# Patient Record
Sex: FEMALE | Race: WHITE | NOT HISPANIC OR LATINO | Employment: OTHER | URBAN - METROPOLITAN AREA
[De-identification: names, ages, dates, MRNs, and addresses within clinical notes are randomized per-mention and may not be internally consistent; named-entity substitution may affect disease eponyms.]

---

## 2017-04-20 ENCOUNTER — OFFICE VISIT (OUTPATIENT)
Dept: MEDICAL GROUP | Facility: MEDICAL CENTER | Age: 62
End: 2017-04-20
Payer: COMMERCIAL

## 2017-04-20 VITALS
HEIGHT: 67 IN | OXYGEN SATURATION: 97 % | DIASTOLIC BLOOD PRESSURE: 70 MMHG | WEIGHT: 124 LBS | HEART RATE: 72 BPM | TEMPERATURE: 99.3 F | BODY MASS INDEX: 19.46 KG/M2 | RESPIRATION RATE: 14 BRPM | SYSTOLIC BLOOD PRESSURE: 114 MMHG

## 2017-04-20 DIAGNOSIS — Z00.00 PREVENTATIVE HEALTH CARE: ICD-10-CM

## 2017-04-20 DIAGNOSIS — E78.01 LDL (LOW DENSITY LIPOPROTEIN RECEPTOR DISORDER): ICD-10-CM

## 2017-04-20 DIAGNOSIS — R53.82 CHRONIC FATIGUE: ICD-10-CM

## 2017-04-20 DIAGNOSIS — R23.8 SCALP IRRITATION: ICD-10-CM

## 2017-04-20 PROCEDURE — 99214 OFFICE O/P EST MOD 30 MIN: CPT | Performed by: PHYSICIAN ASSISTANT

## 2017-04-20 RX ORDER — CETIRIZINE HYDROCHLORIDE 10 MG/1
10 TABLET ORAL
COMMUNITY
End: 2018-06-16

## 2017-04-20 RX ORDER — DIPHENHYDRAMINE HCL 25 MG
25 TABLET ORAL EVERY 6 HOURS PRN
COMMUNITY
End: 2018-06-16

## 2017-04-20 ASSESSMENT — PATIENT HEALTH QUESTIONNAIRE - PHQ9: CLINICAL INTERPRETATION OF PHQ2 SCORE: 0

## 2017-04-20 NOTE — ASSESSMENT & PLAN NOTE
She complains of fatigue for the past 3 months. Symptoms are intermittent throughout the day. She denies any active bleeding such as from her rectum. Denies any depression.

## 2017-04-20 NOTE — ASSESSMENT & PLAN NOTE
This is a 62-year-old female accompanied by her  Jesús. A few months ago her grandchildren were diagnosed with lice. They were treated. She subsequently contracted lice. She went to a lice clinic and was treated and then followed up at the last clinic and was told there were no more active lice. She has continued though to complain of her scalp burning at nighttime. She believes the lice are still there. She has treated herself at least 20 times for lice. She states her  has used a comb to remove little black irregular bodies were checked under microscope but didn't appear to be like lice. She also saw a dermatologist that didn't evaluate her scalp very well but told her that she likely does not have a lice infestation.

## 2017-04-20 NOTE — PROGRESS NOTES
Subjective:   Susy Szymanski is a 62 y.o. female here today for scalp irritation for the past 3 months.    Scalp irritation  This is a 62-year-old female accompanied by her  Jesús. A few months ago her grandchildren were diagnosed with lice. They were treated. She subsequently contracted lice. She went to a lice clinic and was treated and then followed up at the last clinic and was told there were no more active lice. She has continued though to complain of her scalp burning at nighttime. She believes the lice are still there. She has treated herself at least 20 times for lice. She states her  has used a comb to remove little black irregular bodies were checked under microscope but didn't appear to be like lice. She also saw a dermatologist that didn't evaluate her scalp very well but told her that she likely does not have a lice infestation.    Chronic fatigue  She complains of fatigue for the past 3 months. Symptoms are intermittent throughout the day. She denies any active bleeding such as from her rectum. Denies any depression.    LDL (low density lipoprotein receptor disorder)  Last LDL in 2014 was at 137. Her mother has a history of heart disease unfortunately she recently passed away at 89.    Preventative health care  Labs requested.         Current medicines (including changes today)  Current Outpatient Prescriptions   Medication Sig Dispense Refill   • cetirizine (ZYRTEC) 10 MG Tab Take 10 mg by mouth every day.     • diphenhydrAMINE (BENADRYL) 25 MG Tab Take 25 mg by mouth every 6 hours as needed for Sleep.     • ranitidine (ZANTAC) 150 MG Tab Take 150 mg by mouth 2 times a day.     • lorazepam (ATIVAN) 0.5 MG Tab Take 1 Tab by mouth as needed. For sleep 60 Tab 1   • propranolol (INDERAL) 10 MG TABS Take 1 Tab by mouth every 6 hours as needed (for palpitations). 60 Tab 3     No current facility-administered medications for this visit.     She  has a past medical history of Anxiety; GERD  "(gastroesophageal reflux disease); Depression; and ASTHMA.    ROS   No chest pain, no shortness of breath, no abdominal pain and all other systems were reviewed and are negative.       Objective:     Blood pressure 114/70, pulse 72, temperature 37.4 °C (99.3 °F), resp. rate 14, height 1.702 m (5' 7\"), weight 56.246 kg (124 lb), last menstrual period 01/01/2004, SpO2 97 %. Body mass index is 19.42 kg/(m^2).   Physical Exam:  Constitutional: Alert, no distress.  Skin: Warm, dry, good turgor, no rashes in visible areas. Scalp without any dryness or lesions noted. No nits noted.  Eye: Equal, round and reactive, conjunctiva clear, lids normal.  ENMT: Lips without lesions, good dentition, oropharynx clear.  Neck: Trachea midline, no masses.   Lymph: No cervical or supraclavicular lymphadenopathy  Respiratory: Unlabored respiratory effort, lungs appear clear, no wheezes.  Cardiovascular: Regular rate and rhythm.  Psych: Alert and oriented x3, normal affect and mood.        Assessment and Plan:   The following treatment plan was discussed    1. Chronic fatigue  New-onset condition. Will rule out thyroid condition and anemia. Ordered labs fasting. She will be contacted with results.  - TSH+FREE T4  - CBC WITH DIFFERENTIAL; Future    2. Scalp irritation  Unable to find any suggestion of lice of her scalp. Advised no more treatments are necessary. I'm concerned the scalp burning may be secondary to anxiety. Advised to contact me if she wants to be referred to behavioral health. We'll hold off on any medications such as Lyrica or Neurontin.  - CBC WITH DIFFERENTIAL  - HEMOGLOBIN A1C; Future  - WESTERGREN SED RATE; Future    3. LDL (low density lipoprotein receptor disorder)  Last lab performed in 2014. Repeat lipid profile.  - LIPID PROFILE; Future    4. Preventative health care  Ordered hemoglobin A1c and CMP.      Followup: Return if symptoms worsen or fail to improve.    Please note that this dictation was created using " voice recognition software. I have made every reasonable attempt to correct obvious errors, but I expect that there are errors of grammar and possibly content that I did not discover before finalizing the note.

## 2017-04-20 NOTE — ASSESSMENT & PLAN NOTE
Last LDL in 2014 was at 137. Her mother has a history of heart disease unfortunately she recently passed away at 89.

## 2017-04-25 ENCOUNTER — HOSPITAL ENCOUNTER (OUTPATIENT)
Dept: LAB | Facility: MEDICAL CENTER | Age: 62
End: 2017-04-25
Attending: PHYSICIAN ASSISTANT
Payer: COMMERCIAL

## 2017-04-25 DIAGNOSIS — R53.82 CHRONIC FATIGUE: ICD-10-CM

## 2017-04-25 DIAGNOSIS — R23.8 SCALP IRRITATION: ICD-10-CM

## 2017-04-25 DIAGNOSIS — E78.01 LDL (LOW DENSITY LIPOPROTEIN RECEPTOR DISORDER): ICD-10-CM

## 2017-04-25 DIAGNOSIS — Z00.00 PREVENTATIVE HEALTH CARE: ICD-10-CM

## 2017-04-25 LAB
ALBUMIN SERPL BCP-MCNC: 4.2 G/DL (ref 3.2–4.9)
ALBUMIN/GLOB SERPL: 1.7 G/DL
ALP SERPL-CCNC: 62 U/L (ref 30–99)
ALT SERPL-CCNC: 16 U/L (ref 2–50)
ANION GAP SERPL CALC-SCNC: 6 MMOL/L (ref 0–11.9)
AST SERPL-CCNC: 22 U/L (ref 12–45)
BASOPHILS # BLD AUTO: 0.8 % (ref 0–1.8)
BASOPHILS # BLD: 0.03 K/UL (ref 0–0.12)
BILIRUB SERPL-MCNC: 0.5 MG/DL (ref 0.1–1.5)
BUN SERPL-MCNC: 16 MG/DL (ref 8–22)
CALCIUM SERPL-MCNC: 9.6 MG/DL (ref 8.5–10.5)
CHLORIDE SERPL-SCNC: 102 MMOL/L (ref 96–112)
CHOLEST SERPL-MCNC: 238 MG/DL (ref 100–199)
CO2 SERPL-SCNC: 29 MMOL/L (ref 20–33)
CREAT SERPL-MCNC: 0.68 MG/DL (ref 0.5–1.4)
EOSINOPHIL # BLD AUTO: 0.05 K/UL (ref 0–0.51)
EOSINOPHIL NFR BLD: 1.3 % (ref 0–6.9)
ERYTHROCYTE [DISTWIDTH] IN BLOOD BY AUTOMATED COUNT: 44.5 FL (ref 35.9–50)
ERYTHROCYTE [SEDIMENTATION RATE] IN BLOOD BY WESTERGREN METHOD: 7 MM/HOUR (ref 0–30)
EST. AVERAGE GLUCOSE BLD GHB EST-MCNC: 103 MG/DL
GFR SERPL CREATININE-BSD FRML MDRD: >60 ML/MIN/1.73 M 2
GLOBULIN SER CALC-MCNC: 2.5 G/DL (ref 1.9–3.5)
GLUCOSE SERPL-MCNC: 88 MG/DL (ref 65–99)
HBA1C MFR BLD: 5.2 % (ref 0–5.6)
HCT VFR BLD AUTO: 43.6 % (ref 37–47)
HDLC SERPL-MCNC: 81 MG/DL
HGB BLD-MCNC: 14 G/DL (ref 12–16)
IMM GRANULOCYTES # BLD AUTO: 0.01 K/UL (ref 0–0.11)
IMM GRANULOCYTES NFR BLD AUTO: 0.3 % (ref 0–0.9)
LDLC SERPL CALC-MCNC: 144 MG/DL
LYMPHOCYTES # BLD AUTO: 1.13 K/UL (ref 1–4.8)
LYMPHOCYTES NFR BLD: 28.8 % (ref 22–41)
MCH RBC QN AUTO: 29.7 PG (ref 27–33)
MCHC RBC AUTO-ENTMCNC: 32.1 G/DL (ref 33.6–35)
MCV RBC AUTO: 92.4 FL (ref 81.4–97.8)
MONOCYTES # BLD AUTO: 0.32 K/UL (ref 0–0.85)
MONOCYTES NFR BLD AUTO: 8.2 % (ref 0–13.4)
NEUTROPHILS # BLD AUTO: 2.38 K/UL (ref 2–7.15)
NEUTROPHILS NFR BLD: 60.6 % (ref 44–72)
NRBC # BLD AUTO: 0 K/UL
NRBC BLD AUTO-RTO: 0 /100 WBC
PLATELET # BLD AUTO: 216 K/UL (ref 164–446)
PMV BLD AUTO: 10.2 FL (ref 9–12.9)
POTASSIUM SERPL-SCNC: 4.6 MMOL/L (ref 3.6–5.5)
PROT SERPL-MCNC: 6.7 G/DL (ref 6–8.2)
RBC # BLD AUTO: 4.72 M/UL (ref 4.2–5.4)
SODIUM SERPL-SCNC: 137 MMOL/L (ref 135–145)
T4 FREE SERPL-MCNC: 0.88 NG/DL (ref 0.53–1.43)
TRIGL SERPL-MCNC: 66 MG/DL (ref 0–149)
TSH SERPL DL<=0.005 MIU/L-ACNC: 1.07 UIU/ML (ref 0.3–3.7)
WBC # BLD AUTO: 3.9 K/UL (ref 4.8–10.8)

## 2017-04-25 PROCEDURE — 85025 COMPLETE CBC W/AUTO DIFF WBC: CPT

## 2017-04-25 PROCEDURE — 85652 RBC SED RATE AUTOMATED: CPT

## 2017-04-25 PROCEDURE — 84439 ASSAY OF FREE THYROXINE: CPT

## 2017-04-25 PROCEDURE — 83036 HEMOGLOBIN GLYCOSYLATED A1C: CPT

## 2017-04-25 PROCEDURE — 36415 COLL VENOUS BLD VENIPUNCTURE: CPT

## 2017-04-25 PROCEDURE — 80053 COMPREHEN METABOLIC PANEL: CPT

## 2017-04-25 PROCEDURE — 80061 LIPID PANEL: CPT

## 2017-04-25 PROCEDURE — 84443 ASSAY THYROID STIM HORMONE: CPT

## 2017-04-27 ENCOUNTER — TELEPHONE (OUTPATIENT)
Dept: MEDICAL GROUP | Facility: MEDICAL CENTER | Age: 62
End: 2017-04-27

## 2017-04-27 DIAGNOSIS — R79.89 ABNORMAL CBC: ICD-10-CM

## 2017-04-27 NOTE — TELEPHONE ENCOUNTER
----- Message from Roberto Ambriz PA-C sent at 4/27/2017  7:48 AM PDT -----  Please contact Susy.  Labs were good.  Cholesterol was high.  LDL, bad cholesterol, was 144.  Was 137 two years ago.  Continue to exercise routinely.  Continue to eat healthier.  White blood cell count was a little low.  No real concern but would like to repeat in one month non-fasting.  Thank you.    Roberto

## 2017-04-27 NOTE — TELEPHONE ENCOUNTER
Phone Number Called: [phone] (home) [phone] (work)    Message: Spoke with patient and informed her of msg below.     Left Message for patient to call back: N\A

## 2017-05-03 ENCOUNTER — OFFICE VISIT (OUTPATIENT)
Dept: MEDICAL GROUP | Facility: PHYSICIAN GROUP | Age: 62
End: 2017-05-03
Payer: COMMERCIAL

## 2017-05-03 VITALS
OXYGEN SATURATION: 97 % | SYSTOLIC BLOOD PRESSURE: 118 MMHG | RESPIRATION RATE: 12 BRPM | HEIGHT: 67 IN | HEART RATE: 66 BPM | BODY MASS INDEX: 19.93 KG/M2 | WEIGHT: 127 LBS | TEMPERATURE: 97.9 F | DIASTOLIC BLOOD PRESSURE: 60 MMHG

## 2017-05-03 DIAGNOSIS — Z00.00 PREVENTATIVE HEALTH CARE: ICD-10-CM

## 2017-05-03 DIAGNOSIS — F40.10 SOCIAL ANXIETY DISORDER: ICD-10-CM

## 2017-05-03 DIAGNOSIS — Z82.49 FAMILY HISTORY OF ISCHEMIC HEART DISEASE BEFORE AGE 50: ICD-10-CM

## 2017-05-03 DIAGNOSIS — E78.01 LDL (LOW DENSITY LIPOPROTEIN RECEPTOR DISORDER): ICD-10-CM

## 2017-05-03 DIAGNOSIS — D72.819 LEUKOPENIA, UNSPECIFIED TYPE: ICD-10-CM

## 2017-05-03 DIAGNOSIS — Z20.7 EXPOSURE TO HEAD LICE: ICD-10-CM

## 2017-05-03 DIAGNOSIS — F41.9 ANXIETY: ICD-10-CM

## 2017-05-03 PROCEDURE — 99215 OFFICE O/P EST HI 40 MIN: CPT | Performed by: FAMILY MEDICINE

## 2017-05-03 RX ORDER — LORAZEPAM 0.5 MG/1
0.5 TABLET ORAL EVERY 8 HOURS PRN
Qty: 60 TAB | Refills: 1 | Status: SHIPPED | OUTPATIENT
Start: 2017-05-03 | End: 2018-06-16

## 2017-05-03 NOTE — PROGRESS NOTES
"Patient comes in with numerous issues. She recently had lab work done and wants to go over those results with me. She is particularly concerned about her elevated LDL. She is not overweight and she is very careful about what she eats. She is not sure what else she can do. I'm going to send her to our dietitian for help with this.  The patient is going to be flying to [place] later this summer for a vacation with her . She is afraid of flying and needs a refill of lorazepam which she uses with good results for her anxiety. Anxiety has been a long-term problem for her.  The patient says that her grandchild came and visited her and after the grandchild left she was informed that he had had head lice. This was when they were in [place] for the winter. The patient went to a head lice clinic and was treated with \"some medicine that was very expensive and they told me I only had to take it once\"--- probably ivermectin. She has used permethrin and  also used an oil treatment on her hair. She would like me to check her for head lice.  Her son who is diabetic and used to smoke had an MI at age 42. The patient wants to be checked for coronary artery risk factors. She does not smoke and she is kept herself in very good condition. She exercises regularly.      Review of Systems   Constitutional: Negative.  Negative for fever, chills, weight loss and malaise/fatigue.   HENT: Negative for hearing loss, ear pain, congestion, sore throat, neck pain and tinnitus.    Eyes: Negative for blurred vision, double vision and pain.   Respiratory: Negative for cough, hemoptysis, shortness of breath and wheezing.    Cardiovascular: Negative for chest pain, palpitations, orthopnea, claudication, leg swelling and PND.   Gastrointestinal: Negative for heartburn, nausea, vomiting, abdominal pain, diarrhea, constipation, blood in stool and melena.   Genitourinary: Negative for incontinence, dysuria, urgency, frequency and hematuria. Female-- " Negative for pelvic pain, vaginal discharge, or abnormal bleeding. No breast lumps, or masses, nipple bleeding or discharge.   Musculoskeletal: Negative for myalgias, back pain and joint pain.   Skin: Negative for rash and itching. No lesions that will not heal.  Neurological: Negative for dizziness, tingling, tremors, focal weakness, seizures, loss of consciousness and headaches.   Endo/Heme/Allergies: Negative for environmental allergies and polydipsia.  Does not bruise/bleed easily.   Psychiatric/Behavioral: Negative for depression, memory loss and substance abuse.  The patient is nervous/anxious and does not have insomnia.  All others negative.     I reviewed the following    Past Medical History   Diagnosis Date   • Anxiety    • GERD (gastroesophageal reflux disease)    • Depression    • ASTHMA         Past Surgical History   Procedure Laterality Date   • Egd with asp/bx  September 2006     inflammation only ---Dr Paul   • Cholecystectomy  January 2008     laparoscopic   • Colonoscopy with polyp  June 2008     benign polyp  Dr Paul    • Sinusotomies     • Cholecystectomy         Allergies   Allergen Reactions   • Amitriptyline      Excessive sedation --even at 10 mg dose   • Bactrim [Sulfamethoxazole W-Trimethoprim]      Shaking   • Levaquin      Nausea    • Lovastatin      Muscle aches     • Trazodone      Irregular pulse       Current Outpatient Prescriptions   Medication Sig Dispense Refill   • lorazepam (ATIVAN) 0.5 MG Tab Take 1 Tab by mouth every 8 hours as needed for Anxiety. 60 Tab 1   • cetirizine (ZYRTEC) 10 MG Tab Take 10 mg by mouth every day.     • diphenhydrAMINE (BENADRYL) 25 MG Tab Take 25 mg by mouth every 6 hours as needed for Sleep.     • ranitidine (ZANTAC) 150 MG Tab Take 150 mg by mouth 2 times a day.     • propranolol (INDERAL) 10 MG TABS Take 1 Tab by mouth every 6 hours as needed (for palpitations). 60 Tab 3     No current facility-administered medications for this visit.         Family History   Problem Relation Age of Onset   • Cancer Father 70     cancer of unknown primary   • Heart Disease Mother    • Diabetes Sister    • Heart Disease Son 42     MI and coronary stent placement   • Diabetes Son    • Hypertension Son        Social History     Social History   • Marital Status:      Spouse Name: N/A   • Number of Children: N/A   • Years of Education: N/A     Occupational History   • retired      Social History Main Topics   • Smoking status: Never Smoker    • Smokeless tobacco: Never Used   • Alcohol Use: No   • Drug Use: No   • Sexual Activity:     Partners: Male     Birth Control/ Protection: Post-Menopausal     Other Topics Concern   • Not on file     Social History Narrative      Hospital Outpatient Visit on 04/25/2017   Component Date Value   • Sed Rate Westergren 04/25/2017 7    • Cholesterol,Tot 04/25/2017 238*   • Triglycerides 04/25/2017 66    • HDL 04/25/2017 81    • LDL 04/25/2017 144*   • Sodium 04/25/2017 137    • Potassium 04/25/2017 4.6    • Chloride 04/25/2017 102    • Co2 04/25/2017 29    • Anion Gap 04/25/2017 6.0    • Glucose 04/25/2017 88    • Bun 04/25/2017 16    • Creatinine 04/25/2017 0.68    • Calcium 04/25/2017 9.6    • AST(SGOT) 04/25/2017 22    • ALT(SGPT) 04/25/2017 16    • Alkaline Phosphatase 04/25/2017 62    • Total Bilirubin 04/25/2017 0.5    • Albumin 04/25/2017 4.2    • Total Protein 04/25/2017 6.7    • Globulin 04/25/2017 2.5    • A-G Ratio 04/25/2017 1.7    • Glycohemoglobin 04/25/2017 5.2    • Est Avg Glucose 04/25/2017 103    • TSH 04/25/2017 1.070    • Free T-4 04/25/2017 0.88    • WBC 04/25/2017 3.9*   • RBC 04/25/2017 4.72    • Hemoglobin 04/25/2017 14.0    • Hematocrit 04/25/2017 43.6    • MCV 04/25/2017 92.4    • MCH 04/25/2017 29.7    • MCHC 04/25/2017 32.1*   • RDW 04/25/2017 44.5    • Platelet Count 04/25/2017 216    • MPV 04/25/2017 10.2    • Neutrophils-Polys 04/25/2017 60.60    • Lymphocytes 04/25/2017 28.80    • Monocytes  04/25/2017 8.20    • Eosinophils 04/25/2017 1.30    • Basophils 04/25/2017 0.80    • Immature Granulocytes 04/25/2017 0.30    • Nucleated RBC 04/25/2017 0.00    • Neutrophils (Absolute) 04/25/2017 2.38    • Lymphs (Absolute) 04/25/2017 1.13    • Monos (Absolute) 04/25/2017 0.32    • Eos (Absolute) 04/25/2017 0.05    • Baso (Absolute) 04/25/2017 0.03    • Immature Granulocytes (a* 04/25/2017 0.01    • NRBC (Absolute) 04/25/2017 0.00    • GFR If  04/25/2017 >60    • GFR If Non  Ameri* 04/25/2017 >60      Physical Exam   Constitutional: She is oriented. She appears well-developed and well-nourished. No distress.   HENT:  Head: Normocephalic and atraumatic.   Right Ear: External ear normal. Ear canal and TM normal   Left Ear: External ear normal. Ear canal and TM normal  Nose: Nose normal.   Mouth/Throat: Oropharynx is clear and moist.   Eyes: Conjunctivae and extraocular motions are normal. Pupils are equal, round, and reactive to light. Fundi benign bilaterally   Neck: No thyromegaly present.   Cardiovascular: Normal rate, regular rhythm, normal heart sounds and intact distal pulses.  Exam reveals no gallop.    No murmur heard.  Pulmonary/Chest: Effort normal and breath sounds normal. No respiratory distress. She has no wheezes. She has no rales.   Abdominal: Soft. Bowel sounds are normal. No hepatosplenomegaly She exhibits no distension. No tenderness. She has no rebound and no guarding.   Musculoskeletal: Normal range of motion. She exhibits no edema and no tenderness.   Lymphadenopathy:     She has no cervical adenopathy.   No supraclavicular adenopathy  Neurological: She is alert and oriented. She has normal reflexes.        Babinskis downgoing bilaterally   Skin: Careful exam of her hair under a bright light shows no evidence of nits or lice. I see no bites on her scalp. Skin is warm and dry. No rash noted. No erythema.   Psychiatric: She has a normal mood and appropriate affect. Her  behavior is normal. Judgment and thought content normal.     1. LDL (low density lipoprotein receptor disorder)  CT-CARDIAC SCORING    REFERRAL TO [facility] (HIP) Services Requested:: Registered Dietitian for Medical Nutrition Therapy; Reason for Visit:: Medical Condition Requiring Nutrition Counseling; Other:: Elevated LDL and family history of heart diseas    COMP METABOLIC PANEL    LIPID PROFILE  These tests will be done in 2 months    2. Preventative health care  CBC WITH DIFFERENTIAL--- I reviewed her low white count. She's had this intermittently since at least 2013. I'm not really worried about it.     COMP METABOLIC PANEL    LIPID PROFILE  The above tests to be done in 2 months    3. Leukopenia, unspecified type   intermittent. I'm not worried about it    4. Family history of ischemic heart disease before age 50  CT-CARDIAC SCORING    REFERRAL TO [facility] (HIP) Services Requested:: Registered Dietitian for Medical Nutrition Therapy; Reason for Visit:: Medical Condition Requiring Nutrition Counseling; Other:: Elevated LDL and family history of heart diseas    COMP METABOLIC PANEL    LIPID PROFILE    Son--is Diabetic and used to smoke   5.  social anxiety disorder   lorazepam 0.5 mg refill    6. Anxiety  lorazepam (ATIVAN) 0.5 MG Tab   7. Exposure to head lice   I reassured the patient that I didn't see any evidence of head lice at present.      I spent 50 minutes with this patient of which 28 minutes was involved in answering her questions about LDL, diet, leukopenia, a family history of heart disease in her son, anxiety, and exposure to head lice.    Please note that this dictation was created using voice recognition software. I have worked with consultants from the vendor as well as technical experts from [facility] to optimize the interface. I have made every reasonable attempt to correct obvious errors, but I expect that there are errors of grammar and  possibly content that I did not discover before finalizing the note.

## 2017-05-03 NOTE — PATIENT INSTRUCTIONS
Patient given written instructions regarding labs, medications, referrals, dietary and lifestyle management, and return visit.    Shaheen Gutierrez MD

## 2017-05-03 NOTE — MR AVS SNAPSHOT
"        Susy Sunshine Stephon   5/3/2017 8:20 AM   Office Visit   MRN: [id number]    Department:  [facility]   Dept Phone:  [phone]    Description:  Female : 1955   Provider:  Shaheen Gutierrez M.D.           Reason for Visit     Follow-Up           Allergies as of 5/3/2017     Allergen Noted Reactions    Amitriptyline 2011       Excessive sedation --even at 10 mg dose    Bactrim [Sulfamethoxazole W-Trimethoprim] 2012       Shaking    Levaquin 2010       Nausea     Lovastatin 2011       Muscle aches      Trazodone 2011       Irregular pulse      You were diagnosed with     LDL (low density lipoprotein receptor disorder)   [518002]       Preventative health care   [367909]       Leukopenia, unspecified type   [6495326]       Family history of ischemic heart disease before age 50   [3966769]   Son--is Diabetic and used to smoke    Social anxiety disorder   [849379]       Anxiety   [299869]       Exposure to head lice   [316152]         Vital Signs     Blood Pressure Pulse Temperature Respirations Height Weight    118/60 mmHg 66 36.6 °C (97.9 °F) 12 1.702 m (5' 7\") 57.607 kg (127 lb)    Body Mass Index Oxygen Saturation Last Menstrual Period Smoking Status          19.89 kg/m2 97% 2004 Never Smoker         Basic Information     Date Of Birth Sex Race Ethnicity Preferred Language    1955 Female White Non- English      Problem List              ICD-10-CM Priority Class Noted - Resolved    Social anxiety disorder F40.10   2011 - Present    LDL (low density lipoprotein receptor disorder) E78.01   2011 - Present    History of colonic polyps Z86.010   2012 - Present    OSTEOPOROSIS    2012 - Present    Atrial premature complexes I49.1 Medium  2013 - Present    Chronic fatigue R53.82   2017 - Present    Scalp irritation R23.8   2017 - Present    Preventative health care Z00.00   2017 - Present      Health Maintenance       " Date Due Completion Dates    IMM ZOSTER VACCINE 3/2/2013 ---    MAMMOGRAM 4/20/2018 (Originally 3/2/1993) ---    PAP SMEAR 4/20/2018 (Originally 3/2/1974) ---    COLONOSCOPY 9/10/2018 9/10/2013, 9/10/2013    IMM DTaP/Tdap/Td Vaccine (2 - Td) 5/15/2024 5/15/2014            Current Immunizations     Influenza TIV (IM) 1/8/2017, 10/10/2014    Influenza Vaccine Adult HD 10/28/2015    Tdap Vaccine 5/15/2014      Below and/or attached are the medications your provider expects you to take. Review all of your home medications and newly ordered medications with your provider and/or pharmacist. Follow medication instructions as directed by your provider and/or pharmacist. Please keep your medication list with you and share with your provider. Update the information when medications are discontinued, doses are changed, or new medications (including over-the-counter products) are added; and carry medication information at all times in the event of emergency situations     Allergies:  AMITRIPTYLINE - (reactions not documented)     BACTRIM - (reactions not documented)     LEVAQUIN - (reactions not documented)     LOVASTATIN - (reactions not documented)     TRAZODONE - (reactions not documented)               Medications  Valid as of: May 03, 2017 -  9:23 AM    Generic Name Brand Name Tablet Size Instructions for use    Cetirizine HCl (Tab) ZYRTEC 10 MG Take 10 mg by mouth every day.        DiphenhydrAMINE HCl (Tab) BENADRYL 25 MG Take 25 mg by mouth every 6 hours as needed for Sleep.        LORazepam (Tab) ATIVAN 0.5 MG Take 1 Tab by mouth every 8 hours as needed for Anxiety.        Propranolol HCl (Tab) INDERAL 10 MG Take 1 Tab by mouth every 6 hours as needed (for palpitations).        RaNITidine HCl (Tab) ZANTAC 150 MG Take 150 mg by mouth 2 times a day.        .                 Medicines prescribed today were sent to:     [id number] - [place] - [address]    [address] [place] [address]    Phone:  [phone] Fax: [phone]    Open 24 Hours?: No      Medication refill instructions:       If your prescription bottle indicates you have medication refills left, it is not necessary to call your provider’s office. Please contact your pharmacy and they will refill your medication.    If your prescription bottle indicates you do not have any refills left, you may request refills at any time through one of the following ways: The online [employer] system (except Urgent Care), by calling your provider’s office, or by asking your pharmacy to contact your provider’s office with a refill request. Medication refills are processed only during regular business hours and may not be available until the next business day. Your provider may request additional information or to have a follow-up visit with you prior to refilling your medication.   *Please Note: Medication refills are assigned a new Rx number when refilled electronically. Your pharmacy may indicate that no refills were authorized even though a new prescription for the same medication is available at the pharmacy. Please request the medicine by name with the pharmacy before contacting your provider for a refill.        Your To Do List     Future Labs/Procedures Complete By Expires    CBC WITH DIFFERENTIAL  As directed 5/4/2018    COMP METABOLIC PANEL  As directed 5/4/2018    CT-CARDIAC SCORING  As directed 5/3/2018    LIPID PROFILE  As directed 5/4/2018      Referral     A referral request has been sent to our patient care coordination department. Please allow 3-5 business days for us to process this request and contact you either by phone or mail. If you do not hear from us by the 5th business day, please call us at [phone].           [employer] Access Code: Activation code not generated  Current [employer] Status: Active

## 2017-05-03 NOTE — Clinical Note
May 3, 2017        Patient: Susy Szymanski   YOB: 1955   Date of Visit: 5/3/2017           To Whom It May Concern:    It is my medical opinion that Susy Szymanski takes Lorazepam 0.5 mg on an as needed basis as prescribed by me for anxiety.    If you have any questions or concerns, please don't hesitate to call.        Sincerely,          Shaheen Gutierrez M.D.  Electronically Signed    [address] [address] [phone] (Phone)  [phone] (Fax)

## 2017-05-09 ENCOUNTER — HOSPITAL ENCOUNTER (OUTPATIENT)
Dept: RADIOLOGY | Facility: MEDICAL CENTER | Age: 62
End: 2017-05-09
Attending: FAMILY MEDICINE
Payer: COMMERCIAL

## 2017-05-09 DIAGNOSIS — Z82.49 FAMILY HISTORY OF ISCHEMIC HEART DISEASE BEFORE AGE 50: ICD-10-CM

## 2017-05-09 DIAGNOSIS — E78.01 LDL (LOW DENSITY LIPOPROTEIN RECEPTOR DISORDER): ICD-10-CM

## 2017-05-09 PROCEDURE — 4410556 CT-CARDIAC SCORING

## 2017-05-28 ENCOUNTER — APPOINTMENT (OUTPATIENT)
Dept: RADIOLOGY | Facility: IMAGING CENTER | Age: 62
End: 2017-05-28
Attending: PHYSICIAN ASSISTANT
Payer: COMMERCIAL

## 2017-05-28 ENCOUNTER — OFFICE VISIT (OUTPATIENT)
Dept: URGENT CARE | Facility: CLINIC | Age: 62
End: 2017-05-28
Payer: COMMERCIAL

## 2017-05-28 VITALS
OXYGEN SATURATION: 100 % | BODY MASS INDEX: 19.46 KG/M2 | DIASTOLIC BLOOD PRESSURE: 68 MMHG | SYSTOLIC BLOOD PRESSURE: 116 MMHG | HEART RATE: 55 BPM | TEMPERATURE: 98.3 F | RESPIRATION RATE: 14 BRPM | WEIGHT: 124 LBS | HEIGHT: 67 IN

## 2017-05-28 DIAGNOSIS — S99.921A INJURY OF TOE ON RIGHT FOOT, INITIAL ENCOUNTER: ICD-10-CM

## 2017-05-28 DIAGNOSIS — S92.421A CLOSED DISPLACED FRACTURE OF DISTAL PHALANX OF RIGHT GREAT TOE, INITIAL ENCOUNTER: ICD-10-CM

## 2017-05-28 PROCEDURE — 99214 OFFICE O/P EST MOD 30 MIN: CPT | Performed by: PHYSICIAN ASSISTANT

## 2017-05-28 PROCEDURE — 73660 X-RAY EXAM OF TOE(S): CPT | Mod: TC | Performed by: PHYSICIAN ASSISTANT

## 2017-05-28 ASSESSMENT — PAIN SCALES - GENERAL: PAINLEVEL: 5=MODERATE PAIN

## 2017-05-28 ASSESSMENT — ENCOUNTER SYMPTOMS
CONSTITUTIONAL NEGATIVE: 1
BRUISES/BLEEDS EASILY: 0
NEUROLOGICAL NEGATIVE: 1

## 2017-05-28 NOTE — MR AVS SNAPSHOT
"        Susy Szymanski   2017 12:45 PM   Office Visit   MRN: [id number]    Department:  [facility]   Dept Phone:  [phone]    Description:  Female : 1955   Provider:  Clary Fernandez PA-C           Reason for Visit     Toe Injury Rt great toe      Allergies as of 2017     Allergen Noted Reactions    Amitriptyline 2011       Excessive sedation --even at 10 mg dose    Bactrim [Sulfamethoxazole W-Trimethoprim] 2012       Shaking    Levaquin 2010       Nausea     Lovastatin 2011       Muscle aches      Trazodone 2011       Irregular pulse      You were diagnosed with     Injury of toe on right foot, initial encounter   [[id number]]         Vital Signs     Blood Pressure Pulse Temperature Respirations Height Weight    116/68 mmHg 55 36.8 °C (98.3 °F) 14 1.702 m (5' 7\") 56.246 kg (124 lb)    Body Mass Index Oxygen Saturation Last Menstrual Period Breastfeeding? Smoking Status       19.42 kg/m2 100% 2004 No Never Smoker        Basic Information     Date Of Birth Sex Race Ethnicity Preferred Language    1955 Female White Non- English      Problem List              ICD-10-CM Priority Class Noted - Resolved    Social anxiety disorder F40.10   2011 - Present    LDL (low density lipoprotein receptor disorder) E78.01   2011 - Present    History of colonic polyps Z86.010   2012 - Present    OSTEOPOROSIS    2012 - Present    Atrial premature complexes I49.1 Medium  2013 - Present    Chronic fatigue R53.82   2017 - Present    Scalp irritation R23.8   2017 - Present    Preventative health care Z00.00   2017 - Present      Health Maintenance        Date Due Completion Dates    IMM ZOSTER VACCINE 3/2/2013 ---    MAMMOGRAM 2018 (Originally 3/2/1993) ---    PAP SMEAR 2018 (Originally 3/2/1974) ---    COLONOSCOPY 9/10/2018 9/10/2013, 9/10/2013    IMM DTaP/Tdap/Td Vaccine (2 - Td) 5/15/2024 5/15/2014            "   Current Immunizations     Influenza TIV (IM) 1/8/2017, 10/10/2014    Influenza Vaccine Adult HD 10/28/2015    Tdap Vaccine 5/15/2014      Below and/or attached are the medications your provider expects you to take. Review all of your home medications and newly ordered medications with your provider and/or pharmacist. Follow medication instructions as directed by your provider and/or pharmacist. Please keep your medication list with you and share with your provider. Update the information when medications are discontinued, doses are changed, or new medications (including over-the-counter products) are added; and carry medication information at all times in the event of emergency situations     Allergies:  AMITRIPTYLINE - (reactions not documented)     BACTRIM - (reactions not documented)     LEVAQUIN - (reactions not documented)     LOVASTATIN - (reactions not documented)     TRAZODONE - (reactions not documented)               Medications  Valid as of: May 28, 2017 -  2:00 PM    Generic Name Brand Name Tablet Size Instructions for use    Cetirizine HCl (Tab) ZYRTEC 10 MG Take 10 mg by mouth every day.        DiphenhydrAMINE HCl (Tab) BENADRYL 25 MG Take 25 mg by mouth every 6 hours as needed for Sleep.        LORazepam (Tab) ATIVAN 0.5 MG Take 1 Tab by mouth every 8 hours as needed for Anxiety.        Propranolol HCl (Tab) INDERAL 10 MG Take 1 Tab by mouth every 6 hours as needed (for palpitations).        RaNITidine HCl (Tab) ZANTAC 150 MG Take 150 mg by mouth 2 times a day.        .                 Medicines prescribed today were sent to:     [id number] - [place] - [address]    [address] [address]    Phone: [phone] Fax: [phone]    Open 24 Hours?: No      Medication refill instructions:       If your prescription bottle indicates you have medication refills left, it is not necessary to call your provider’s office. Please contact your pharmacy and they will refill your  medication.    If your prescription bottle indicates you do not have any refills left, you may request refills at any time through one of the following ways: The online [employer] system (except Urgent Care), by calling your provider’s office, or by asking your pharmacy to contact your provider’s office with a refill request. Medication refills are processed only during regular business hours and may not be available until the next business day. Your provider may request additional information or to have a follow-up visit with you prior to refilling your medication.   *Please Note: Medication refills are assigned a new Rx number when refilled electronically. Your pharmacy may indicate that no refills were authorized even though a new prescription for the same medication is available at the pharmacy. Please request the medicine by name with the pharmacy before contacting your provider for a refill.        Your To Do List     Future Labs/Procedures Complete By Expires    DX-TOE(S) 2+ RIGHT  As directed 5/28/2018         [employer] Access Code: Activation code not generated  Current [employer] Status: Active

## 2017-05-29 NOTE — PROGRESS NOTES
Subjective:      Susy Szymanski is a 62 y.o. female who presents with Toe Injury        Toe Injury       Patient presents with about a 1 day history of right great toe injury. She states she was wearing pajama bottoms that were too long and she got up she tripped and hyperflexed her toe and felt immediate and immense pain.  This morning she woke up to swelling and bruising.  She was able to ambulate but there was some pain.  She denies previous trauma to this digit.  Did do some icing but not much help.  Some tinging at the tip of the toe.     Review of Systems   Constitutional: Negative.    Musculoskeletal:        SEE HPI   Skin: Negative.    Neurological: Negative.    Endo/Heme/Allergies: Does not bruise/bleed easily.   All other systems reviewed and are negative.       PMH:  has a past medical history of Anxiety; GERD (gastroesophageal reflux disease); Depression; and ASTHMA.  MEDS:   Current outpatient prescriptions:   •  lorazepam (ATIVAN) 0.5 MG Tab, Take 1 Tab by mouth every 8 hours as needed for Anxiety., Disp: 60 Tab, Rfl: 1  •  cetirizine (ZYRTEC) 10 MG Tab, Take 10 mg by mouth every day., Disp: , Rfl:   •  diphenhydrAMINE (BENADRYL) 25 MG Tab, Take 25 mg by mouth every 6 hours as needed for Sleep., Disp: , Rfl:   •  ranitidine (ZANTAC) 150 MG Tab, Take 150 mg by mouth 2 times a day., Disp: , Rfl:   •  propranolol (INDERAL) 10 MG TABS, Take 1 Tab by mouth every 6 hours as needed (for palpitations)., Disp: 60 Tab, Rfl: 3  ALLERGIES:   Allergies   Allergen Reactions   • Amitriptyline      Excessive sedation --even at 10 mg dose   • Bactrim [Sulfamethoxazole W-Trimethoprim]      Shaking   • Levaquin      Nausea    • Lovastatin      Muscle aches     • Trazodone      Irregular pulse     SURGHX:   Past Surgical History   Procedure Laterality Date   • Egd with asp/bx  September 2006     inflammation only ---Dr Paul   • Cholecystectomy  January 2008     laparoscopic   • Colonoscopy with polyp  June 2008      "benign polyp  Dr Paul    • Sinusotomies     • Cholecystectomy       SOCHX:  reports that she has never smoked. She has never used smokeless tobacco. She reports that she does not drink alcohol or use illicit drugs.  FH: Family history was reviewed, no pertinent findings to report     Objective:     /68 mmHg  Pulse 55  Temp(Src) 36.8 °C (98.3 °F)  Resp 14  Ht 1.702 m (5' 7\")  Wt 56.246 kg (124 lb)  BMI 19.42 kg/m2  SpO2 100%  LMP 01/01/2004  Breastfeeding? No     Physical Exam   Constitutional: She is oriented to person, place, and time. She appears well-developed and well-nourished. No distress.   Cardiovascular: Normal rate.    Pulmonary/Chest: Effort normal.   Musculoskeletal:        Right foot: There is no deformity and no laceration.        Feet:    Neurological: She is alert and oriented to person, place, and time.   Skin: Skin is warm and dry. No rash noted.   Psychiatric: She has a normal mood and affect. Her behavior is normal.   Vitals reviewed.       RAD    Impression        1.  There is a dorsal intra-articular avulsion of the base of the right 1st distal phalanx.         Reading Provider Reading Date     Dee Morfin M.D. May 28, 2017        Assessment/Plan:     1. Injury of toe on right foot, initial encounter  DX-TOE(S) 2+ RIGHT   2. Closed displaced fracture of distal phalanx of right great toe, initial encounter  REFERRAL TO ORTHOPEDICS       -ale taped and placed in post-op shoe for support.  Encouraged non-weight bearing as much as possible for now.   -referral to ortho for follow up in next several days discussed.  Patient is already established with EFREN  -elevate, icing.  Declines anything for pain  -RTC and ER precautions    Supportive care, differential diagnoses, and indications for immediate follow-up discussed with patient.   Pathogenesis of diagnosis discussed including typical length and natural progression.   Instructed to return to clinic or nearest emergency " department for any change in condition, further concerns, or worsening of symptoms.  Patient states understanding of the plan of care and discharge instructions.      Clary Fernandez PA-C

## 2017-06-19 DIAGNOSIS — Z20.7 EXPOSURE TO HEAD LICE: ICD-10-CM

## 2017-06-19 RX ORDER — BISMUTH SUBSALICYLATE 525 MG/15ML
1 SUSPENSION ORAL ONCE
Qty: 1 TUBE | Refills: 0 | Status: SHIPPED | OUTPATIENT
Start: 2017-06-19 | End: 2017-06-19

## 2017-06-21 DIAGNOSIS — Z20.7 EXPOSURE TO HEAD LICE: ICD-10-CM

## 2017-07-25 ENCOUNTER — RX ONLY (OUTPATIENT)
Age: 62
Setting detail: RX ONLY
End: 2017-07-25

## 2017-10-22 ENCOUNTER — APPOINTMENT (OUTPATIENT)
Dept: URGENT CARE | Facility: CLINIC | Age: 62
End: 2017-10-22
Payer: COMMERCIAL

## 2017-10-23 ENCOUNTER — APPOINTMENT (RX ONLY)
Dept: URBAN - METROPOLITAN AREA CLINIC 4 | Facility: CLINIC | Age: 62
Setting detail: DERMATOLOGY
End: 2017-10-23

## 2017-10-23 DIAGNOSIS — R20.2 PARESTHESIA OF SKIN: ICD-10-CM

## 2017-10-23 PROCEDURE — ? PRESCRIPTION

## 2017-10-23 PROCEDURE — ? COUNSELING

## 2017-10-23 PROCEDURE — 99201: CPT

## 2017-10-23 RX ORDER — DOXEPIN HYDROCHLORIDE 25 MG/1
CAPSULE ORAL
Qty: 30 | Refills: 2 | Status: ERX | COMMUNITY
Start: 2017-10-23

## 2017-10-23 RX ADMIN — DOXEPIN HYDROCHLORIDE: 25 CAPSULE ORAL at 00:00

## 2017-10-23 NOTE — HPI: PRURITUS
How Did Your Itching Occur?: gradual in onset  (over a period of years)
How Severe Is Your Itching?: moderate
Additional History: Patient was seen in clinic several months ago. She was concerned she had lice at that time, had been on several treatments at that time as well as seen a speciality clinic who told her she had no infestation of lice. Patient has had continued itching. She was given an Rx for clobetasol solution last visit. She did try the Clobetasol but said she had side effect with pressure in her eyes so she stopped using it.

## 2017-10-26 ENCOUNTER — RX ONLY (OUTPATIENT)
Age: 62
Setting detail: RX ONLY
End: 2017-10-26

## 2017-10-26 RX ORDER — DOXEPIN HYDROCHLORIDE 10 MG/1
CAPSULE ORAL
Qty: 30 | Refills: 0 | Status: ERX | COMMUNITY
Start: 2017-10-26

## 2017-10-30 ENCOUNTER — HOSPITAL ENCOUNTER (OUTPATIENT)
Facility: MEDICAL CENTER | Age: 62
End: 2017-10-30
Attending: PHYSICIAN ASSISTANT
Payer: COMMERCIAL

## 2017-10-30 ENCOUNTER — OFFICE VISIT (OUTPATIENT)
Dept: URGENT CARE | Facility: CLINIC | Age: 62
End: 2017-10-30
Payer: COMMERCIAL

## 2017-10-30 VITALS
DIASTOLIC BLOOD PRESSURE: 70 MMHG | SYSTOLIC BLOOD PRESSURE: 110 MMHG | BODY MASS INDEX: 19.93 KG/M2 | RESPIRATION RATE: 20 BRPM | HEART RATE: 54 BPM | WEIGHT: 127 LBS | HEIGHT: 67 IN | TEMPERATURE: 98.5 F | OXYGEN SATURATION: 98 %

## 2017-10-30 DIAGNOSIS — N30.00 ACUTE CYSTITIS WITHOUT HEMATURIA: ICD-10-CM

## 2017-10-30 LAB
APPEARANCE UR: CLEAR
BILIRUB UR STRIP-MCNC: NORMAL MG/DL
COLOR UR AUTO: YELLOW
GLUCOSE UR STRIP.AUTO-MCNC: NORMAL MG/DL
KETONES UR STRIP.AUTO-MCNC: NORMAL MG/DL
LEUKOCYTE ESTERASE UR QL STRIP.AUTO: NORMAL
NITRITE UR QL STRIP.AUTO: NORMAL
PH UR STRIP.AUTO: 7 [PH] (ref 5–8)
PROT UR QL STRIP: NORMAL MG/DL
RBC UR QL AUTO: NORMAL
SP GR UR STRIP.AUTO: 1
UROBILINOGEN UR STRIP-MCNC: NORMAL MG/DL

## 2017-10-30 PROCEDURE — 87186 SC STD MICRODIL/AGAR DIL: CPT

## 2017-10-30 PROCEDURE — 87077 CULTURE AEROBIC IDENTIFY: CPT

## 2017-10-30 PROCEDURE — 87086 URINE CULTURE/COLONY COUNT: CPT

## 2017-10-30 PROCEDURE — 81002 URINALYSIS NONAUTO W/O SCOPE: CPT | Performed by: PHYSICIAN ASSISTANT

## 2017-10-30 PROCEDURE — 99000 SPECIMEN HANDLING OFFICE-LAB: CPT | Performed by: PHYSICIAN ASSISTANT

## 2017-10-30 PROCEDURE — 99214 OFFICE O/P EST MOD 30 MIN: CPT | Performed by: PHYSICIAN ASSISTANT

## 2017-10-30 RX ORDER — NITROFURANTOIN 25; 75 MG/1; MG/1
100 CAPSULE ORAL EVERY 12 HOURS
Qty: 10 CAP | Refills: 0 | Status: SHIPPED | OUTPATIENT
Start: 2017-10-30 | End: 2017-11-04

## 2017-10-30 RX ORDER — PHENAZOPYRIDINE HYDROCHLORIDE 200 MG/1
200 TABLET, FILM COATED ORAL 3 TIMES DAILY PRN
Qty: 6 TAB | Refills: 0 | Status: SHIPPED | OUTPATIENT
Start: 2017-10-30 | End: 2018-06-16

## 2017-10-30 ASSESSMENT — ENCOUNTER SYMPTOMS
BACK PAIN: 1
PALPITATIONS: 0
FLANK PAIN: 0
VOMITING: 0
CHILLS: 0
FEVER: 0
COUGH: 0
DIZZINESS: 0
SHORTNESS OF BREATH: 0
ABDOMINAL PAIN: 1
NAUSEA: 0
TINGLING: 0
SENSORY CHANGE: 0

## 2017-10-30 NOTE — PROGRESS NOTES
Subjective:      Susy Szymanski is a 62 y.o. female who presents with Dysuria (Since last nigh urinary frequency and pain )            Dysuria    This is a new problem. The current episode started yesterday. The problem occurs every urination. The problem has been unchanged. The quality of the pain is described as burning. The pain is mild. There is no history of pyelonephritis. Associated symptoms include frequency and urgency. Pertinent negatives include no chills, flank pain, hematuria, nausea, possible pregnancy or vomiting. Associated symptoms comments: Pelvic pressure . She has tried increased fluids for the symptoms. Her past medical history is significant for recurrent UTIs. interstitial cystitis       Past Medical History:   Diagnosis Date   • Anxiety    • ASTHMA    • Depression    • GERD (gastroesophageal reflux disease)      .  Past Surgical History:   Procedure Laterality Date   • COLONOSCOPY WITH POLYP  June 2008    benign polyp  Dr Paul    • CHOLECYSTECTOMY  January 2008    laparoscopic   • EGD WITH ASP/BX  September 2006    inflammation only ---Dr Paul   • CHOLECYSTECTOMY     • SINUSOTOMIES         Family History   Problem Relation Age of Onset   • Cancer Father 70     cancer of unknown primary   • Heart Disease Mother    • Diabetes Sister    • Heart Disease Son 42     MI and coronary stent placement   • Diabetes Son    • Hypertension Son        Allergies   Allergen Reactions   • Amitriptyline      Excessive sedation --even at 10 mg dose   • Bactrim [Sulfamethoxazole W-Trimethoprim]      Shaking   • Levaquin      Nausea    • Lovastatin      Muscle aches     • Trazodone      Irregular pulse       Medications, Allergies, and current problem list reviewed today in Epic      Review of Systems   Constitutional: Negative for chills, fever and malaise/fatigue.   Respiratory: Negative for cough and shortness of breath.    Cardiovascular: Negative for chest pain, palpitations and leg swelling.  "  Gastrointestinal: Positive for abdominal pain (suprapubic tenderness). Negative for nausea and vomiting.   Genitourinary: Positive for dysuria, frequency and urgency. Negative for flank pain and hematuria.   Musculoskeletal: Positive for back pain.   Neurological: Negative for dizziness, tingling and sensory change.     All other systems reviewed and are negative.        Objective:     /70   Pulse (!) 54   Temp 36.9 °C (98.5 °F)   Resp 20   Ht 1.702 m (5' 7\")   Wt 57.6 kg (127 lb)   LMP 01/01/2004   SpO2 98%   BMI 19.89 kg/m²      Physical Exam   Constitutional: She is oriented to person, place, and time. She appears well-developed and well-nourished. No distress.   Eyes: Conjunctivae are normal. No scleral icterus.   Cardiovascular: Normal rate, regular rhythm and normal heart sounds.  Exam reveals no gallop and no friction rub.    No murmur heard.  Pulmonary/Chest: Effort normal and breath sounds normal. No respiratory distress. She has no wheezes. She has no rales.   Abdominal: Soft. Bowel sounds are normal. She exhibits no distension and no mass. There is tenderness (mild TTP over suprapubic region). There is no rebound and no guarding.   Neurological: She is alert and oriented to person, place, and time. No cranial nerve deficit.   Skin: Skin is warm and dry. No rash noted.   Psychiatric: She has a normal mood and affect. Her behavior is normal. Judgment and thought content normal.       UA: trace leuks, small blood  Assessment/Plan:       1. Acute cystitis without hematuria  URINE CULTURE(NEW)    POCT Urinalysis    nitrofurantoin monohydr macro (MACROBID) 100 MG Cap    phenazopyridine (PYRIDIUM) 200 MG Tab       Current Outpatient Prescriptions:   •  nitrofurantoin monohydr macro (MACROBID) 100 MG Cap, Take 1 Cap by mouth every 12 hours for 5 days., Disp: 10 Cap, Rfl: 0  •  phenazopyridine (PYRIDIUM) 200 MG Tab, Take 1 Tab by mouth 3 times a day as needed., Disp: 6 Tab, Rfl: 0    Push " fluids.  Will Culture urine. CONTACT PATIENT with results.      Differential diagnoses, Supportive care, and indications for immediate follow-up discussed with patient.   Instructed to return to clinic or nearest emergency department for any change in condition, further concerns, or worsening of symptoms.    The patient demonstrated a good understanding and agreed with the treatment plan.    Clary Buchanan P.A.-C.

## 2017-10-31 DIAGNOSIS — N30.00 ACUTE CYSTITIS WITHOUT HEMATURIA: ICD-10-CM

## 2017-11-02 LAB
BACTERIA UR CULT: ABNORMAL
BACTERIA UR CULT: ABNORMAL
SIGNIFICANT IND 70042: ABNORMAL
SOURCE SOURCE: ABNORMAL

## 2017-11-03 ENCOUNTER — TELEPHONE (OUTPATIENT)
Dept: URGENT CARE | Facility: CLINIC | Age: 62
End: 2017-11-03

## 2017-11-03 NOTE — TELEPHONE ENCOUNTER
Discussed urine culture results with patient. Patient is on appropriate antibiotic  According to C&S

## 2017-12-14 ENCOUNTER — RX ONLY (OUTPATIENT)
Age: 62
Setting detail: RX ONLY
End: 2017-12-14

## 2017-12-14 RX ORDER — DOXEPIN HYDROCHLORIDE 10 MG/1
ONE CAPSULE ORAL DAILY
Qty: 30 | Refills: 0 | Status: ERX

## 2018-05-21 ENCOUNTER — HOSPITAL ENCOUNTER (OUTPATIENT)
Dept: RADIOLOGY | Facility: MEDICAL CENTER | Age: 63
End: 2018-05-21
Attending: INTERNAL MEDICINE
Payer: MEDICARE

## 2018-05-21 ENCOUNTER — APPOINTMENT (OUTPATIENT)
Dept: MEDICAL GROUP | Facility: MEDICAL CENTER | Age: 63
End: 2018-05-21
Payer: MEDICARE

## 2018-05-21 DIAGNOSIS — K21.9 GASTROESOPHAGEAL REFLUX DISEASE, ESOPHAGITIS PRESENCE NOT SPECIFIED: ICD-10-CM

## 2018-05-21 PROCEDURE — 700117 HCHG RX CONTRAST REV CODE 255: Performed by: INTERNAL MEDICINE

## 2018-05-21 PROCEDURE — 74177 CT ABD & PELVIS W/CONTRAST: CPT

## 2018-05-21 RX ADMIN — IOHEXOL 100 ML: 350 INJECTION, SOLUTION INTRAVENOUS at 09:09

## 2018-05-21 RX ADMIN — IOHEXOL 50 ML: 240 INJECTION, SOLUTION INTRATHECAL; INTRAVASCULAR; INTRAVENOUS; ORAL at 09:09

## 2018-05-30 ENCOUNTER — HOSPITAL ENCOUNTER (OUTPATIENT)
Dept: RADIOLOGY | Facility: MEDICAL CENTER | Age: 63
End: 2018-05-30
Attending: INTERNAL MEDICINE
Payer: MEDICARE

## 2018-05-30 DIAGNOSIS — K21.9 GASTROESOPHAGEAL REFLUX DISEASE, ESOPHAGITIS PRESENCE NOT SPECIFIED: ICD-10-CM

## 2018-05-30 PROCEDURE — 74220 X-RAY XM ESOPHAGUS 1CNTRST: CPT

## 2018-06-16 ENCOUNTER — APPOINTMENT (OUTPATIENT)
Dept: RADIOLOGY | Facility: MEDICAL CENTER | Age: 63
End: 2018-06-16
Attending: EMERGENCY MEDICINE
Payer: MEDICARE

## 2018-06-16 ENCOUNTER — HOSPITAL ENCOUNTER (EMERGENCY)
Facility: MEDICAL CENTER | Age: 63
End: 2018-06-16
Attending: EMERGENCY MEDICINE
Payer: MEDICARE

## 2018-06-16 VITALS
DIASTOLIC BLOOD PRESSURE: 89 MMHG | SYSTOLIC BLOOD PRESSURE: 149 MMHG | TEMPERATURE: 98.6 F | HEIGHT: 68 IN | BODY MASS INDEX: 19.88 KG/M2 | WEIGHT: 131.17 LBS | OXYGEN SATURATION: 98 % | HEART RATE: 55 BPM | RESPIRATION RATE: 18 BRPM

## 2018-06-16 DIAGNOSIS — K59.01 SLOW TRANSIT CONSTIPATION: ICD-10-CM

## 2018-06-16 DIAGNOSIS — R10.11 RIGHT UPPER QUADRANT ABDOMINAL PAIN: ICD-10-CM

## 2018-06-16 LAB
ALBUMIN SERPL BCP-MCNC: 4.4 G/DL (ref 3.2–4.9)
ALBUMIN/GLOB SERPL: 1.7 G/DL
ALP SERPL-CCNC: 73 U/L (ref 30–99)
ALT SERPL-CCNC: 16 U/L (ref 2–50)
ANION GAP SERPL CALC-SCNC: 6 MMOL/L (ref 0–11.9)
AST SERPL-CCNC: 21 U/L (ref 12–45)
BASOPHILS # BLD AUTO: 0.3 % (ref 0–1.8)
BASOPHILS # BLD: 0.02 K/UL (ref 0–0.12)
BILIRUB SERPL-MCNC: 0.9 MG/DL (ref 0.1–1.5)
BUN SERPL-MCNC: 16 MG/DL (ref 8–22)
CALCIUM SERPL-MCNC: 9.2 MG/DL (ref 8.4–10.2)
CHLORIDE SERPL-SCNC: 107 MMOL/L (ref 96–112)
CO2 SERPL-SCNC: 24 MMOL/L (ref 20–33)
CREAT SERPL-MCNC: 0.76 MG/DL (ref 0.5–1.4)
DEPRECATED D DIMER PPP IA-ACNC: <200 NG/ML(D-DU)
EKG IMPRESSION: NORMAL
EOSINOPHIL # BLD AUTO: 0.07 K/UL (ref 0–0.51)
EOSINOPHIL NFR BLD: 1.1 % (ref 0–6.9)
ERYTHROCYTE [DISTWIDTH] IN BLOOD BY AUTOMATED COUNT: 47.1 FL (ref 35.9–50)
GLOBULIN SER CALC-MCNC: 2.6 G/DL (ref 1.9–3.5)
GLUCOSE SERPL-MCNC: 92 MG/DL (ref 65–99)
HCT VFR BLD AUTO: 41.1 % (ref 37–47)
HGB BLD-MCNC: 13.2 G/DL (ref 12–16)
IMM GRANULOCYTES # BLD AUTO: 0.01 K/UL (ref 0–0.11)
IMM GRANULOCYTES NFR BLD AUTO: 0.2 % (ref 0–0.9)
LIPASE SERPL-CCNC: 32 U/L (ref 7–58)
LYMPHOCYTES # BLD AUTO: 1.37 K/UL (ref 1–4.8)
LYMPHOCYTES NFR BLD: 21.9 % (ref 22–41)
MCH RBC QN AUTO: 29.7 PG (ref 27–33)
MCHC RBC AUTO-ENTMCNC: 32.1 G/DL (ref 33.6–35)
MCV RBC AUTO: 92.4 FL (ref 81.4–97.8)
MONOCYTES # BLD AUTO: 0.51 K/UL (ref 0–0.85)
MONOCYTES NFR BLD AUTO: 8.1 % (ref 0–13.4)
NEUTROPHILS # BLD AUTO: 4.28 K/UL (ref 2–7.15)
NEUTROPHILS NFR BLD: 68.4 % (ref 44–72)
NRBC # BLD AUTO: 0 K/UL
NRBC BLD-RTO: 0 /100 WBC
PLATELET # BLD AUTO: 204 K/UL (ref 164–446)
PMV BLD AUTO: 9.8 FL (ref 9–12.9)
POTASSIUM SERPL-SCNC: 4 MMOL/L (ref 3.6–5.5)
PROT SERPL-MCNC: 7 G/DL (ref 6–8.2)
RBC # BLD AUTO: 4.45 M/UL (ref 4.2–5.4)
SODIUM SERPL-SCNC: 137 MMOL/L (ref 135–145)
TROPONIN I SERPL-MCNC: <0.02 NG/ML (ref 0–0.04)
WBC # BLD AUTO: 6.3 K/UL (ref 4.8–10.8)

## 2018-06-16 PROCEDURE — 83690 ASSAY OF LIPASE: CPT

## 2018-06-16 PROCEDURE — 71045 X-RAY EXAM CHEST 1 VIEW: CPT

## 2018-06-16 PROCEDURE — 80053 COMPREHEN METABOLIC PANEL: CPT

## 2018-06-16 PROCEDURE — 84484 ASSAY OF TROPONIN QUANT: CPT

## 2018-06-16 PROCEDURE — 74177 CT ABD & PELVIS W/CONTRAST: CPT

## 2018-06-16 PROCEDURE — 85025 COMPLETE CBC W/AUTO DIFF WBC: CPT

## 2018-06-16 PROCEDURE — 93005 ELECTROCARDIOGRAM TRACING: CPT | Performed by: EMERGENCY MEDICINE

## 2018-06-16 PROCEDURE — 700117 HCHG RX CONTRAST REV CODE 255: Performed by: EMERGENCY MEDICINE

## 2018-06-16 PROCEDURE — 85379 FIBRIN DEGRADATION QUANT: CPT

## 2018-06-16 PROCEDURE — 36415 COLL VENOUS BLD VENIPUNCTURE: CPT

## 2018-06-16 PROCEDURE — 99284 EMERGENCY DEPT VISIT MOD MDM: CPT

## 2018-06-16 RX ORDER — CHLORPHENIRAMINE MALEATE 4 MG/1
4 TABLET ORAL EVERY 6 HOURS PRN
Status: SHIPPED | COMMUNITY
End: 2018-10-18

## 2018-06-16 RX ORDER — LORAZEPAM 0.5 MG/1
.25-.5 TABLET ORAL EVERY 6 HOURS PRN
Status: SHIPPED | COMMUNITY
End: 2019-08-07 | Stop reason: SDUPTHER

## 2018-06-16 RX ORDER — TRAMADOL HYDROCHLORIDE 50 MG/1
50 TABLET ORAL EVERY 6 HOURS PRN
Status: SHIPPED | COMMUNITY
End: 2018-06-16

## 2018-06-16 RX ORDER — OMEPRAZOLE 40 MG/1
40 CAPSULE, DELAYED RELEASE ORAL DAILY
Status: SHIPPED | COMMUNITY
End: 2019-11-04

## 2018-06-16 RX ADMIN — IOHEXOL 100 ML: 350 INJECTION, SOLUTION INTRAVENOUS at 15:20

## 2018-06-16 ASSESSMENT — PAIN SCALES - GENERAL: PAINLEVEL_OUTOF10: 7

## 2018-06-16 NOTE — ED NOTES
Right rib pain that radiates around to shoulder.   A week ago Thursday had an upper endoscopy done. States awoke with pain post procedure.  Bruising to right jaw post oral surgery.

## 2018-06-16 NOTE — ED PROVIDER NOTES
ED Provider Note    CHIEF COMPLAINT  Chief Complaint   Patient presents with   • RUQ Pain   • Rib Pain       HPI  Susy Szymanski is a 63 y.o. female who presents for evaluation of right upper quadrant pain and pain in the ribs on the right side. The patient has a history of acid reflux. She is followed by gastroenterology. She had upper endoscopy performed with Dr. Faustin actually 10 days ago. They did do some biopsies of the action. There is suggestion on their procedure report of some mild irritation but no perforation no varices no large ulcer. Right after the procedure she had some right upper quadrant and right lateral chest discomfort. She described this to them and they thought it was likely related to the procedure. She gave it a week or so and still has symptoms. She specifically denies leg swelling or hemoptysis no high fevers or chills. She denies any black or bloody stools. Pain is slightly worse with movement and worse with inspiration. She has no known history of cardiopulmonary disease process of an asthma but denies wheezing. No history of blood clots. Movement and deep inspiration make it worse and nothing seems to make it better    REVIEW OF SYSTEMS  See HPI for further details. No leg swelling or hemoptysis high fevers chills night was weight loss All other systems are negative.     PAST MEDICAL HISTORY  Past Medical History:   Diagnosis Date   • Anxiety    • ASTHMA    • Depression    • GERD (gastroesophageal reflux disease)        FAMILY HISTORY  No history of thromboembolic disease    SOCIAL HISTORY  Social History     Social History   • Marital status:      Spouse name: N/A   • Number of children: N/A   • Years of education: N/A     Occupational History   • retired      Social History Main Topics   • Smoking status: Never Smoker   • Smokeless tobacco: Never Used   • Alcohol use No   • Drug use: No   • Sexual activity: Yes     Partners: Male     Birth control/ protection:  "Post-Menopausal     Other Topics Concern   • Not on file     Social History Narrative   • No narrative on file     Nonsmoker no IV drugs  SURGICAL HISTORY  Past Surgical History:   Procedure Laterality Date   • COLONOSCOPY WITH POLYP  June 2008    benign polyp  Dr Paul    • CHOLECYSTECTOMY  January 2008    laparoscopic   • EGD WITH ASP/BX  September 2006    inflammation only ---Dr Paul   • CHOLECYSTECTOMY     • SINUSOTOMIES         CURRENT MEDICATIONS  Home Medications     Reviewed by Jaky Dick (Pharmacy Tech) on 06/16/18 at 1517  Med List Status: Partial   Medication Last Dose Status   chlorpheniramine (CHLOR-TRIMETRON) 4 MG Tab <week Active   LORazepam (ATIVAN) 0.5 MG Tab 6/16/2018 Active   omeprazole (PRILOSEC) 40 MG delayed-release capsule  Active   tramadol (ULTRAM) 50 MG Tab  Active                ALLERGIES  Allergies   Allergen Reactions   • Amitriptyline      Excessive sedation --even at 10 mg dose   • Bactrim [Sulfamethoxazole W-Trimethoprim]      Shaking   • Levaquin      Nausea    • Lovastatin      Muscle aches     • Trazodone      Irregular pulse       PHYSICAL EXAM  VITAL SIGNS: /89   Pulse 63   Temp 36.8 °C (98.3 °F)   Resp 16   Ht 1.715 m (5' 7.5\")   Wt 59.5 kg (131 lb 2.8 oz)   LMP 01/01/2004   SpO2 98%   BMI 20.24 kg/m²       Constitutional: Well developed, Well nourished, No acute distress, Non-toxic appearance.   HENT: Normocephalic, Atraumatic, Bilateral external ears normal, Oropharynx moist, No oral exudates, Nose normal.   Eyes: PERRLA, EOMI, Conjunctiva normal, No discharge.   Neck: Normal range of motion, No tenderness, Supple, No stridor.   Cardiovascular: Normal heart rate, Normal rhythm, No murmurs, No rubs, No gallops.   Thorax & Lungs: Normal breath sounds, No respiratory distress, No wheezing, No chest tenderness.   Abdomen: Bowel sounds normal, Soft, right upper quadrant discomfort with palpation no rebound guarding or rigidity  Skin: Warm, Dry, No " erythema, No rash.   Back: No tenderness, No CVA tenderness.   Extremities: Intact distal pulses, No edema, No tenderness, No cyanosis, No clubbing.   Musculoskeletal: Good range of motion in all major joints. No tenderness to palpation or major deformities noted.   Neurologic: Alert & oriented x 3, Normal motor function, Normal sensory function, No focal deficits noted.   Psychiatric: Anxious    EKG  Interpretation by me rate 55 sinus rhythm premature atrial complexes noted. Nonspecific T-wave inversions in V1 and V2 hyperacute T waves in the lateral leads V3 through V6. No acute ST segment elevation    Results for orders placed or performed during the hospital encounter of 06/16/18   CBC WITH DIFFERENTIAL   Result Value Ref Range    WBC 6.3 4.8 - 10.8 K/uL    RBC 4.45 4.20 - 5.40 M/uL    Hemoglobin 13.2 12.0 - 16.0 g/dL    Hematocrit 41.1 37.0 - 47.0 %    MCV 92.4 81.4 - 97.8 fL    MCH 29.7 27.0 - 33.0 pg    MCHC 32.1 (L) 33.6 - 35.0 g/dL    RDW 47.1 35.9 - 50.0 fL    Platelet Count 204 164 - 446 K/uL    MPV 9.8 9.0 - 12.9 fL    Neutrophils-Polys 68.40 44.00 - 72.00 %    Lymphocytes 21.90 (L) 22.00 - 41.00 %    Monocytes 8.10 0.00 - 13.40 %    Eosinophils 1.10 0.00 - 6.90 %    Basophils 0.30 0.00 - 1.80 %    Immature Granulocytes 0.20 0.00 - 0.90 %    Nucleated RBC 0.00 /100 WBC    Neutrophils (Absolute) 4.28 2.00 - 7.15 K/uL    Lymphs (Absolute) 1.37 1.00 - 4.80 K/uL    Monos (Absolute) 0.51 0.00 - 0.85 K/uL    Eos (Absolute) 0.07 0.00 - 0.51 K/uL    Baso (Absolute) 0.02 0.00 - 0.12 K/uL    Immature Granulocytes (abs) 0.01 0.00 - 0.11 K/uL    NRBC (Absolute) 0.00 K/uL   LIPASE   Result Value Ref Range    Lipase 32 7 - 58 U/L   COMP METABOLIC PANEL   Result Value Ref Range    Sodium 137 135 - 145 mmol/L    Potassium 4.0 3.6 - 5.5 mmol/L    Chloride 107 96 - 112 mmol/L    Co2 24 20 - 33 mmol/L    Anion Gap 6.0 0.0 - 11.9    Glucose 92 65 - 99 mg/dL    Bun 16 8 - 22 mg/dL    Creatinine 0.76 0.50 - 1.40 mg/dL     Calcium 9.2 8.4 - 10.2 mg/dL    AST(SGOT) 21 12 - 45 U/L    ALT(SGPT) 16 2 - 50 U/L    Alkaline Phosphatase 73 30 - 99 U/L    Total Bilirubin 0.9 0.1 - 1.5 mg/dL    Albumin 4.4 3.2 - 4.9 g/dL    Total Protein 7.0 6.0 - 8.2 g/dL    Globulin 2.6 1.9 - 3.5 g/dL    A-G Ratio 1.7 g/dL   TROPONIN   Result Value Ref Range    Troponin I <0.02 0.00 - 0.04 ng/mL   D-DIMER   Result Value Ref Range    D-Dimer Screen <200 <250 ng/mL(D-DU)   ESTIMATED GFR   Result Value Ref Range    GFR If African American >60 >60 mL/min/1.73 m 2    GFR If Non African American >60 >60 mL/min/1.73 m 2   EKG (NOW)   Result Value Ref Range    Report       [facility] Emergency Dept.    Test Date:  2018  Pt Name:    DEAN CRUZ               Department: [facility]  MRN:        [id number]                      Room:       -ROOM 1  Gender:     Female                       Technician: YARIEL  :        1955                   Requested By:ZAC ESCALONA  Order #:    [id number]                    Reading MD:    Measurements  Intervals                                Axis  Rate:       55                           P:          78  ME:         155                          QRS:        66  QRSD:       94                           T:          60  QT:         453  QTc:        434    Interpretive Statements  Sinus rhythm  Atrial premature complex  Probable left atrial enlargement  Compared to ECG 2012 14:53:15  Atrial premature complex(es) now present  Sinus bradycardia no longer present          RADIOLOGY/PROCEDURES  CT-ABDOMEN-PELVIS WITH   Final Result      1.  Small amount of nonspecific free pelvic fluid.      2.  No pneumoperitoneum is identified.      3.  Moderate amount of colonic stool.      4.  Mild atherosclerotic disease.      DX-CHEST-PORTABLE (1 VIEW)   Final Result      No acute cardiopulmonary findings.            COURSE & MEDICAL DECISION MAKING  Pertinent Labs & Imaging studies reviewed. (See chart for  details)  An IV was established. Differential diagnosis was extensive including bowel perforation from procedure constipation and obstipation pulmonary embolism hepatitis kidney stone and UTI and acute coronary syndrome. Extensive workup was performed. Here the patient had normal and reassuring EKG, d-dimer and chest x-ray were normal, troponin is negative as well. CT scan was performed to rule out any catastrophic conditions such as bowel perforation obstruction etc. The only notable finding was a moderate amount of colonic stool especially at the hepatic flexure which could account for all of her symptoms. I think that is safe to discharge the patient home. I counseled her on NSAID usage. She continues to use daily significant NSAID dosages and with her underlying history I recommended against this. I will have her switch to Tylenol start her on over-the-counter MiraLAX and recommend follow-up in 2-3 days as needed    FINAL IMPRESSION  1. Right upper quadrant pain unclear etiology  2. Right-sided chest discomfort  3. Constipation      Electronically signed by: Sanjay Almeida, 6/16/2018 1:47 PM

## 2018-06-16 NOTE — ED NOTES
The Medication Reconciliation process has been completed by interviewing the patient    Allergies have been reviewed  Antibiotic use in 30 days - none    Home Pharmacy:  [facility] [place]

## 2018-06-16 NOTE — ED NOTES
.Pt D/C to home. VSS. IV removed. D/C instructions given to patient. Pt verbalizes understanding. Pt leaves ED with no acute changes, complaints or concerns. Pt ambulated out with a steady gait and all belongings.

## 2018-06-16 NOTE — DISCHARGE INSTRUCTIONS
Abdominal Pain (Nonspecific)  Stop using NSAID medication such as Naprosyn Aleve ibuprofen aspirin. Use Tylenol instead. Use over-the-counter MiraLAX as discussed for constipation  Your exam might not show the exact reason you have abdominal pain. Since there are many different causes of abdominal pain, another checkup and more tests may be needed. It is very important to follow up for lasting (persistent) or worsening symptoms. A possible cause of abdominal pain in any person who still has his or her appendix is acute appendicitis. Appendicitis is often hard to diagnose. Normal blood tests, urine tests, ultrasound, and CT scans do not completely rule out early appendicitis or other causes of abdominal pain. Sometimes, only the changes that happen over time will allow appendicitis and other causes of abdominal pain to be determined. Other potential problems that may require surgery may also take time to become more apparent. Because of this, it is important that you follow all of the instructions below.  HOME CARE INSTRUCTIONS   · Rest as much as possible.   · Do not eat solid food until your pain is gone.   · While adults or children have pain: A diet of water, weak decaffeinated tea, broth or bouillon, gelatin, oral rehydration solutions (ORS), frozen ice pops, or ice chips may be helpful.   · When pain is gone in adults or children: Start a light diet (dry toast, crackers, applesauce, or white rice). Increase the diet slowly as long as it does not bother you. Eat no dairy products (including cheese and eggs) and no spicy, fatty, fried, or high-fiber foods.   · Use no alcohol, caffeine, or cigarettes.   · Take your regular medicines unless your caregiver told you not to.   · Take any prescribed medicine as directed.   · Only take over-the-counter or prescription medicines for pain, discomfort, or fever as directed by your caregiver. Do not give aspirin to children.   If your caregiver has given you a follow-up  appointment, it is very important to keep that appointment. Not keeping the appointment could result in a permanent injury and/or lasting (chronic) pain and/or disability. If there is any problem keeping the appointment, you must call to reschedule.   SEEK IMMEDIATE MEDICAL CARE IF:   · Your pain is not gone in 24 hours.   · Your pain becomes worse, changes location, or feels different.   · You or your child has an oral temperature above 102° F (38.9° C), not controlled by medicine.   · Your baby is older than 3 months with a rectal temperature of 102° F (38.9° C) or higher.   · Your baby is 3 months old or younger with a rectal temperature of 100.4° F (38° C) or higher.   · You have shaking chills.   · You keep throwing up (vomiting) or cannot drink liquids.   · There is blood in your vomit or you see blood in your bowel movements.   · Your bowel movements become dark or black.   · You have frequent bowel movements.   · Your bowel movements stop (become blocked) or you cannot pass gas.   · You have bloody, frequent, or painful urination.   · You have yellow discoloration in the skin or whites of the eyes.   · Your stomach becomes bloated or bigger.   · You have dizziness or fainting.   · You have chest or back pain.   MAKE SURE YOU:   · Understand these instructions.   · Will watch your condition.   · Will get help right away if you are not doing well or get worse.   Document Released: 12/18/2006 Document Revised: 03/11/2013 Document Reviewed: 11/15/2010  ExitCare® Patient Information ©2013 [employer], [facility].  Constipation, Adult  Constipation is when a person:  · Poops (has a bowel movement) fewer times in a week than normal.  · Has a hard time pooping.  · Has poop that is dry, hard, or bigger than normal.  Follow these instructions at home:  Eating and drinking  · Eat foods that have a lot of fiber, such as:  ¨ Fresh fruits and vegetables.  ¨ Whole grains.  ¨ Beans.  · Eat less of foods that are high in fat, low in  fiber, or overly processed, such as:  ¨ French fries.  ¨ Hamburgers.  ¨ Cookies.  ¨ Candy.  ¨ Soda.  · Drink enough fluid to keep your pee (urine) clear or pale yellow.  General instructions  · Exercise regularly or as told by your doctor.  · Go to the restroom when you feel like you need to poop. Do not hold it in.  · Take over-the-counter and prescription medicines only as told by your doctor. These include any fiber supplements.  · Do pelvic floor retraining exercises, such as:  ¨ Doing deep breathing while relaxing your lower belly (abdomen).  ¨ Relaxing your pelvic floor while pooping.  · Watch your condition for any changes.  · Keep all follow-up visits as told by your doctor. This is important.  Contact a doctor if:  · You have pain that gets worse.  · You have a fever.  · You have not pooped for 4 days.  · You throw up (vomit).  · You are not hungry.  · You lose weight.  · You are bleeding from the anus.  · You have thin, pencil-like poop (stool).  Get help right away if:  · You have a fever, and your symptoms suddenly get worse.  · You leak poop or have blood in your poop.  · Your belly feels hard or bigger than normal (is bloated).  · You have very bad belly pain.  · You feel dizzy or you faint.  This information is not intended to replace advice given to you by your health care provider. Make sure you discuss any questions you have with your health care provider.  Document Released: 06/05/2009 Document Revised: 07/07/2017 Document Reviewed: 06/07/2017  Elsevier Interactive Patient Education © 2017 [employer]

## 2018-10-18 ENCOUNTER — OFFICE VISIT (OUTPATIENT)
Dept: MEDICAL GROUP | Facility: LAB | Age: 63
End: 2018-10-18
Payer: MEDICARE

## 2018-10-18 ENCOUNTER — HOSPITAL ENCOUNTER (OUTPATIENT)
Dept: LAB | Facility: MEDICAL CENTER | Age: 63
End: 2018-10-18
Attending: INTERNAL MEDICINE
Payer: MEDICARE

## 2018-10-18 VITALS
TEMPERATURE: 97.1 F | HEART RATE: 62 BPM | HEIGHT: 67 IN | BODY MASS INDEX: 20.84 KG/M2 | SYSTOLIC BLOOD PRESSURE: 112 MMHG | RESPIRATION RATE: 18 BRPM | WEIGHT: 132.8 LBS | OXYGEN SATURATION: 97 % | DIASTOLIC BLOOD PRESSURE: 70 MMHG

## 2018-10-18 DIAGNOSIS — K21.9 GASTROESOPHAGEAL REFLUX DISEASE WITHOUT ESOPHAGITIS: ICD-10-CM

## 2018-10-18 DIAGNOSIS — G89.29 CHRONIC HIP PAIN, BILATERAL: ICD-10-CM

## 2018-10-18 DIAGNOSIS — M25.552 CHRONIC HIP PAIN, BILATERAL: ICD-10-CM

## 2018-10-18 DIAGNOSIS — R93.5 ABNORMAL CT OF THE ABDOMEN: ICD-10-CM

## 2018-10-18 DIAGNOSIS — Z23 NEED FOR VACCINATION: ICD-10-CM

## 2018-10-18 DIAGNOSIS — M25.551 CHRONIC HIP PAIN, BILATERAL: ICD-10-CM

## 2018-10-18 LAB
ALBUMIN SERPL BCP-MCNC: 4.3 G/DL (ref 3.2–4.9)
ALBUMIN/GLOB SERPL: 1.7 G/DL
ALP SERPL-CCNC: 70 U/L (ref 30–99)
ALT SERPL-CCNC: 16 U/L (ref 2–50)
ANION GAP SERPL CALC-SCNC: 6 MMOL/L (ref 0–11.9)
AST SERPL-CCNC: 24 U/L (ref 12–45)
BILIRUB SERPL-MCNC: 0.5 MG/DL (ref 0.1–1.5)
BUN SERPL-MCNC: 17 MG/DL (ref 8–22)
CALCIUM SERPL-MCNC: 9.9 MG/DL (ref 8.5–10.5)
CHLORIDE SERPL-SCNC: 105 MMOL/L (ref 96–112)
CO2 SERPL-SCNC: 30 MMOL/L (ref 20–33)
CREAT SERPL-MCNC: 0.69 MG/DL (ref 0.5–1.4)
FASTING STATUS PATIENT QL REPORTED: NORMAL
GLOBULIN SER CALC-MCNC: 2.5 G/DL (ref 1.9–3.5)
GLUCOSE SERPL-MCNC: 91 MG/DL (ref 65–99)
POTASSIUM SERPL-SCNC: 4 MMOL/L (ref 3.6–5.5)
PROT SERPL-MCNC: 6.8 G/DL (ref 6–8.2)
SODIUM SERPL-SCNC: 141 MMOL/L (ref 135–145)

## 2018-10-18 PROCEDURE — G0008 ADMIN INFLUENZA VIRUS VAC: HCPCS | Performed by: INTERNAL MEDICINE

## 2018-10-18 PROCEDURE — 36415 COLL VENOUS BLD VENIPUNCTURE: CPT

## 2018-10-18 PROCEDURE — 90662 IIV NO PRSV INCREASED AG IM: CPT | Performed by: INTERNAL MEDICINE

## 2018-10-18 PROCEDURE — 99204 OFFICE O/P NEW MOD 45 MIN: CPT | Mod: 25 | Performed by: INTERNAL MEDICINE

## 2018-10-18 PROCEDURE — 80053 COMPREHEN METABOLIC PANEL: CPT

## 2018-10-18 NOTE — ASSESSMENT & PLAN NOTE
New to me, chronic uncontrolled problem.  She reported 1 year history of bilateral hip aches, this typically flares up when she bikes for a long distance.  She was seen recently by an orthopedic doctor and she told me she had had imaging that showed no arthritis or bone problems.  She was referred to physical therapy for potential muscle problem and she recently started doing physical therapy with moderate relief of her pain.  She is not taking any pain medications at this time.

## 2018-10-18 NOTE — ASSESSMENT & PLAN NOTE
New to discuss.  She stated that she visited emergency room last June for an episode of abdominal pain and she ended up having a CT scan of her abdomen.  It mainly showed a constipation problem and her abdominal pain has consequently resolved.  She had a paperwork from her Medicare showing that she has an incidental imaging finding of a renal cyst that needs to be addressed.  We reviewed her imaging and it indeed showed an indeterminant left renal cyst.  She does not have any complaint of CVA pain or tenderness.  No unintentional weight loss and no history of smoking.  No junito hematuria.

## 2018-10-18 NOTE — PROGRESS NOTES
Chief Complaint   Patient presents with   • Hip Pain   • Leg Pain   • Gastrophageal Reflux   • Hospital Follow-up     ER Visit in 6/2018   • Results   • Flu Vaccine       Subjective:     History of Present Illness: Patient is a 63 y.o. female. This pleasant patient of Dr. Gutierrez who is here today for an acute visit.    Gastroesophageal reflux disease without esophagitis  New to me, chronic and controlled problem.  She reported having acid reflux and heartburn problem for many years but has been worsening over the last 6 months.  She was seen by GI and had an EGD done by Dr. Stovall [facility].  She was told that she has a hiatal hernia and she was a started on omeprazole 40 mg daily.  She felt remarkable improvement while taking this medication but later on she read about this medication and there is a concern about long-term kidney dysfunction so she stopped taking it for the last month or 2.  She has a recurrence of her acid reflux especially at night.  She denies any unintentional weight loss, dysphagia or symptoms of anemia at this time.  She would like to make sure that her kidney function is monitored if she needs to take this medication versus trying alternatives.      Abnormal CT of the abdomen  New to discuss.  She stated that she visited emergency room last June for an episode of abdominal pain and she ended up having a CT scan of her abdomen.  It mainly showed a constipation problem and her abdominal pain has consequently resolved.  She had a paperwork from her Medicare showing that she has an incidental imaging finding of a renal cyst that needs to be addressed.  We reviewed her imaging and it indeed showed an indeterminant left renal cyst.  She does not have any complaint of CVA pain or tenderness.  No unintentional weight loss and no history of smoking.  No junito hematuria.    Chronic hip pain, bilateral  New to me, chronic uncontrolled problem.  She reported 1 year history of bilateral hip  aches, this typically flares up when she bikes for a long distance.  She was seen recently by an orthopedic doctor and she told me she had had imaging that showed no arthritis or bone problems.  She was referred to physical therapy for potential muscle problem and she recently started doing physical therapy with moderate relief of her pain.  She is not taking any pain medications at this time.      Allergies: Amitriptyline; Bactrim [sulfamethoxazole w-trimethoprim]; Levaquin; Lovastatin; and Trazodone    Current Outpatient Prescriptions Ordered in [facility]   Medication Sig Dispense Refill   • omeprazole (PRILOSEC) 40 MG delayed-release capsule Take 40 mg by mouth every day.     • LORazepam (ATIVAN) 0.5 MG Tab Take 0.25-0.5 mg by mouth every 6 hours as needed for Anxiety.       No current Epic-ordered facility-administered medications on file.        Past Medical History:   Diagnosis Date   • Anxiety    • ASTHMA    • Depression    • GERD (gastroesophageal reflux disease)        Past Surgical History:   Procedure Laterality Date   • COLONOSCOPY WITH POLYP  June 2008    benign polyp  Dr Paul    • CHOLECYSTECTOMY  January 2008    laparoscopic   • EGD WITH ASP/BX  September 2006    inflammation only ---Dr Paul   • CHOLECYSTECTOMY     • SINUSOTOMIES         Social History   Substance Use Topics   • Smoking status: Never Smoker   • Smokeless tobacco: Never Used   • Alcohol use No       Family History   Problem Relation Age of Onset   • Cancer Father 70        cancer of unknown primary   • Heart Disease Mother    • Heart Disease Son 42        MI and coronary stent placement   • Diabetes Son    • Hypertension Son    • Diabetes Sister        ROS:     - Constitutional: Negative for fever, chills, unexpected weight change, and fatigue/generalized weakness.     - HEENT: Negative for headaches, vision changes, hearing changes, ear pain, ear discharge, sinus congestion, sore throat, and neck pain.      - Respiratory: Negative for  "cough, sputum production, dyspnea and wheezing.    - Cardiovascular: Negative for chest pain, palpitations, orthopnea, and bilateral lower extremity edema.     - Gastrointestinal: Negative for heartburn, nausea, vomiting, abdominal pain, hematochezia, melena, diarrhea, constipation, and greasy/foul-smelling stools.     - Genitourinary: Negative for dysuria, polyuria, hematuria, pyuria, urinary urgency, and urinary incontinence.    - Musculoskeletal: + Bilateral hip pain. Negative for myalgias, back pain, and joint pain.     - Skin: Negative for rash, itching, cyanotic skin color change.     - Neurological: Negative for dizziness, tingling, tremors, focal sensory deficit, focal weakness and headaches.     - Endo/Heme/Allergies: Does not bruise/bleed easily.     - Psychiatric/Behavioral: Negative for depression, suicidal/homicidal ideation and memory loss.        - NOTE: All other systems reviewed and are negative, except as in HPI.       Physical Exam:     Blood pressure 112/70, pulse 62, temperature 36.2 °C (97.1 °F), temperature source Temporal, resp. rate 18, height 1.702 m (5' 7\"), weight 60.2 kg (132 lb 12.8 oz), last menstrual period 01/01/2004, SpO2 97 %, not currently breastfeeding. Body mass index is 20.8 kg/m².   General: Normal appearing. No distress.  HEENT: Normocephalic. Eyes conjunctiva clear lids without ptosis, pupils equal and reactive to light accommodation, ears normal shape and contour, canals are clear bilaterally, tympanic membranes are benign,  oropharynx is without erythema, edema or exudates.    Neck: Supple without JVD or bruit. Thyroid is not enlarged.  Pulmonary: Clear to ausculation.  Normal effort. No rales, ronchi, or wheezing.  Cardiovascular: Regular rate and rhythm without murmur. Radial pulses are intact, regular and symmetrical bilaterally.  Abdomen: Soft, nontender, nondistended. Normal bowel sounds. Liver and spleen are not palpable.  Neurologic: Grossly non-focal.  Lymph: No " cervical, occipital or supraclavicular lymph nodes are palpable  Skin: Warm and dry.  No obvious lesions.  Musculoskeletal: Normal gait. No extremity cyanosis, clubbing, or edema.  Psych: Normal mood and affect. Alert and oriented x3. Judgment and insight is normal.    Data:     LABS: 6/2018: Results reviewed and discussed with the patient, questions answered.      Assessment and Plan:     1. Need for vaccination  Given flu vaccine today.  - INFLUENZA VACCINE, HIGH DOSE (65+ ONLY)    2. Abnormal CT of the abdomen  New to me.   Reviewed her CT abdomen from June 2018 that showed an incidental finding of an indeterminant left renal cyst.  We will proceed with an ultrasound scan to confirm its a simple cyst and no further workup is needed.  She is asymptomatic at this time and has no risk factors for renal cell carcinoma apart from her age.  - US-RENAL; Future    3. Gastroesophageal reflux disease without esophagitis  New to me, and controlled.  Status post EGD, started on high-dose PPI with omeprazole 40 mg by her GI provider with relief of her symptoms.  She has been off this medication due to concerns she read about renal function improvement.  She would like to try alternatives.  At this point, I recommend a trial of Pepcid AC daily to see if this helps with her symptoms.  If no symptom relief she should go back to omeprazole and I discussed with her the importance of monitoring her kidney function at least twice yearly with her primary care provider.  We will proceed with a creatinine recheck at this time.  - COMP METABOLIC PANEL; Future    4. Chronic hip pain, bilateral  New to me, chronic controlled.  Already evaluated by orthopedic, per patient no arthritis finding on her previous imaging.  Recommended continuing with physical therapy at this time.      Follow Up:      Return for PRN with PCP.    Please note that this dictation was created using voice recognition software. I have made every reasonable attempt to  correct obvious errors, but I expect that there are errors of grammar and possibly content that I did not discover before finalizing the note.    Signed by: Mariposa Beatty M.D.

## 2018-10-18 NOTE — ASSESSMENT & PLAN NOTE
New to me, chronic and controlled problem.  She reported having acid reflux and heartburn problem for many years but has been worsening over the last 6 months.  She was seen by GI and had an EGD done by Dr. St[facility]all [facility].  She was told that she has a hiatal hernia and she was a started on omeprazole 40 mg daily.  She felt remarkable improvement while taking this medication but later on she read about this medication and there is a concern about long-term kidney dysfunction so she stopped taking it for the last month or 2.  She has a recurrence of her acid reflux especially at night.  She denies any unintentional weight loss, dysphagia or symptoms of anemia at this time.  She would like to make sure that her kidney function is monitored if she needs to take this medication versus trying alternatives.

## 2018-11-16 ENCOUNTER — HOSPITAL ENCOUNTER (OUTPATIENT)
Dept: RADIOLOGY | Facility: MEDICAL CENTER | Age: 63
End: 2018-11-16
Attending: INTERNAL MEDICINE
Payer: MEDICARE

## 2018-11-16 DIAGNOSIS — R93.5 ABNORMAL CT OF THE ABDOMEN: ICD-10-CM

## 2018-11-16 PROCEDURE — 76775 US EXAM ABDO BACK WALL LIM: CPT

## 2019-04-09 ENCOUNTER — OFFICE VISIT (OUTPATIENT)
Dept: MEDICAL GROUP | Facility: MEDICAL CENTER | Age: 64
End: 2019-04-09
Payer: MEDICARE

## 2019-04-09 VITALS
OXYGEN SATURATION: 95 % | TEMPERATURE: 98.5 F | DIASTOLIC BLOOD PRESSURE: 70 MMHG | SYSTOLIC BLOOD PRESSURE: 128 MMHG | WEIGHT: 142 LBS | HEIGHT: 67 IN | BODY MASS INDEX: 22.29 KG/M2 | HEART RATE: 60 BPM | RESPIRATION RATE: 16 BRPM

## 2019-04-09 DIAGNOSIS — R63.5 WEIGHT GAIN: ICD-10-CM

## 2019-04-09 DIAGNOSIS — Z13.29 THYROID DISORDER SCREEN: ICD-10-CM

## 2019-04-09 DIAGNOSIS — R25.2 MUSCLE CRAMPING: ICD-10-CM

## 2019-04-09 DIAGNOSIS — K21.9 GASTROESOPHAGEAL REFLUX DISEASE WITHOUT ESOPHAGITIS: ICD-10-CM

## 2019-04-09 DIAGNOSIS — Z13.0 SCREENING FOR DEFICIENCY ANEMIA: ICD-10-CM

## 2019-04-09 PROCEDURE — 99214 OFFICE O/P EST MOD 30 MIN: CPT | Performed by: NURSE PRACTITIONER

## 2019-04-09 RX ORDER — PANTOPRAZOLE SODIUM 40 MG/1
40 TABLET, DELAYED RELEASE ORAL DAILY
COMMUNITY
End: 2019-05-17

## 2019-04-09 RX ORDER — MIRABEGRON 50 MG/1
TABLET, FILM COATED, EXTENDED RELEASE ORAL
COMMUNITY
End: 2019-07-11

## 2019-04-09 ASSESSMENT — PATIENT HEALTH QUESTIONNAIRE - PHQ9: CLINICAL INTERPRETATION OF PHQ2 SCORE: 0

## 2019-04-09 NOTE — ASSESSMENT & PLAN NOTE
Symptoms controlled with pantoprazole, questioning if this may be contributing to her muscle cramping

## 2019-04-09 NOTE — ASSESSMENT & PLAN NOTE
Significant lower leg and feet cramping typically occurring at night, causing discomfort, has a hard time relaxing getting to sleep.  States that she has increased her water consumption, unsure if this is helping.  She is getting good sources of potassium, typically eats 2 bananas a day.  She is taking an over-the-counter magnesium.  She has not been stretching or exercising

## 2019-04-09 NOTE — ASSESSMENT & PLAN NOTE
10 lb weight gain in the last 6 months. Reports poor activity level, constipation, hair loss. No fam h/o thyroid disorder.  She has not been exercising as usual, they are in the process of moving back from [place]

## 2019-04-10 NOTE — PROGRESS NOTES
"Subjective:     Chief Complaint   Patient presents with   • Labs Only     REQUESTING LABS/ THYROID    • Other     MED REVIEW FOR PANTOPRAZOLE AND MYRBETRIQ      Susy Szymanski is a 64 y.o. female here today to follow up on:    Weight gain  10 lb weight gain in the last 6 months. Reports poor activity level, constipation, hair loss. No fam h/o thyroid disorder.  She has not been exercising as usual, they are in the process of moving back from [place]    Gastroesophageal reflux disease without esophagitis  Symptoms controlled with pantoprazole, questioning if this may be contributing to her muscle cramping    Muscle cramping  Significant lower leg and feet cramping typically occurring at night, causing discomfort, has a hard time relaxing getting to sleep.  States that she has increased her water consumption, unsure if this is helping.  She is getting good sources of potassium, typically eats 2 bananas a day.  She is taking an over-the-counter magnesium.  She has not been stretching or exercising       Current medicines (including changes today)  Current Outpatient Prescriptions   Medication Sig Dispense Refill   • pantoprazole (PROTONIX) 40 MG Tablet Delayed Response Take 40 mg by mouth every day.     • Mirabegron ER (MYRBETRIQ) 50 MG TABLET SR 24 HR Take  by mouth.     • LORazepam (ATIVAN) 0.5 MG Tab Take 0.25-0.5 mg by mouth every 6 hours as needed for Anxiety.     • omeprazole (PRILOSEC) 40 MG delayed-release capsule Take 40 mg by mouth every day.       No current facility-administered medications for this visit.      She  has a past medical history of Anxiety; ASTHMA; Depression; and GERD (gastroesophageal reflux disease).    ROS included above     Objective:     /70 (BP Location: Left arm, Patient Position: Sitting, BP Cuff Size: Adult)   Pulse 60   Temp 36.9 °C (98.5 °F) (Temporal)   Resp 16   Ht 1.702 m (5' 7\")   Wt 64.4 kg (142 lb)   SpO2 95%  Body mass index is 22.24 kg/m².     Physical " Exam:  General: Alert, oriented in no acute distress.  Eye contact is good, speech is normal, affect calm  Lungs: clear to auscultation bilaterally, normal effort, no wheeze/ rhonchi/ rales.  CV: regular rate and rhythm, S1, S2, no murmur  MS: no muscular tenderness in bilateral LE. No hypertonicity  Ext: no edema, color normal, vascularity normal, temperature normal    Assessment and Plan:   The following treatment plan was discussed   1. Muscle cramping  Increased difficulty with painful muscle cramping. Pt was concerned this may be a side effect of her medication this is, however, not a listed adverse effect. We will evaluate labs. Discussed hydration, stretching. Prescription for compound cream with muscle relaxant sent  Comp Metabolic Panel    MAGNESIUM   2. Weight gain  10 lb weight gain recently, requesting thyroid screening. She is also reporting hair loss, decreased energy. Will evaluate labs. Encouraged to resume regular exercise regimen   3. Gastroesophageal reflux disease without esophagitis  stable   4. Thyroid disorder screen  TSH WITH REFLEX TO FT4   5. Screening for deficiency anemia  CBC WITH DIFFERENTIAL       Followup: pending labs         Please note that this dictation was created using voice recognition software. I have worked with consultants from the vendor as well as technical experts from [facility] [employer] to optimize the interface. I have made every reasonable attempt to correct obvious errors, but I expect that there are errors of grammar and possibly content that I did not discover before finalizing the note.

## 2019-04-18 ENCOUNTER — HOSPITAL ENCOUNTER (OUTPATIENT)
Dept: LAB | Facility: MEDICAL CENTER | Age: 64
End: 2019-04-18
Attending: NURSE PRACTITIONER
Payer: MEDICARE

## 2019-04-18 DIAGNOSIS — Z13.0 SCREENING FOR DEFICIENCY ANEMIA: ICD-10-CM

## 2019-04-18 DIAGNOSIS — Z13.29 THYROID DISORDER SCREEN: ICD-10-CM

## 2019-04-18 DIAGNOSIS — R25.2 MUSCLE CRAMPING: ICD-10-CM

## 2019-04-18 LAB
ALBUMIN SERPL BCP-MCNC: 4.1 G/DL (ref 3.2–4.9)
ALBUMIN/GLOB SERPL: 1.9 G/DL
ALP SERPL-CCNC: 69 U/L (ref 30–99)
ALT SERPL-CCNC: 14 U/L (ref 2–50)
ANION GAP SERPL CALC-SCNC: 8 MMOL/L (ref 0–11.9)
AST SERPL-CCNC: 21 U/L (ref 12–45)
BASOPHILS # BLD AUTO: 0.5 % (ref 0–1.8)
BASOPHILS # BLD: 0.02 K/UL (ref 0–0.12)
BILIRUB SERPL-MCNC: 0.5 MG/DL (ref 0.1–1.5)
BUN SERPL-MCNC: 14 MG/DL (ref 8–22)
CALCIUM SERPL-MCNC: 9.3 MG/DL (ref 8.5–10.5)
CHLORIDE SERPL-SCNC: 104 MMOL/L (ref 96–112)
CO2 SERPL-SCNC: 29 MMOL/L (ref 20–33)
CREAT SERPL-MCNC: 0.76 MG/DL (ref 0.5–1.4)
EOSINOPHIL # BLD AUTO: 0.02 K/UL (ref 0–0.51)
EOSINOPHIL NFR BLD: 0.5 % (ref 0–6.9)
ERYTHROCYTE [DISTWIDTH] IN BLOOD BY AUTOMATED COUNT: 48.7 FL (ref 35.9–50)
FASTING STATUS PATIENT QL REPORTED: NORMAL
GLOBULIN SER CALC-MCNC: 2.2 G/DL (ref 1.9–3.5)
GLUCOSE SERPL-MCNC: 84 MG/DL (ref 65–99)
HCT VFR BLD AUTO: 42.8 % (ref 37–47)
HGB BLD-MCNC: 13.5 G/DL (ref 12–16)
IMM GRANULOCYTES # BLD AUTO: 0.01 K/UL (ref 0–0.11)
IMM GRANULOCYTES NFR BLD AUTO: 0.3 % (ref 0–0.9)
LYMPHOCYTES # BLD AUTO: 1.18 K/UL (ref 1–4.8)
LYMPHOCYTES NFR BLD: 29.8 % (ref 22–41)
MAGNESIUM SERPL-MCNC: 2 MG/DL (ref 1.5–2.5)
MCH RBC QN AUTO: 29.9 PG (ref 27–33)
MCHC RBC AUTO-ENTMCNC: 31.5 G/DL (ref 33.6–35)
MCV RBC AUTO: 94.7 FL (ref 81.4–97.8)
MONOCYTES # BLD AUTO: 0.3 K/UL (ref 0–0.85)
MONOCYTES NFR BLD AUTO: 7.6 % (ref 0–13.4)
NEUTROPHILS # BLD AUTO: 2.43 K/UL (ref 2–7.15)
NEUTROPHILS NFR BLD: 61.3 % (ref 44–72)
NRBC # BLD AUTO: 0 K/UL
NRBC BLD-RTO: 0 /100 WBC
PLATELET # BLD AUTO: 253 K/UL (ref 164–446)
PMV BLD AUTO: 10.3 FL (ref 9–12.9)
POTASSIUM SERPL-SCNC: 4.2 MMOL/L (ref 3.6–5.5)
PROT SERPL-MCNC: 6.3 G/DL (ref 6–8.2)
RBC # BLD AUTO: 4.52 M/UL (ref 4.2–5.4)
SODIUM SERPL-SCNC: 141 MMOL/L (ref 135–145)
TSH SERPL DL<=0.005 MIU/L-ACNC: 1.13 UIU/ML (ref 0.38–5.33)
WBC # BLD AUTO: 4 K/UL (ref 4.8–10.8)

## 2019-04-18 PROCEDURE — 84443 ASSAY THYROID STIM HORMONE: CPT

## 2019-04-18 PROCEDURE — 83735 ASSAY OF MAGNESIUM: CPT

## 2019-04-18 PROCEDURE — 80053 COMPREHEN METABOLIC PANEL: CPT

## 2019-04-18 PROCEDURE — 85025 COMPLETE CBC W/AUTO DIFF WBC: CPT

## 2019-04-18 PROCEDURE — 36415 COLL VENOUS BLD VENIPUNCTURE: CPT

## 2019-04-26 ENCOUNTER — OFFICE VISIT (OUTPATIENT)
Dept: CARDIOLOGY | Facility: MEDICAL CENTER | Age: 64
End: 2019-04-26
Payer: MEDICARE

## 2019-04-26 VITALS
WEIGHT: 138 LBS | SYSTOLIC BLOOD PRESSURE: 110 MMHG | BODY MASS INDEX: 20.92 KG/M2 | HEIGHT: 68 IN | HEART RATE: 76 BPM | DIASTOLIC BLOOD PRESSURE: 70 MMHG | OXYGEN SATURATION: 93 %

## 2019-04-26 DIAGNOSIS — Z91.89 OTHER SPECIFIED PERSONAL RISK FACTORS, NOT ELSEWHERE CLASSIFIED: ICD-10-CM

## 2019-04-26 DIAGNOSIS — R00.2 PALPITATIONS: ICD-10-CM

## 2019-04-26 DIAGNOSIS — E78.01 LDL (LOW DENSITY LIPOPROTEIN RECEPTOR DISORDER): ICD-10-CM

## 2019-04-26 DIAGNOSIS — I49.1 ATRIAL PREMATURE COMPLEXES: ICD-10-CM

## 2019-04-26 PROCEDURE — 99203 OFFICE O/P NEW LOW 30 MIN: CPT | Performed by: INTERNAL MEDICINE

## 2019-04-26 RX ORDER — PROPRANOLOL HYDROCHLORIDE 10 MG/1
10 TABLET ORAL
COMMUNITY
Start: 2013-06-25 | End: 2019-06-04

## 2019-04-26 ASSESSMENT — ENCOUNTER SYMPTOMS
ROS GI COMMENTS: OCCASIONAL CONSTIPATION
ABDOMINAL PAIN: 0
NEUROLOGICAL NEGATIVE: 1
MUSCULOSKELETAL NEGATIVE: 1
PALPITATIONS: 1
BLOOD IN STOOL: 0
CONSTITUTIONAL NEGATIVE: 1
RESPIRATORY NEGATIVE: 1
BRUISES/BLEEDS EASILY: 0
GASTROINTESTINAL NEGATIVE: 1
INSOMNIA: 1
NERVOUS/ANXIOUS: 1

## 2019-04-26 NOTE — PROGRESS NOTES
No chief complaint on file.      Subjective:   Susy Szymanski is a 64 y.o. female who is self-referred for evaluation of palpitations.  Last night she had what she felt were worse palpitations than usual.  Her  was a bit concerned but did not bring her in to the emergency room.  Today she is better.  She has a history of palpitations had been evaluated in the past.  She was seen previously by  in 2013.  Holter monitor at that time revealed frequent PACs and brief runs of SVT lasting generally about 4 beats for the longest episode of 10 beats.  The Holter recording images were actually personally reviewed today and also shown to the patient and .  There is no history of syncope, presyncope, or sustained tachyarrhythmias.  She notes that her palpitations come and go over time.  She is physically active and she and her  ride her bike about 15 miles several times a week    Family history is notable for the fact that her son, who is a diabetic had a stent placed.  Social history: , non-smoker, exercises regularly.  Palpitations: Patient has had a recurrence of her palpitations.  No history of thyroid disease.    Past Medical History:   Diagnosis Date   • Anxiety    • ASTHMA    • Depression    • GERD (gastroesophageal reflux disease)      Past Surgical History:   Procedure Laterality Date   • COLONOSCOPY WITH POLYP  June 2008    benign polyp  Dr Paul    • CHOLECYSTECTOMY  January 2008    laparoscopic   • EGD WITH ASP/BX  September 2006    inflammation only ---Dr Paul   • CHOLECYSTECTOMY     • SINUSOTOMIES       Family History   Problem Relation Age of Onset   • Cancer Father 70        cancer of unknown primary   • Heart Disease Mother    • Heart Disease Son 42        MI and coronary stent placement   • Diabetes Son    • Hypertension Son    • Diabetes Sister      Social History     Social History   • Marital status:      Spouse name: N/A   • Number of children: N/A  "  • Years of education: N/A     Occupational History   • retired      Social History Main Topics   • Smoking status: Never Smoker   • Smokeless tobacco: Never Used   • Alcohol use No   • Drug use: No   • Sexual activity: Yes     Partners: Male     Birth control/ protection: Post-Menopausal     Other Topics Concern   • Not on file     Social History Narrative   • No narrative on file     Allergies   Allergen Reactions   • Amitriptyline      Excessive sedation --even at 10 mg dose   • Bactrim [Sulfamethoxazole W-Trimethoprim]      Shaking   • Levaquin      Nausea    • Lovastatin      Muscle aches  Muscle aches     • Trazodone      Irregular pulse     Outpatient Encounter Prescriptions as of 4/26/2019   Medication Sig Dispense Refill   • propranolol (INDERAL) 10 MG Tab Take 10 mg by mouth.     • pantoprazole (PROTONIX) 40 MG Tablet Delayed Response Take 40 mg by mouth every day.     • Mirabegron ER (MYRBETRIQ) 50 MG TABLET SR 24 HR Take  by mouth.     • LORazepam (ATIVAN) 0.5 MG Tab Take 0.25-0.5 mg by mouth every 6 hours as needed for Anxiety.     • omeprazole (PRILOSEC) 40 MG delayed-release capsule Take 40 mg by mouth every day.       No facility-administered encounter medications on file as of 4/26/2019.      Review of Systems   Constitutional: Negative.    HENT: Negative.    Respiratory: Negative.    Cardiovascular: Positive for palpitations.   Gastrointestinal: Negative.  Negative for abdominal pain and blood in stool.        Occasional constipation   Genitourinary: Negative for frequency and hematuria.   Musculoskeletal: Negative.    Skin: Negative.    Neurological: Negative.    Endo/Heme/Allergies: Negative.  Does not bruise/bleed easily.   Psychiatric/Behavioral: The patient is nervous/anxious and has insomnia.         Objective:   /70 (BP Location: Right arm, Patient Position: Sitting)   Pulse 76   Ht 1.715 m (5' 7.5\")   Wt 62.6 kg (138 lb)   LMP 01/01/2004   SpO2 93%   BMI 21.29 kg/m² "     Physical Exam   Constitutional: She is oriented to person, place, and time. No distress.   HENT:   Head: Normocephalic and atraumatic.   Eyes: Pupils are equal, round, and reactive to light. No scleral icterus.   Neck: No JVD present.   Cardiovascular: Normal rate and regular rhythm.    No murmur heard.  Pulmonary/Chest: No respiratory distress. She has no wheezes.   Abdominal: Soft. She exhibits no distension. There is no tenderness.   Musculoskeletal: She exhibits no edema.   Neurological: She is alert and oriented to person, place, and time.   Skin: Skin is warm.   Psychiatric:   Mildly anxious       Assessment:     1. Atrial premature complexes  EKG    Holter Monitor / Event Recorder   2. LDL (low density lipoprotein receptor disorder)  EKG    Holter Monitor / Event Recorder   3. Palpitations  EKG    Holter Monitor / Event Recorder   4. Other specified personal risk factors, not elsewhere classified  CT-CARDIAC SCORING       Medical Decision Making:  Today's Assessment / Status / Plan:   Palpitations: The patient has had a recurrence of her palpitations.  She was evaluated in June, 2013 as noted above was found to have PACs and brief runs of SVT.  Those results were shared with she and her .  She will have a 1 week monitor placed and reassess her rhythm.  The patient does have some propranolol at home that she does take for tremor when needed.  Will consider changing her to bisoprolol after the monitor results are available.  She is reassured that this does not appear to be a serious arrhythmia.  Rhythm strip in the office today revealed only rare PAC.    Hypercholesterolemia: Recent lab reveals LDL of 144.  She does have a history of premature coronary disease in her father.  Recommend coronary CT calcification study for risk stratification.    She will be notified of the results.  Recommendations regarding therapy for arrhythmia will be made after monitor reviewed.  Return in August

## 2019-05-14 ENCOUNTER — HOSPITAL ENCOUNTER (OUTPATIENT)
Dept: RADIOLOGY | Facility: MEDICAL CENTER | Age: 64
End: 2019-05-14
Attending: INTERNAL MEDICINE

## 2019-05-14 DIAGNOSIS — Z91.89 OTHER SPECIFIED PERSONAL RISK FACTORS, NOT ELSEWHERE CLASSIFIED: ICD-10-CM

## 2019-05-14 PROCEDURE — 4410556 CT-CARDIAC SCORING

## 2019-05-15 ENCOUNTER — OFFICE VISIT (OUTPATIENT)
Dept: URGENT CARE | Facility: CLINIC | Age: 64
End: 2019-05-15
Payer: MEDICARE

## 2019-05-15 ENCOUNTER — APPOINTMENT (RX ONLY)
Dept: URBAN - METROPOLITAN AREA CLINIC 4 | Facility: CLINIC | Age: 64
Setting detail: DERMATOLOGY
End: 2019-05-15

## 2019-05-15 ENCOUNTER — TELEPHONE (OUTPATIENT)
Dept: CARDIOLOGY | Facility: MEDICAL CENTER | Age: 64
End: 2019-05-15

## 2019-05-15 VITALS
SYSTOLIC BLOOD PRESSURE: 122 MMHG | RESPIRATION RATE: 16 BRPM | OXYGEN SATURATION: 97 % | DIASTOLIC BLOOD PRESSURE: 80 MMHG | HEIGHT: 68 IN | WEIGHT: 138.8 LBS | HEART RATE: 58 BPM | BODY MASS INDEX: 21.04 KG/M2 | TEMPERATURE: 99 F

## 2019-05-15 DIAGNOSIS — D22 MELANOCYTIC NEVI: ICD-10-CM

## 2019-05-15 DIAGNOSIS — R20.2 PARESTHESIA OF SKIN: ICD-10-CM

## 2019-05-15 DIAGNOSIS — L81.4 OTHER MELANIN HYPERPIGMENTATION: ICD-10-CM

## 2019-05-15 DIAGNOSIS — L81.1 CHLOASMA: ICD-10-CM

## 2019-05-15 DIAGNOSIS — D18.0 HEMANGIOMA: ICD-10-CM

## 2019-05-15 DIAGNOSIS — K21.9 GASTROESOPHAGEAL REFLUX DISEASE WITHOUT ESOPHAGITIS: Primary | ICD-10-CM

## 2019-05-15 DIAGNOSIS — L82.1 OTHER SEBORRHEIC KERATOSIS: ICD-10-CM

## 2019-05-15 DIAGNOSIS — Z71.89 OTHER SPECIFIED COUNSELING: ICD-10-CM

## 2019-05-15 PROBLEM — D22.62 MELANOCYTIC NEVI OF LEFT UPPER LIMB, INCLUDING SHOULDER: Status: ACTIVE | Noted: 2019-05-15

## 2019-05-15 PROBLEM — D22.71 MELANOCYTIC NEVI OF RIGHT LOWER LIMB, INCLUDING HIP: Status: ACTIVE | Noted: 2019-05-15

## 2019-05-15 PROBLEM — D18.01 HEMANGIOMA OF SKIN AND SUBCUTANEOUS TISSUE: Status: ACTIVE | Noted: 2019-05-15

## 2019-05-15 PROBLEM — D22.72 MELANOCYTIC NEVI OF LEFT LOWER LIMB, INCLUDING HIP: Status: ACTIVE | Noted: 2019-05-15

## 2019-05-15 PROBLEM — D22.5 MELANOCYTIC NEVI OF TRUNK: Status: ACTIVE | Noted: 2019-05-15

## 2019-05-15 PROBLEM — D22.61 MELANOCYTIC NEVI OF RIGHT UPPER LIMB, INCLUDING SHOULDER: Status: ACTIVE | Noted: 2019-05-15

## 2019-05-15 PROCEDURE — ? PRESCRIPTION

## 2019-05-15 PROCEDURE — 99214 OFFICE O/P EST MOD 30 MIN: CPT | Performed by: PHYSICIAN ASSISTANT

## 2019-05-15 PROCEDURE — ? COUNSELING

## 2019-05-15 PROCEDURE — ? ADDITIONAL NOTES

## 2019-05-15 PROCEDURE — 99214 OFFICE O/P EST MOD 30 MIN: CPT

## 2019-05-15 RX ORDER — HYDROQUINONE 4 %
CREAM (GRAM) TOPICAL
Qty: 30 | Refills: 2 | Status: ERX

## 2019-05-15 ASSESSMENT — LOCATION ZONE DERM
LOCATION ZONE: LIP
LOCATION ZONE: ARM
LOCATION ZONE: LEG
LOCATION ZONE: TRUNK
LOCATION ZONE: FACE

## 2019-05-15 ASSESSMENT — LOCATION DETAILED DESCRIPTION DERM
LOCATION DETAILED: EPIGASTRIC SKIN
LOCATION DETAILED: MIDDLE STERNUM
LOCATION DETAILED: LEFT ANTERIOR DISTAL UPPER ARM
LOCATION DETAILED: LOWER STERNUM
LOCATION DETAILED: RIGHT PROXIMAL POSTERIOR UPPER ARM
LOCATION DETAILED: RIGHT ANTERIOR DISTAL UPPER ARM
LOCATION DETAILED: SUPERIOR THORACIC SPINE
LOCATION DETAILED: RIGHT DISTAL POSTERIOR THIGH
LOCATION DETAILED: LEFT PROXIMAL DORSAL FOREARM
LOCATION DETAILED: PHILTRUM
LOCATION DETAILED: RIGHT PROXIMAL LATERAL POSTERIOR UPPER ARM
LOCATION DETAILED: LEFT PROXIMAL POSTERIOR UPPER ARM
LOCATION DETAILED: RIGHT PROXIMAL DORSAL FOREARM
LOCATION DETAILED: LEFT DISTAL POSTERIOR UPPER ARM
LOCATION DETAILED: LEFT SUPERIOR MEDIAL UPPER BACK
LOCATION DETAILED: LEFT INFERIOR MEDIAL FOREHEAD
LOCATION DETAILED: RIGHT DISTAL POSTERIOR UPPER ARM
LOCATION DETAILED: LEFT MEDIAL UPPER BACK
LOCATION DETAILED: INFERIOR THORACIC SPINE
LOCATION DETAILED: LEFT DISTAL POSTERIOR THIGH

## 2019-05-15 ASSESSMENT — LOCATION SIMPLE DESCRIPTION DERM
LOCATION SIMPLE: RIGHT POSTERIOR THIGH
LOCATION SIMPLE: UPPER LIP
LOCATION SIMPLE: RIGHT UPPER ARM
LOCATION SIMPLE: RIGHT POSTERIOR UPPER ARM
LOCATION SIMPLE: CHEST
LOCATION SIMPLE: LEFT POSTERIOR UPPER ARM
LOCATION SIMPLE: LEFT POSTERIOR THIGH
LOCATION SIMPLE: RIGHT FOREARM
LOCATION SIMPLE: UPPER BACK
LOCATION SIMPLE: LEFT FOREHEAD
LOCATION SIMPLE: LEFT FOREARM
LOCATION SIMPLE: ABDOMEN
LOCATION SIMPLE: LEFT UPPER ARM
LOCATION SIMPLE: LEFT UPPER BACK

## 2019-05-15 NOTE — PROGRESS NOTES
Subjective:      Pt is a 64 y.o. female who presents with Abdominal Pain (countinous heartburn,still since 17)            HPI  This is a new problem. Pt notes worsening GERD x 2 weeks and states rotating between Prilosec 40mg and pantoprazole 40 mg once and switching them daily with no benefit. Pt to see GI in 5 days she states. Pt has not taken any Rx medications for this condition. PT unsure why this is worsening just now. Pt states the pain is a 7/10 with heartburn, aching in nature and worse at night. Pt denies CP, SOB, NVD, paresthesias, headaches, dizziness, change in vision, hives, or other joint pain. The pt's medication list, problem list, and allergies have been evaluated and reviewed during today's visit.    PMH:  Past Medical History:   Diagnosis Date   • Anxiety    • ASTHMA    • Depression    • GERD (gastroesophageal reflux disease)        PSH:  Past Surgical History:   Procedure Laterality Date   • COLONOSCOPY WITH POLYP  2008    benign polyp  Dr Paul    • CHOLECYSTECTOMY  2008    laparoscopic   • EGD WITH ASP/BX  2006    inflammation only ---Dr Paul   • CHOLECYSTECTOMY     • SINUSOTOMIES         Fam Hx:    family history includes Cancer (age of onset: 70) in her father; Diabetes in her sister and son; Heart Disease in her mother; Heart Disease (age of onset: 42) in her son; Hypertension in her son.  Family Status   Relation Status   • Fa    • Mo  at age 89   • Son Alive   • Son Alive   • Sis (Not Specified)       Soc HX:  Social History     Social History   • Marital status:      Spouse name: N/A   • Number of children: N/A   • Years of education: N/A     Occupational History   • retired      Social History Main Topics   • Smoking status: Never Smoker   • Smokeless tobacco: Never Used   • Alcohol use No   • Drug use: No   • Sexual activity: Yes     Partners: Male     Birth control/ protection: Post-Menopausal     Other Topics Concern   • Not on  "file     Social History Narrative   • No narrative on file         Medications:    Current Outpatient Prescriptions:   •  propranolol (INDERAL) 10 MG Tab, Take 10 mg by mouth., Disp: , Rfl:   •  pantoprazole (PROTONIX) 40 MG Tablet Delayed Response, Take 40 mg by mouth every day., Disp: , Rfl:   •  Mirabegron ER (MYRBETRIQ) 50 MG TABLET SR 24 HR, Take  by mouth., Disp: , Rfl:   •  LORazepam (ATIVAN) 0.5 MG Tab, Take 0.25-0.5 mg by mouth every 6 hours as needed for Anxiety., Disp: , Rfl:   •  omeprazole (PRILOSEC) 40 MG delayed-release capsule, Take 40 mg by mouth every day., Disp: , Rfl:       Allergies:  Amitriptyline; Bactrim [sulfamethoxazole w-trimethoprim]; Levaquin; Lovastatin; and Trazodone    ROS  Constitutional: Negative for fever, chills and malaise/fatigue.   HENT: Negative for congestion and sore throat.    Eyes: Negative for blurred vision, double vision and photophobia.   Respiratory: Negative for cough and shortness of breath.  Cardiovascular: Negative for chest pain and palpitations.   Gastrointestinal: POS for heartburn, NEG nausea, vomiting, abdominal pain, diarrhea and constipation.   Genitourinary: Negative for dysuria and flank pain.   Musculoskeletal: Negative for joint pain and myalgias.   Skin: Negative for itching and rash.   Neurological: Negative for dizziness, tingling and headaches.   Endo/Heme/Allergies: Does not bruise/bleed easily.   Psychiatric/Behavioral: Negative for depression. The patient is not nervous/anxious.           Objective:     /80 (BP Location: Left arm, Patient Position: Sitting, BP Cuff Size: Adult)   Pulse (!) 58   Temp 37.2 °C (99 °F) (Temporal)   Resp 16   Ht 1.715 m (5' 7.5\")   Wt 63 kg (138 lb 12.8 oz)   LMP 01/01/2004   SpO2 97%   BMI 21.42 kg/m²      Physical Exam   Abdominal: Soft. Normal appearance. She exhibits no distension, no pulsatile liver, no fluid wave, no abdominal bruit, no ascites, no pulsatile midline mass and no mass. Bowel sounds " are increased. There is no hepatosplenomegaly. There is tenderness in the epigastric area. There is no rigidity, no rebound, no guarding, no CVA tenderness, no tenderness at McBurney's point and negative Altamirano's sign. No hernia.              Constitutional: PT is oriented to person, place, and time. PT appears well-developed and well-nourished. No distress.   HENT:   Head: Normocephalic and atraumatic.   Mouth/Throat: Oropharynx is clear and moist. No oropharyngeal exudate.   Eyes: Conjunctivae normal and EOM are normal. Pupils are equal, round, and reactive to light.   Neck: Normal range of motion. Neck supple. No thyromegaly present.   Cardiovascular: Normal rate, regular rhythm, normal heart sounds and intact distal pulses.  Exam reveals no gallop and no friction rub.    No murmur heard.  Pulmonary/Chest: Effort normal and breath sounds normal. No respiratory distress. PT has no wheezes. PT has no rales. Pt exhibits no tenderness.   Musculoskeletal: Normal range of motion. PT exhibits no edema and no tenderness.   Neurological: PT is alert and oriented to person, place, and time. PT has normal reflexes. No cranial nerve deficit.   Skin: Skin is warm and dry. No rash noted. PT is not diaphoretic. No erythema.       Psychiatric: PT has a normal mood and affect. PT behavior is normal. Judgment and thought content normal.        Assessment/Plan:     1. Gastroesophageal reflux disease without esophagitis    Strict ER precautions given  Spoke with pt's pharmacist and she agrees doubling the pantoprazole daily until seen by GI with OTC supportive care would be the way to go.  Rest, fluids encouraged.  AVS with medical info given.  Pt was in full understanding and agreement with the plan.  Differential diagnosis, natural history, supportive care, and indications for immediate follow-up discussed. All questions answered. Patient agrees with the plan of care.  Follow-up as needed if symptoms worsen or fail to  improve.

## 2019-05-15 NOTE — HPI: EVALUATION OF SKIN LESION(S)
What Type Of Note Output Would You Prefer (Optional)?: Standard Output
How Severe Are Your Spot(S)?: mild
Have Your Spot(S) Been Treated In The Past?: has not been treated
Hpi Title: Evaluation of Skin Lesions
Additional History: Patient would like [facility]BE today.

## 2019-05-15 NOTE — TELEPHONE ENCOUNTER
Kaleb Chang M.D.  Aline York L.P.N.             CT calcium score is ZERO> She is in  Very low risk category and does not need statins.      [facility] message sent to pt.

## 2019-05-15 NOTE — PATIENT INSTRUCTIONS
Heartburn  Introduction  Heartburn is a type of pain or discomfort that can happen in the throat or chest. It is often described as a burning pain. It may also cause a bad taste in the mouth. Heartburn may feel worse when you lie down or bend over. It may be caused by stomach contents that move back up (reflux) into the tube that connects the mouth with the stomach (esophagus).  Follow these instructions at home:  Take these actions to lessen your discomfort and to help avoid problems.  Diet  · Follow a diet as told by your doctor. You may need to avoid foods and drinks such as:  ¨ Coffee and tea (with or without caffeine).  ¨ Drinks that contain alcohol.  ¨ Energy drinks and sports drinks.  ¨ Carbonated drinks or sodas.  ¨ Chocolate and cocoa.  ¨ Peppermint and mint flavorings.  ¨ Garlic and onions.  ¨ Horseradish.  ¨ Spicy and acidic foods, such as peppers, chili powder, lind powder, vinegar, hot sauces, and BBQ sauce.  ¨ Citrus fruit juices and citrus fruits, such as oranges, antonino, and limes.  ¨ Tomato-based foods, such as red sauce, chili, salsa, and pizza with red sauce.  ¨ Fried and fatty foods, such as donuts, french fries, potato chips, and high-fat dressings.  ¨ High-fat meats, such as hot dogs, rib eye steak, sausage, ham, and norman.  ¨ High-fat dairy items, such as whole milk, butter, and cream cheese.  · Eat small meals often. Avoid eating large meals.  · Avoid drinking large amounts of liquid with your meals.  · Avoid eating meals during the 2-3 hours before bedtime.  · Avoid lying down right after you eat.  · Do not exercise right after you eat.  General instructions  · Pay attention to any changes in your symptoms.  · Take over-the-counter and prescription medicines only as told by your doctor. Do not take aspirin, ibuprofen, or other NSAIDs unless your doctor says it is okay.  · Do not use any tobacco products, including cigarettes, chewing tobacco, and e-cigarettes. If you need help quitting, ask  your doctor.  · Wear loose clothes. Do not wear anything tight around your waist.  · Raise (elevate) the head of your bed about 6 inches (15 cm).  · Try to lower your stress. If you need help doing this, ask your doctor.  · If you are overweight, lose an amount of weight that is healthy for you. Ask your doctor about a safe weight loss goal.  · Keep all follow-up visits as told by your doctor. This is important.  Contact a doctor if:  · You have new symptoms.  · You lose weight and you do not know why it is happening.  · You have trouble swallowing, or it hurts to swallow.  · You have wheezing or a cough that keeps happening.  · Your symptoms do not get better with treatment.  · You have heartburn often for more than two weeks.  Get help right away if:  · You have pain in your arms, neck, jaw, teeth, or back.  · You feel sweaty, dizzy, or light-headed.  · You have chest pain or shortness of breath.  · You throw up (vomit) and your throw up looks like blood or coffee grounds.  · Your poop (stool) is bloody or black.  This information is not intended to replace advice given to you by your health care provider. Make sure you discuss any questions you have with your health care provider.  Document Released: 08/29/2012 Document Revised: 05/25/2017 Document Reviewed: 04/13/2016  © 2017 [employer]

## 2019-05-15 NOTE — PROCEDURE: ADDITIONAL NOTES
Additional Notes: Lesions of concern on the L anterior neck and L upper back are clinically consistent with SK’s.
Detail Level: Detailed

## 2019-05-17 ENCOUNTER — HOSPITAL ENCOUNTER (EMERGENCY)
Facility: MEDICAL CENTER | Age: 64
End: 2019-05-17
Attending: EMERGENCY MEDICINE
Payer: MEDICARE

## 2019-05-17 ENCOUNTER — APPOINTMENT (OUTPATIENT)
Dept: RADIOLOGY | Facility: MEDICAL CENTER | Age: 64
End: 2019-05-17
Attending: EMERGENCY MEDICINE
Payer: MEDICARE

## 2019-05-17 VITALS
HEART RATE: 50 BPM | SYSTOLIC BLOOD PRESSURE: 128 MMHG | RESPIRATION RATE: 19 BRPM | OXYGEN SATURATION: 93 % | HEIGHT: 68 IN | TEMPERATURE: 98.5 F | BODY MASS INDEX: 21.08 KG/M2 | WEIGHT: 139.11 LBS | DIASTOLIC BLOOD PRESSURE: 77 MMHG

## 2019-05-17 DIAGNOSIS — R07.9 CHEST PAIN, UNSPECIFIED TYPE: ICD-10-CM

## 2019-05-17 DIAGNOSIS — R00.2 PALPITATIONS: ICD-10-CM

## 2019-05-17 DIAGNOSIS — R06.02 SOB (SHORTNESS OF BREATH): ICD-10-CM

## 2019-05-17 LAB
ALBUMIN SERPL BCP-MCNC: 3.9 G/DL (ref 3.2–4.9)
ALBUMIN/GLOB SERPL: 1.6 G/DL
ALP SERPL-CCNC: 69 U/L (ref 30–99)
ALT SERPL-CCNC: 17 U/L (ref 2–50)
ANION GAP SERPL CALC-SCNC: 8 MMOL/L (ref 0–11.9)
AST SERPL-CCNC: 22 U/L (ref 12–45)
BASOPHILS # BLD AUTO: 0.2 % (ref 0–1.8)
BASOPHILS # BLD: 0.01 K/UL (ref 0–0.12)
BILIRUB SERPL-MCNC: 0.4 MG/DL (ref 0.1–1.5)
BNP SERPL-MCNC: 106 PG/ML (ref 0–100)
BUN SERPL-MCNC: 21 MG/DL (ref 8–22)
CALCIUM SERPL-MCNC: 9.4 MG/DL (ref 8.4–10.2)
CHLORIDE SERPL-SCNC: 104 MMOL/L (ref 96–112)
CO2 SERPL-SCNC: 28 MMOL/L (ref 20–33)
CREAT SERPL-MCNC: 0.7 MG/DL (ref 0.5–1.4)
EKG IMPRESSION: NORMAL
EOSINOPHIL # BLD AUTO: 0.04 K/UL (ref 0–0.51)
EOSINOPHIL NFR BLD: 0.6 % (ref 0–6.9)
ERYTHROCYTE [DISTWIDTH] IN BLOOD BY AUTOMATED COUNT: 45.2 FL (ref 35.9–50)
GLOBULIN SER CALC-MCNC: 2.4 G/DL (ref 1.9–3.5)
GLUCOSE SERPL-MCNC: 97 MG/DL (ref 65–99)
HCT VFR BLD AUTO: 41.8 % (ref 37–47)
HGB BLD-MCNC: 13.4 G/DL (ref 12–16)
IMM GRANULOCYTES # BLD AUTO: 0.01 K/UL (ref 0–0.11)
IMM GRANULOCYTES NFR BLD AUTO: 0.2 % (ref 0–0.9)
LYMPHOCYTES # BLD AUTO: 1.27 K/UL (ref 1–4.8)
LYMPHOCYTES NFR BLD: 20.6 % (ref 22–41)
MAGNESIUM SERPL-MCNC: 2.1 MG/DL (ref 1.5–2.5)
MCH RBC QN AUTO: 29.8 PG (ref 27–33)
MCHC RBC AUTO-ENTMCNC: 32.1 G/DL (ref 33.6–35)
MCV RBC AUTO: 92.9 FL (ref 81.4–97.8)
MONOCYTES # BLD AUTO: 0.55 K/UL (ref 0–0.85)
MONOCYTES NFR BLD AUTO: 8.9 % (ref 0–13.4)
NEUTROPHILS # BLD AUTO: 4.29 K/UL (ref 2–7.15)
NEUTROPHILS NFR BLD: 69.5 % (ref 44–72)
NRBC # BLD AUTO: 0 K/UL
NRBC BLD-RTO: 0 /100 WBC
PHOSPHATE SERPL-MCNC: 4.4 MG/DL (ref 2.5–4.5)
PLATELET # BLD AUTO: 240 K/UL (ref 164–446)
PMV BLD AUTO: 10 FL (ref 9–12.9)
POTASSIUM SERPL-SCNC: 4.1 MMOL/L (ref 3.6–5.5)
PROT SERPL-MCNC: 6.3 G/DL (ref 6–8.2)
RBC # BLD AUTO: 4.5 M/UL (ref 4.2–5.4)
SODIUM SERPL-SCNC: 140 MMOL/L (ref 135–145)
TROPONIN I SERPL-MCNC: <0.02 NG/ML (ref 0–0.04)
WBC # BLD AUTO: 6.2 K/UL (ref 4.8–10.8)

## 2019-05-17 PROCEDURE — 99284 EMERGENCY DEPT VISIT MOD MDM: CPT

## 2019-05-17 PROCEDURE — 84484 ASSAY OF TROPONIN QUANT: CPT

## 2019-05-17 PROCEDURE — 80053 COMPREHEN METABOLIC PANEL: CPT

## 2019-05-17 PROCEDURE — 84100 ASSAY OF PHOSPHORUS: CPT

## 2019-05-17 PROCEDURE — 83735 ASSAY OF MAGNESIUM: CPT

## 2019-05-17 PROCEDURE — 71045 X-RAY EXAM CHEST 1 VIEW: CPT

## 2019-05-17 PROCEDURE — 83880 ASSAY OF NATRIURETIC PEPTIDE: CPT

## 2019-05-17 PROCEDURE — 85025 COMPLETE CBC W/AUTO DIFF WBC: CPT

## 2019-05-17 PROCEDURE — 93005 ELECTROCARDIOGRAM TRACING: CPT | Performed by: EMERGENCY MEDICINE

## 2019-05-17 PROCEDURE — 36415 COLL VENOUS BLD VENIPUNCTURE: CPT

## 2019-05-17 PROCEDURE — 93005 ELECTROCARDIOGRAM TRACING: CPT

## 2019-05-18 NOTE — ED NOTES
Patient states she still has no symptoms at this time. ERP was over to discuss plan of care with pt. Patient provided printed discharge instructions which included signs and symptoms to look out for, why to return to ER, and other follow up appointment to make. Patient stated they understand discharge instructions and had no further questions or concerns at this time. Patient discharged to home with . Patient ambulated out of ER with stable gait.

## 2019-05-18 NOTE — ED NOTES
Rounded on pt. Blood drawn per protocol. Updated pt on wait times. Pt returned to lobby to wait room assignment.

## 2019-05-18 NOTE — ED TRIAGE NOTES
Pt comes here c/o irregular heart beat and SOB  Started while she wa son her way back home(here) from [place]  Was visiting grandchildren   Has Hx of this  Is seeing a cardiologist

## 2019-05-18 NOTE — DISCHARGE INSTRUCTIONS
Take 81 mg of aspirin (baby aspirin) daily with food for heart health  Return to the ER for chest pain, shortness of breath, nausea, vomiting, sweating  Follow-up with your primary care provider and your cardiologist on Monday

## 2019-05-18 NOTE — ED NOTES
Pt in scott bed with  at bedside. Pt states no pain or shortness of breath at this time. Pt stats she took her propanolol just prior to arrival and feels better. Pt c/o intermittant shortness of breath and rapid heart rate for past few days, and states she has had this before and had been prescribed propranolol for this reason.

## 2019-05-18 NOTE — ED PROVIDER NOTES
ED Provider Note    CHIEF COMPLAINT  Chief Complaint   Patient presents with   • Shortness of Breath   • Irregular Heart Beat     started around 2-3 this afternoon        HPI  Susy Szymanski is a 64 y.o. female with a history of palpitations, GERD and anxiety who presents complaining of palpitations.    Patient states she had acute onset of palpitations while riding in the car today.  Patient states she had shortness of breath and dizziness accompanied by chest tightness and a sensation in her left shoulder.  Aside from the palpitations, these are new symptoms for her.  She was wearing an apple watch that detected her heart rate to be anywhere from 180-37.  Patient took propranolol and now feels improved.  Patient denies current chest pain, nausea, vomiting, diaphoresis, calf pain, fever, chills, history of PE/DVT in self or family.  Patient has an appointment to have a Holter monitor placed on Wednesday with her cardiologist, Dr. Chang.      Patient has had palpitations for several years.    ALLERGIES  Allergies   Allergen Reactions   • Amitriptyline      Excessive sedation --even at 10 mg dose   • Bactrim [Sulfamethoxazole W-Trimethoprim]      Shaking   • Levaquin      Nausea    • Lovastatin      Muscle aches  Muscle aches     • Trazodone      Irregular pulse       CURRENT MEDICATIONS  Home Medications     Reviewed by Lisset Salmeron R.N. (Registered Nurse) on 05/17/19 at 1937  Med List Status: Partial   Medication Last Dose Status   LORazepam (ATIVAN) 0.5 MG Tab 5/17/2019 Active   Mirabegron ER (MYRBETRIQ) 50 MG TABLET SR 24 HR  Active   omeprazole (PRILOSEC) 40 MG delayed-release capsule 5/17/2019 Active   propranolol (INDERAL) 10 MG Tab 5/17/2019 Active                PAST MEDICAL HISTORY   has a past medical history of Anxiety; ASTHMA; Depression; and GERD (gastroesophageal reflux disease).    SURGICAL HISTORY   has a past surgical history that includes egd with asp/bx (September 2006); cholecystectomy  "(January 2008); colonoscopy with polyp (June 2008); sinusotomies; and cholecystectomy.    SOCIAL HISTORY  Social History     Social History Main Topics   • Smoking status: Never Smoker   • Smokeless tobacco: Never Used   • Alcohol use No   • Drug use: No   • Sexual activity: Yes     Partners: Male     Birth control/ protection: Post-Menopausal         Family Hx:  Brother with CHF and pacemaker    REVIEW OF SYSTEMS  See HPI for further details.  All other systems are negative except as above in HPI.      PHYSICAL EXAM  VITAL SIGNS: /77   Pulse (!) 50   Temp 36.9 °C (98.5 °F) (Temporal)   Resp 19   Ht 1.715 m (5' 7.5\")   Wt 63.1 kg (139 lb 1.8 oz)   LMP 01/01/2004   SpO2 93%   BMI 21.47 kg/m²     General:  WDWN, nontoxic appearing in NAD; A+Ox3; V/S as above  Skin: warm and dry; good color; no rash  HEENT: NCAT; EOMs intact; PERRL; no scleral icterus   Neck: FROM; no thyromegaly  Cardiovascular: Regular heart rate and rhythm.  No murmurs, rubs, or gallops; pulses 2+ bilaterally radially  Lungs: Clear to auscultation with good air movement bilaterally.  No wheezes, rhonchi, or rales.   Abdomen: BS present; soft; NTND; no rebound, guarding, or rigidity.  No organomegaly or pulsatile mass  Extremities: TAVARES x 4; no e/o trauma; no pedal edema; neg Marissa's  Neurologic: CNs III-XII grossly intact; speech clear; distal sensation intact; strength 5/5 UE/LEs  Psychiatric: Appropriate affect, normal mood    LABS  Results for orders placed or performed during the hospital encounter of 05/17/19   CBC WITH DIFFERENTIAL   Result Value Ref Range    WBC 6.2 4.8 - 10.8 K/uL    RBC 4.50 4.20 - 5.40 M/uL    Hemoglobin 13.4 12.0 - 16.0 g/dL    Hematocrit 41.8 37.0 - 47.0 %    MCV 92.9 81.4 - 97.8 fL    MCH 29.8 27.0 - 33.0 pg    MCHC 32.1 (L) 33.6 - 35.0 g/dL    RDW 45.2 35.9 - 50.0 fL    Platelet Count 240 164 - 446 K/uL    MPV 10.0 9.0 - 12.9 fL    Neutrophils-Polys 69.50 44.00 - 72.00 %    Lymphocytes 20.60 (L) " 22.00 - 41.00 %    Monocytes 8.90 0.00 - 13.40 %    Eosinophils 0.60 0.00 - 6.90 %    Basophils 0.20 0.00 - 1.80 %    Immature Granulocytes 0.20 0.00 - 0.90 %    Nucleated RBC 0.00 /100 WBC    Neutrophils (Absolute) 4.29 2.00 - 7.15 K/uL    Lymphs (Absolute) 1.27 1.00 - 4.80 K/uL    Monos (Absolute) 0.55 0.00 - 0.85 K/uL    Eos (Absolute) 0.04 0.00 - 0.51 K/uL    Baso (Absolute) 0.01 0.00 - 0.12 K/uL    Immature Granulocytes (abs) 0.01 0.00 - 0.11 K/uL    NRBC (Absolute) 0.00 K/uL   COMP METABOLIC PANEL   Result Value Ref Range    Sodium 140 135 - 145 mmol/L    Potassium 4.1 3.6 - 5.5 mmol/L    Chloride 104 96 - 112 mmol/L    Co2 28 20 - 33 mmol/L    Anion Gap 8.0 0.0 - 11.9    Glucose 97 65 - 99 mg/dL    Bun 21 8 - 22 mg/dL    Creatinine 0.70 0.50 - 1.40 mg/dL    Calcium 9.4 8.4 - 10.2 mg/dL    AST(SGOT) 22 12 - 45 U/L    ALT(SGPT) 17 2 - 50 U/L    Alkaline Phosphatase 69 30 - 99 U/L    Total Bilirubin 0.4 0.1 - 1.5 mg/dL    Albumin 3.9 3.2 - 4.9 g/dL    Total Protein 6.3 6.0 - 8.2 g/dL    Globulin 2.4 1.9 - 3.5 g/dL    A-G Ratio 1.6 g/dL   MAGNESIUM   Result Value Ref Range    Magnesium 2.1 1.5 - 2.5 mg/dL   PHOSPHORUS   Result Value Ref Range    Phosphorus 4.4 2.5 - 4.5 mg/dL   TROPONIN   Result Value Ref Range    Troponin I <0.02 0.00 - 0.04 ng/mL   BNP   Result Value Ref Range    B Natriuretic Peptide 106 (H) 0 - 100 pg/mL   ESTIMATED GFR   Result Value Ref Range    GFR If African American >60 >60 mL/min/1.73 m 2    GFR If Non African American >60 >60 mL/min/1.73 m 2   EKG   Result Value Ref Range    Report       [facility] Emergency Dept.    Test Date:  2019  Pt Name:    DEAN CRUZ               Department: [facility]  MRN:        [id number]                      Room:  Gender:     Female                       Technician:   :        [id number]                   Requested By:ER TRIAGE PROTOCOL  Order #:    [id number]                    Reading MD: ZACK ARROYO,  MD    Measurements  Intervals                                Axis  Rate:       67                           P:          79  ME:         152                          QRS:        70  QRSD:       90                           T:          63  QT:         404  QTc:        427    Interpretive Statements  SINUS RHYTHM  Compared to ECG 06/16/2018 14:07:23  Atrial premature complex(es) no longer present    Electronically Signed On 5- 22:04:08 PDT by ZACK ARROYO MD           IMAGING  DX-CHEST-PORTABLE (1 VIEW)   Final Result         1.  No acute cardiopulmonary disease.   2.  Left lung base nodular density, corresponds with nipple shadow on prior study.          MEDICAL RECORD  I have reviewed patient's medical record and pertinent results are listed below.      COURSE & MEDICAL DECISION MAKING  I have reviewed any medical record information, laboratory studies and radiographic results as noted.    Susy Szymanski is a 64 y.o. female who presents complaining of palpitations.  Patient has had these palpitations for some time.  She feels there worsening and becoming more frequent.  She had associated chest tightness, shortness of breath, and dizziness today.    EKG demonstrates no acute arrhythmia or acute ST changes.    Labs demonstrate normal CBC and electrolyte panel.  BNP was slightly elevated to 106 for an unclear reason.  Troponin is negative.  Patient had a TSH in April that was normal so we did not repeat this.    Chest x-ray demonstrated no acute abnormalities.    Patient has been asymptomatic while here in the ER.  She attributes this to having taken the propranolol prior to arrival.    Pt was re-evaluated and lab results and imaging were discussed.  I recommended admission for cardiac work-up including repeat troponin, echocardiogram, and possible stress testing.  Patient's  expressed concern regarding Medicare payment for an observation admission.  I requested that registration be contacted to  confirm this for the patient and advised the patient that I would check back with her about her decision whether or not to stay or sign out AMA.    Upon reevaluation, the patient states she would like to be discharged.  We discussed the strong possibility of this being atypical ACS as she is a female patient.  She agrees to sign out AMA despite my recommendations to be admitted.  She will follow-up as scheduled with her cardiologist and Holter monitor placement on Wednesday.  I advised that she take aspirin 81 mg daily with food for heart health.  She will return to the ER for recurrent symptoms or any concerns.    Pt's blood pressure was noted to be above 120/80 here in the ER.  Pt was informed and advised to follow up as an outpatient for recheck for possible dx/management of hypertension.      FINAL IMPRESSION  1. Palpitations    2. SOB (shortness of breath)    3. Chest pain, unspecified type      Electronically signed by: Roberta Capellan, 5/17/2019 9:10 PM

## 2019-05-22 ENCOUNTER — NON-PROVIDER VISIT (OUTPATIENT)
Dept: CARDIOLOGY | Facility: MEDICAL CENTER | Age: 64
End: 2019-05-22
Payer: MEDICARE

## 2019-05-22 ENCOUNTER — TELEPHONE (OUTPATIENT)
Dept: CARDIOLOGY | Facility: MEDICAL CENTER | Age: 64
End: 2019-05-22

## 2019-05-22 DIAGNOSIS — I49.1 ATRIAL PREMATURE COMPLEXES: ICD-10-CM

## 2019-05-22 DIAGNOSIS — R00.2 PALPITATIONS: ICD-10-CM

## 2019-05-22 DIAGNOSIS — E78.01 LDL (LOW DENSITY LIPOPROTEIN RECEPTOR DISORDER): ICD-10-CM

## 2019-05-22 PROCEDURE — 93268 ECG RECORD/REVIEW: CPT | Performed by: INTERNAL MEDICINE

## 2019-05-22 NOTE — TELEPHONE ENCOUNTER
Patient enrolled in the [employer] Heart monitoring program.  >In-office hook up/Baseline sent today.

## 2019-06-03 ENCOUNTER — TELEPHONE (OUTPATIENT)
Dept: CARDIOLOGY | Facility: MEDICAL CENTER | Age: 64
End: 2019-06-03

## 2019-06-03 NOTE — TELEPHONE ENCOUNTER
Message   Received: 3 days ago   Message Contents   Zacarias Lo M.D.  Sameera Ortiz R.N.             Please call ordering physician Kaleb Guadarrama with abnormal Biotel monitor results showing frequent supraventricular tachycardia episodes.     Thanks.  KENDRICK Bryan RN is following up with today's ADD since Dr. Chang is out of office for next 2 weeks.

## 2019-06-03 NOTE — TELEPHONE ENCOUNTER
Crystal Bryan, R.N.   Phone Number: [phone]             RS/Jaylamadelaine Walker's , Jesús is calling to discuss Holter Monitor results. He would please like a call back at [phone].      Per RS last OV note, based on holter results change propranolol to bisoprolol. RS out of clinic for 2 weeks, given to ADD for review.     1125: Per AA--ADD okay to start bisoprolol 2.5mg once daily, call out to Jseús regarding this. No answer. LVM to call back.

## 2019-06-04 RX ORDER — BISOPROLOL FUMARATE 5 MG/1
2.5 TABLET, FILM COATED ORAL DAILY
Qty: 45 TAB | Refills: 3 | Status: SHIPPED | OUTPATIENT
Start: 2019-06-04 | End: 2019-06-06

## 2019-06-04 NOTE — TELEPHONE ENCOUNTER
Spoke with pt regarding results and recommendations. She verbalized understanding. Rx sent to preferred pharmacy.

## 2019-06-06 DIAGNOSIS — R00.2 PALPITATIONS: ICD-10-CM

## 2019-06-06 RX ORDER — CARVEDILOL 6.25 MG/1
6.25 TABLET ORAL 2 TIMES DAILY WITH MEALS
Qty: 180 TAB | Refills: 3 | Status: SHIPPED | OUTPATIENT
Start: 2019-06-06 | End: 2019-06-18

## 2019-06-10 ENCOUNTER — TELEPHONE (OUTPATIENT)
Dept: CARDIOLOGY | Facility: MEDICAL CENTER | Age: 64
End: 2019-06-10

## 2019-06-10 NOTE — TELEPHONE ENCOUNTER
Angella Martinez R.N.   Phone Number: [phone]             RS/Dayanara       Patient has questions about her Carvedilol medication. She has heard that it can affect novocaine, and she's going in tomorrow for a dental implant. She can be reached at [phone].      Called pt, pt reports she is scheduled for dental implant tomorrow and would like to know if she's cleared to undergo procedure considering her recent cardiac problems (SVT) and her current medication she is taking (Carvedilol), pt reports they will probably be using local anesthetic with Epi and Novocaine. I've instructed pt to please have her dental office fax a clearance request, she will contact them to fax one.     To AK-ADD, please advice, RS unavailable, procedure is scheduled tomorrow. Thank You!

## 2019-06-11 NOTE — TELEPHONE ENCOUNTER
Narayan Gauthier M.D.   You 7 hours ago (12:25 AM)      She should be ok for dental procedure. (Routing comment)        Attempted to call pt, no answer, detailed vm left regarding AK recommendations

## 2019-06-18 ENCOUNTER — TELEPHONE (OUTPATIENT)
Dept: CARDIOLOGY | Facility: MEDICAL CENTER | Age: 64
End: 2019-06-18

## 2019-06-18 RX ORDER — CARVEDILOL 3.12 MG/1
3.12 TABLET ORAL 2 TIMES DAILY WITH MEALS
Qty: 60 TAB | Refills: 11 | Status: SHIPPED | OUTPATIENT
Start: 2019-06-18 | End: 2019-07-01

## 2019-06-18 NOTE — TELEPHONE ENCOUNTER
Pt. notified. Rx for carvedilol 3.125 sent to [facility]. She will also start the magnesium 400 mg daily OTC.  She's asking to see an APN prior to her Aug FV w/ Dr. Chang to go over all of her results & answer her questions.   Message forwarded to schedulers to call pt.        Shaheen Reyes M.D.   You 37 minutes ago (2:10 PM)      Yes also have her start a Mg supplement of 400 mg daily (Routing comment)

## 2019-06-18 NOTE — TELEPHONE ENCOUNTER
TC to pt.   On carvedilol 6.25 mg for episodes SVT on BioTel monitor. Very fatigued on med, especially after normal exercise routine. Exhausted after her usual bike ride.   Last evening while watching TV, HR dipped to 30-40.  Very anxious about this. Has Apple watch. Describes different shaped spikes, wasn't just a steady pattern that was very slow..  (Perhaps these were PVC's) BP unknown. States fastest resting HR 60-70.   Asking if she can reduce carvedilol dose?    Dr. Chang out of office until 6/21. To Dr. Reyes (office MD on 6/18) to advise.

## 2019-06-18 NOTE — TELEPHONE ENCOUNTER
----- Message from Valencia Jimenez, Med Ass't sent at 6/18/2019  8:30 AM PDT -----  Regarding: questions on carvedilol meds  Contact: [phone]  RS     Pt has questions on carvedilol meds. She states she has been feeling terrible after taking them & HR was in the 30/40's.  Ph# [phone]

## 2019-06-20 ENCOUNTER — TELEPHONE (OUTPATIENT)
Dept: CARDIOLOGY | Facility: MEDICAL CENTER | Age: 64
End: 2019-06-20

## 2019-06-20 NOTE — TELEPHONE ENCOUNTER
Kaleb Chang M.D.  Edna Bryan, R.N.               Outpatient monitoring showed frequent brief episodes of SVT.  No atrial fibrillation.  Recommend stopping carvedilol and placing her on metoprolol XL 25 mg a day.  I think she is scheduled follow-up with us in August.      Patient has appt on Monday 6/24 with AB to go over test results, can discuss this then.

## 2019-06-24 ENCOUNTER — OFFICE VISIT (OUTPATIENT)
Dept: CARDIOLOGY | Facility: MEDICAL CENTER | Age: 64
End: 2019-06-24
Payer: MEDICARE

## 2019-06-24 VITALS
HEIGHT: 68 IN | HEART RATE: 56 BPM | OXYGEN SATURATION: 98 % | DIASTOLIC BLOOD PRESSURE: 72 MMHG | BODY MASS INDEX: 20.61 KG/M2 | SYSTOLIC BLOOD PRESSURE: 118 MMHG | WEIGHT: 136 LBS

## 2019-06-24 DIAGNOSIS — I49.1 ATRIAL PREMATURE COMPLEXES: ICD-10-CM

## 2019-06-24 DIAGNOSIS — R00.2 PALPITATIONS: ICD-10-CM

## 2019-06-24 DIAGNOSIS — K21.9 GASTROESOPHAGEAL REFLUX DISEASE WITHOUT ESOPHAGITIS: ICD-10-CM

## 2019-06-24 DIAGNOSIS — R06.02 SHORTNESS OF BREATH: ICD-10-CM

## 2019-06-24 PROBLEM — R23.8 SCALP IRRITATION: Status: RESOLVED | Noted: 2017-04-20 | Resolved: 2019-06-24

## 2019-06-24 PROCEDURE — 99214 OFFICE O/P EST MOD 30 MIN: CPT | Performed by: NURSE PRACTITIONER

## 2019-06-24 ASSESSMENT — ENCOUNTER SYMPTOMS
PALPITATIONS: 0
NERVOUS/ANXIOUS: 1
SHORTNESS OF BREATH: 1
COUGH: 0
CHILLS: 0
ABDOMINAL PAIN: 0
FEVER: 0
NAUSEA: 0
PND: 0
BRUISES/BLEEDS EASILY: 0
ORTHOPNEA: 0
MYALGIAS: 0
INSOMNIA: 1
HEADACHES: 0
DIZZINESS: 0
LOSS OF CONSCIOUSNESS: 0

## 2019-06-24 NOTE — PROGRESS NOTES
"Chief Complaint   Patient presents with   • Follow-Up   • Palpitations   • Premature Atrial Contractions (PACs)   • Shortness of Breath       Subjective:   Susy Szymanski is a 64 y.o. female who presents today for two month follow-up of palpitations and shortness of breath.    Susy is a 64 year old female with history of PACs, osteoporosis and anxiety, who first saw Dr. Chang in late April 2019 for evaluation of palpitations. Coronary calcification CT testing was done, as was long term holter monitor ([email or web address]). Her PRN Propanolol was changed to Coreg, which was recently decreased from 6.25mg BID to 3.125mg BID.    She is here today for follow-up. She is still noticing low HRs on the BID Coreg, sometimes as low as the 40s, and she is quite sluggish. She hasn't noticed any significant palpitations, but does admit to being a \"worrier\" and quite anxious. No chest pain, pressure or discomfort; she does have some mild shortness of breath, especially with vigorous exercise. No orthopnea or PND; no dizziness, lightheadedness or syncope; no LE edema. BP is well controlled.    Past Medical History:   Diagnosis Date   • Anxiety    • ASTHMA    • Depression    • GERD (gastroesophageal reflux disease)    • Osteoporosis    • Palpitations    • Premature atrial contractions      Past Surgical History:   Procedure Laterality Date   • COLONOSCOPY WITH POLYP  June 2008    benign polyp  Dr Paul    • CHOLECYSTECTOMY  January 2008    laparoscopic   • EGD WITH ASP/BX  September 2006    inflammation only ---Dr Paul   • CHOLECYSTECTOMY     • SINUSOTOMIES       Family History   Problem Relation Age of Onset   • Cancer Father 70        cancer of unknown primary   • Heart Disease Mother    • Heart Disease Son 42        MI and coronary stent placement   • Diabetes Son    • Hypertension Son    • Diabetes Sister      Social History     Social History   • Marital status:      Spouse name: N/A   • Number of children: N/A   • Years " "of education: N/A     Occupational History   • retired      Social History Main Topics   • Smoking status: Never Smoker   • Smokeless tobacco: Never Used   • Alcohol use No   • Drug use: No   • Sexual activity: Yes     Partners: Male     Birth control/ protection: Post-Menopausal     Other Topics Concern   • Not on file     Social History Narrative   • No narrative on file     Allergies   Allergen Reactions   • Amitriptyline      Excessive sedation --even at 10 mg dose   • Bactrim [Sulfamethoxazole W-Trimethoprim]      Shaking   • Levaquin      Nausea    • Lovastatin      Muscle aches  Muscle aches     • Trazodone      Irregular pulse     Outpatient Encounter Prescriptions as of 6/24/2019   Medication Sig Dispense Refill   • carvedilol (COREG) 3.125 MG Tab Take 1 Tab by mouth 2 times a day, with meals. 60 Tab 11   • Mirabegron ER (MYRBETRIQ) 50 MG TABLET SR 24 HR Take  by mouth.     • LORazepam (ATIVAN) 0.5 MG Tab Take 0.25-0.5 mg by mouth every 6 hours as needed for Anxiety.     • omeprazole (PRILOSEC) 40 MG delayed-release capsule Take 40 mg by mouth every day.       No facility-administered encounter medications on file as of 6/24/2019.      Review of Systems   Constitutional: Positive for malaise/fatigue. Negative for chills and fever.   HENT: Negative for congestion.    Respiratory: Positive for shortness of breath. Negative for cough.    Cardiovascular: Negative for chest pain, palpitations, orthopnea, leg swelling and PND.   Gastrointestinal: Negative for abdominal pain and nausea.   Musculoskeletal: Negative for myalgias.   Skin: Negative for rash.   Neurological: Negative for dizziness, loss of consciousness and headaches.   Endo/Heme/Allergies: Does not bruise/bleed easily.   Psychiatric/Behavioral: The patient is nervous/anxious and has insomnia.         Objective:   /72 (BP Location: Left arm, Patient Position: Sitting, BP Cuff Size: Adult)   Pulse (!) 56   Ht 1.715 m (5' 7.5\")   Wt 61.7 kg (136 " lb)   LMP 01/01/2004   SpO2 98%   BMI 20.99 kg/m²     Physical Exam   Constitutional: She is oriented to person, place, and time. She appears well-developed and well-nourished.   Tall, thin (BMI 20.99)   HENT:   Head: Normocephalic.   Eyes: EOM are normal.   Neck: Normal range of motion. Neck supple. No JVD present.   Cardiovascular: Normal rate, regular rhythm and normal heart sounds.    Pulmonary/Chest: Effort normal and breath sounds normal. No respiratory distress. She has no wheezes. She has no rales.   Abdominal: Soft. Bowel sounds are normal. She exhibits no distension. There is no tenderness.   Musculoskeletal: Normal range of motion. She exhibits no edema.   Neurological: She is alert and oriented to person, place, and time.   Skin: Skin is warm and dry. No rash noted.   Psychiatric: She has a normal mood and affect.     FINDINGS OF CORONARY CALCIUM CT SCAN OF 5/14/2019:  Coronary calcification:  LMA - 0.0  LCX - 0.0  LAD - 0.0  RCA - 0.0  Total Calcium Score: 0.0    RESULTS OF BIOTEL MONITOR OF 5/22/2019-5/28/2019:  No Atrial fibrillation/flutter  No pauses  No heart block  No VT/VF  1 episode of SVT on 5/24/2019, max , lasting 12 beats  PACs 1%  PVCs 4%  Rhythm predominantly sinus with PACs    Lab Results   Component Value Date/Time    CHOLSTRLTOT 238 (H) 04/25/2017 11:05 AM     (H) 04/25/2017 11:05 AM    HDL 81 04/25/2017 11:05 AM    TRIGLYCERIDE 66 04/25/2017 11:05 AM       Lab Results   Component Value Date/Time    SODIUM 140 05/17/2019 08:55 PM    POTASSIUM 4.1 05/17/2019 08:55 PM    CHLORIDE 104 05/17/2019 08:55 PM    CO2 28 05/17/2019 08:55 PM    GLUCOSE 97 05/17/2019 08:55 PM    BUN 21 05/17/2019 08:55 PM    CREATININE 0.70 05/17/2019 08:55 PM    CREATININE 0.8 10/20/2008 11:59 AM     Lab Results   Component Value Date/Time    ALKPHOSPHAT 69 05/17/2019 08:55 PM    ASTSGOT 22 05/17/2019 08:55 PM    ALTSGPT 17 05/17/2019 08:55 PM    TBILIRUBIN 0.4 05/17/2019 08:55 PM      Lab Results    Component Value Date/Time    WBC 6.2 05/17/2019 08:55 PM    RBC 4.50 05/17/2019 08:55 PM    HEMOGLOBIN 13.4 05/17/2019 08:55 PM    HEMATOCRIT 41.8 05/17/2019 08:55 PM    MCV 92.9 05/17/2019 08:55 PM    MCH 29.8 05/17/2019 08:55 PM    MCHC 32.1 (L) 05/17/2019 08:55 PM    MPV 10.0 05/17/2019 08:55 PM        Assessment:     1. Palpitations     2. Atrial premature complexes     3. Shortness of breath  EC-ECHOCARDIOGRAM COMPLETE W/O CONT   4. Gastroesophageal reflux disease without esophagitis         Medical Decision Making:  Today's Assessment / Status / Plan:     1. Palpitations, with 1 episode of SVT lasting 12 beats, and PACs/PVCs. Suggest decreasing Coreg from 3.125mg BID to once daily. Also add Magnesium 250mg BID. Watch caffeine intake.    2. Shortness of breath, to obtain echocardiogram. If normal, consider referral to pulmonlogy.    3. GERD, previously treated with PPI, now off of this.    4. Hyperlipidemia, not currently on any therapy. She had a coronary calcium score of 0.00. Reviewed these results with her.    Plan as above. She is going to send me a [employer] message in 1-2 weeks with BP/HR report. Will also get echocardiogram. Keep August 2019 FU as scheduled.    Collaborating MD: Deepti

## 2019-06-24 NOTE — LETTER
"     [facility]   [address]   [address]  [place] [address]  Phone: [phone]  Fax: [phone]              Susy Szymanski  1955    Encounter Date: 6/24/2019    CAMILLE Garrett          PROGRESS NOTE:  Chief Complaint   Patient presents with   • Follow-Up   • Palpitations   • Premature Atrial Contractions (PACs)   • Shortness of Breath       Subjective:   Susy Szymanski is a 64 y.o. female who presents today for two month follow-up of palpitations and shortness of breath.    Susy is a 64 year old female with history of PACs, osteoporosis and anxiety, who first saw Dr. Chang in late April 2019 for evaluation of palpitations. Coronary calcification CT testing was done, as was long term holter monitor ([employer]). Her PRN Propanolol was changed to Coreg, which was recently decreased from 6.25mg BID to 3.125mg BID.    She is here today for follow-up. She is still noticing low HRs on the BID Coreg, sometimes as low as the 40s, and she is quite sluggish. She hasn't noticed any significant palpitations, but does admit to being a \"worrier\" and quite anxious. No chest pain, pressure or discomfort; she does have some mild shortness of breath, especially with vigorous exercise. No orthopnea or PND; no dizziness, lightheadedness or syncope; no LE edema. BP is well controlled.    Past Medical History:   Diagnosis Date   • Anxiety    • ASTHMA    • Depression    • GERD (gastroesophageal reflux disease)    • Osteoporosis    • Palpitations    • Premature atrial contractions      Past Surgical History:   Procedure Laterality Date   • COLONOSCOPY WITH POLYP  June 2008    benign polyp  Dr Paul    • CHOLECYSTECTOMY  January 2008    laparoscopic   • EGD WITH ASP/BX  September 2006    inflammation only ---Dr Paul   • CHOLECYSTECTOMY     • SINUSOTOMIES       Family History   Problem Relation Age of Onset   • Cancer Father 70        cancer of unknown " primary   • Heart Disease Mother    • Heart Disease Son 42        MI and coronary stent placement   • Diabetes Son    • Hypertension Son    • Diabetes Sister      Social History     Social History   • Marital status:      Spouse name: N/A   • Number of children: N/A   • Years of education: N/A     Occupational History   • retired      Social History Main Topics   • Smoking status: Never Smoker   • Smokeless tobacco: Never Used   • Alcohol use No   • Drug use: No   • Sexual activity: Yes     Partners: Male     Birth control/ protection: Post-Menopausal     Other Topics Concern   • Not on file     Social History Narrative   • No narrative on file     Allergies   Allergen Reactions   • Amitriptyline      Excessive sedation --even at 10 mg dose   • Bactrim [Sulfamethoxazole W-Trimethoprim]      Shaking   • Levaquin      Nausea    • Lovastatin      Muscle aches  Muscle aches     • Trazodone      Irregular pulse     Outpatient Encounter Prescriptions as of 6/24/2019   Medication Sig Dispense Refill   • carvedilol (COREG) 3.125 MG Tab Take 1 Tab by mouth 2 times a day, with meals. 60 Tab 11   • Mirabegron ER (MYRBETRIQ) 50 MG TABLET SR 24 HR Take  by mouth.     • LORazepam (ATIVAN) 0.5 MG Tab Take 0.25-0.5 mg by mouth every 6 hours as needed for Anxiety.     • omeprazole (PRILOSEC) 40 MG delayed-release capsule Take 40 mg by mouth every day.       No facility-administered encounter medications on file as of 6/24/2019.      Review of Systems   Constitutional: Positive for malaise/fatigue. Negative for chills and fever.   HENT: Negative for congestion.    Respiratory: Positive for shortness of breath. Negative for cough.    Cardiovascular: Negative for chest pain, palpitations, orthopnea, leg swelling and PND.   Gastrointestinal: Negative for abdominal pain and nausea.   Musculoskeletal: Negative for myalgias.   Skin: Negative for rash.   Neurological: Negative for dizziness, loss of consciousness and headaches.    "  Endo/Heme/Allergies: Does not bruise/bleed easily.   Psychiatric/Behavioral: The patient is nervous/anxious and has insomnia.         Objective:   /72 (BP Location: Left arm, Patient Position: Sitting, BP Cuff Size: Adult)   Pulse (!) 56   Ht 1.715 m (5' 7.5\")   Wt 61.7 kg (136 lb)   LMP 01/01/2004   SpO2 98%   BMI 20.99 kg/m²      Physical Exam   Constitutional: She is oriented to person, place, and time. She appears well-developed and well-nourished.   Tall, thin (BMI 20.99)   HENT:   Head: Normocephalic.   Eyes: EOM are normal.   Neck: Normal range of motion. Neck supple. No JVD present.   Cardiovascular: Normal rate, regular rhythm and normal heart sounds.    Pulmonary/Chest: Effort normal and breath sounds normal. No respiratory distress. She has no wheezes. She has no rales.   Abdominal: Soft. Bowel sounds are normal. She exhibits no distension. There is no tenderness.   Musculoskeletal: Normal range of motion. She exhibits no edema.   Neurological: She is alert and oriented to person, place, and time.   Skin: Skin is warm and dry. No rash noted.   Psychiatric: She has a normal mood and affect.     FINDINGS OF CORONARY CALCIUM CT SCAN OF 5/14/2019:  Coronary calcification:  LMA - 0.0  LCX - 0.0  LAD - 0.0  RCA - 0.0  Total Calcium Score: 0.0    RESULTS OF BIOTEL MONITOR OF 5/22/2019-5/28/2019:  No Atrial fibrillation/flutter  No pauses  No heart block  No VT/VF  1 episode of SVT on 5/24/2019, max , lasting 12 beats  PACs 1%  PVCs 4%  Rhythm predominantly sinus with PACs    Lab Results   Component Value Date/Time    CHOLSTRLTOT 238 (H) 04/25/2017 11:05 AM     (H) 04/25/2017 11:05 AM    HDL 81 04/25/2017 11:05 AM    TRIGLYCERIDE 66 04/25/2017 11:05 AM       Lab Results   Component Value Date/Time    SODIUM 140 05/17/2019 08:55 PM    POTASSIUM 4.1 05/17/2019 08:55 PM    CHLORIDE 104 05/17/2019 08:55 PM    CO2 28 05/17/2019 08:55 PM    GLUCOSE 97 05/17/2019 08:55 PM    BUN 21 05/17/2019 " 08:55 PM    CREATININE 0.70 05/17/2019 08:55 PM    CREATININE 0.8 10/20/2008 11:59 AM     Lab Results   Component Value Date/Time    ALKPHOSPHAT 69 05/17/2019 08:55 PM    ASTSGOT 22 05/17/2019 08:55 PM    ALTSGPT 17 05/17/2019 08:55 PM    TBILIRUBIN 0.4 05/17/2019 08:55 PM      Lab Results   Component Value Date/Time    WBC 6.2 05/17/2019 08:55 PM    RBC 4.50 05/17/2019 08:55 PM    HEMOGLOBIN 13.4 05/17/2019 08:55 PM    HEMATOCRIT 41.8 05/17/2019 08:55 PM    MCV 92.9 05/17/2019 08:55 PM    MCH 29.8 05/17/2019 08:55 PM    MCHC 32.1 (L) 05/17/2019 08:55 PM    MPV 10.0 05/17/2019 08:55 PM        Assessment:     1. Palpitations     2. Atrial premature complexes     3. Shortness of breath  EC-ECHOCARDIOGRAM COMPLETE W/O CONT   4. Gastroesophageal reflux disease without esophagitis         Medical Decision Making:  Today's Assessment / Status / Plan:     1. Palpitations, with 1 episode of SVT lasting 12 beats, and PACs/PVCs. Suggest decreasing Coreg from 3.125mg BID to once daily. Also add Magnesium 250mg BID. Watch caffeine intake.    2. Shortness of breath, to obtain echocardiogram. If normal, consider referral to pulmonlogy.    3. GERD, previously treated with PPI, now off of this.    4. Hyperlipidemia, not currently on any therapy. She had a coronary calcium score of 0.00. Reviewed these results with her.    Plan as above. She is going to send me a [employer] message in 1-2 weeks with BP/HR report. Will also get echocardiogram. Keep August 2019 FU as scheduled.    Collaborating MD: Deepti      No Recipients

## 2019-07-01 ENCOUNTER — APPOINTMENT (OUTPATIENT)
Dept: RADIOLOGY | Facility: MEDICAL CENTER | Age: 64
End: 2019-07-01
Attending: EMERGENCY MEDICINE
Payer: MEDICARE

## 2019-07-01 ENCOUNTER — TELEPHONE (OUTPATIENT)
Dept: CARDIOLOGY | Facility: MEDICAL CENTER | Age: 64
End: 2019-07-01

## 2019-07-01 ENCOUNTER — HOSPITAL ENCOUNTER (OUTPATIENT)
Facility: MEDICAL CENTER | Age: 64
End: 2019-07-02
Attending: EMERGENCY MEDICINE | Admitting: INTERNAL MEDICINE
Payer: MEDICARE

## 2019-07-01 DIAGNOSIS — E86.0 DEHYDRATION: ICD-10-CM

## 2019-07-01 DIAGNOSIS — I48.91 NEW ONSET ATRIAL FIBRILLATION (HCC): ICD-10-CM

## 2019-07-01 DIAGNOSIS — R06.02 SHORTNESS OF BREATH: ICD-10-CM

## 2019-07-01 DIAGNOSIS — R00.2 PALPITATIONS: ICD-10-CM

## 2019-07-01 DIAGNOSIS — E83.42 HYPOMAGNESEMIA: ICD-10-CM

## 2019-07-01 LAB
ALBUMIN SERPL BCP-MCNC: 3.9 G/DL (ref 3.2–4.9)
ALBUMIN/GLOB SERPL: 1.4 G/DL
ALP SERPL-CCNC: 79 U/L (ref 30–99)
ALT SERPL-CCNC: 20 U/L (ref 2–50)
ANION GAP SERPL CALC-SCNC: 11 MMOL/L (ref 0–11.9)
APPEARANCE UR: CLEAR
APTT PPP: 23.7 SEC (ref 24.7–36)
AST SERPL-CCNC: 24 U/L (ref 12–45)
BACTERIA #/AREA URNS HPF: NEGATIVE /HPF
BASOPHILS # BLD AUTO: 0.3 % (ref 0–1.8)
BASOPHILS # BLD: 0.02 K/UL (ref 0–0.12)
BILIRUB SERPL-MCNC: 0.6 MG/DL (ref 0.1–1.5)
BILIRUB UR QL STRIP.AUTO: NEGATIVE
BNP SERPL-MCNC: 246 PG/ML (ref 0–100)
BUN SERPL-MCNC: 20 MG/DL (ref 8–22)
CALCIUM SERPL-MCNC: 9.6 MG/DL (ref 8.4–10.2)
CHLORIDE SERPL-SCNC: 104 MMOL/L (ref 96–112)
CO2 SERPL-SCNC: 26 MMOL/L (ref 20–33)
COLOR UR: YELLOW
CREAT SERPL-MCNC: 0.91 MG/DL (ref 0.5–1.4)
EOSINOPHIL # BLD AUTO: 0.08 K/UL (ref 0–0.51)
EOSINOPHIL NFR BLD: 1.4 % (ref 0–6.9)
EPI CELLS #/AREA URNS HPF: ABNORMAL /HPF
ERYTHROCYTE [DISTWIDTH] IN BLOOD BY AUTOMATED COUNT: 44.7 FL (ref 35.9–50)
GLOBULIN SER CALC-MCNC: 2.7 G/DL (ref 1.9–3.5)
GLUCOSE SERPL-MCNC: 107 MG/DL (ref 65–99)
GLUCOSE UR STRIP.AUTO-MCNC: NEGATIVE MG/DL
HCT VFR BLD AUTO: 48.1 % (ref 37–47)
HGB BLD-MCNC: 15.7 G/DL (ref 12–16)
HYALINE CASTS #/AREA URNS LPF: ABNORMAL /LPF
IMM GRANULOCYTES # BLD AUTO: 0.01 K/UL (ref 0–0.11)
IMM GRANULOCYTES NFR BLD AUTO: 0.2 % (ref 0–0.9)
INR PPP: 0.88 (ref 0.87–1.13)
KETONES UR STRIP.AUTO-MCNC: ABNORMAL MG/DL
LEUKOCYTE ESTERASE UR QL STRIP.AUTO: NEGATIVE
LIPASE SERPL-CCNC: 39 U/L (ref 7–58)
LYMPHOCYTES # BLD AUTO: 1.55 K/UL (ref 1–4.8)
LYMPHOCYTES NFR BLD: 27 % (ref 22–41)
MAGNESIUM SERPL-MCNC: 1.9 MG/DL (ref 1.5–2.5)
MCH RBC QN AUTO: 29.5 PG (ref 27–33)
MCHC RBC AUTO-ENTMCNC: 32.6 G/DL (ref 33.6–35)
MCV RBC AUTO: 90.4 FL (ref 81.4–97.8)
MICRO URNS: ABNORMAL
MONOCYTES # BLD AUTO: 0.56 K/UL (ref 0–0.85)
MONOCYTES NFR BLD AUTO: 9.7 % (ref 0–13.4)
MUCOUS THREADS #/AREA URNS HPF: ABNORMAL /HPF
NEUTROPHILS # BLD AUTO: 3.53 K/UL (ref 2–7.15)
NEUTROPHILS NFR BLD: 61.4 % (ref 44–72)
NITRITE UR QL STRIP.AUTO: NEGATIVE
NRBC # BLD AUTO: 0 K/UL
NRBC BLD-RTO: 0 /100 WBC
PH UR STRIP.AUTO: 5 [PH]
PLATELET # BLD AUTO: 285 K/UL (ref 164–446)
PMV BLD AUTO: 10.5 FL (ref 9–12.9)
POTASSIUM SERPL-SCNC: 3.9 MMOL/L (ref 3.6–5.5)
PROT SERPL-MCNC: 6.6 G/DL (ref 6–8.2)
PROT UR QL STRIP: NEGATIVE MG/DL
PROTHROMBIN TIME: 12 SEC (ref 12–14.6)
RBC # BLD AUTO: 5.32 M/UL (ref 4.2–5.4)
RBC # URNS HPF: ABNORMAL /HPF
RBC UR QL AUTO: ABNORMAL
SODIUM SERPL-SCNC: 141 MMOL/L (ref 135–145)
SP GR UR REFRACTOMETRY: 1.02
T4 FREE SERPL-MCNC: 1.06 NG/DL (ref 0.58–1.64)
TROPONIN I SERPL-MCNC: <0.02 NG/ML (ref 0–0.04)
TSH SERPL DL<=0.005 MIU/L-ACNC: 2.75 UIU/ML (ref 0.38–5.33)
UNIDENT CRYS URNS QL MICRO: ABNORMAL /HPF
WBC # BLD AUTO: 5.8 K/UL (ref 4.8–10.8)
WBC #/AREA URNS HPF: ABNORMAL /HPF

## 2019-07-01 PROCEDURE — 96366 THER/PROPH/DIAG IV INF ADDON: CPT

## 2019-07-01 PROCEDURE — 99220 PR INITIAL OBSERVATION CARE,LEVL III: CPT | Performed by: INTERNAL MEDICINE

## 2019-07-01 PROCEDURE — G0378 HOSPITAL OBSERVATION PER HR: HCPCS

## 2019-07-01 PROCEDURE — 81001 URINALYSIS AUTO W/SCOPE: CPT

## 2019-07-01 PROCEDURE — 700105 HCHG RX REV CODE 258: Performed by: EMERGENCY MEDICINE

## 2019-07-01 PROCEDURE — A9270 NON-COVERED ITEM OR SERVICE: HCPCS | Performed by: EMERGENCY MEDICINE

## 2019-07-01 PROCEDURE — 84439 ASSAY OF FREE THYROXINE: CPT

## 2019-07-01 PROCEDURE — 83690 ASSAY OF LIPASE: CPT

## 2019-07-01 PROCEDURE — 71045 X-RAY EXAM CHEST 1 VIEW: CPT

## 2019-07-01 PROCEDURE — 99285 EMERGENCY DEPT VISIT HI MDM: CPT

## 2019-07-01 PROCEDURE — 83880 ASSAY OF NATRIURETIC PEPTIDE: CPT

## 2019-07-01 PROCEDURE — 84484 ASSAY OF TROPONIN QUANT: CPT

## 2019-07-01 PROCEDURE — 85610 PROTHROMBIN TIME: CPT

## 2019-07-01 PROCEDURE — 700111 HCHG RX REV CODE 636 W/ 250 OVERRIDE (IP): Performed by: EMERGENCY MEDICINE

## 2019-07-01 PROCEDURE — 83735 ASSAY OF MAGNESIUM: CPT

## 2019-07-01 PROCEDURE — 85730 THROMBOPLASTIN TIME PARTIAL: CPT

## 2019-07-01 PROCEDURE — 84443 ASSAY THYROID STIM HORMONE: CPT

## 2019-07-01 PROCEDURE — 80053 COMPREHEN METABOLIC PANEL: CPT

## 2019-07-01 PROCEDURE — 96365 THER/PROPH/DIAG IV INF INIT: CPT

## 2019-07-01 PROCEDURE — 700102 HCHG RX REV CODE 250 W/ 637 OVERRIDE(OP): Performed by: EMERGENCY MEDICINE

## 2019-07-01 PROCEDURE — 85025 COMPLETE CBC W/AUTO DIFF WBC: CPT

## 2019-07-01 RX ORDER — BISACODYL 10 MG
10 SUPPOSITORY, RECTAL RECTAL
Status: DISCONTINUED | OUTPATIENT
Start: 2019-07-01 | End: 2019-07-02 | Stop reason: HOSPADM

## 2019-07-01 RX ORDER — CARVEDILOL 3.12 MG/1
3.12 TABLET ORAL DAILY
Status: ON HOLD | COMMUNITY
Start: 2019-06-06 | End: 2019-07-02

## 2019-07-01 RX ORDER — LORAZEPAM 1 MG/1
0.5 TABLET ORAL EVERY 6 HOURS PRN
Status: DISCONTINUED | OUTPATIENT
Start: 2019-07-01 | End: 2019-07-02 | Stop reason: HOSPADM

## 2019-07-01 RX ORDER — ENALAPRILAT 1.25 MG/ML
1.25 INJECTION INTRAVENOUS EVERY 6 HOURS PRN
Status: DISCONTINUED | OUTPATIENT
Start: 2019-07-01 | End: 2019-07-02 | Stop reason: HOSPADM

## 2019-07-01 RX ORDER — OMEPRAZOLE 20 MG/1
40 CAPSULE, DELAYED RELEASE ORAL DAILY
Status: DISCONTINUED | OUTPATIENT
Start: 2019-07-02 | End: 2019-07-02 | Stop reason: HOSPADM

## 2019-07-01 RX ORDER — METOPROLOL SUCCINATE 25 MG/1
25 TABLET, EXTENDED RELEASE ORAL DAILY
Qty: 30 TAB | Refills: 11 | Status: SHIPPED | OUTPATIENT
Start: 2019-07-01 | End: 2019-07-11 | Stop reason: SDUPTHER

## 2019-07-01 RX ORDER — ASPIRIN 81 MG/1
324 TABLET, CHEWABLE ORAL ONCE
Status: COMPLETED | OUTPATIENT
Start: 2019-07-01 | End: 2019-07-01

## 2019-07-01 RX ORDER — DILTIAZEM HYDROCHLORIDE 5 MG/ML
10 INJECTION INTRAVENOUS EVERY 4 HOURS PRN
Status: DISCONTINUED | OUTPATIENT
Start: 2019-07-01 | End: 2019-07-02 | Stop reason: HOSPADM

## 2019-07-01 RX ORDER — POLYETHYLENE GLYCOL 3350 17 G/17G
1 POWDER, FOR SOLUTION ORAL
Status: DISCONTINUED | OUTPATIENT
Start: 2019-07-01 | End: 2019-07-02 | Stop reason: HOSPADM

## 2019-07-01 RX ORDER — ONDANSETRON 2 MG/ML
4 INJECTION INTRAMUSCULAR; INTRAVENOUS EVERY 4 HOURS PRN
Status: DISCONTINUED | OUTPATIENT
Start: 2019-07-01 | End: 2019-07-02 | Stop reason: HOSPADM

## 2019-07-01 RX ORDER — OXYCODONE HYDROCHLORIDE 5 MG/1
5 TABLET ORAL EVERY 4 HOURS PRN
Status: DISCONTINUED | OUTPATIENT
Start: 2019-07-01 | End: 2019-07-02

## 2019-07-01 RX ORDER — ONDANSETRON 4 MG/1
4 TABLET, ORALLY DISINTEGRATING ORAL EVERY 4 HOURS PRN
Status: DISCONTINUED | OUTPATIENT
Start: 2019-07-01 | End: 2019-07-02 | Stop reason: HOSPADM

## 2019-07-01 RX ORDER — SODIUM CHLORIDE 9 MG/ML
1000 INJECTION, SOLUTION INTRAVENOUS ONCE
Status: COMPLETED | OUTPATIENT
Start: 2019-07-01 | End: 2019-07-01

## 2019-07-01 RX ORDER — AMOXICILLIN 250 MG
2 CAPSULE ORAL 2 TIMES DAILY
Status: DISCONTINUED | OUTPATIENT
Start: 2019-07-02 | End: 2019-07-02 | Stop reason: HOSPADM

## 2019-07-01 RX ORDER — SODIUM CHLORIDE 9 MG/ML
INJECTION, SOLUTION INTRAVENOUS CONTINUOUS
Status: DISCONTINUED | OUTPATIENT
Start: 2019-07-01 | End: 2019-07-02

## 2019-07-01 RX ORDER — MAGNESIUM SULFATE HEPTAHYDRATE 40 MG/ML
2 INJECTION, SOLUTION INTRAVENOUS ONCE
Status: COMPLETED | OUTPATIENT
Start: 2019-07-01 | End: 2019-07-01

## 2019-07-01 RX ORDER — ACETAMINOPHEN 325 MG/1
650 TABLET ORAL EVERY 6 HOURS PRN
Status: DISCONTINUED | OUTPATIENT
Start: 2019-07-01 | End: 2019-07-02 | Stop reason: HOSPADM

## 2019-07-01 RX ADMIN — ASPIRIN 81 MG 324 MG: 81 TABLET ORAL at 20:16

## 2019-07-01 RX ADMIN — MAGNESIUM SULFATE HEPTAHYDRATE 2 G: 40 INJECTION, SOLUTION INTRAVENOUS at 21:44

## 2019-07-01 RX ADMIN — SODIUM CHLORIDE 1000 ML: 9 INJECTION, SOLUTION INTRAVENOUS at 21:44

## 2019-07-01 ASSESSMENT — ENCOUNTER SYMPTOMS
WEAKNESS: 0
MYALGIAS: 0
FALLS: 0
VOMITING: 0
PALPITATIONS: 1
HEADACHES: 0
FEVER: 0
SPUTUM PRODUCTION: 0
DIARRHEA: 0
DEPRESSION: 0
SHORTNESS OF BREATH: 1
CHILLS: 0
ABDOMINAL PAIN: 0
STRIDOR: 0
LOSS OF CONSCIOUSNESS: 0
NAUSEA: 1
TINGLING: 0
COUGH: 0
CONSTIPATION: 0
DIZZINESS: 0

## 2019-07-01 ASSESSMENT — PAIN SCALES - WONG BAKER: WONGBAKER_NUMERICALRESPONSE: DOESN'T HURT AT ALL

## 2019-07-01 ASSESSMENT — LIFESTYLE VARIABLES: EVER_SMOKED: NEVER

## 2019-07-01 NOTE — TELEPHONE ENCOUNTER
Pt. notified by phone that we rec'd her Kardia tracings & Aura has reviewed them. Would like her to stop carvedilol and start Toprol XL 25 mg QD, and aspirin 81 mg. Rx sent to [facility]. She will  and start the OTC aspirin 81 mg as well. She will call back if her sx's don't resolve I a few days. ER precautions reviewed. Will keep upcoming appts.         Non-Urgent Medical Question     TIMOTHY Garrett.   You 1 hour ago (12:13 PM)      Looks like she might have some episodes of AFib.     Suggest stopping Coreg and replacing with Toprol XL 25mg once daily, as this will help settle down HR. Should also start ASA 81mg once daily.     Keep echo appt next week, and FU with RS in August 2019.     Thanks, AB (Routing comment)       You   JENNIFER GarrettP.N. 2 hours ago (11:40 AM)      Aura, see previous email. I printed the tracings and placed in your in box. When I spoke with her earlier, she denied syncope or near syncope buy said she felt exhausted. (Routing comment)             JENNIFER FultonP.N. 3 hours ago (10:25 AM)         Kardia readings for Ariane Szymanski     /80 P76 at 10:04 AM     If you need more images please call and let me know. This system will only allow 5 images     [phone]

## 2019-07-02 ENCOUNTER — PATIENT OUTREACH (OUTPATIENT)
Dept: HEALTH INFORMATION MANAGEMENT | Facility: OTHER | Age: 64
End: 2019-07-02

## 2019-07-02 ENCOUNTER — APPOINTMENT (OUTPATIENT)
Dept: CARDIOLOGY | Facility: MEDICAL CENTER | Age: 64
End: 2019-07-02
Attending: INTERNAL MEDICINE
Payer: MEDICARE

## 2019-07-02 VITALS
DIASTOLIC BLOOD PRESSURE: 65 MMHG | HEIGHT: 67 IN | BODY MASS INDEX: 21.8 KG/M2 | HEART RATE: 57 BPM | SYSTOLIC BLOOD PRESSURE: 129 MMHG | RESPIRATION RATE: 18 BRPM | TEMPERATURE: 97.4 F | WEIGHT: 138.89 LBS | OXYGEN SATURATION: 97 %

## 2019-07-02 LAB
ANION GAP SERPL CALC-SCNC: 7 MMOL/L (ref 0–11.9)
BUN SERPL-MCNC: 19 MG/DL (ref 8–22)
CALCIUM SERPL-MCNC: 8.5 MG/DL (ref 8.4–10.2)
CHLORIDE SERPL-SCNC: 111 MMOL/L (ref 96–112)
CO2 SERPL-SCNC: 23 MMOL/L (ref 20–33)
CREAT SERPL-MCNC: 0.68 MG/DL (ref 0.5–1.4)
EKG IMPRESSION: NORMAL
ERYTHROCYTE [DISTWIDTH] IN BLOOD BY AUTOMATED COUNT: 45.6 FL (ref 35.9–50)
GLUCOSE SERPL-MCNC: 90 MG/DL (ref 65–99)
HCT VFR BLD AUTO: 42.2 % (ref 37–47)
HGB BLD-MCNC: 13.6 G/DL (ref 12–16)
LV EJECT FRACT  99904: 60
LV EJECT FRACT MOD 2C 99903: 70.64
LV EJECT FRACT MOD 4C 99902: 55.33
LV EJECT FRACT MOD BP 99901: 63.74
MCH RBC QN AUTO: 29.3 PG (ref 27–33)
MCHC RBC AUTO-ENTMCNC: 32.2 G/DL (ref 33.6–35)
MCV RBC AUTO: 90.9 FL (ref 81.4–97.8)
PLATELET # BLD AUTO: 229 K/UL (ref 164–446)
PMV BLD AUTO: 10.2 FL (ref 9–12.9)
POTASSIUM SERPL-SCNC: 4.1 MMOL/L (ref 3.6–5.5)
RBC # BLD AUTO: 4.64 M/UL (ref 4.2–5.4)
SODIUM SERPL-SCNC: 141 MMOL/L (ref 135–145)
TROPONIN I SERPL-MCNC: <0.02 NG/ML (ref 0–0.04)
TROPONIN I SERPL-MCNC: <0.02 NG/ML (ref 0–0.04)
WBC # BLD AUTO: 5.1 K/UL (ref 4.8–10.8)

## 2019-07-02 PROCEDURE — 80048 BASIC METABOLIC PNL TOTAL CA: CPT

## 2019-07-02 PROCEDURE — 93306 TTE W/DOPPLER COMPLETE: CPT | Mod: 26 | Performed by: INTERNAL MEDICINE

## 2019-07-02 PROCEDURE — 99217 PR OBSERVATION CARE DISCHARGE: CPT | Performed by: INTERNAL MEDICINE

## 2019-07-02 PROCEDURE — 93306 TTE W/DOPPLER COMPLETE: CPT

## 2019-07-02 PROCEDURE — G0378 HOSPITAL OBSERVATION PER HR: HCPCS

## 2019-07-02 PROCEDURE — 700102 HCHG RX REV CODE 250 W/ 637 OVERRIDE(OP): Performed by: INTERNAL MEDICINE

## 2019-07-02 PROCEDURE — 93005 ELECTROCARDIOGRAM TRACING: CPT | Performed by: EMERGENCY MEDICINE

## 2019-07-02 PROCEDURE — 700105 HCHG RX REV CODE 258: Performed by: INTERNAL MEDICINE

## 2019-07-02 PROCEDURE — 84484 ASSAY OF TROPONIN QUANT: CPT

## 2019-07-02 PROCEDURE — A9270 NON-COVERED ITEM OR SERVICE: HCPCS | Performed by: INTERNAL MEDICINE

## 2019-07-02 PROCEDURE — 90670 PCV13 VACCINE IM: CPT | Performed by: INTERNAL MEDICINE

## 2019-07-02 PROCEDURE — 85027 COMPLETE CBC AUTOMATED: CPT

## 2019-07-02 PROCEDURE — 90471 IMMUNIZATION ADMIN: CPT

## 2019-07-02 PROCEDURE — 700111 HCHG RX REV CODE 636 W/ 250 OVERRIDE (IP): Performed by: INTERNAL MEDICINE

## 2019-07-02 RX ADMIN — METOPROLOL TARTRATE 25 MG: 25 TABLET ORAL at 00:08

## 2019-07-02 RX ADMIN — METOPROLOL TARTRATE 25 MG: 25 TABLET ORAL at 05:14

## 2019-07-02 RX ADMIN — ENOXAPARIN SODIUM 60 MG: 100 INJECTION SUBCUTANEOUS at 00:08

## 2019-07-02 RX ADMIN — PNEUMOCOCCAL 13-VALENT CONJUGATE VACCINE 0.5 ML: 2.2; 2.2; 2.2; 2.2; 2.2; 4.4; 2.2; 2.2; 2.2; 2.2; 2.2; 2.2; 2.2 INJECTION, SUSPENSION INTRAMUSCULAR at 10:56

## 2019-07-02 RX ADMIN — SODIUM CHLORIDE: 9 INJECTION, SOLUTION INTRAVENOUS at 00:08

## 2019-07-02 ASSESSMENT — COGNITIVE AND FUNCTIONAL STATUS - GENERAL
SUGGESTED CMS G CODE MODIFIER MOBILITY: CH
MOBILITY SCORE: 24
SUGGESTED CMS G CODE MODIFIER DAILY ACTIVITY: CH
DAILY ACTIVITIY SCORE: 24

## 2019-07-02 ASSESSMENT — COPD QUESTIONNAIRES
COPD SCREENING SCORE: 2
IN THE PAST 12 MONTHS DO YOU DO LESS THAN YOU USED TO BECAUSE OF YOUR BREATHING PROBLEMS: DISAGREE/UNSURE
DURING THE PAST 4 WEEKS HOW MUCH DID YOU FEEL SHORT OF BREATH: NONE/LITTLE OF THE TIME
HAVE YOU SMOKED AT LEAST 100 CIGARETTES IN YOUR ENTIRE LIFE: NO/DON'T KNOW
DO YOU EVER COUGH UP ANY MUCUS OR PHLEGM?: NO/ONLY WITH OCCASIONAL COLDS OR INFECTIONS

## 2019-07-02 ASSESSMENT — PATIENT HEALTH QUESTIONNAIRE - PHQ9
2. FEELING DOWN, DEPRESSED, IRRITABLE, OR HOPELESS: NOT AT ALL
1. LITTLE INTEREST OR PLEASURE IN DOING THINGS: NOT AT ALL
SUM OF ALL RESPONSES TO PHQ9 QUESTIONS 1 AND 2: 0

## 2019-07-02 ASSESSMENT — LIFESTYLE VARIABLES: ALCOHOL_USE: NO

## 2019-07-02 NOTE — PROGRESS NOTES
Telemetry Shift Summary    Rhythm Aflutter  HR Range  low: 44  Ectopy r-o PVC  Measurements -/0.08/0.34        Normal Values  Rhythm SR  HR Range    Measurements 0.12-0.20 / 0.06-0.10  / 0.30-0.52

## 2019-07-02 NOTE — PROGRESS NOTES
Telemetry Shift Summary    Rhythm A flutter  HR Range 52-70  Ectopy Rare PVCs  Measurements -/0.10/-        Normal Values  Rhythm SR  HR Range    Measurements 0.12-0.20 / 0.06-0.10  / 0.30-0.52

## 2019-07-02 NOTE — ED NOTES
VSS . NAD noted. Remains on monitoring. Fall precautions in place. Call light in reach. Pt ambulatory to restroom with ease. Visitor at side. Denies need for assistance. Fall teaching reviewed. Aware of need for UA.

## 2019-07-02 NOTE — PROGRESS NOTES
Received discharge orders. Removed IV and Tele box. Went over discharge instructions with pt. All questions answered, patient feels safe to discharge. Patient went home with . All belongings gathered and patient dressed.

## 2019-07-02 NOTE — ED NOTES
Patient is stable with no acute distress at this time. No difficulty in breathing or complaint of chest pain at this time.

## 2019-07-02 NOTE — ED TRIAGE NOTES
Pt comes in c/o being in a-fib since Saturday she believes  Feeling irregular HR since then  Also having SOB with this   EKG done  To to [facility] now

## 2019-07-02 NOTE — PROGRESS NOTES
2 RN skin check complete.   Devices in place tele box.  Skin assessed under devices intact.  Confirmed pressure ulcers found on n/a.  New potential pressure ulcers noted on n/a.   The following interventions in place patient ambulates occasionaly, patient turns self side to side in bed, pillows used for support and positoning*

## 2019-07-02 NOTE — PROGRESS NOTES
Report given to Kianna ISABEL. Plan of care discussed. Safety precautions in place.  at bedside.

## 2019-07-02 NOTE — PROGRESS NOTES
Pt currently resting in bed with  at bedside, pt denying any chest pain at this time. Pt has bed locked and in lowest position.

## 2019-07-02 NOTE — ED PROVIDER NOTES
ED Provider Note    CHIEF COMPLAINT  Chief Complaint   Patient presents with   • Atrial Fibrillation     started on saturday  has been on going   not feeling well    • Shortness of Breath       HPI    Primary care provider: Shaheen Gutierrez M.D.  Means of arrival: POV  History obtained from: Patient and   History limited by: Nothing    Susy Szymanski is a 64 y.o. female who presents with palpitations and generalized fatigue.  She also has dyspnea.  Occasional episodes of retrosternal nonradiating achy chest pain.  Symptoms began on Saturday.  They have been constant and worsening over the last 2 days.  Denies prior episodes.  She has had palpitations in the past but never had an irregular heartbeat that lasts this long.  She was able to transmit heart tracings via her phone to her cardiology group, they told her to take an extra dose of metoprolol she had a dose of carvedilol early this morning.  Metoprolol did not seem to help and so she came in for emergent evaluation.  Symptoms are still present.  No fevers or chills or cough or other recent illness.  No vomiting or syncope.  She does not take any anticoagulants.    REVIEW OF SYSTEMS  Constitutional: Negative for fever or chills.  Positive for fatigue.  HENT: Negative for rhinorrhea or sore throat.    Respiratory: Negative for cough but positive for shortness of breath.    Cardiovascular: Positive for intermittent chest pain and palpitations and irregular heart rhythm.  Gastrointestinal: Negative for nausea, vomiting, abdominal pain.   Genitourinary: Negative for dysuria or flank pain.   Musculoskeletal: Negative for back pain or joint pain.  Neurological: Negative for sensory or motor changes.   See HPI for further details. All other systems are negative.     PAST MEDICAL HISTORY   has a past medical history of Anxiety; ASTHMA; Depression; GERD (gastroesophageal reflux disease); Osteoporosis; Palpitations; and Premature atrial  contractions.    PAST FAMILY HISTORY  Family History   Problem Relation Age of Onset   • Cancer Father 70        cancer of unknown primary   • Heart Disease Mother    • Heart Disease Son 42        MI and coronary stent placement   • Diabetes Son    • Hypertension Son    • Diabetes Sister        SOCIAL HISTORY  Social History     Social History Main Topics   • Smoking status: Never Smoker   • Smokeless tobacco: Never Used   • Alcohol use No   • Drug use: No   • Sexual activity: Yes     Partners: Male     Birth control/ protection: Post-Menopausal       SURGICAL HISTORY   has a past surgical history that includes egd with asp/bx (September 2006); cholecystectomy (January 2008); colonoscopy with polyp (June 2008); sinusotomies; and cholecystectomy.    CURRENT MEDICATIONS  Home Medications     Reviewed by Mo Barney R.N. (Registered Nurse) on 07/01/19 at 2355  Med List Status: Complete   Medication Last Dose Status   aspirin EC (ECOTRIN) 81 MG Tablet Delayed Response 7/1/2019 Active   carvedilol (COREG) 3.125 MG Tab 7/1/2019 Active   LORazepam (ATIVAN) 0.5 MG Tab 6/24/2019 Active   metoprolol SR (TOPROL XL) 25 MG TABLET SR 24 HR 7/1/2019 Active   Mirabegron ER (MYRBETRIQ) 50 MG TABLET SR 24 HR prn Active   omeprazole (PRILOSEC) 40 MG delayed-release capsule prn Active                ALLERGIES  Allergies   Allergen Reactions   • Amitriptyline      Excessive sedation --even at 10 mg dose   • Bactrim [Sulfamethoxazole W-Trimethoprim]      Shaking   • Levaquin      Nausea    • Lovastatin      Muscle aches  Muscle aches     • Trazodone      Irregular pulse       PHYSICAL EXAM  VITAL SIGNS: See patient's nursing chart for full vitals, she arrived with fast heart rate but stable blood pressure and normal oxygen saturations and respiratory rate and temperature.  Pulse ox interpretation: On room air, I interpret this pulse ox as low-normal.  Constitutional: Well-appearing for age but sitting up in mild distress.  HEENT:  Normocephalic, atraumatic. Posterior pharynx clear, mucous membranes dry.  Eyes:  EOMI. Normal sclerae.  Neck: Supple, nontender.  Chest/Pulmonary: Clear to ausculation bilaterally, no wheezes or rhonchi.  Cardiovascular: Irregularly irregular rhythm, no obvious murmur peer  Abdomen: Soft, nontender; no rebound, guarding, or masses.  Back: No CVA or midline tenderness.   Musculoskeletal: No deformity or edema.  Neuro: Clear speech, normal coordination, cranial nerves II-XII grossly intact, no focal asymmetry or sensory deficits.   Psych: Normal mood and affect.  Skin: No rashes, warm and dry.      DIAGNOSTIC STUDIES / PROCEDURES    LABS & EKG  Results for orders placed or performed during the hospital encounter of 07/01/19   CBC WITH DIFFERENTIAL   Result Value Ref Range    WBC 5.8 4.8 - 10.8 K/uL    RBC 5.32 4.20 - 5.40 M/uL    Hemoglobin 15.7 12.0 - 16.0 g/dL    Hematocrit 48.1 (H) 37.0 - 47.0 %    MCV 90.4 81.4 - 97.8 fL    MCH 29.5 27.0 - 33.0 pg    MCHC 32.6 (L) 33.6 - 35.0 g/dL    RDW 44.7 35.9 - 50.0 fL    Platelet Count 285 164 - 446 K/uL    MPV 10.5 9.0 - 12.9 fL    Neutrophils-Polys 61.40 44.00 - 72.00 %    Lymphocytes 27.00 22.00 - 41.00 %    Monocytes 9.70 0.00 - 13.40 %    Eosinophils 1.40 0.00 - 6.90 %    Basophils 0.30 0.00 - 1.80 %    Immature Granulocytes 0.20 0.00 - 0.90 %    Nucleated RBC 0.00 /100 WBC    Neutrophils (Absolute) 3.53 2.00 - 7.15 K/uL    Lymphs (Absolute) 1.55 1.00 - 4.80 K/uL    Monos (Absolute) 0.56 0.00 - 0.85 K/uL    Eos (Absolute) 0.08 0.00 - 0.51 K/uL    Baso (Absolute) 0.02 0.00 - 0.12 K/uL    Immature Granulocytes (abs) 0.01 0.00 - 0.11 K/uL    NRBC (Absolute) 0.00 K/uL   COMP METABOLIC PANEL   Result Value Ref Range    Sodium 141 135 - 145 mmol/L    Potassium 3.9 3.6 - 5.5 mmol/L    Chloride 104 96 - 112 mmol/L    Co2 26 20 - 33 mmol/L    Anion Gap 11.0 0.0 - 11.9    Glucose 107 (H) 65 - 99 mg/dL    Bun 20 8 - 22 mg/dL    Creatinine 0.91 0.50 - 1.40 mg/dL    Calcium 9.6 8.4  - 10.2 mg/dL    AST(SGOT) 24 12 - 45 U/L    ALT(SGPT) 20 2 - 50 U/L    Alkaline Phosphatase 79 30 - 99 U/L    Total Bilirubin 0.6 0.1 - 1.5 mg/dL    Albumin 3.9 3.2 - 4.9 g/dL    Total Protein 6.6 6.0 - 8.2 g/dL    Globulin 2.7 1.9 - 3.5 g/dL    A-G Ratio 1.4 g/dL   LIPASE   Result Value Ref Range    Lipase 39 7 - 58 U/L   TROPONIN   Result Value Ref Range    Troponin I <0.02 0.00 - 0.04 ng/mL   BTYPE NATRIURETIC PEPTIDE   Result Value Ref Range    B Natriuretic Peptide 246 (H) 0 - 100 pg/mL   URINALYSIS CULTURE, IF INDICATED   Result Value Ref Range    Color Yellow     Character Clear     Ph 5.0 5.0 - 8.0    Glucose Negative Negative mg/dL    Ketones Trace (A) Negative mg/dL    Protein Negative Negative mg/dL    Bilirubin Negative Negative    Nitrite Negative Negative    Leukocyte Esterase Negative Negative    Occult Blood Trace (A) Negative    Micro Urine Req Microscopic    PROTHROMBIN TIME (INR)   Result Value Ref Range    PT 12.0 12.0 - 14.6 sec    INR 0.88 0.87 - 1.13   APTT   Result Value Ref Range    APTT 23.7 (L) 24.7 - 36.0 sec   MAGNESIUM   Result Value Ref Range    Magnesium 1.9 1.5 - 2.5 mg/dL   TSH   Result Value Ref Range    TSH 2.750 0.380 - 5.330 uIU/mL   FREE THYROXINE   Result Value Ref Range    Free T-4 1.06 0.58 - 1.64 ng/dL   ESTIMATED GFR   Result Value Ref Range    GFR If African American >60 >60 mL/min/1.73 m 2    GFR If Non African American >60 >60 mL/min/1.73 m 2   REFRACTOMETER SG   Result Value Ref Range    Specific Gravity 1.021    URINE MICROSCOPIC (W/UA)   Result Value Ref Range    WBC 0-2 /hpf    RBC 0-2 /hpf    Bacteria Negative None /hpf    Epithelial Cells Rare Few /hpf    Mucous Threads Few /hpf    Urine Crystals Rare Ca Oxalate /hpf    Hyaline Cast 3-5 (A) /lpf   CBC without Differential   Result Value Ref Range    WBC 5.1 4.8 - 10.8 K/uL    RBC 4.64 4.20 - 5.40 M/uL    Hemoglobin 13.6 12.0 - 16.0 g/dL    Hematocrit 42.2 37.0 - 47.0 %    MCV 90.9 81.4 - 97.8 fL    MCH 29.3 27.0 -  33.0 pg    MCHC 32.2 (L) 33.6 - 35.0 g/dL    RDW 45.6 35.9 - 50.0 fL    Platelet Count 229 164 - 446 K/uL    MPV 10.2 9.0 - 12.9 fL   Basic Metabolic Panel (BMP)   Result Value Ref Range    Sodium 141 135 - 145 mmol/L    Potassium 4.1 3.6 - 5.5 mmol/L    Chloride 111 96 - 112 mmol/L    Co2 23 20 - 33 mmol/L    Glucose 90 65 - 99 mg/dL    Bun 19 8 - 22 mg/dL    Creatinine 0.68 0.50 - 1.40 mg/dL    Calcium 8.5 8.4 - 10.2 mg/dL    Anion Gap 7.0 0.0 - 11.9   TROPONIN   Result Value Ref Range    Troponin I <0.02 0.00 - 0.04 ng/mL   ESTIMATED GFR   Result Value Ref Range    GFR If African American >60 >60 mL/min/1.73 m 2    GFR If Non African American >60 >60 mL/min/1.73 m 2   EKG   Result Value Ref Range    Report       [facility] Emergency Dept.    Test Date:  2019  Pt Name:    DEAN CRUZ               Department: MED  MRN:        [id number]                      Room:       3310  Gender:     Female                       Technician:   :        [id number]                   Requested By:COURTNEY CASTILLO  Order #:    [id number]                    Reading MD:    Measurements  Intervals                                Axis  Rate:       118                          P:  UT:                                      QRS:        72  QRSD:       88                           T:          56  QT:         301  QTc:        422    Interpretive Statements  Atrial fibrillation  Borderline ST depression, diffuse leads  Compared to ECG 2019 19:36:06  ST (T wave) deviation now present  Sinus rhythm no longer present         RADIOLOGY  DX-CHEST-PORTABLE (1 VIEW)   Final Result      No acute cardiac or pulmonary abnormalities are identified.      EC-ECHOCARDIOGRAM LTD W/O CONT    (Results Pending)         COURSE & MEDICAL DECISION MAKING    This is a 64 y.o. female who presents with irregular heart rhythm and palpitations and dyspnea and fatigue.  Occasional chest pain.    Differential Diagnosis includes  but is not limited to:  Atrial fibrillation, dehydration, electrolyte abnormality, ACS, thyroid abnormality    ED Course:  Given the patient's presentation I am concerned, her screening EKG in triage shows atrial fibrillation with a rapid ventricular response.  She has a stable blood pressure.  Plan labs, she has dry mucous membranes and I am worried she could be dehydrated leading to her high heart rate so I will keep her n.p.o. in case a surgical process is present and treated with a crystalloid fluid bolus.  Thankfully labs are fairly reassuring she has no fevers white count normal doubt infection.  She is not anemic.  Electrolytes are stable without acidosis aside from a slightly low magnesium which I will replete IV.  Troponin negative, heart score 3.  Thyroid studies are reassuring.  Coagulation studies are normal.  Chest x-ray without acute disease process.    After parenteral fluids and magnesium the patient's heart rate is well controlled in the 80s to 90s but she is still in atrial fibrillation.  I am concerned that this is new onset A. fib.  I would like to admit her to the hospital for further work-up and treatment.  Patient understands and agrees with the plan of care.  She is chest pain-free at this time.  I consulted with our hospitalist Dr. Samuel, he will kindly admit the patient for further work-up and treatment, and he will initiate parenteral anticoagulation with Lovenox.  The patient is hemodynamically stable for admission to the telemetry unit in guarded condition.  No leg swelling, no history of clots, highly doubt pulmonary embolism.  No hypoxia here in the emergency department.    Medications   LORazepam (ATIVAN) tablet 0.5 mg (not administered)   omeprazole (PRILOSEC) capsule 40 mg (not administered)   senna-docusate (PERICOLACE or SENOKOT S) 8.6-50 MG per tablet 2 Tab (not administered)     And   polyethylene glycol/lytes (MIRALAX) PACKET 1 Packet (not administered)     And   magnesium  hydroxide (MILK OF MAGNESIA) suspension 30 mL (not administered)     And   bisacodyl (DULCOLAX) suppository 10 mg (not administered)   NS infusion ( Intravenous New Bag 7/2/19 0008)   acetaminophen (TYLENOL) tablet 650 mg (not administered)   enalaprilat (VASOTEC) injection 1.25 mg (not administered)   metoprolol (LOPRESSOR) tablet 25 mg (25 mg Oral Given 7/2/19 0008)   DILTIAZem (CARDIZEM) injection 10 mg (not administered)   enoxaparin (LOVENOX) inj 60 mg (60 mg Subcutaneous Given 7/2/19 0008)   ondansetron (ZOFRAN) syringe/vial injection 4 mg (not administered)   ondansetron (ZOFRAN ODT) dispertab 4 mg (not administered)   pneumococcal 13-Brittany Conj Vacc (PREVNAR 13) syringe 0.5 mL (not administered)   aspirin (ASA) chewable tab 324 mg (324 mg Oral Given 7/1/19 2016)   magnesium sulfate IVPB premix 2 g (0 g Intravenous Stopped 7/1/19 2344)   NS infusion 1,000 mL (0 mL Intravenous Stopped 7/1/19 2228)     FINAL IMPRESSION  1. New onset atrial fibrillation (HCC)    2. Shortness of breath    3. Hypomagnesemia    4. Palpitations    5. Dehydration        -ADMIT-     Results, exam findings, clinical impression, presumed diagnosis, treatment options, and plan for admission were discussed with the patient, and they verbalized understanding, agreed with, and appreciated the plan of care.    Pertinent Labs & Imaging studies reviewed and verified by myself, as well as nursing notes and the patient's past medical, family, and social histories (See chart for details).    Portions of this record were made with voice recognition software.  Despite my review, spelling/grammar/context errors may still remain.  Interpretation of this chart should be taken in this context.    Electronically signed by Neftali Barbosa on 7/2/2019 at 3:41 AM.

## 2019-07-02 NOTE — H&P
Hospital Medicine History & Physical Note    Date of Service  7/1/2019    Primary Care Physician  Shaheen Gutierrez M.D.    Consultants  None    Code Status  Full    Chief Complaint  Palpitations    History of Presenting Illness  64 y.o. female who presented 7/1/2019 with palpitations.  Patient states that initially started on Saturday, she does have a peggy on her phone that takes EKGs, Stating possible A. fib.  Her palpitations were constant and persisted over the weekend.  Today they got worse, both her rate and symptoms.  Her symptoms have been shortness of breath and fatigue, she notices worsening palpitations with exertion.  Patient states she called her cardiologist, sent to the telemetry strips to cardiology and they told her to take metoprolol.  She recently had what sounds like a Holter monitor for a week, was diagnosed with tachycardia with some extra beats, was started on Coreg.  Patient was on Coreg twice a day but then she was having low heart rates and it was changed to once a day.  I did discuss the case including labs and imaging with the ER physician.    Review of Systems  Review of Systems   Constitutional: Positive for malaise/fatigue. Negative for chills and fever.   HENT: Negative for congestion.    Respiratory: Positive for shortness of breath. Negative for cough, sputum production and stridor.    Cardiovascular: Positive for palpitations. Negative for chest pain and leg swelling.   Gastrointestinal: Positive for nausea. Negative for abdominal pain, constipation, diarrhea and vomiting.   Genitourinary: Negative for dysuria and urgency.   Musculoskeletal: Negative for falls and myalgias.   Neurological: Negative for dizziness, tingling, loss of consciousness, weakness and headaches.   Psychiatric/Behavioral: Negative for depression and suicidal ideas.   All other systems reviewed and are negative.      Past Medical History   has a past medical history of Anxiety; ASTHMA; Depression; GERD  (gastroesophageal reflux disease); Osteoporosis; Palpitations; and Premature atrial contractions.    Surgical History   has a past surgical history that includes egd with asp/bx (September 2006); cholecystectomy (January 2008); colonoscopy with polyp (June 2008); sinusotomies; and cholecystectomy.     Family History  family history includes Cancer (age of onset: 70) in her father; Diabetes in her sister and son; Heart Disease in her mother; Heart Disease (age of onset: 42) in her son; Hypertension in her son.     Social History   reports that she has never smoked. She has never used smokeless tobacco. She reports that she does not drink alcohol or use drugs.    Allergies  Allergies   Allergen Reactions   • Amitriptyline      Excessive sedation --even at 10 mg dose   • Bactrim [Sulfamethoxazole W-Trimethoprim]      Shaking   • Levaquin      Nausea    • Lovastatin      Muscle aches  Muscle aches     • Trazodone      Irregular pulse       Medications  Prior to Admission Medications   Prescriptions Last Dose Informant Patient Reported? Taking?   LORazepam (ATIVAN) 0.5 MG Tab prn  Yes No   Sig: Take 0.25-0.5 mg by mouth every 6 hours as needed for Anxiety.   Mirabegron ER (MYRBETRIQ) 50 MG TABLET SR 24 HR prn  Yes No   Sig: Take  by mouth.   aspirin EC (ECOTRIN) 81 MG Tablet Delayed Response 7/1/2019 at Unknown time  Yes No   Sig: Take 1 Tab by mouth every day.   metoprolol SR (TOPROL XL) 25 MG TABLET SR 24 HR 7/1/2019 at Unknown time  No No   Sig: Take 1 Tab by mouth every day.   omeprazole (PRILOSEC) 40 MG delayed-release capsule prn at Unknown time  Yes No   Sig: Take 40 mg by mouth every day.      Facility-Administered Medications: None       Physical Exam  Temp:  [37.2 °C (98.9 °F)] 37.2 °C (98.9 °F)  Pulse:  [] 83  Resp:  [14-37] 24  BP: (112-142)/(75-85) 112/75  SpO2:  [94 %-99 %] 95 %    Physical Exam   Constitutional: She is oriented to person, place, and time. She appears well-developed. She is  cooperative. No distress.   HENT:   Head: Normocephalic and atraumatic. Not macrocephalic and not microcephalic. Head is without raccoon's eyes and without Jefferson's sign.   Right Ear: External ear normal.   Left Ear: External ear normal.   Mouth/Throat: Mucous membranes are dry. No oropharyngeal exudate.   Eyes: Conjunctivae are normal. Right eye exhibits no discharge. Left eye exhibits no discharge. No scleral icterus.   Neck: Neck supple. No tracheal deviation present.   Cardiovascular: Normal rate and intact distal pulses.  An irregularly irregular rhythm present. Exam reveals no gallop, no distant heart sounds and no friction rub.    No murmur heard.  Pulmonary/Chest: Effort normal and breath sounds normal. No accessory muscle usage or stridor. No tachypnea and no bradypnea. No respiratory distress. She has no decreased breath sounds. She has no wheezes. She has no rhonchi. She has no rales. She exhibits no tenderness.   Abdominal: Soft. Bowel sounds are normal. She exhibits no distension. There is no hepatosplenomegaly, splenomegaly or hepatomegaly. There is no tenderness. There is no rebound and no guarding.   Musculoskeletal: Normal range of motion. She exhibits no edema or tenderness.   Lymphadenopathy:     She has no cervical adenopathy.   Neurological: She is alert and oriented to person, place, and time. No cranial nerve deficit. She displays no seizure activity.   Skin: Skin is warm, dry and intact. No rash noted. She is not diaphoretic. No erythema. No pallor.   Psychiatric: She has a normal mood and affect. Her speech is normal and behavior is normal. Judgment and thought content normal. Cognition and memory are normal.   Nursing note and vitals reviewed.      Laboratory:  Recent Labs      07/01/19 1923   WBC  5.8   RBC  5.32   HEMOGLOBIN  15.7   HEMATOCRIT  48.1*   MCV  90.4   MCH  29.5   MCHC  32.6*   RDW  44.7   PLATELETCT  285   MPV  10.5     Recent Labs      07/01/19 1923   SODIUM  141    POTASSIUM  3.9   CHLORIDE  104   CO2  26   GLUCOSE  107*   BUN  20   CREATININE  0.91   CALCIUM  9.6     Recent Labs      07/01/19 1923   ALTSGPT  20   ASTSGOT  24   ALKPHOSPHAT  79   TBILIRUBIN  0.6   LIPASE  39   GLUCOSE  107*     Recent Labs      07/01/19 1923   APTT  23.7*   INR  0.88     Recent Labs      07/01/19 1923   BNPBTYPENAT  246*         Recent Labs      07/01/19 1923   TROPONINI  <0.02       Urinalysis:    Recent Labs      07/01/19 2020   SPECGRAVITY  1.021   GLUCOSEUR  Negative   KETONES  Trace*   NITRITE  Negative   LEUKESTERAS  Negative   WBCURINE  0-2   RBCURINE  0-2   BACTERIA  Negative   EPITHELCELL  Rare        Imaging:  DX-CHEST-PORTABLE (1 VIEW)   Final Result      No acute cardiac or pulmonary abnormalities are identified.            Assessment/Plan:  I anticipate this patient is appropriate for observation status at this time.    * Atrial fibrillation with RVR ([facility])   Assessment & Plan    -New onset, patient had recently been diagnosed with tachycardia and what sounds like premature atrial contractions  -She apparently was bradycardic with a low-dose Coreg, it was Coreg 3.125 twice daily, this was switched to daily  -Now she is in atrial fibrillation, did take her metoprolol earlier today, and she goes back and forth between rate controlled or slightly tachycardic now  -Start metoprolol, IV diltiazem as needed  -I am concerned that if she transitions back to sinus she will become bradycardic since apparently that is what she was doing before  -She does need close monitoring on telemetry, she is at risk for worsening  -I will start full dose Lovenox  -Obtain an echocardiogram  -TSH and free T4 are normal  -I did personally review the chest x-ray, noted no acute cardiopulmonary process  -I also personally reviewed the EKG, noted atrial fibrillation     Gastroesophageal reflux disease without esophagitis- (present on admission)   Assessment & Plan    -Continue home PPI      Dehydration   Assessment & Plan    -Likely worsening her atrial fibrillation  -Start IV fluids     Anxiety- (present on admission)   Assessment & Plan    -Continue home Ativan         VTE prophylaxis: Lovenox

## 2019-07-02 NOTE — ASSESSMENT & PLAN NOTE
-New onset, patient had recently been diagnosed with tachycardia and what sounds like premature atrial contractions  -She apparently was bradycardic with a low-dose Coreg, it was Coreg 3.125 twice daily, this was switched to daily  -Now she is in atrial fibrillation, did take her metoprolol earlier today, and she goes back and forth between rate controlled or slightly tachycardic now  -Start metoprolol, IV diltiazem as needed  -I am concerned that if she transitions back to sinus she will become bradycardic since apparently that is what she was doing before  -She does need close monitoring on telemetry, she is at risk for worsening  -I will start full dose Lovenox  -Obtain an echocardiogram  -TSH and free T4 are normal  -I did personally review the chest x-ray, noted no acute cardiopulmonary process  -I also personally reviewed the EKG, noted atrial fibrillation

## 2019-07-02 NOTE — CARE PLAN
Problem: Communication  Goal: The ability to communicate needs accurately and effectively will improve    Intervention: Develop alternate methods of communication with patient and significant other/support system  Pt has ECHO this AM. Pt stating no questions about ECHO.       Problem: Safety  Goal: Will remain free from injury  Outcome: PROGRESSING AS EXPECTED  Pt admitting diagnosis A fib with RVR. Pts HR monitored. Pt currently in the 60s.

## 2019-07-03 NOTE — DISCHARGE SUMMARY
Discharge Summary    CHIEF COMPLAINT ON ADMISSION  Chief Complaint   Patient presents with   • Atrial Fibrillation     started on saturday  has been on going   not feeling well    • Shortness of Breath       Reason for Admission  chest pain     Admission Date  7/1/2019    CODE STATUS  Full    HPI & HOSPITAL COURSE  This is a 64 y.o. female w a hx of recently diagnosed atrial fibrillation, established with cardiology here with palpitations found to have afib with RVR, rates in 150s.  She had recently been switched from diltiazem to metoprolol as outpatient but had only had one dose of metoprolol.  She was started on IV and then PO metoprolol and had good rate control into the 80s with this medication.  She had no recurrence of symptoms.  Echo was performed and was unchanged from previous showing normal EF and mild mitral regurg.  Due to minimal RFs and low CHADS2 score, she was started on full ASA.  She will follow up with cardiology to discuss full anticoagulation of this is indicated in the future for stroke prevention.         Therefore, she is discharged in good and stable condition to home with close outpatient follow-up.    The patient recovered much more quickly than anticipated on admission.    Discharge Date  7/2/2019    FOLLOW UP ITEMS POST DISCHARGE  F/U with cardiology    DISCHARGE DIAGNOSES  Principal Problem:    Atrial fibrillation with RVR (HCC) POA: Unknown  Active Problems:    Gastroesophageal reflux disease without esophagitis POA: Yes    Anxiety POA: Yes    Dehydration POA: Unknown  Resolved Problems:    * No resolved hospital problems. *      FOLLOW UP  Future Appointments  Date Time Provider Department Center   7/11/2019 3:15 PM CAMILLE Garcia [facility] None   8/29/2019 2:40 PM Kaleb Chang M.D. [facility] None     Shaheen Gutierrez M.D.  [address]  [facility] [address]  [phone]            MEDICATIONS ON DISCHARGE     Medication List      CHANGE how you take these  medications      Instructions   aspirin  MG Tbec  What changed:  · medication strength  · how much to take  Commonly known as:  ECOTRIN   Take 1 Tab by mouth every day.  Dose:  325 mg        CONTINUE taking these medications      Instructions   LORazepam 0.5 MG Tabs  Commonly known as:  ATIVAN   Take 0.25-0.5 mg by mouth every 6 hours as needed for Anxiety.  Dose:  0.25-0.5 mg     metoprolol SR 25 MG Tb24  Commonly known as:  TOPROL XL   Take 1 Tab by mouth every day.  Dose:  25 mg     MYRBETRIQ 50 MG Tb24  Generic drug:  Mirabegron ER   Take  by mouth.     omeprazole 40 MG delayed-release capsule  Commonly known as:  PRILOSEC   Take 40 mg by mouth every day.  Dose:  40 mg        STOP taking these medications    carvedilol 3.125 MG Tabs  Commonly known as:  COREG            Allergies  Allergies   Allergen Reactions   • Amitriptyline      Excessive sedation --even at 10 mg dose   • Bactrim [Sulfamethoxazole W-Trimethoprim]      Shaking   • Levaquin      Nausea    • Lovastatin      Muscle aches  Muscle aches     • Trazodone      Irregular pulse       DIET  As tolerated    ACTIVITY  As tolerated.  Weight bearing as tolerated    CONSULTATIONS  None    PROCEDURES  None    LABORATORY  Lab Results   Component Value Date    SODIUM 141 07/02/2019    POTASSIUM 4.1 07/02/2019    CHLORIDE 111 07/02/2019    CO2 23 07/02/2019    GLUCOSE 90 07/02/2019    BUN 19 07/02/2019    CREATININE 0.68 07/02/2019    CREATININE 0.8 10/20/2008        Lab Results   Component Value Date    WBC 5.1 07/02/2019    HEMOGLOBIN 13.6 07/02/2019    HEMATOCRIT 42.2 07/02/2019    PLATELETCT 229 07/02/2019        Total time of the discharge process exceeds 38 minutes.

## 2019-07-11 ENCOUNTER — OFFICE VISIT (OUTPATIENT)
Dept: CARDIOLOGY | Facility: MEDICAL CENTER | Age: 64
End: 2019-07-11
Payer: MEDICARE

## 2019-07-11 VITALS
HEIGHT: 67 IN | BODY MASS INDEX: 21.66 KG/M2 | DIASTOLIC BLOOD PRESSURE: 84 MMHG | HEART RATE: 50 BPM | SYSTOLIC BLOOD PRESSURE: 132 MMHG | OXYGEN SATURATION: 94 % | WEIGHT: 138 LBS

## 2019-07-11 DIAGNOSIS — F41.9 ANXIETY: ICD-10-CM

## 2019-07-11 DIAGNOSIS — I48.0 PAROXYSMAL ATRIAL FIBRILLATION (HCC): ICD-10-CM

## 2019-07-11 DIAGNOSIS — I48.91 ATRIAL FIBRILLATION WITH RVR (HCC): ICD-10-CM

## 2019-07-11 PROCEDURE — 99214 OFFICE O/P EST MOD 30 MIN: CPT | Performed by: NURSE PRACTITIONER

## 2019-07-11 RX ORDER — METOPROLOL SUCCINATE 25 MG/1
25 TABLET, EXTENDED RELEASE ORAL EVERY EVENING
Qty: 100 TAB | Refills: 3 | Status: SHIPPED | OUTPATIENT
Start: 2019-07-11 | End: 2019-07-16

## 2019-07-11 ASSESSMENT — ENCOUNTER SYMPTOMS
COUGH: 0
WEAKNESS: 0
ORTHOPNEA: 0
PND: 0
DIZZINESS: 1
PALPITATIONS: 1
ABDOMINAL PAIN: 0
CLAUDICATION: 0
SHORTNESS OF BREATH: 1
MYALGIAS: 0

## 2019-07-11 NOTE — PROGRESS NOTES
"Chief Complaint   Patient presents with   • Atrial Fibrillation   • Shortness of Breath   • Fatigue       Subjective:   Susy \" Ariane\" Bita Szymanski is a 64 y.o. female who presents today with her  Jesús for hospital follow up.  She presented with palpitations and was found to be in rapid atrial fibrillation. Aspirin was increased to 325 mg daily and she was continued on metoprolol.    Since being, she continues to have episodes of palpitations where she will feel fatigued and lightheaded.  She has shortness of breath when her heart is going irregularly.  She admits to being very anxious about having atrial fibrillation.    She takes omeprazole for acid reflux and wonders if this is related to her atrial fibrillation.  She also has some allergies and sinus issues that occasionally she will take Sudafed.    She would like to continue doing their 50 mile bike rides daily.  When her heart is not irregular she states she feels fine except for some fatigue..      Past Medical History:   Diagnosis Date   • Anxiety    • ASTHMA    • Depression    • GERD (gastroesophageal reflux disease)    • Osteoporosis    • Palpitations    • Premature atrial contractions      Past Surgical History:   Procedure Laterality Date   • COLONOSCOPY WITH POLYP  June 2008    benign polyp  Dr Paul    • CHOLECYSTECTOMY  January 2008    laparoscopic   • EGD WITH ASP/BX  September 2006    inflammation only ---Dr Paul   • CHOLECYSTECTOMY     • SINUSOTOMIES       Family History   Problem Relation Age of Onset   • Cancer Father 70        cancer of unknown primary   • Heart Disease Mother    • Heart Disease Son 42        MI and coronary stent placement   • Diabetes Son    • Hypertension Son    • Diabetes Sister      Social History     Social History   • Marital status:      Spouse name: N/A   • Number of children: N/A   • Years of education: N/A     Occupational History   • retired      Social History Main Topics   • Smoking status: Never " Smoker   • Smokeless tobacco: Never Used   • Alcohol use No   • Drug use: No   • Sexual activity: Yes     Partners: Male     Birth control/ protection: Post-Menopausal     Other Topics Concern   • Not on file     Social History Narrative   • No narrative on file     Allergies   Allergen Reactions   • Amitriptyline      Excessive sedation --even at 10 mg dose   • Bactrim [Sulfamethoxazole W-Trimethoprim]      Shaking   • Levaquin      Nausea    • Lovastatin      Muscle aches  Muscle aches     • Trazodone      Irregular pulse     Outpatient Encounter Prescriptions as of 7/11/2019   Medication Sig Dispense Refill   • metoprolol SR (TOPROL XL) 25 MG TABLET SR 24 HR Take 1 Tab by mouth every evening. 100 Tab 3   • rivaroxaban (XARELTO) 20 MG Tab tablet Take 1 Tab by mouth with dinner. 30 Tab 11   • LORazepam (ATIVAN) 0.5 MG Tab Take 0.25-0.5 mg by mouth every 6 hours as needed for Anxiety.     • omeprazole (PRILOSEC) 40 MG delayed-release capsule Take 40 mg by mouth every day.     • [DISCONTINUED] aspirin EC (ECOTRIN) 325 MG Tablet Delayed Response Take 1 Tab by mouth every day. 120 Tab 1   • [DISCONTINUED] metoprolol SR (TOPROL XL) 25 MG TABLET SR 24 HR Take 1 Tab by mouth every day. 30 Tab 11   • [DISCONTINUED] Mirabegron ER (MYRBETRIQ) 50 MG TABLET SR 24 HR Take  by mouth.       No facility-administered encounter medications on file as of 7/11/2019.      Review of Systems   Constitutional: Positive for malaise/fatigue.   Respiratory: Positive for shortness of breath (with palps.). Negative for cough.    Cardiovascular: Positive for palpitations (skipping). Negative for chest pain, orthopnea, claudication, leg swelling and PND.   Gastrointestinal: Negative for abdominal pain.   Musculoskeletal: Negative for myalgias.   Neurological: Positive for dizziness (lightheaded with palps.). Negative for weakness.        Objective:   /84 (BP Location: Left arm, Patient Position: Sitting, BP Cuff Size: Adult)   Pulse (!)  "50   Ht 1.702 m (5' 7\")   Wt 62.6 kg (138 lb)   LMP 01/01/2004   SpO2 94%   BMI 21.61 kg/m²     Physical Exam   Constitutional: She is oriented to person, place, and time. She appears well-developed and well-nourished.   Fit appearing female, appears anxious.   HENT:   Head: Normocephalic.   Eyes: EOM are normal.   Neck: No JVD present.   Cardiovascular: Normal rate, regular rhythm and normal heart sounds.    Pulmonary/Chest: Effort normal and breath sounds normal.   Abdominal: Soft. Bowel sounds are normal.   Musculoskeletal: She exhibits no edema.   Neurological: She is alert and oriented to person, place, and time.   Skin: Skin is warm and dry.   Psychiatric: She has a normal mood and affect.     Results for DEAN CRUZ (MRN [id number])    Ref. Range 7/2/2019 00:37   WBC Latest Ref Range: 4.8 - 10.8 K/uL 5.1   RBC Latest Ref Range: 4.20 - 5.40 M/uL 4.64   Hemoglobin Latest Ref Range: 12.0 - 16.0 g/dL 13.6   Hematocrit Latest Ref Range: 37.0 - 47.0 % 42.2   MCV Latest Ref Range: 81.4 - 97.8 fL 90.9   MCH Latest Ref Range: 27.0 - 33.0 pg 29.3   MCHC Latest Ref Range: 33.6 - 35.0 g/dL 32.2 (L)   RDW Latest Ref Range: 35.9 - 50.0 fL 45.6   Platelet Count Latest Ref Range: 164 - 446 K/uL 229   MPV Latest Ref Range: 9.0 - 12.9 fL 10.2   Sodium Latest Ref Range: 135 - 145 mmol/L 141   Potassium Latest Ref Range: 3.6 - 5.5 mmol/L 4.1   Chloride Latest Ref Range: 96 - 112 mmol/L 111   Co2 Latest Ref Range: 20 - 33 mmol/L 23   Anion Gap Latest Ref Range: 0.0 - 11.9  7.0   Glucose Latest Ref Range: 65 - 99 mg/dL 90   Bun Latest Ref Range: 8 - 22 mg/dL 19   Creatinine Latest Ref Range: 0.50 - 1.40 mg/dL 0.68   GFR If  Latest Ref Range: >60 mL/min/1.73 m 2 >60   GFR If Non  Latest Ref Range: >60 mL/min/1.73 m 2 >60   Calcium Latest Ref Range: 8.4 - 10.2 mg/dL 8.5   Troponin I Latest Ref Range: 0.00 - 0.04 ng/mL <0.02     July 2, 2019: Transthoracic Echo Report  CONCLUSIONS  Normal " left ventricular chamber size.  Left ventricular ejection fraction is visually estimated to be 60%.  Diastolic function is difficult to assess with atrial fibrillation.  Mild mitral regurgitation.  Mildly dilated left atrium. Index 37.  Right heart pressures are normal.     Assessment:     1. Atrial fibrillation with RVR (HCC)  metoprolol SR (TOPROL XL) 25 MG TABLET SR 24 HR    rivaroxaban (XARELTO) 20 MG Tab tablet   2. Paroxysmal atrial fibrillation (HCC)     3. Anxiety         Medical Decision Making:  Today's Assessment / Status / Plan:     Paroxysmal atrial fibrillation: She is in a regular rhythm today with a low heart rate which is probably due to her being fit.  I will have her continue on her current dose of metoprolol succinate 25 mg and have her take it in the evening.  She will avoid stimulants.    She is chadsvasc 2 based on age and gender.  We discussed having her wear a monitor for a month to determine her of atrial fibrillation but then she would not be able to do her water exercise.  On further reflection we decided that since her score is 2 would be best if she was anticoagulated.  Therefore she will take Xarelto 20 mg daily.    Anxiety: She has a lot of anxiety about her health.  Blood knows that she can live a normal life even with atrial fibrillation.  She will monitor her heart rate with her apple watch and [employer] device.    She will follow-up as scheduled with Dr. Chang on August 29, 2019.  At that time we can decide what further treatments are appropriate for her. She will follow-up sooner if problems.    Collaborating Provider: Dr. Elida Denny.    Please note that this dictation was created using voice recognition software. I have made every reasonable attempt to correct obvious errors, but it is possible there are errors of grammar and possibly content that I did not discover before finalizing the note.

## 2019-07-16 DIAGNOSIS — I48.91 ATRIAL FIBRILLATION WITH RVR (HCC): ICD-10-CM

## 2019-07-16 RX ORDER — DILTIAZEM HYDROCHLORIDE 120 MG/1
120 CAPSULE, COATED, EXTENDED RELEASE ORAL DAILY
Qty: 30 CAP | Refills: 3 | Status: SHIPPED | OUTPATIENT
Start: 2019-07-16 | End: 2019-08-07

## 2019-07-23 ENCOUNTER — TELEPHONE (OUTPATIENT)
Dept: CARDIOLOGY | Facility: MEDICAL CENTER | Age: 64
End: 2019-07-23

## 2019-07-23 NOTE — TELEPHONE ENCOUNTER
[employer] Message sent to patient regarding APN recommendations.    -------------------------  Non-Urgent Medical Question   CAMILLE Garcia   You 22 hours ago (5:06 PM)     It is unlikely for the pain is from the Xarelto.  It may be a coincidence.  Most medications will say back pain as a side effect.  Aspirin is not an anticoagulant it is a platelet agent and will not prevent stroke and atrial fibrillation.  I recommend she take something for her back pain and continue on Xarelto for stroke prevention. (Routing comment)    JYOTI Bnun A.P.N. Yesterday (1:19 PM)      To Rahel (Routing comment)    CAMILLE Garrett R.N. Yesterday (12:12 PM)      She saw Rahel last, and I would direct these questions to her.     Thanks, AB (Routing comment)    JYOTI Bunn A.P.N. Yesterday (11:04 AM)      Aura, I tried to call her a couple times to get more info about nature of her back pain. I left her a message to call back, but told her I'd forward this email to you. (Routing comment)    CAMILLE Fulton Yesterday (9:44 AM)      I am taking Xarelto for my Afib. Shortly after starting Xarelto I started experiencing lower back pain. The literature for Xarelto lists back pain as a possible side affect. Is it possible that the Xarelto is causing the pain? If the pain is from the Xarelto how much is my chance for a stroke increased by switching to asprin for a blood thinner?     When I saw Rahel earlier this month she suggested I wear a month long heart monitor.  If I need to do that I would lie to start ASAP. I have plans for the fall and would like to have answers before then     Thank You, Ariane Szymanski

## 2019-08-07 ENCOUNTER — OFFICE VISIT (OUTPATIENT)
Dept: MEDICAL GROUP | Facility: PHYSICIAN GROUP | Age: 64
End: 2019-08-07
Payer: MEDICARE

## 2019-08-07 ENCOUNTER — HOSPITAL ENCOUNTER (OUTPATIENT)
Dept: LAB | Facility: MEDICAL CENTER | Age: 64
End: 2019-08-07
Attending: FAMILY MEDICINE
Payer: MEDICARE

## 2019-08-07 VITALS
HEART RATE: 58 BPM | RESPIRATION RATE: 16 BRPM | HEIGHT: 67 IN | BODY MASS INDEX: 21.82 KG/M2 | OXYGEN SATURATION: 95 % | DIASTOLIC BLOOD PRESSURE: 60 MMHG | TEMPERATURE: 97.6 F | SYSTOLIC BLOOD PRESSURE: 122 MMHG | WEIGHT: 139 LBS

## 2019-08-07 DIAGNOSIS — G47.09 OTHER INSOMNIA: ICD-10-CM

## 2019-08-07 DIAGNOSIS — E55.9 VITAMIN D DEFICIENCY: ICD-10-CM

## 2019-08-07 DIAGNOSIS — R00.2 PALPITATIONS: ICD-10-CM

## 2019-08-07 DIAGNOSIS — I48.0 PAROXYSMAL ATRIAL FIBRILLATION (HCC): ICD-10-CM

## 2019-08-07 DIAGNOSIS — I48.91 ATRIAL FIBRILLATION WITH RVR (HCC): ICD-10-CM

## 2019-08-07 DIAGNOSIS — F41.9 ANXIETY: ICD-10-CM

## 2019-08-07 LAB
25(OH)D3 SERPL-MCNC: 44 NG/ML (ref 30–100)
MAGNESIUM SERPL-MCNC: 2 MG/DL (ref 1.5–2.5)
T4 FREE SERPL-MCNC: 0.96 NG/DL (ref 0.53–1.43)
TSH SERPL DL<=0.005 MIU/L-ACNC: 1.09 UIU/ML (ref 0.38–5.33)

## 2019-08-07 PROCEDURE — 83735 ASSAY OF MAGNESIUM: CPT | Mod: GA

## 2019-08-07 PROCEDURE — 82306 VITAMIN D 25 HYDROXY: CPT

## 2019-08-07 PROCEDURE — 84443 ASSAY THYROID STIM HORMONE: CPT

## 2019-08-07 PROCEDURE — 84439 ASSAY OF FREE THYROXINE: CPT

## 2019-08-07 PROCEDURE — 36415 COLL VENOUS BLD VENIPUNCTURE: CPT | Mod: GA

## 2019-08-07 PROCEDURE — 99214 OFFICE O/P EST MOD 30 MIN: CPT | Performed by: FAMILY MEDICINE

## 2019-08-07 RX ORDER — METOPROLOL SUCCINATE 25 MG/1
25 TABLET, EXTENDED RELEASE ORAL
Refills: 3 | Status: SHIPPED | COMMUNITY
Start: 2019-07-24 | End: 2019-08-07

## 2019-08-07 RX ORDER — METOPROLOL SUCCINATE 25 MG/1
12.5 TABLET, EXTENDED RELEASE ORAL
Qty: 90 TAB | Refills: 3 | Status: SHIPPED | OUTPATIENT
Start: 2019-08-07 | End: 2020-08-31

## 2019-08-07 RX ORDER — LANOLIN ALCOHOL/MO/W.PET/CERES
3 CREAM (GRAM) TOPICAL
Qty: 30 TAB | Refills: 3
Start: 2019-08-07 | End: 2019-11-04

## 2019-08-07 RX ORDER — LORAZEPAM 0.5 MG/1
.25-.5 TABLET ORAL EVERY 6 HOURS PRN
Qty: 30 TAB | Refills: 3 | Status: SHIPPED
Start: 2019-08-07 | End: 2019-09-06

## 2019-08-08 NOTE — PROGRESS NOTES
The patient comes in for follow-up after she was seen in the emergency room on July 1, 2019 with atrial fibrillation.  She was put on metoprolol and initially 325 mg of aspirin.  The aspirin has been changed to Xarelto 20 mg p.o. daily.  She is tolerating Xarelto well, but she feels that metoprolol 25 mg SR is making her inordinately tired and decreasing her energy level.  She also has ongoing anxiety and needs a refill of lorazepam which she takes rarely with good results.  She does have problems with insomnia and wonders if she can take melatonin.  While she was in the hospital she was found to have low magnesium.  This was replaced in the hospital.  We will will get a follow-up magnesium level.  I also want to look at her thyroid tests again because of her atrial fibrillation.    I reviewed the following    Past Medical History:   Diagnosis Date   • Anxiety    • ASTHMA    • Depression    • GERD (gastroesophageal reflux disease)    • Osteoporosis    • Palpitations    • Premature atrial contractions         Past Surgical History:   Procedure Laterality Date   • COLONOSCOPY WITH POLYP  June 2008    benign polyp  Dr Paul    • CHOLECYSTECTOMY  January 2008    laparoscopic   • EGD WITH ASP/BX  September 2006    inflammation only ---Dr Paul   • CHOLECYSTECTOMY     • SINUSOTOMIES         Allergies   Allergen Reactions   • Amitriptyline      Excessive sedation --even at 10 mg dose   • Bactrim [Sulfamethoxazole W-Trimethoprim]      Shaking   • Levaquin      Nausea    • Lovastatin      Muscle aches  Muscle aches     • Trazodone      Irregular pulse       Current Outpatient Medications   Medication Sig Dispense Refill   • metoprolol SR (TOPROL XL) 25 MG TABLET SR 24 HR Take 0.5 Tabs by mouth every bedtime. 90 Tab 3   • LORazepam (ATIVAN) 0.5 MG Tab Take 0.5-1 Tabs by mouth every 6 hours as needed for Anxiety for up to 30 days. 30 Tab 3   • melatonin 3 MG Tab Take 1 Tab by mouth every bedtime. 30 Tab 3   • rivaroxaban  (XARELTO) 20 MG Tab tablet Take 1 Tab by mouth with dinner. 30 Tab 11   • omeprazole (PRILOSEC) 40 MG delayed-release capsule Take 40 mg by mouth every day.       No current facility-administered medications for this visit.         Family History   Problem Relation Age of Onset   • Cancer Father 70        cancer of unknown primary   • Heart Disease Mother    • Heart Disease Son 42        MI and coronary stent placement   • Diabetes Son    • Hypertension Son    • Diabetes Sister        Social History     Socioeconomic History   • Marital status:      Spouse name: Not on file   • Number of children: Not on file   • Years of education: Not on file   • Highest education level: Not on file   Occupational History   • Occupation: retired   Social Needs   • Financial resource strain: Not on file   • Food insecurity:     Worry: Not on file     Inability: Not on file   • Transportation needs:     Medical: Not on file     Non-medical: Not on file   Tobacco Use   • Smoking status: Never Smoker   • Smokeless tobacco: Never Used   Substance and Sexual Activity   • Alcohol use: No     Alcohol/week: 0.0 oz   • Drug use: No   • Sexual activity: Yes     Partners: Male     Birth control/protection: Post-Menopausal   Lifestyle   • Physical activity:     Days per week: Not on file     Minutes per session: Not on file   • Stress: Not on file   Relationships   • Social connections:     Talks on phone: Not on file     Gets together: Not on file     Attends Rastafarian service: Not on file     Active member of club or organization: Not on file     Attends meetings of clubs or organizations: Not on file     Relationship status: Not on file   • Intimate partner violence:     Fear of current or ex partner: Not on file     Emotionally abused: Not on file     Physically abused: Not on file     Forced sexual activity: Not on file   Other Topics Concern   • Not on file   Social History Narrative   • Not on file      Physical Exam    Constitutional: She is oriented. She appears well-developed and well-nourished. No distress.   HENT:  Head: Normocephalic and atraumatic.   Right Ear: External ear normal. Ear canal and TM normal   Left Ear: External ear normal. Ear canal and TM normal  Nose: Nose normal.   Mouth/Throat: Oropharynx is clear and moist.   Eyes: Conjunctivae and extraocular motions are normal. Pupils are equal, round, and reactive to light. Fundi benign bilaterally   Neck: No thyromegaly present.   Cardiovascular: Normal rate, regular rhythm, normal heart sounds and intact distal pulses.  Exam reveals no gallop.  She is not fibrillating today and her pulse is somewhat low at 50/min when I listened to her heart.  No murmur heard.  Pulmonary/Chest: Effort normal and breath sounds normal. No respiratory distress. She has no wheezes. She has no rales.   Abdominal: Soft. Bowel sounds are normal. No hepatosplenomegaly She exhibits no distension. No tenderness. She has no rebound and no guarding.   Musculoskeletal: Normal range of motion. She exhibits no edema and no tenderness.   Lymphadenopathy:     She has no cervical adenopathy.   No supraclavicular adenopathy  Neurological: She is alert and oriented. She has normal reflexes.        Babinskis downgoing bilaterally   Skin: Skin is warm and dry. No rash noted. No erythema.   Psychiatric: She is anxious. Her behavior is normal. Judgment and thought content normal.     I reviewed the records from her visit to the [facility] Room.    1. Atrial fibrillation with RVR (HCC)  metoprolol SR (TOPROL XL) 25 MG TABLET SR 24 HR--because she complains of being tired and she has a low pulse of 50, this dose may be a bit high for her.  She tends to be very sensitive to medications.  I am going to have her cut this in half and take 12.5 mg at bedtime.    TSH    FREE THYROXINE    MAGNESIUM   2. Anxiety  LORazepam (ATIVAN) 0.5 MG Tab   3. Vitamin D deficiency  VITAMIN D 25-HYDROXY   4. Other insomnia   melatonin 3 MG Tab--1 nightly.  I did a drug interaction check and found no problems.   5. Palpitations   patient has a regular pulse now.  She will be seen at her Cardiologist's office in the next few days.   6. Paroxysmal atrial fibrillation (HCC)   decreased dose of metoprolol as above.  Follow-up with her cardiologist as scheduled.     Please note that this dictation was created using voice recognition software. I have worked with consultants from the vendor as well as technical experts from [employer] to optimize the interface. I have made every reasonable attempt to correct obvious errors, but I expect that there are errors of grammar and possibly content that I did not discover before finalizing the note.    Recheck with me 2 months or as needed

## 2019-08-08 NOTE — PATIENT INSTRUCTIONS
Patient given written instructions regarding labs, imaging, medications, referrals, dietary and lifestyle management, and return visit.    Shaheen Gutierrez MD

## 2019-08-12 ENCOUNTER — OFFICE VISIT (OUTPATIENT)
Dept: CARDIOLOGY | Facility: MEDICAL CENTER | Age: 64
End: 2019-08-12
Payer: MEDICARE

## 2019-08-12 VITALS
SYSTOLIC BLOOD PRESSURE: 118 MMHG | WEIGHT: 137 LBS | HEIGHT: 68 IN | BODY MASS INDEX: 20.76 KG/M2 | HEART RATE: 66 BPM | DIASTOLIC BLOOD PRESSURE: 72 MMHG

## 2019-08-12 DIAGNOSIS — Z79.01 CHRONIC ANTICOAGULATION: ICD-10-CM

## 2019-08-12 DIAGNOSIS — I48.91 ATRIAL FIBRILLATION WITH RVR (HCC): ICD-10-CM

## 2019-08-12 DIAGNOSIS — K21.9 GASTROESOPHAGEAL REFLUX DISEASE WITHOUT ESOPHAGITIS: ICD-10-CM

## 2019-08-12 DIAGNOSIS — R00.2 PALPITATIONS: ICD-10-CM

## 2019-08-12 DIAGNOSIS — I48.0 PAROXYSMAL ATRIAL FIBRILLATION (HCC): ICD-10-CM

## 2019-08-12 PROBLEM — E86.0 DEHYDRATION: Status: RESOLVED | Noted: 2019-07-01 | Resolved: 2019-08-12

## 2019-08-12 PROCEDURE — 99214 OFFICE O/P EST MOD 30 MIN: CPT | Performed by: NURSE PRACTITIONER

## 2019-08-12 ASSESSMENT — ENCOUNTER SYMPTOMS
LOSS OF CONSCIOUSNESS: 0
FEVER: 0
ORTHOPNEA: 0
PND: 0
PALPITATIONS: 0
MYALGIAS: 0
DIZZINESS: 0
BRUISES/BLEEDS EASILY: 0
HEADACHES: 0
ABDOMINAL PAIN: 0
CHILLS: 0
INSOMNIA: 1
NAUSEA: 0
SHORTNESS OF BREATH: 1
COUGH: 0
NERVOUS/ANXIOUS: 1

## 2019-08-12 NOTE — PROGRESS NOTES
Chief Complaint   Patient presents with   • Follow-Up   • Atrial Fibrillation   • Anticoagulation   • Palpitations       Subjective:   Ariane Szymanski is a 64 y.o. female who presents today for ER follow-up of atrial fibrillation with RVR.    Susy is a 64 year old female with history of PACs, osteoporosis and anxiety, who first saw Dr. Chang in late April 2019 for evaluation of palpitations. Coronary calcification CT testing was done, as was long term holter monitor (Degordian), which showed sinus rhythm with PACs/PVCs.    In July 2019, she was seen in the ER at [facility], and EKG confirmed AFib with RVR; she was started on toprol Xl 25mg once daily, and Xarelto for anticoagulation.    In the last week, her PCP lowered her Toprol XL from 25mg to 12.5mg, as she just still felt very tired and had a hard time exercising, and getting her HR up. This has helped somewhat. I had suggested switching from BB to CCB (Diltiazem), but she hasn't started this yet.     She is here today for follow-up. Generally, she is doing well, but she just doesn't like when she is in Afib. She feels very tired, lightheaded and dizzy.  Currently, she denies anchest pain, pressure or discomfort; she does have some mild shortness of breath, especially with vigorous exercise. No orthopnea or PND; no dizziness, lightheadedness or syncope; no LE edema. BP is well controlled.    Past Medical History:   Diagnosis Date   • Anxiety    • ASTHMA    • Depression    • GERD (gastroesophageal reflux disease)    • Osteoporosis    • Palpitations    • Premature atrial contractions      Past Surgical History:   Procedure Laterality Date   • COLONOSCOPY WITH POLYP  June 2008    benign polyp  Dr Paul    • CHOLECYSTECTOMY  January 2008    laparoscopic   • EGD WITH ASP/BX  September 2006    inflammation only ---Dr Paul   • CHOLECYSTECTOMY     • SINUSOTOMIES       Family History   Problem Relation Age of Onset   • Cancer Father 70        cancer of unknown  primary   • Heart Disease Mother    • Heart Disease Son 42        MI and coronary stent placement   • Diabetes Son    • Hypertension Son    • Diabetes Sister      Social History     Socioeconomic History   • Marital status:      Spouse name: Not on file   • Number of children: Not on file   • Years of education: Not on file   • Highest education level: Not on file   Occupational History   • Occupation: retired   Social Needs   • Financial resource strain: Not on file   • Food insecurity:     Worry: Not on file     Inability: Not on file   • Transportation needs:     Medical: Not on file     Non-medical: Not on file   Tobacco Use   • Smoking status: Never Smoker   • Smokeless tobacco: Never Used   Substance and Sexual Activity   • Alcohol use: No     Alcohol/week: 0.0 oz   • Drug use: No   • Sexual activity: Yes     Partners: Male     Birth control/protection: Post-Menopausal   Lifestyle   • Physical activity:     Days per week: Not on file     Minutes per session: Not on file   • Stress: Not on file   Relationships   • Social connections:     Talks on phone: Not on file     Gets together: Not on file     Attends Pentecostalism service: Not on file     Active member of club or organization: Not on file     Attends meetings of clubs or organizations: Not on file     Relationship status: Not on file   • Intimate partner violence:     Fear of current or ex partner: Not on file     Emotionally abused: Not on file     Physically abused: Not on file     Forced sexual activity: Not on file   Other Topics Concern   • Not on file   Social History Narrative   • Not on file     Allergies   Allergen Reactions   • Amitriptyline      Excessive sedation --even at 10 mg dose   • Bactrim [Sulfamethoxazole W-Trimethoprim]      Shaking   • Levaquin      Nausea    • Lovastatin      Muscle aches  Muscle aches     • Trazodone      Irregular pulse     Outpatient Encounter Medications as of 8/12/2019   Medication Sig Dispense Refill   •  "metoprolol SR (TOPROL XL) 25 MG TABLET SR 24 HR Take 0.5 Tabs by mouth every bedtime. 90 Tab 3   • LORazepam (ATIVAN) 0.5 MG Tab Take 0.5-1 Tabs by mouth every 6 hours as needed for Anxiety for up to 30 days. 30 Tab 3   • melatonin 3 MG Tab Take 1 Tab by mouth every bedtime. 30 Tab 3   • rivaroxaban (XARELTO) 20 MG Tab tablet Take 1 Tab by mouth with dinner. 30 Tab 11   • omeprazole (PRILOSEC) 40 MG delayed-release capsule Take 40 mg by mouth every day.       No facility-administered encounter medications on file as of 8/12/2019.      Review of Systems   Constitutional: Positive for malaise/fatigue. Negative for chills and fever.   HENT: Negative for congestion.    Respiratory: Positive for shortness of breath. Negative for cough.         Mostly with exertion (hard time getting HR up)   Cardiovascular: Negative for chest pain, palpitations, orthopnea, leg swelling and PND.   Gastrointestinal: Negative for abdominal pain and nausea.   Musculoskeletal: Negative for myalgias.   Skin: Negative for rash.   Neurological: Negative for dizziness, loss of consciousness and headaches.   Endo/Heme/Allergies: Does not bruise/bleed easily.   Psychiatric/Behavioral: The patient is nervous/anxious and has insomnia.         Objective:   /72 (BP Location: Left arm, Patient Position: Sitting, BP Cuff Size: Adult)   Pulse 66   Ht 1.715 m (5' 7.5\")   Wt 62.1 kg (137 lb)   LMP 01/01/2004   BMI 21.14 kg/m²     Physical Exam   Constitutional: She is oriented to person, place, and time. She appears well-developed and well-nourished.   Tall, thin (BMI 21.14)   HENT:   Head: Normocephalic.   Eyes: EOM are normal.   Neck: Normal range of motion. Neck supple. No JVD present.   Cardiovascular: Normal rate, regular rhythm and normal heart sounds.   Pulmonary/Chest: Effort normal and breath sounds normal. No respiratory distress. She has no wheezes. She has no rales.   Abdominal: Soft. Bowel sounds are normal. She exhibits no " distension. There is no tenderness.   Musculoskeletal: Normal range of motion. She exhibits no edema.   Neurological: She is alert and oriented to person, place, and time.   Skin: Skin is warm and dry. No rash noted.   Psychiatric: She has a normal mood and affect.     CONCLUSIONS OF ECHOCARDIOGRAM OF 7/2/2019:  Normal left ventricular chamber size.  Left ventricular ejection fraction is visually estimated to be 60%.  Diastolic function is difficult to assess with atrial fibrillation.  Mild mitral regurgitation.  Mildly dilated left atrium.  Right heart pressures are normal.     Lab Results   Component Value Date/Time    SODIUM 141 07/02/2019 12:37 AM    POTASSIUM 4.1 07/02/2019 12:37 AM    CHLORIDE 111 07/02/2019 12:37 AM    CO2 23 07/02/2019 12:37 AM    GLUCOSE 90 07/02/2019 12:37 AM    BUN 19 07/02/2019 12:37 AM    CREATININE 0.68 07/02/2019 12:37 AM    CREATININE 0.8 10/20/2008 11:59 AM     Lab Results   Component Value Date/Time    WBC 5.1 07/02/2019 12:37 AM    RBC 4.64 07/02/2019 12:37 AM    HEMOGLOBIN 13.6 07/02/2019 12:37 AM    HEMATOCRIT 42.2 07/02/2019 12:37 AM    MCV 90.9 07/02/2019 12:37 AM    MCH 29.3 07/02/2019 12:37 AM    MCHC 32.2 (L) 07/02/2019 12:37 AM    MPV 10.2 07/02/2019 12:37 AM      Lab Results   Component Value Date/Time    BNPBTYPENAT 246 (H) 07/01/2019 07:23 PM        Assessment:     1. Atrial fibrillation with RVR (HCC)     2. Paroxysmal atrial fibrillation (HCC)     3. Palpitations     4. Chronic anticoagulation     5. Gastroesophageal reflux disease without esophagitis         Medical Decision Making:  Today's Assessment / Status / Plan:     1. ER visit for AFib with RVR, now in sinus rhythm on very low Toprol XL 12.5mg once daily. Discussed BB versus CCB; she would like to try switching to Diltiazem 120mg once daily. Monitor HR at home. She is very symptomatic with any AFib breakthroughs, and would like a referral to EP to discuss AAR versus ablation. Will go ahead and refer.    2.  Chronic anticoagulation with Xarelto, no bleeding problems.    3. GERD, treated and stable.     Plan as above: switch from Toprol to Diltiazem; continue Xarelto. Referral to EP to discuss anti-arrhythmics versus ablation. FU sooner if clinical condition changes.    Collaborating MD: Mo

## 2019-08-13 ENCOUNTER — OFFICE VISIT (OUTPATIENT)
Dept: CARDIOLOGY | Facility: MEDICAL CENTER | Age: 64
End: 2019-08-13
Payer: MEDICARE

## 2019-08-13 ENCOUNTER — TELEPHONE (OUTPATIENT)
Dept: CARDIOLOGY | Facility: MEDICAL CENTER | Age: 64
End: 2019-08-13

## 2019-08-13 VITALS
DIASTOLIC BLOOD PRESSURE: 80 MMHG | BODY MASS INDEX: 21.82 KG/M2 | HEIGHT: 67 IN | WEIGHT: 139 LBS | SYSTOLIC BLOOD PRESSURE: 130 MMHG | OXYGEN SATURATION: 96 % | HEART RATE: 72 BPM

## 2019-08-13 DIAGNOSIS — I49.5 SINUS NODE DYSFUNCTION (HCC): ICD-10-CM

## 2019-08-13 DIAGNOSIS — Z79.01 CHRONIC ANTICOAGULATION: ICD-10-CM

## 2019-08-13 DIAGNOSIS — I48.0 PAF (PAROXYSMAL ATRIAL FIBRILLATION) (HCC): ICD-10-CM

## 2019-08-13 PROCEDURE — 93000 ELECTROCARDIOGRAM COMPLETE: CPT | Performed by: INTERNAL MEDICINE

## 2019-08-13 PROCEDURE — 99215 OFFICE O/P EST HI 40 MIN: CPT | Performed by: INTERNAL MEDICINE

## 2019-08-13 NOTE — TELEPHONE ENCOUNTER
----- Message from CAMILLE Garrett sent at 8/12/2019 11:32 AM PDT -----  Regarding: Referral to EP  Mayuri,    I saw this patient today - new on set AFib with RVR in July 2019, very symptomatic.  I referred her to EP to discuss AAR versus ablation. (She is younger and doesn't want to keep having symptoms).    I put in a referral. Can you please call to schedule?    Thank you!  Aura

## 2019-08-13 NOTE — PROGRESS NOTES
Arrhythmia Clinic Note (New patient)     DOS: 8/13/2019    Referring physician: Aura Lazaro    Chief complaint/Reason for consult: PAF    HPI:  Patient is a 63 yo F. She has a history of anxiety and palpitations starting a few months ago. Outpatient ambulatory monitoring showed short runs of AT and nonsustained AF. She then presented to the ED with sustained AF with RVR. Highly symptomatic with palpitations and fatigue. She was started on low dose BB but this made her tired and she reduced the dose to Toprol 12.5. She has had breakthrough on this and today feels irregularity in her HR. She was referred to EP for AF management.    ROS (+ highlighted in red):  Constitutional: Fevers/chills/fatigue/weightloss  HEENT: Blurry vision/eye pain/sore throat/hearing loss  Respiratory: Shortness of breath/cough  Cardiovascular: Chest pain/palpitations/edema/orthopnea/syncope  GI: Nausea/vomitting/diarrhea  MSK: Arthralgias/myagias/muscle weakness  Skin: Rash/sores  Neurological: Numbness/tremors/vertigo  Endocrine: Excessive thirst/polyuria/cold intolerance/heat intolerance  Psych: Depression/anxiety    Past Medical History:   Diagnosis Date   • Anxiety    • ASTHMA    • Depression    • GERD (gastroesophageal reflux disease)    • Osteoporosis    • Palpitations    • Premature atrial contractions        Past Surgical History:   Procedure Laterality Date   • COLONOSCOPY WITH POLYP  June 2008    benign polyp  Dr Paul    • CHOLECYSTECTOMY  January 2008    laparoscopic   • EGD WITH ASP/BX  September 2006    inflammation only ---Dr Paul   • CHOLECYSTECTOMY     • SINUSOTOMIES         Social History     Socioeconomic History   • Marital status:      Spouse name: Not on file   • Number of children: Not on file   • Years of education: Not on file   • Highest education level: Not on file   Occupational History   • Occupation: retired   Social Needs   • Financial resource strain: Not on file   • Food insecurity:     Worry: Not on  file     Inability: Not on file   • Transportation needs:     Medical: Not on file     Non-medical: Not on file   Tobacco Use   • Smoking status: Never Smoker   • Smokeless tobacco: Never Used   Substance and Sexual Activity   • Alcohol use: No     Alcohol/week: 0.0 oz   • Drug use: No   • Sexual activity: Yes     Partners: Male     Birth control/protection: Post-Menopausal   Lifestyle   • Physical activity:     Days per week: Not on file     Minutes per session: Not on file   • Stress: Not on file   Relationships   • Social connections:     Talks on phone: Not on file     Gets together: Not on file     Attends Rastafari service: Not on file     Active member of club or organization: Not on file     Attends meetings of clubs or organizations: Not on file     Relationship status: Not on file   • Intimate partner violence:     Fear of current or ex partner: Not on file     Emotionally abused: Not on file     Physically abused: Not on file     Forced sexual activity: Not on file   Other Topics Concern   • Not on file   Social History Narrative   • Not on file       Family History   Problem Relation Age of Onset   • Cancer Father 70        cancer of unknown primary   • Heart Disease Mother    • Heart Disease Son 42        MI and coronary stent placement   • Diabetes Son    • Hypertension Son    • Diabetes Sister        Allergies   Allergen Reactions   • Amitriptyline      Excessive sedation --even at 10 mg dose   • Bactrim [Sulfamethoxazole W-Trimethoprim]      Shaking   • Levaquin      Nausea    • Lovastatin      Muscle aches  Muscle aches     • Trazodone      Irregular pulse       Current Outpatient Medications   Medication Sig Dispense Refill   • dronedarone (MULTAQ) 400 MG Tab Take 1 Tab by mouth 2 times a day, with meals. 60 Tab 5   • metoprolol SR (TOPROL XL) 25 MG TABLET SR 24 HR Take 0.5 Tabs by mouth every bedtime. 90 Tab 3   • LORazepam (ATIVAN) 0.5 MG Tab Take 0.5-1 Tabs by mouth every 6 hours as needed for  "Anxiety for up to 30 days. 30 Tab 3   • melatonin 3 MG Tab Take 1 Tab by mouth every bedtime. 30 Tab 3   • rivaroxaban (XARELTO) 20 MG Tab tablet Take 1 Tab by mouth with dinner. 30 Tab 11   • omeprazole (PRILOSEC) 40 MG delayed-release capsule Take 40 mg by mouth every day.       No current facility-administered medications for this visit.        Physical Exam:  Vitals:    08/13/19 1441   BP: 130/80   BP Location: Left arm   Patient Position: Sitting   BP Cuff Size: Adult   Pulse: 72   SpO2: 96%   Weight: 63 kg (139 lb)   Height: 1.702 m (5' 7\")     General appearance: NAD, conversant   Eyes: anicteric sclerae, moist conjunctivae; no lid-lag; PERRLA  HENT: Atraumatic; oropharynx clear with moist mucous membranes and no mucosal ulcerations; normal hard and soft palate  Neck: Trachea midline; FROM, supple, no thyromegaly or lymphadenopathy  Lungs: CTA, with normal respiratory effort and no intercostal retractions  CV: Irregular, no MRGs, no JVD   Abdomen: Soft, non-tender; no masses or HSM  Extremities: No peripheral edema or extremity lymphadenopathy  Skin: Normal temperature, turgor and texture; no rash, ulcers or subcutaneous nodules  Psych: Appropriate affect, alert and oriented to person, place and time    Data:  Labs reviewed    Prior echo/stress results reviewed:  Normal echo    EKG interpreted by me:  Sinus PACs    Prior 12 lead showing AF    Prior biotel showing nonsustained AF    Impression/Plan:  1. PAF (paroxysmal atrial fibrillation) (HCC)  EKG   2. Chronic anticoagulation     3. Sinus node dysfunction (HCC)       -Discussed options for management of symptomatic PAF including AADs and AF ablation  -Given her already slow baseline sinus rates has had a hard time tolerating AADs  -She is interested in pursuing AF ablation  -Risks/benefits discussed and questions answered  -She wants to proceed  -Will start her on some Multaq in mean time (if this is not effective can try IC agents though robert more of a " problem)  -Continue xarelto for time being, though after ablation w CHADSVasc of 2 as female can consider coming off    [place]ining [place] MD

## 2019-08-15 ENCOUNTER — PATIENT MESSAGE (OUTPATIENT)
Dept: CARDIOLOGY | Facility: MEDICAL CENTER | Age: 64
End: 2019-08-15

## 2019-08-15 NOTE — PATIENT COMMUNICATION
"Previous note is ERROR< WRONG PT.  Spoke with pt. She took 2 doses of Multaq yesterday and can't tolerate it. Last night she reported \"chest tightness, throat fullness\".   She finally dod get to sleep. When she awake this am, symptoms were pretty much resolved.   She doesn't think she is in a fib. She has been taking Toprol 12.5 Q PM. She may decide to increase it to 25mg as ordered. She was also advised that she could stay on 12.5 once daily and add the other half tablet if she develops A Fib. She knows when to go to ER.  To Dr. Moody. Ablation is scheduled 11-5-19.  "

## 2019-08-15 NOTE — PATIENT COMMUNICATION
Spoke with pt. She said Dr. Moody told her she could start Flecainide 50mg once daily. She also takes Diltiazem

## 2019-08-15 NOTE — TELEPHONE ENCOUNTER
From: Susy Szymanski  To: Josue Moody M.D.  Sent: 8/15/2019 6:26 AM PDT  Subject: Non-Urgent Medical Question    #1-I started the Multaq. I'm not sleeping and short of breath. I can't take this med. You mentioned Flecainide. Can I try that? Can Flecainide be used when I'm in Afib or do you need to take it daily?    #2-I was taking Metoprolol 25mg ER. I was so tired I had a hard time making it thru the day. I was told to cut the dosage in half. Aura Lazaro prescribed Ditiazem 120mg ER to replace the Metoprolol. I asked the pharmicist about Ditiazem and the Xeralto I'm taking. She recommended not taking them together. So I did not start Diltiazem. Aura Lazaro said the pharmicist was wrong. Diltiazem and Xeralto are used together frequently. My question is can I safely switch from Metoprolol to Diltiazem and still take Xeralto?    Thank you, Ariane Szymanski

## 2019-08-16 LAB — EKG IMPRESSION: NORMAL

## 2019-08-20 ENCOUNTER — TELEPHONE (OUTPATIENT)
Dept: CARDIOLOGY | Facility: MEDICAL CENTER | Age: 64
End: 2019-08-20

## 2019-08-20 NOTE — TELEPHONE ENCOUNTER
Patient scheduled for an afib ablation w/ELIZABETH on 11-5-19 at [facility] with Dr. Moody.

## 2019-08-21 DIAGNOSIS — I48.0 PAROXYSMAL ATRIAL FIBRILLATION (HCC): ICD-10-CM

## 2019-08-23 ENCOUNTER — TELEPHONE (OUTPATIENT)
Dept: CARDIOLOGY | Facility: MEDICAL CENTER | Age: 64
End: 2019-08-23

## 2019-08-23 DIAGNOSIS — I48.91 ATRIAL FIBRILLATION, UNSPECIFIED TYPE (HCC): ICD-10-CM

## 2019-08-23 RX ORDER — FLECAINIDE ACETATE 50 MG/1
50 TABLET ORAL 2 TIMES DAILY
Qty: 60 TAB | Refills: 0 | Status: SHIPPED | OUTPATIENT
Start: 2019-08-23 | End: 2019-08-31 | Stop reason: SDUPTHER

## 2019-08-23 NOTE — TELEPHONE ENCOUNTER
Susy Szymanski Shining, M.D. 17 minutes ago (1:11 PM)         Thank you. I will need a prescription for the Flecinade sent to [facility] on [address]       You  Susy Szymanski 1 hour ago (11:38 AM)         Dr. Heidy Keita has made the following medication recommendations:     Toprol 12.5mg daily   Flecainide 50mg twice daily     I can update your medications on your chart and fill a prescription for the flecainide, let me know if this helps.     Thanks,     Krystal ISABEL/Dr. Heidy Mullen R.N. routed conversation to You 2 hours ago (11:14 AM)       CELESTE Read L.P.N. 2 days ago      Can do Toprol 12.5 qdaily with flecainide 50 BID    Routing comment        Flecainide 50mg PO BID ordered. Pt messaged through [employer].

## 2019-08-26 ENCOUNTER — TELEPHONE (OUTPATIENT)
Dept: CARDIOLOGY | Facility: MEDICAL CENTER | Age: 64
End: 2019-08-26

## 2019-08-26 NOTE — TELEPHONE ENCOUNTER
Returned pt's call. She stopped Multaq when she started taking Flecainide. She took 1 dose of Flecainide Friday evening, 2 doses on Saturday, and one dose Sunday morning. She hasn't taken any since however because she experienced SOB and HR in the 40s on Saturday, /60. Her symptoms subsided when she stopped taking Flecainide and her HR is now in the upper 50s. She is wondering if she can try a lower dose.    To SS

## 2019-08-26 NOTE — TELEPHONE ENCOUNTER
Susy Szymanski Shining, M.D. 15 hours ago (8:54 PM)         Krystal...I've been taking Flecinade since it was prescribed Friday. I'm having a very hard time with this med. I'm short of breath, unable to sleep, periods of brachycardia, light headed and nauseous. The shortness of breath is making life difficult and worrying me. This is Sunday evening. I'm due for my second 50 mg at 9 PM. I am not going to take it. What are my options? Stop Flecinade altogether? Take a singe dose? Cut the pills in half? I'm also reading that antacids are contraindicated for use with Flecinade. I have been taking Tums for my nausea. Is that a problem? I'm thinking I should just quit the Flecinade and continue with just Metoprolol. Thank You..Ariane Szymanski      [facility] at [phone] requesting pt call back to discuss symptoms. Would also like to ask pt if she was instructed to stop Multaq when she started taking Flecainide.

## 2019-08-27 ENCOUNTER — APPOINTMENT (RX ONLY)
Dept: URBAN - METROPOLITAN AREA CLINIC 4 | Facility: CLINIC | Age: 64
Setting detail: DERMATOLOGY
End: 2019-08-27

## 2019-08-27 DIAGNOSIS — L82.1 OTHER SEBORRHEIC KERATOSIS: ICD-10-CM

## 2019-08-27 PROCEDURE — ? COUNSELING

## 2019-08-27 PROCEDURE — ? ADDITIONAL NOTES

## 2019-08-27 PROCEDURE — 99212 OFFICE O/P EST SF 10 MIN: CPT

## 2019-08-27 ASSESSMENT — LOCATION ZONE DERM
LOCATION ZONE: NECK
LOCATION ZONE: LEG

## 2019-08-27 ASSESSMENT — LOCATION SIMPLE DESCRIPTION DERM
LOCATION SIMPLE: RIGHT PRETIBIAL REGION
LOCATION SIMPLE: LEFT ANTERIOR NECK

## 2019-08-27 ASSESSMENT — LOCATION DETAILED DESCRIPTION DERM
LOCATION DETAILED: RIGHT PROXIMAL PRETIBIAL REGION
LOCATION DETAILED: LEFT INFERIOR ANTERIOR NECK

## 2019-08-27 NOTE — PROCEDURE: ADDITIONAL NOTES
Detail Level: Zone
Additional Notes: Lesions of concern on the anterior neck and the right pretibial region are clinically consistent with a benign Seborrheic Keratoses.

## 2019-08-27 NOTE — HPI: SKIN LESIONS
Is This A New Presentation, Or A Follow-Up?: Skin Lesion
How Severe Is Your Skin Lesion?: mild
Have Your Skin Lesions Been Treated?: not been treated
Additional History: Patient states she applied the Elta MD to it and the color had changed from black to light brown.

## 2019-08-29 ENCOUNTER — OFFICE VISIT (OUTPATIENT)
Dept: CARDIOLOGY | Facility: MEDICAL CENTER | Age: 64
End: 2019-08-29
Payer: MEDICARE

## 2019-08-29 VITALS
DIASTOLIC BLOOD PRESSURE: 80 MMHG | HEART RATE: 62 BPM | WEIGHT: 138 LBS | SYSTOLIC BLOOD PRESSURE: 138 MMHG | HEIGHT: 67 IN | BODY MASS INDEX: 21.66 KG/M2 | OXYGEN SATURATION: 97 %

## 2019-08-29 DIAGNOSIS — Z79.01 CHRONIC ANTICOAGULATION: ICD-10-CM

## 2019-08-29 DIAGNOSIS — R00.2 PALPITATIONS: ICD-10-CM

## 2019-08-29 DIAGNOSIS — I48.0 PAROXYSMAL ATRIAL FIBRILLATION (HCC): ICD-10-CM

## 2019-08-29 DIAGNOSIS — R06.02 SHORTNESS OF BREATH: ICD-10-CM

## 2019-08-29 PROCEDURE — 99214 OFFICE O/P EST MOD 30 MIN: CPT | Performed by: INTERNAL MEDICINE

## 2019-08-29 ASSESSMENT — ENCOUNTER SYMPTOMS
NERVOUS/ANXIOUS: 1
INSOMNIA: 1
BLOOD IN STOOL: 0
ROS GI COMMENTS: OCCASIONAL CONSTIPATION
CONSTITUTIONAL NEGATIVE: 1
RESPIRATORY NEGATIVE: 1
ABDOMINAL PAIN: 0
GASTROINTESTINAL NEGATIVE: 1
NEUROLOGICAL NEGATIVE: 1
PALPITATIONS: 1
MUSCULOSKELETAL NEGATIVE: 1
BRUISES/BLEEDS EASILY: 0

## 2019-08-29 NOTE — PROGRESS NOTES
Chief Complaint   Patient presents with   • Atrial Fibrillation       Subjective:   Ariane Szymanski is a 64 y.o. female who presents today for follow-up of palpitations including symptomatic PACs and PAF.  She was evaluated by our EP colleagues and in fact is scheduled for an ablation in about a month and a half from now.  She tried dronedarone but stopped this because of side effects, also she could not tolerate flecainide and stopped it.  She has had no significant side effects with flecainide, except she felt mildly weak and also could not sleep.  The patient has a history of anxiety which has been a severe problem for her.  She has not sought any behavioral help for this in the past but has used anxiolytics.  She is on anticoagulation and is tolerating this well.  Rhythm strip in the office today demonstrates rare PVCs.  She is otherwise in sinus rhythm    Past Medical History:   Diagnosis Date   • Anxiety    • ASTHMA    • Depression    • GERD (gastroesophageal reflux disease)    • Osteoporosis    • Palpitations    • Premature atrial contractions      Past Surgical History:   Procedure Laterality Date   • COLONOSCOPY WITH POLYP  June 2008    benign polyp  Dr Paul    • CHOLECYSTECTOMY  January 2008    laparoscopic   • EGD WITH ASP/BX  September 2006    inflammation only ---Dr Paul   • CHOLECYSTECTOMY     • SINUSOTOMIES       Family History   Problem Relation Age of Onset   • Cancer Father 70        cancer of unknown primary   • Heart Disease Mother    • Heart Disease Son 42        MI and coronary stent placement   • Diabetes Son    • Hypertension Son    • Diabetes Sister      Social History     Socioeconomic History   • Marital status:      Spouse name: Not on file   • Number of children: Not on file   • Years of education: Not on file   • Highest education level: Not on file   Occupational History   • Occupation: retired   Social Needs   • Financial resource strain: Not on file   • Food insecurity:      Worry: Not on file     Inability: Not on file   • Transportation needs:     Medical: Not on file     Non-medical: Not on file   Tobacco Use   • Smoking status: Never Smoker   • Smokeless tobacco: Never Used   Substance and Sexual Activity   • Alcohol use: No     Alcohol/week: 0.0 oz   • Drug use: No   • Sexual activity: Yes     Partners: Male     Birth control/protection: Post-Menopausal   Lifestyle   • Physical activity:     Days per week: Not on file     Minutes per session: Not on file   • Stress: Not on file   Relationships   • Social connections:     Talks on phone: Not on file     Gets together: Not on file     Attends Gnosticism service: Not on file     Active member of club or organization: Not on file     Attends meetings of clubs or organizations: Not on file     Relationship status: Not on file   • Intimate partner violence:     Fear of current or ex partner: Not on file     Emotionally abused: Not on file     Physically abused: Not on file     Forced sexual activity: Not on file   Other Topics Concern   • Not on file   Social History Narrative   • Not on file     Allergies   Allergen Reactions   • Amitriptyline      Excessive sedation --even at 10 mg dose   • Bactrim [Sulfamethoxazole W-Trimethoprim]      Shaking   • Levaquin      Nausea    • Lovastatin      Muscle aches  Muscle aches     • Trazodone      Irregular pulse     Outpatient Encounter Medications as of 8/29/2019   Medication Sig Dispense Refill   • metoprolol SR (TOPROL XL) 25 MG TABLET SR 24 HR Take 0.5 Tabs by mouth every bedtime. 90 Tab 3   • LORazepam (ATIVAN) 0.5 MG Tab Take 0.5-1 Tabs by mouth every 6 hours as needed for Anxiety for up to 30 days. 30 Tab 3   • melatonin 3 MG Tab Take 1 Tab by mouth every bedtime. 30 Tab 3   • rivaroxaban (XARELTO) 20 MG Tab tablet Take 1 Tab by mouth with dinner. 30 Tab 11   • omeprazole (PRILOSEC) 40 MG delayed-release capsule Take 40 mg by mouth every day.     • flecainide (TAMBOCOR) 50 MG tablet  "Take 1 Tab by mouth 2 times a day. (Patient not taking: Reported on 8/29/2019) 60 Tab 0   • dronedarone (MULTAQ) 400 MG Tab Take 1 Tab by mouth 2 times a day, with meals. 60 Tab 5     No facility-administered encounter medications on file as of 8/29/2019.      Review of Systems   Constitutional: Negative.    HENT: Negative.    Respiratory: Negative.    Cardiovascular: Positive for palpitations.   Gastrointestinal: Negative.  Negative for abdominal pain and blood in stool.        Occasional constipation   Genitourinary: Negative for frequency and hematuria.   Musculoskeletal: Negative.    Skin: Negative.    Neurological: Negative.    Endo/Heme/Allergies: Negative.  Does not bruise/bleed easily.   Psychiatric/Behavioral: The patient is nervous/anxious and has insomnia.         Objective:   /80 (BP Location: Left arm, Patient Position: Sitting, BP Cuff Size: Adult)   Pulse 62   Ht 1.702 m (5' 7\")   Wt 62.6 kg (138 lb)   LMP 01/01/2004   SpO2 97%   BMI 21.61 kg/m²     Physical Exam   Constitutional: She is oriented to person, place, and time. No distress.   HENT:   Head: Normocephalic and atraumatic.   Eyes: Pupils are equal, round, and reactive to light. No scleral icterus.   Neck: No JVD present.   Cardiovascular: Normal rate and regular rhythm.  Occasional extrasystoles are present.   No murmur heard.  Pulmonary/Chest: No respiratory distress. She has no wheezes.   Abdominal: Soft. She exhibits no distension. There is no tenderness.   Musculoskeletal: She exhibits no edema.   Neurological: She is alert and oriented to person, place, and time.   Skin: Skin is warm.   Psychiatric: Her mood appears anxious. She is agitated.       Assessment:     1. Paroxysmal atrial fibrillation (HCC)  Rhythm Strip   2. Chronic anticoagulation  Rhythm Strip   3. Palpitations  Rhythm Strip   4. Shortness of breath  Rhythm Strip       Medical Decision Making:  Today's Assessment / Status / Plan:   Palpitations: Patient has " both atrial fibrillation, brief periods of atrial tachycardia and symptomatic  PACs.  She is quite symptomatically her atrial fibrillation.  She could not tolerate flecainide for rather ill-defined reasons and therefore stopped it on her own.  I long discussion with she and her  today regarding atrial fibrillation, treatment, and side effects.  Recommend she restart flecainide at 25 mg twice daily and if he has breakthrough arrhythmias go to 50 mg twice daily.  Continue her low-dose metoprolol.    Anxiety: This is a major problem for this patient.  Her anxiety is debilitating.  She is never sought any behavioral counseling which I strongly recommended.  Also discussed the link between anxiety and her palpitations.    She is already scheduled for atrial fibrillation ablation.  Restart flecainide at 25 mg twice daily initially.  She will call if problems.

## 2019-08-31 DIAGNOSIS — I48.91 ATRIAL FIBRILLATION, UNSPECIFIED TYPE (HCC): ICD-10-CM

## 2019-09-04 RX ORDER — FLECAINIDE ACETATE 50 MG/1
TABLET ORAL
Qty: 180 TAB | Refills: 3 | Status: ON HOLD | OUTPATIENT
Start: 2019-09-04 | End: 2019-11-06

## 2019-09-18 ENCOUNTER — OFFICE VISIT (OUTPATIENT)
Dept: URGENT CARE | Facility: CLINIC | Age: 64
End: 2019-09-18
Payer: MEDICARE

## 2019-09-18 VITALS
OXYGEN SATURATION: 98 % | BODY MASS INDEX: 21.77 KG/M2 | RESPIRATION RATE: 16 BRPM | HEART RATE: 62 BPM | TEMPERATURE: 97.3 F | SYSTOLIC BLOOD PRESSURE: 118 MMHG | DIASTOLIC BLOOD PRESSURE: 72 MMHG | WEIGHT: 139 LBS

## 2019-09-18 DIAGNOSIS — Z79.01 ON ANTICOAGULANT THERAPY: ICD-10-CM

## 2019-09-18 DIAGNOSIS — R35.0 URINARY FREQUENCY: ICD-10-CM

## 2019-09-18 DIAGNOSIS — R31.9 HEMATURIA, UNSPECIFIED TYPE: ICD-10-CM

## 2019-09-18 LAB
APPEARANCE UR: CLEAR
BILIRUB UR STRIP-MCNC: NORMAL MG/DL
COLOR UR AUTO: NORMAL
GLUCOSE UR STRIP.AUTO-MCNC: NORMAL MG/DL
KETONES UR STRIP.AUTO-MCNC: NORMAL MG/DL
LEUKOCYTE ESTERASE UR QL STRIP.AUTO: NORMAL
NITRITE UR QL STRIP.AUTO: NORMAL
PH UR STRIP.AUTO: 5 [PH] (ref 5–8)
PROT UR QL STRIP: NORMAL MG/DL
RBC UR QL AUTO: NORMAL
SP GR UR STRIP.AUTO: 1.03
UROBILINOGEN UR STRIP-MCNC: 0.2 MG/DL

## 2019-09-18 PROCEDURE — 81002 URINALYSIS NONAUTO W/O SCOPE: CPT | Performed by: NURSE PRACTITIONER

## 2019-09-18 PROCEDURE — 99214 OFFICE O/P EST MOD 30 MIN: CPT | Performed by: NURSE PRACTITIONER

## 2019-09-18 RX ORDER — PHENAZOPYRIDINE HYDROCHLORIDE 200 MG/1
200 TABLET, FILM COATED ORAL 3 TIMES DAILY PRN
Qty: 6 TAB | Refills: 0 | Status: SHIPPED | OUTPATIENT
Start: 2019-09-18 | End: 2019-11-04

## 2019-09-18 ASSESSMENT — ENCOUNTER SYMPTOMS
SWOLLEN GLANDS: 0
MYALGIAS: 0
BRUISES/BLEEDS EASILY: 1
ARTHRALGIAS: 0
VOMITING: 0
SORE THROAT: 0
FEVER: 0
CHILLS: 0
ABDOMINAL PAIN: 0
CHANGE IN BOWEL HABIT: 0
ANOREXIA: 0

## 2019-09-19 NOTE — PROGRESS NOTES
Subjective:      Ariane Szymanski is a 64 y.o. female who presents with Urinary Frequency (lower abdominal pain x1 wk )        Reviewed past medical, surgical and family history. Reviewed prescription and OTC medications with patient in electronic health record today      Allergies   Allergen Reactions   • Amitriptyline      Excessive sedation --even at 10 mg dose   • Bactrim [Sulfamethoxazole W-Trimethoprim]      Shaking   • Levaquin      Nausea    • Lovastatin      Muscle aches  Muscle aches     • Trazodone      Irregular pulse         Urinary Frequency   This is a new problem. The current episode started in the past 7 days. The problem occurs constantly. The problem has been gradually worsening. Associated symptoms include urinary symptoms. Pertinent negatives include no abdominal pain, anorexia, arthralgias, change in bowel habit, chest pain, chills, congestion, fever, myalgias, sore throat, swollen glands or vomiting. Nothing aggravates the symptoms. She has tried drinking for the symptoms. The treatment provided no relief.       Review of Systems   Constitutional: Negative for chills and fever.   HENT: Negative for congestion and sore throat.    Cardiovascular: Negative for chest pain.   Gastrointestinal: Negative for abdominal pain, anorexia, change in bowel habit and vomiting.   Musculoskeletal: Negative for arthralgias and myalgias.   Endo/Heme/Allergies: Bruises/bleeds easily (chronic anticoagulant).          Objective:     /72 (BP Location: Right arm, Patient Position: Sitting, BP Cuff Size: Adult)   Pulse 62   Temp 36.3 °C (97.3 °F) (Temporal)   Resp 16   Wt 63 kg (139 lb)   LMP 01/01/2004   SpO2 98%   BMI 21.77 kg/m²      Physical Exam   Constitutional: She is oriented to person, place, and time. Vital signs are normal. She appears well-developed and well-nourished. She is cooperative.  Non-toxic appearance. She does not appear ill. No distress.   HENT:   Head: Normocephalic.    Cardiovascular: Normal rate and regular rhythm.   Pulmonary/Chest: Effort normal.   Abdominal: Soft. Normal appearance. She exhibits no distension. There is no tenderness. There is no CVA tenderness.   Musculoskeletal:        Lumbar back: Normal.   Neurological: She is alert and oriented to person, place, and time.   Skin: Skin is warm and dry. Capillary refill takes less than 2 seconds. She is not diaphoretic.   Psychiatric: She has a normal mood and affect. Her behavior is normal.   Nursing note and vitals reviewed.    UA: negative for acute infection           Assessment/Plan:     1. Urinary frequency  phenazopyridine (PYRIDIUM) 200 MG Tab   2. Hematuria, unspecified type     3. On anticoagulant therapy       Differential Diagnosis includes but is not limited to:  IC, bladder irritants, OAB    Keep well hydrated    Educated in proper administration of medication(s) ordered today including safety, possible SE, risks, benefits, rationale and alternatives to therapy.     Return to clinic or PCP prn  if current symptoms are not resolving in a satisfactory manner or sooner if new or worsening symptoms occur.   Patient was advised of signs and symptoms which would warrant further evaluation and /or emergent evaluation in ER.  Verbalized agreement with this treatment plan and seemed to understand without barriers. Questions were encouraged and answered to patients satisfaction.

## 2019-10-14 ENCOUNTER — TELEPHONE (OUTPATIENT)
Dept: CARDIOLOGY | Facility: MEDICAL CENTER | Age: 64
End: 2019-10-14

## 2019-10-14 NOTE — TELEPHONE ENCOUNTER
"Pt. Sent email that Toprol 25mg and Flecainide 25mg once daily are causing side effects: constipation, bloating, weight gain, frequent urination. She wantys to stop the meds. She also takes Xarelto and she knows she must continue that.   Pt. Advised that these are unusual side effects but she said she researched Flecainide on the internet and \"all these side effects are listed\". She had a recent UA which was normal except for a trace of blood.  To Dr. Chang.  "

## 2019-10-14 NOTE — TELEPHONE ENCOUNTER
To Dr. Moody for his recommendations. Pt. Is scheduled for A fib ablation 11-5-19.    Message   Received: Today   Message Contents   CELESTE Almaguer L.PISABELLE   Caller: Unspecified (Today,  8:47 AM)             Recommend she follow-up with the EP

## 2019-10-23 ENCOUNTER — APPOINTMENT (RX ONLY)
Dept: URBAN - METROPOLITAN AREA CLINIC 4 | Facility: CLINIC | Age: 64
Setting detail: DERMATOLOGY
End: 2019-10-23

## 2019-10-23 DIAGNOSIS — L81.1 CHLOASMA: ICD-10-CM

## 2019-10-23 DIAGNOSIS — L82.1 OTHER SEBORRHEIC KERATOSIS: ICD-10-CM

## 2019-10-23 PROCEDURE — ? BENIGN DESTRUCTION COSMETIC

## 2019-10-23 PROCEDURE — 99213 OFFICE O/P EST LOW 20 MIN: CPT

## 2019-10-23 PROCEDURE — ? COUNSELING

## 2019-10-23 PROCEDURE — ? ADDITIONAL NOTES

## 2019-10-23 ASSESSMENT — LOCATION DETAILED DESCRIPTION DERM
LOCATION DETAILED: RIGHT CENTRAL ZYGOMA
LOCATION DETAILED: RIGHT DISTAL PRETIBIAL REGION
LOCATION DETAILED: RIGHT UPPER CUTANEOUS LIP
LOCATION DETAILED: RIGHT LATERAL PROXIMAL PRETIBIAL REGION
LOCATION DETAILED: LEFT UPPER CUTANEOUS LIP

## 2019-10-23 ASSESSMENT — LOCATION SIMPLE DESCRIPTION DERM
LOCATION SIMPLE: RIGHT LIP
LOCATION SIMPLE: LEFT LIP
LOCATION SIMPLE: RIGHT PRETIBIAL REGION
LOCATION SIMPLE: RIGHT ZYGOMA

## 2019-10-23 ASSESSMENT — LOCATION ZONE DERM
LOCATION ZONE: LIP
LOCATION ZONE: LEG
LOCATION ZONE: FACE

## 2019-10-23 NOTE — PROCEDURE: BENIGN DESTRUCTION COSMETIC
Post-Care Instructions: I reviewed with the patient in detail post-care instructions. Patient is to wear sunprotection, and avoid picking at any of the treated lesions. Pt may apply Vaseline to crusted or scabbing areas.
Anesthesia Volume In Cc: 0
Detail Level: Detailed
Price (Use Numbers Only, No Special Characters Or $): 125.00
Consent: The patient's consent was obtained including but not limited to risks of crusting, scabbing, blistering, scarring, darker or lighter pigmentary change, recurrence, incomplete removal and infection.

## 2019-10-23 NOTE — PROCEDURE: ADDITIONAL NOTES
Additional Notes: Lesion of concern on right zygoma, clinically consistent with a seborrheic keratosis. \\nDiscussed LN2, patient declines at this time.
Detail Level: Simple
Additional Notes: Discussed treatment options including hydroquinone, BBL.\\nPatient advised to contact our cosmetic coordinator, Liliana for cosmetic consult\\nCosmetic pamphlet given.

## 2019-11-04 DIAGNOSIS — Z01.810 PRE-OPERATIVE CARDIOVASCULAR EXAMINATION: ICD-10-CM

## 2019-11-04 DIAGNOSIS — Z01.812 PRE-OPERATIVE LABORATORY EXAMINATION: ICD-10-CM

## 2019-11-04 LAB
ALBUMIN SERPL BCP-MCNC: 4.5 G/DL (ref 3.2–4.9)
ALBUMIN/GLOB SERPL: 1.7 G/DL
ALP SERPL-CCNC: 79 U/L (ref 30–99)
ALT SERPL-CCNC: 14 U/L (ref 2–50)
ANION GAP SERPL CALC-SCNC: 6 MMOL/L (ref 0–11.9)
AST SERPL-CCNC: 24 U/L (ref 12–45)
BASOPHILS # BLD AUTO: 0.4 % (ref 0–1.8)
BASOPHILS # BLD: 0.02 K/UL (ref 0–0.12)
BILIRUB SERPL-MCNC: 0.5 MG/DL (ref 0.1–1.5)
BUN SERPL-MCNC: 15 MG/DL (ref 8–22)
CALCIUM SERPL-MCNC: 9.5 MG/DL (ref 8.5–10.5)
CHLORIDE SERPL-SCNC: 105 MMOL/L (ref 96–112)
CO2 SERPL-SCNC: 29 MMOL/L (ref 20–33)
CREAT SERPL-MCNC: 0.75 MG/DL (ref 0.5–1.4)
EKG IMPRESSION: NORMAL
EOSINOPHIL # BLD AUTO: 0.02 K/UL (ref 0–0.51)
EOSINOPHIL NFR BLD: 0.4 % (ref 0–6.9)
ERYTHROCYTE [DISTWIDTH] IN BLOOD BY AUTOMATED COUNT: 48.1 FL (ref 35.9–50)
GLOBULIN SER CALC-MCNC: 2.6 G/DL (ref 1.9–3.5)
GLUCOSE SERPL-MCNC: 86 MG/DL (ref 65–99)
HCT VFR BLD AUTO: 45 % (ref 37–47)
HGB BLD-MCNC: 14 G/DL (ref 12–16)
IMM GRANULOCYTES # BLD AUTO: 0.01 K/UL (ref 0–0.11)
IMM GRANULOCYTES NFR BLD AUTO: 0.2 % (ref 0–0.9)
INR PPP: 1.02 (ref 0.87–1.13)
LYMPHOCYTES # BLD AUTO: 1.41 K/UL (ref 1–4.8)
LYMPHOCYTES NFR BLD: 25.3 % (ref 22–41)
MCH RBC QN AUTO: 29.5 PG (ref 27–33)
MCHC RBC AUTO-ENTMCNC: 31.1 G/DL (ref 33.6–35)
MCV RBC AUTO: 94.9 FL (ref 81.4–97.8)
MONOCYTES # BLD AUTO: 0.46 K/UL (ref 0–0.85)
MONOCYTES NFR BLD AUTO: 8.3 % (ref 0–13.4)
NEUTROPHILS # BLD AUTO: 3.65 K/UL (ref 2–7.15)
NEUTROPHILS NFR BLD: 65.4 % (ref 44–72)
NRBC # BLD AUTO: 0 K/UL
NRBC BLD-RTO: 0 /100 WBC
PLATELET # BLD AUTO: 260 K/UL (ref 164–446)
PMV BLD AUTO: 10.1 FL (ref 9–12.9)
POTASSIUM SERPL-SCNC: 4.5 MMOL/L (ref 3.6–5.5)
PROT SERPL-MCNC: 7.1 G/DL (ref 6–8.2)
PROTHROMBIN TIME: 13.6 SEC (ref 12–14.6)
RBC # BLD AUTO: 4.74 M/UL (ref 4.2–5.4)
SODIUM SERPL-SCNC: 140 MMOL/L (ref 135–145)
WBC # BLD AUTO: 5.6 K/UL (ref 4.8–10.8)

## 2019-11-04 PROCEDURE — 85610 PROTHROMBIN TIME: CPT

## 2019-11-04 PROCEDURE — 85025 COMPLETE CBC W/AUTO DIFF WBC: CPT

## 2019-11-04 PROCEDURE — 93010 ELECTROCARDIOGRAM REPORT: CPT | Performed by: INTERNAL MEDICINE

## 2019-11-04 PROCEDURE — 80053 COMPREHEN METABOLIC PANEL: CPT

## 2019-11-04 PROCEDURE — 36415 COLL VENOUS BLD VENIPUNCTURE: CPT

## 2019-11-04 PROCEDURE — 93005 ELECTROCARDIOGRAM TRACING: CPT

## 2019-11-04 RX ORDER — ACETAMINOPHEN 325 MG/1
650 TABLET ORAL EVERY 4 HOURS PRN
Status: ON HOLD | COMMUNITY
End: 2019-11-06 | Stop reason: SDUPTHER

## 2019-11-05 ENCOUNTER — ANESTHESIA EVENT (OUTPATIENT)
Dept: CARDIOLOGY | Facility: MEDICAL CENTER | Age: 64
End: 2019-11-05
Payer: MEDICARE

## 2019-11-05 ENCOUNTER — APPOINTMENT (OUTPATIENT)
Dept: CARDIOLOGY | Facility: MEDICAL CENTER | Age: 64
End: 2019-11-05
Attending: INTERNAL MEDICINE
Payer: MEDICARE

## 2019-11-05 ENCOUNTER — ANESTHESIA (OUTPATIENT)
Dept: CARDIOLOGY | Facility: MEDICAL CENTER | Age: 64
End: 2019-11-05
Payer: MEDICARE

## 2019-11-05 ENCOUNTER — HOSPITAL ENCOUNTER (OUTPATIENT)
Facility: MEDICAL CENTER | Age: 64
End: 2019-11-07
Attending: INTERNAL MEDICINE | Admitting: INTERNAL MEDICINE
Payer: MEDICARE

## 2019-11-05 DIAGNOSIS — I48.0 PAROXYSMAL ATRIAL FIBRILLATION (HCC): ICD-10-CM

## 2019-11-05 PROBLEM — Z87.19 HISTORY OF ESOPHAGEAL STRICTURE: Status: ACTIVE | Noted: 2019-11-05

## 2019-11-05 PROBLEM — K21.9 HIATAL HERNIA WITH GERD: Status: ACTIVE | Noted: 2019-11-05

## 2019-11-05 PROBLEM — K44.9 HIATAL HERNIA WITH GERD: Status: ACTIVE | Noted: 2019-11-05

## 2019-11-05 LAB
ACT BLD: 235 SEC (ref 74–137)
ACT BLD: 274 SEC (ref 74–137)
EKG IMPRESSION: NORMAL
LV EJECT FRACT  99904: 65

## 2019-11-05 PROCEDURE — 85347 COAGULATION TIME ACTIVATED: CPT | Mod: 91

## 2019-11-05 PROCEDURE — 700102 HCHG RX REV CODE 250 W/ 637 OVERRIDE(OP): Performed by: INTERNAL MEDICINE

## 2019-11-05 PROCEDURE — 700111 HCHG RX REV CODE 636 W/ 250 OVERRIDE (IP): Performed by: ANESTHESIOLOGY

## 2019-11-05 PROCEDURE — G0378 HOSPITAL OBSERVATION PER HR: HCPCS

## 2019-11-05 PROCEDURE — 93325 DOPPLER ECHO COLOR FLOW MAPG: CPT

## 2019-11-05 PROCEDURE — 93662 INTRACARDIAC ECG (ICE): CPT | Mod: 26 | Performed by: INTERNAL MEDICINE

## 2019-11-05 PROCEDURE — 700101 HCHG RX REV CODE 250: Performed by: ANESTHESIOLOGY

## 2019-11-05 PROCEDURE — 93662 INTRACARDIAC ECG (ICE): CPT

## 2019-11-05 PROCEDURE — A9270 NON-COVERED ITEM OR SERVICE: HCPCS | Performed by: INTERNAL MEDICINE

## 2019-11-05 PROCEDURE — 93010 ELECTROCARDIOGRAM REPORT: CPT | Performed by: INTERNAL MEDICINE

## 2019-11-05 PROCEDURE — 700105 HCHG RX REV CODE 258: Performed by: ANESTHESIOLOGY

## 2019-11-05 PROCEDURE — 93005 ELECTROCARDIOGRAM TRACING: CPT | Performed by: INTERNAL MEDICINE

## 2019-11-05 PROCEDURE — 700111 HCHG RX REV CODE 636 W/ 250 OVERRIDE (IP)

## 2019-11-05 PROCEDURE — 700101 HCHG RX REV CODE 250

## 2019-11-05 PROCEDURE — 93623 PRGRMD STIMJ&PACG IV RX NFS: CPT | Mod: 26 | Performed by: INTERNAL MEDICINE

## 2019-11-05 PROCEDURE — 93656 COMPRE EP EVAL ABLTJ ATR FIB: CPT | Performed by: INTERNAL MEDICINE

## 2019-11-05 PROCEDURE — 160002 HCHG RECOVERY MINUTES (STAT)

## 2019-11-05 PROCEDURE — 93613 INTRACARDIAC EPHYS 3D MAPG: CPT | Performed by: INTERNAL MEDICINE

## 2019-11-05 RX ORDER — DIPHENHYDRAMINE HYDROCHLORIDE 50 MG/ML
12.5 INJECTION INTRAMUSCULAR; INTRAVENOUS
Status: DISCONTINUED | OUTPATIENT
Start: 2019-11-05 | End: 2019-11-05 | Stop reason: HOSPADM

## 2019-11-05 RX ORDER — MEPERIDINE HYDROCHLORIDE 25 MG/ML
12.5 INJECTION INTRAMUSCULAR; INTRAVENOUS; SUBCUTANEOUS
Status: DISCONTINUED | OUTPATIENT
Start: 2019-11-05 | End: 2019-11-05 | Stop reason: HOSPADM

## 2019-11-05 RX ORDER — LIDOCAINE HYDROCHLORIDE 40 MG/ML
SOLUTION TOPICAL
Status: COMPLETED
Start: 2019-11-05 | End: 2019-11-05

## 2019-11-05 RX ORDER — HEPARIN SODIUM,PORCINE 1000/ML
VIAL (ML) INJECTION PRN
Status: DISCONTINUED | OUTPATIENT
Start: 2019-11-05 | End: 2019-11-05 | Stop reason: SURG

## 2019-11-05 RX ORDER — HEPARIN SODIUM 200 [USP'U]/100ML
INJECTION, SOLUTION INTRAVENOUS
Status: COMPLETED
Start: 2019-11-05 | End: 2019-11-05

## 2019-11-05 RX ORDER — NEOSTIGMINE METHYLSULFATE 1 MG/ML
INJECTION, SOLUTION INTRAVENOUS PRN
Status: DISCONTINUED | OUTPATIENT
Start: 2019-11-05 | End: 2019-11-05 | Stop reason: SURG

## 2019-11-05 RX ORDER — ONDANSETRON 2 MG/ML
INJECTION INTRAMUSCULAR; INTRAVENOUS PRN
Status: DISCONTINUED | OUTPATIENT
Start: 2019-11-05 | End: 2019-11-05 | Stop reason: SURG

## 2019-11-05 RX ORDER — SODIUM CHLORIDE, SODIUM LACTATE, POTASSIUM CHLORIDE, CALCIUM CHLORIDE 600; 310; 30; 20 MG/100ML; MG/100ML; MG/100ML; MG/100ML
INJECTION, SOLUTION INTRAVENOUS
Status: DISCONTINUED | OUTPATIENT
Start: 2019-11-05 | End: 2019-11-05 | Stop reason: SURG

## 2019-11-05 RX ORDER — OXYCODONE HYDROCHLORIDE AND ACETAMINOPHEN 5; 325 MG/1; MG/1
1 TABLET ORAL
Status: DISCONTINUED | OUTPATIENT
Start: 2019-11-05 | End: 2019-11-05 | Stop reason: HOSPADM

## 2019-11-05 RX ORDER — SUCRALFATE ORAL 1 G/10ML
1 SUSPENSION ORAL
Status: DISCONTINUED | OUTPATIENT
Start: 2019-11-05 | End: 2019-11-07 | Stop reason: HOSPADM

## 2019-11-05 RX ORDER — PROTAMINE SULFATE 10 MG/ML
INJECTION, SOLUTION INTRAVENOUS
Status: COMPLETED
Start: 2019-11-05 | End: 2019-11-05

## 2019-11-05 RX ORDER — SUCRALFATE 1 G/1
1 TABLET ORAL
Status: DISCONTINUED | OUTPATIENT
Start: 2019-11-05 | End: 2019-11-05

## 2019-11-05 RX ORDER — ONDANSETRON 2 MG/ML
4 INJECTION INTRAMUSCULAR; INTRAVENOUS
Status: DISCONTINUED | OUTPATIENT
Start: 2019-11-05 | End: 2019-11-05 | Stop reason: HOSPADM

## 2019-11-05 RX ORDER — SODIUM CHLORIDE 9 MG/ML
INJECTION, SOLUTION INTRAVENOUS
Status: DISCONTINUED | OUTPATIENT
Start: 2019-11-05 | End: 2019-11-05 | Stop reason: SURG

## 2019-11-05 RX ORDER — PROTAMINE SULFATE 10 MG/ML
INJECTION, SOLUTION INTRAVENOUS PRN
Status: DISCONTINUED | OUTPATIENT
Start: 2019-11-05 | End: 2019-11-05 | Stop reason: SURG

## 2019-11-05 RX ORDER — OMEPRAZOLE 20 MG/1
20 CAPSULE, DELAYED RELEASE ORAL
Status: DISCONTINUED | OUTPATIENT
Start: 2019-11-05 | End: 2019-11-07 | Stop reason: HOSPADM

## 2019-11-05 RX ORDER — ISOPROTERENOL HYDROCHLORIDE 0.2 MG/ML
INJECTION, SOLUTION INTRAVENOUS
Status: COMPLETED
Start: 2019-11-05 | End: 2019-11-05

## 2019-11-05 RX ORDER — MIDAZOLAM HYDROCHLORIDE 1 MG/ML
INJECTION INTRAMUSCULAR; INTRAVENOUS
Status: COMPLETED
Start: 2019-11-05 | End: 2019-11-05

## 2019-11-05 RX ORDER — HEPARIN SODIUM,PORCINE 1000/ML
VIAL (ML) INJECTION
Status: COMPLETED
Start: 2019-11-05 | End: 2019-11-05

## 2019-11-05 RX ORDER — METOPROLOL SUCCINATE 25 MG/1
12.5 TABLET, EXTENDED RELEASE ORAL
Status: DISCONTINUED | OUTPATIENT
Start: 2019-11-05 | End: 2019-11-07 | Stop reason: HOSPADM

## 2019-11-05 RX ORDER — SODIUM CHLORIDE, SODIUM LACTATE, POTASSIUM CHLORIDE, CALCIUM CHLORIDE 600; 310; 30; 20 MG/100ML; MG/100ML; MG/100ML; MG/100ML
INJECTION, SOLUTION INTRAVENOUS CONTINUOUS
Status: DISCONTINUED | OUTPATIENT
Start: 2019-11-05 | End: 2019-11-06

## 2019-11-05 RX ADMIN — ROCURONIUM BROMIDE 10 MG: 10 INJECTION, SOLUTION INTRAVENOUS at 09:06

## 2019-11-05 RX ADMIN — MIDAZOLAM HYDROCHLORIDE 1 MG: 1 INJECTION, SOLUTION INTRAMUSCULAR; INTRAVENOUS at 08:08

## 2019-11-05 RX ADMIN — DEXTROSE MONOHYDRATE 2 MCG/MIN: 50 INJECTION, SOLUTION INTRAVENOUS at 09:45

## 2019-11-05 RX ADMIN — ISOPROTERENOL HYDROCHLORIDE 200 MCG: 0.2 INJECTION, SOLUTION INTRACARDIAC; INTRAMUSCULAR; INTRAVENOUS; SUBCUTANEOUS at 09:54

## 2019-11-05 RX ADMIN — NEOSTIGMINE METHYLSULFATE 2 MG: 1 INJECTION INTRAVENOUS at 10:07

## 2019-11-05 RX ADMIN — FENTANYL CITRATE 50 MCG: 50 INJECTION, SOLUTION INTRAMUSCULAR; INTRAVENOUS at 09:07

## 2019-11-05 RX ADMIN — LIDOCAINE HYDROCHLORIDE: 40 SOLUTION TOPICAL at 07:30

## 2019-11-05 RX ADMIN — MIDAZOLAM HYDROCHLORIDE 1 MG: 1 INJECTION, SOLUTION INTRAMUSCULAR; INTRAVENOUS at 07:50

## 2019-11-05 RX ADMIN — ROCURONIUM BROMIDE 30 MG: 10 INJECTION, SOLUTION INTRAVENOUS at 08:09

## 2019-11-05 RX ADMIN — HEPARIN SODIUM 2000 UNITS: 200 INJECTION, SOLUTION INTRAVENOUS at 09:56

## 2019-11-05 RX ADMIN — PROPOFOL 40 MG: 10 INJECTION, EMULSION INTRAVENOUS at 10:02

## 2019-11-05 RX ADMIN — SUCRALFATE 1 G: 1 SUSPENSION ORAL at 19:22

## 2019-11-05 RX ADMIN — ONDANSETRON 4 MG: 2 INJECTION INTRAMUSCULAR; INTRAVENOUS at 07:50

## 2019-11-05 RX ADMIN — EPHEDRINE SULFATE 10 MG: 50 INJECTION INTRAMUSCULAR; INTRAVENOUS; SUBCUTANEOUS at 09:01

## 2019-11-05 RX ADMIN — HEPARIN SODIUM 4000 UNITS: 1000 INJECTION, SOLUTION INTRAVENOUS; SUBCUTANEOUS at 08:52

## 2019-11-05 RX ADMIN — SUCRALFATE 1 G: 1 TABLET ORAL at 17:02

## 2019-11-05 RX ADMIN — METOPROLOL SUCCINATE 12.5 MG: 25 TABLET, EXTENDED RELEASE ORAL at 19:22

## 2019-11-05 RX ADMIN — FENTANYL CITRATE 25 MCG: 50 INJECTION, SOLUTION INTRAMUSCULAR; INTRAVENOUS at 07:57

## 2019-11-05 RX ADMIN — FENTANYL CITRATE 75 MCG: 50 INJECTION, SOLUTION INTRAMUSCULAR; INTRAVENOUS at 08:09

## 2019-11-05 RX ADMIN — PROPOFOL 60 MG: 10 INJECTION, EMULSION INTRAVENOUS at 08:09

## 2019-11-05 RX ADMIN — EPHEDRINE SULFATE 10 MG: 50 INJECTION INTRAMUSCULAR; INTRAVENOUS; SUBCUTANEOUS at 08:34

## 2019-11-05 RX ADMIN — HEPARIN SODIUM 4000 UNITS: 1000 INJECTION, SOLUTION INTRAVENOUS; SUBCUTANEOUS at 09:20

## 2019-11-05 RX ADMIN — HEPARIN SODIUM 4000 UNITS: 1000 INJECTION, SOLUTION INTRAVENOUS; SUBCUTANEOUS at 08:46

## 2019-11-05 RX ADMIN — GLYCOPYRROLATE 0.4 MG: 0.2 INJECTION INTRAMUSCULAR; INTRAVENOUS at 10:07

## 2019-11-05 RX ADMIN — FENTANYL CITRATE 50 MCG: 50 INJECTION, SOLUTION INTRAMUSCULAR; INTRAVENOUS at 08:08

## 2019-11-05 RX ADMIN — SODIUM CHLORIDE, POTASSIUM CHLORIDE, SODIUM LACTATE AND CALCIUM CHLORIDE: 600; 310; 30; 20 INJECTION, SOLUTION INTRAVENOUS at 07:50

## 2019-11-05 RX ADMIN — RIVAROXABAN 20 MG: 20 TABLET, FILM COATED ORAL at 17:02

## 2019-11-05 RX ADMIN — SODIUM CHLORIDE: 9 INJECTION, SOLUTION INTRAVENOUS at 09:30

## 2019-11-05 RX ADMIN — PROTAMINE SULFATE 40 MG: 10 INJECTION, SOLUTION INTRAVENOUS at 09:51

## 2019-11-05 RX ADMIN — HEPARIN SODIUM 2000 UNITS: 200 INJECTION, SOLUTION INTRAVENOUS at 09:55

## 2019-11-05 ASSESSMENT — LIFESTYLE VARIABLES
HAVE PEOPLE ANNOYED YOU BY CRITICIZING YOUR DRINKING: NO
EVER_SMOKED: NEVER
ALCOHOL_USE: NO
CONSUMPTION TOTAL: NEGATIVE
AVERAGE NUMBER OF DAYS PER WEEK YOU HAVE A DRINK CONTAINING ALCOHOL: 0
TOTAL SCORE: 0
TOTAL SCORE: 0
HAVE YOU EVER FELT YOU SHOULD CUT DOWN ON YOUR DRINKING: NO
EVER FELT BAD OR GUILTY ABOUT YOUR DRINKING: NO
EVER HAD A DRINK FIRST THING IN THE MORNING TO STEADY YOUR NERVES TO GET RID OF A HANGOVER: NO
ON A TYPICAL DAY WHEN YOU DRINK ALCOHOL HOW MANY DRINKS DO YOU HAVE: 0
TOTAL SCORE: 0
HOW MANY TIMES IN THE PAST YEAR HAVE YOU HAD 5 OR MORE DRINKS IN A DAY: 0

## 2019-11-05 ASSESSMENT — COGNITIVE AND FUNCTIONAL STATUS - GENERAL
SUGGESTED CMS G CODE MODIFIER DAILY ACTIVITY: CH
SUGGESTED CMS G CODE MODIFIER MOBILITY: CH
MOBILITY SCORE: 24
DAILY ACTIVITIY SCORE: 24

## 2019-11-05 ASSESSMENT — PATIENT HEALTH QUESTIONNAIRE - PHQ9
2. FEELING DOWN, DEPRESSED, IRRITABLE, OR HOPELESS: NOT AT ALL
SUM OF ALL RESPONSES TO PHQ9 QUESTIONS 1 AND 2: 0
1. LITTLE INTEREST OR PLEASURE IN DOING THINGS: NOT AT ALL

## 2019-11-05 ASSESSMENT — PAIN SCALES - GENERAL: PAIN_LEVEL: 0

## 2019-11-05 NOTE — ANESTHESIA PREPROCEDURE EVALUATION
Relevant Problems   PULMONARY   (+) Shortness of breath      NEURO   (+) History of colonic polyps      CARDIAC   (+) Atrial premature complexes   (+) Paroxysmal atrial fibrillation (HCC)      GI   (+) Gastroesophageal reflux disease without esophagitis   (+) Hiatal hernia with GERD      Other   (+) Anxiety   (+) Chronic anticoagulation   (+) Palpitations       Physical Exam    Airway   Mallampati: II       Cardiovascular   Rhythm: regular  Rate: normal     Dental - normal exam         Pulmonary   Breath sounds clear to auscultation     Abdominal - normal exam     Neurological     Comments: Extremely nervous/aprehensive             Anesthesia Plan    ASA 2       Plan - general       Airway plan will be ETT        Induction: intravenous      Pertinent diagnostic labs and testing reviewed    Informed Consent:    Anesthetic plan and risks discussed with patient and spouse.

## 2019-11-05 NOTE — ANESTHESIA TIME REPORT
Anesthesia Start and Stop Event Times     Date Time Event    11/5/2019 0736 Ready for Procedure     0750 Anesthesia Start     1024 Anesthesia Stop        Responsible Staff  11/05/19    Name Role Begin End    Vincent Biswas Jr., M.D. Anesth 0750 1024        Preop Diagnosis (Free Text):  Pre-op Diagnosis             Preop Diagnosis (Codes):    Post op Diagnosis  Paroxysmal atrial fibrillation      Premium Reason  Non-Premium    Comments:

## 2019-11-05 NOTE — CARE PLAN
2 RN Skin Check:    2 RN skin check complete.   Devices in place: SCDs.  Skin assessed under devices: yes.  Confirmed pressure ulcers found on: N/A.  New potential pressure ulcers noted on none. Wound consult placed No.  The following interventions in place Lotion.    Bilateral groin sites clean, dry, intact transparent dressing with gauze.  Coban wrap to LUE at radial pulse clean, dry intact.

## 2019-11-05 NOTE — ANESTHESIA PROCEDURE NOTES
Arterial Line  Performed by: Vincent Biswas Jr., M.D.  Authorized by: Vincent Biswas Jr., M.D.     Start Time:  11/5/2019 8:00 AM  End Time:  11/5/2019 8:04 AM  Localization: surface landmarks    Patient Location:  OR  Indication: continuous blood pressure monitoring and blood sampling needed    Catheter Size:  20 G  Seldinger Technique?: Yes    Laterality:  Left  Site:  Radial artery  Line Secured:  Transparent dressing and tape  Events: patient tolerated procedure well with no complications

## 2019-11-05 NOTE — ANESTHESIA PROCEDURE NOTES
Airway  Date/Time: 11/5/2019 8:11 AM  Performed by: Vincent Biswas Jr., M.D.  Authorized by: Vincent Biswas Jr., M.D.     Location:  OR  Urgency:  Elective  Indications for Airway Management:  Anesthesia  Spontaneous Ventilation: absent    Sedation Level:  Deep  Preoxygenated: Yes    Patient Position:  Sniffing  Final Airway Type:  Endotracheal airway  Final Endotracheal Airway:  ETT  Cuffed: Yes    Technique Used for Successful ETT Placement:  Direct laryngoscopy  Insertion Site:  Oral  Blade Type:  Jenae  Laryngoscope Blade/Videolaryngoscope Blade Size:  3  ETT Size (mm):  7.0  Measured from:  Teeth  ETT to Teeth (cm):  27  Placement Verified by: auscultation and capnometry    Cormack-Lehane Classification:  Grade IIa - partial view of glottis  Number of Attempts at Approach:  1

## 2019-11-05 NOTE — ANESTHESIA POSTPROCEDURE EVALUATION
Patient: Susy Szymanski    Procedure Summary     Date:  11/05/19 Room / Location:  [facility] - CATH LAB [facility]    Anesthesia Start:  0750 Anesthesia Stop:  1024    Procedure:  CL-EP ABLATION ATRIAL FIBRILLATION Diagnosis:       Paroxysmal atrial fibrillation (HCC)      (See Associated Dx)    Scheduled Providers:  Josue Moody M.D. Responsible Provider:  Vincent Biswas Jr., M.D.    Anesthesia Type:  general ASA Status:  2          Final Anesthesia Type: general  Last vitals  BP   Blood Pressure : 143/55    Temp   37.1 °C (98.8 °F)    Pulse   Pulse: (!) 51   Resp   14    SpO2   99 %      Anesthesia Post Evaluation    Patient location during evaluation: PACU  Patient participation: complete - patient participated  Level of consciousness: awake and alert, sleepy but conscious and responsive to verbal stimuli  Pain score: 0    Airway patency: patent  Anesthetic complications: no  Cardiovascular status: hemodynamically stable  Respiratory status: acceptable and nasal cannula  Hydration status: euvolemic    PONV: none           Nurse Pain Score: 0 (NPRS)

## 2019-11-05 NOTE — OP REPORT
Electrophysiology Procedure Note    Procedure(s) Performed:   1) Atrial fibrillation ablation and EP study  2) 3D mapping  3) ICE during diagnostics and intervention  4) Arrhythmia induction with IV drug infusion    Indication(s):   Paroxysmal atrial fibrillation    Physician(s): Josue Moody M.D.     Resident/Assistant(s): None     Anesthesia: General endotracheal anesthetic.     Specimen(s) Removed: None     Estimated Blood Loss:  30cc     Complications:  None     Description of Procedure:   After informed written consent, the patient was brought to the EP lab in the fasting, non-sedated state. The patient was prepped and draped in the usual sterile fashion. Femoral venous access was obtained using the modified Seldinger technique. In the left femoral vein, 2 sheaths (6,10 Fr) were inserted over 0.035” guidewires. In the right femoral vein, 2 sheaths (8,8 Fr) were inserted over 0.035” guidewires. A deflectable decapolar catheter was advanced to the CS position. An intracardiac echo (ICE) catheter was advanced to the right atrium. ICE was used to identify the atrial septum, left atrial appendage, and pulmonary veins and stacie the anatomic structures to superimpose on our 3D electroanatomic map using CARTOSound. During the procedure, ICE was utilized to localize the ablation catheter, monitor for thrombus formation, and to exclude pericardial effusion. Intravenous heparin was administered to maintain -350 seconds. Double transseptal left heart catheterization was performed under intracardiac echo and hemodynamic guidance. A Neosens needle was inserted into the 8 Fr sheaths (Neosens Torflex) for transseptal puncture and placement of sheaths in the left atrium. Mapping of the pulmonary veins and left atrium was performed using CARTO3 FAM and a deflectable multipolar mapping catheter (Image Stream MedicalaRay). Wide antral circumferential ablation was performed using an 3.5 mm deflectable irrigated force contact catheter  (Gemvara.com ST SF) at primarily 30-40 W power starting from the L sided veins and then proceeding along to the RSPV and then finally the RIPV. Bidirectional pulmonary vein antrum isolation was verified at the end of the procedure by pacing inside the vein and confirming exit block along with absence of local PV signals on the PentaRay. Arrhythmia induction was performed with single and double atrial extra stimuli on and off isuprel infusion at 2 mcg/min pacing from proximal CS. No arrhythmia was induced. At the end of the procedure, heparin was reversed with protamine, the catheter and sheaths were removed, and hemostasis was achieved by manual compression. Following recovery from anesthesia, the patient was transferred to the PPU in good condition.       Total ablation time: 791 seconds    Fluoroscopy time: 2.2 minutes     Electrophysiologic Findings:    1. Sinus  734 ms. AVBCL = 430 ms. AVNERP 500/380.    Impressions:    1. Paroxysmal atrial fibrillation.    2. Successful RF ablation pulmonary vein isolation procedure.       Recommendations:  1. Resume anticoagulation.  2. Start carafate x 2 weeks and daily PPI x 1 month.  3. Admit to monitored bedrest.

## 2019-11-05 NOTE — ANESTHESIA QCDR
2019 [facility] Clinical Data Registry (for Quality Improvement)     Postoperative nausea/vomiting risk protocol (Adult = 18 yrs and Pediatric 3-17 yrs)- (430 and 463)  General inhalation anesthetic (NOT TIVA) with PONV risk factors: No  Provision of anti-emetic therapy with at least 2 different classes of agents: N/A  Patient DID NOT receive anti-emetic therapy and reason is documented in Medical Record: N/A    Multimodal Pain Management- (AQI59)  Patient undergoing Elective Surgery (i.e. Outpatient, or ASC, or Prescheduled Surgery prior to Hospital Admission): No  Use of Multimodal Pain Management, two or more drugs and/or interventions, NOT including systemic opioids: N/A  Exception: Documented allergy to multiple classes of analgesics: N/A    PACU assessment of acute postoperative pain prior to Anesthesia Care End- Applies to Patients Age = 18- (ABG7)  Initial PACU pain score is which of the following: < 7/10  Patient unable to report pain score: N/A    Post-anesthetic transfer of care checklist/protocol to PACU/ICU- (426 and 427)  Upon conclusion of case, patient transferred to which of the following locations: PACU/Non-ICU  Use of transfer checklist/protocol: Yes  Exclusion: Service Performed in Patient Hospital Room (and thus did not require transfer): N/A    PACU Reintubation- (AQI31)  General anesthesia requiring endotracheal intubation (ETT) along with subsequent extubation in OR or PACU: Yes  Required reintubation in the PACU: No   Extubation was a planned trial documented in the medical record prior to removal of the original airway device:  N/A    Unplanned admission to ICU related to anesthesia service up through end of PACU care- (MD51)  Unplanned admission to ICU (not initially anticipated at anesthesia start time): No

## 2019-11-05 NOTE — OR NURSING
1022 patient arrived from cath lab s/p a.fib ablation. Patient awake, right and left groin access sites dressing clean dry intact. Patient denies pain. Arterial line right radial. Vss.   1040 arterial line removed gauze tegaderm applied no bleeding no hematoma.   1045 patient provided with water tolerating well.   1410 patient ambulated to bathroom independently tolerated well. Surgical site dressing clean dry intact.   1514 Criteria met to discharge patient out of ppu.   1518 Report given to Samra ISABEL [id number] bed 2 all questions answered

## 2019-11-05 NOTE — PROGRESS NOTES
Patient transported fromPPU. Patient A&Ox4. Plan of care discussed with patient. Family at bedside. Patient oriented to floor and surroundings. Patient currently on room air. VSS. Patient currently A&Ox4. Patient is complaining of pain 0/10. Tele box placed. Monitor room notified. 2 rn skin check performed. Patient educated to call for assistance before ambulating. Patient education regarding fall risk. Call light within reach. Fall precautions in place.

## 2019-11-06 ENCOUNTER — APPOINTMENT (OUTPATIENT)
Dept: RADIOLOGY | Facility: MEDICAL CENTER | Age: 64
End: 2019-11-06
Attending: NURSE PRACTITIONER
Payer: MEDICARE

## 2019-11-06 LAB
ALBUMIN SERPL BCP-MCNC: 3.9 G/DL (ref 3.2–4.9)
ALBUMIN/GLOB SERPL: 1.7 G/DL
ALP SERPL-CCNC: 117 U/L (ref 30–99)
ALT SERPL-CCNC: 346 U/L (ref 2–50)
ALT SERPL-CCNC: 364 U/L (ref 2–50)
ANION GAP SERPL CALC-SCNC: 7 MMOL/L (ref 0–11.9)
AST SERPL-CCNC: 274 U/L (ref 12–45)
AST SERPL-CCNC: 300 U/L (ref 12–45)
BILIRUB SERPL-MCNC: 0.6 MG/DL (ref 0.1–1.5)
BUN SERPL-MCNC: 8 MG/DL (ref 8–22)
CALCIUM SERPL-MCNC: 9 MG/DL (ref 8.5–10.5)
CHLORIDE SERPL-SCNC: 106 MMOL/L (ref 96–112)
CO2 SERPL-SCNC: 26 MMOL/L (ref 20–33)
CREAT SERPL-MCNC: 0.62 MG/DL (ref 0.5–1.4)
EKG IMPRESSION: NORMAL
ERYTHROCYTE [DISTWIDTH] IN BLOOD BY AUTOMATED COUNT: 48.2 FL (ref 35.9–50)
GLOBULIN SER CALC-MCNC: 2.3 G/DL (ref 1.9–3.5)
GLUCOSE SERPL-MCNC: 108 MG/DL (ref 65–99)
HCT VFR BLD AUTO: 41.5 % (ref 37–47)
HGB BLD-MCNC: 13.2 G/DL (ref 12–16)
MCH RBC QN AUTO: 29.7 PG (ref 27–33)
MCHC RBC AUTO-ENTMCNC: 31.8 G/DL (ref 33.6–35)
MCV RBC AUTO: 93.5 FL (ref 81.4–97.8)
PLATELET # BLD AUTO: 214 K/UL (ref 164–446)
PMV BLD AUTO: 9.9 FL (ref 9–12.9)
POTASSIUM SERPL-SCNC: 4.2 MMOL/L (ref 3.6–5.5)
PROT SERPL-MCNC: 6.2 G/DL (ref 6–8.2)
RBC # BLD AUTO: 4.44 M/UL (ref 4.2–5.4)
SODIUM SERPL-SCNC: 139 MMOL/L (ref 135–145)
WBC # BLD AUTO: 6.8 K/UL (ref 4.8–10.8)

## 2019-11-06 PROCEDURE — 85027 COMPLETE CBC AUTOMATED: CPT

## 2019-11-06 PROCEDURE — 36415 COLL VENOUS BLD VENIPUNCTURE: CPT

## 2019-11-06 PROCEDURE — 84450 TRANSFERASE (AST) (SGOT): CPT

## 2019-11-06 PROCEDURE — 93005 ELECTROCARDIOGRAM TRACING: CPT | Performed by: INTERNAL MEDICINE

## 2019-11-06 PROCEDURE — 700102 HCHG RX REV CODE 250 W/ 637 OVERRIDE(OP): Performed by: NURSE PRACTITIONER

## 2019-11-06 PROCEDURE — 93010 ELECTROCARDIOGRAM REPORT: CPT | Performed by: INTERNAL MEDICINE

## 2019-11-06 PROCEDURE — 80053 COMPREHEN METABOLIC PANEL: CPT

## 2019-11-06 PROCEDURE — A9270 NON-COVERED ITEM OR SERVICE: HCPCS | Performed by: NURSE PRACTITIONER

## 2019-11-06 PROCEDURE — 84460 ALANINE AMINO (ALT) (SGPT): CPT

## 2019-11-06 PROCEDURE — G0378 HOSPITAL OBSERVATION PER HR: HCPCS

## 2019-11-06 PROCEDURE — A9270 NON-COVERED ITEM OR SERVICE: HCPCS | Performed by: INTERNAL MEDICINE

## 2019-11-06 PROCEDURE — 76700 US EXAM ABDOM COMPLETE: CPT

## 2019-11-06 PROCEDURE — 700102 HCHG RX REV CODE 250 W/ 637 OVERRIDE(OP): Performed by: INTERNAL MEDICINE

## 2019-11-06 RX ORDER — OMEPRAZOLE 20 MG/1
20 CAPSULE, DELAYED RELEASE ORAL
Qty: 30 CAP | Refills: 0 | Status: SHIPPED | OUTPATIENT
Start: 2019-11-06 | End: 2019-12-04 | Stop reason: SDUPTHER

## 2019-11-06 RX ORDER — ACETAMINOPHEN 325 MG/1
650 TABLET ORAL EVERY 6 HOURS PRN
COMMUNITY
Start: 2019-11-06 | End: 2019-11-07 | Stop reason: SDUPTHER

## 2019-11-06 RX ORDER — SUCRALFATE ORAL 1 G/10ML
1 SUSPENSION ORAL
Qty: 600 ML | Refills: 0 | Status: SHIPPED | OUTPATIENT
Start: 2019-11-06 | End: 2019-11-26

## 2019-11-06 RX ORDER — ACETAMINOPHEN 325 MG/1
650 TABLET ORAL EVERY 6 HOURS PRN
Status: DISCONTINUED | OUTPATIENT
Start: 2019-11-06 | End: 2019-11-06

## 2019-11-06 RX ADMIN — RIVAROXABAN 20 MG: 20 TABLET, FILM COATED ORAL at 17:47

## 2019-11-06 RX ADMIN — SUCRALFATE 1 G: 1 SUSPENSION ORAL at 17:49

## 2019-11-06 RX ADMIN — SUCRALFATE 1 G: 1 SUSPENSION ORAL at 08:06

## 2019-11-06 RX ADMIN — ACETAMINOPHEN 650 MG: 325 TABLET, FILM COATED ORAL at 08:06

## 2019-11-06 RX ADMIN — SUCRALFATE 1 G: 1 SUSPENSION ORAL at 20:49

## 2019-11-06 RX ADMIN — OMEPRAZOLE 20 MG: 20 CAPSULE, DELAYED RELEASE ORAL at 08:05

## 2019-11-06 RX ADMIN — METOPROLOL SUCCINATE 12.5 MG: 25 TABLET, EXTENDED RELEASE ORAL at 20:49

## 2019-11-06 ASSESSMENT — ENCOUNTER SYMPTOMS
PALPITATIONS: 0
ENDOCRINE NEGATIVE: 1
DIZZINESS: 0
TROUBLE SWALLOWING: 0
SHORTNESS OF BREATH: 0
CHILLS: 0
VOMITING: 0
DIARRHEA: 0
NUMBNESS: 0
HEMATOLOGIC/LYMPHATIC NEGATIVE: 1
WHEEZING: 0
BLOOD IN STOOL: 0
CHEST TIGHTNESS: 0
LIGHT-HEADEDNESS: 0
CONSTIPATION: 1
WEAKNESS: 0
EYES NEGATIVE: 1
MUSCULOSKELETAL NEGATIVE: 1
ABDOMINAL DISTENTION: 0
ABDOMINAL PAIN: 0
COUGH: 0
NAUSEA: 0
SPEECH DIFFICULTY: 0
FATIGUE: 0
PSYCHIATRIC NEGATIVE: 1
FEVER: 0

## 2019-11-06 NOTE — PROGRESS NOTES
Cardiology Follow Up Progress Note    Date of Service  11/6/2019    Attending Physician  Josue Moody M.D.    Chief Complaint   Elective Admission for Atrial Fibrillation Ablation    HPI  HPI & HOSPITAL COURSE  This is a 64 y.o. year old female here for elective ablation for symptomatic paroxysmal atrial fibrilaltion.  She has a history of palpitations, ambulatory monitor showed short runs of AT and non sustained AF.  She then presented to the ED with sustained AF for which she was symptomatic.  She has tried antiarrhythmic medications and has not been able to tolerate in the past. She is followed longitudinally in our office by Dr. Chang and Dr. Moody for EP.  Last seen by Dr. Moody 8/13/19.      Past medical history significant for use of chronic anticoagulation and anxiety disorder.       Interim Events  Seen in EP rounds.   Denies chest pain, dizziness, dyspnea, other complaints.  Monitored rhythm: Sinus rhythm.  No events overnight.   Labs: AST/ALT elevated this AM.     Procedural Conclusions per Dr. Moody's Op Note dated 11/5/19  Procedure(s) Performed:   1) Atrial fibrillation ablation and EP study  2) 3D mapping  3) ICE during diagnostics and intervention  4) Arrhythmia induction with IV drug infusion  Indication(s):   Paroxysmal atrial fibrillation  Complications:  None  Total ablation time: 791 seconds   Electrophysiologic Findings:    1. Sinus  734 ms. AVBCL = 430 ms. AVNERP 500/380.   Impressions:    1. Paroxysmal atrial fibrillation.    2. Successful RF ablation pulmonary vein isolation procedure.       Review of Systems  Review of Systems   Constitutional: Negative for chills, fatigue and fever.   HENT: Negative for nosebleeds, tinnitus and trouble swallowing.    Eyes: Negative.    Respiratory: Negative for cough, chest tightness, shortness of breath and wheezing.    Cardiovascular: Negative for chest pain, palpitations and leg swelling.   Gastrointestinal: Positive for constipation (reports recent history  of ). Negative for abdominal distention, abdominal pain, blood in stool, diarrhea, nausea (states some nausea earlier this AM but resolved at this time. ) and vomiting.   Endocrine: Negative.    Genitourinary: Negative for difficulty urinating and hematuria.   Musculoskeletal: Negative.    Skin: Negative.    Neurological: Negative for dizziness, syncope, speech difficulty, weakness, light-headedness and numbness.   Hematological: Negative.    Psychiatric/Behavioral: Negative.        Vital signs in last 24 hours  Temp:  [36.2 °C (97.1 °F)-37.7 °C (99.9 °F)] 37.7 °C (99.9 °F)  Pulse:  [63-79] 68  Resp:  [16-20] 18  BP: (112-136)/(52-79) 127/73  SpO2:  [95 %-100 %] 95 %    Physical Exam  Physical Exam  Vitals signs and nursing note reviewed.   Constitutional:       Appearance: Normal appearance.   HENT:      Head: Normocephalic and atraumatic.      Nose: No congestion.   Eyes:      Conjunctiva/sclera: Conjunctivae normal.      Pupils: Pupils are equal, round, and reactive to light.   Neck:      Musculoskeletal: Normal range of motion.   Cardiovascular:      Rate and Rhythm: Normal rate and regular rhythm.      Pulses: Normal pulses.      Heart sounds: Normal heart sounds. No murmur. No friction rub. No gallop.       Comments: Bilateral femoral access sites are clean and dry.  No evidence of erythema, ecchymosis/hematoma.   Pulmonary:      Effort: Pulmonary effort is normal.      Breath sounds: Normal breath sounds. No wheezing, rhonchi or rales.   Abdominal:      General: Bowel sounds are normal. There is no distension.      Tenderness: There is no tenderness. There is no guarding.   Musculoskeletal: Normal range of motion.      Right lower leg: No edema.      Left lower leg: No edema.   Skin:     General: Skin is warm and dry.   Neurological:      Mental Status: She is alert and oriented to person, place, and time.      Gait: Gait normal.   Psychiatric:         Mood and Affect: Mood normal.         Behavior: Behavior  normal.         Thought Content: Thought content normal.         Judgment: Judgment normal.         Lab Review  Lab Results   Component Value Date/Time    WBC 6.8 11/06/2019 07:29 AM    RBC 4.44 11/06/2019 07:29 AM    HEMOGLOBIN 13.2 11/06/2019 07:29 AM    HEMATOCRIT 41.5 11/06/2019 07:29 AM    MCV 93.5 11/06/2019 07:29 AM    MCH 29.7 11/06/2019 07:29 AM    MCHC 31.8 (L) 11/06/2019 07:29 AM    MPV 9.9 11/06/2019 07:29 AM      Lab Results   Component Value Date/Time    SODIUM 139 11/06/2019 07:29 AM    POTASSIUM 4.2 11/06/2019 07:29 AM    CHLORIDE 106 11/06/2019 07:29 AM    CO2 26 11/06/2019 07:29 AM    GLUCOSE 108 (H) 11/06/2019 07:29 AM    BUN 8 11/06/2019 07:29 AM    CREATININE 0.62 11/06/2019 07:29 AM    CREATININE 0.8 10/20/2008 11:59 AM      Lab Results   Component Value Date/Time    ASTSGOT 274 (H) 11/06/2019 09:01 AM    ALTSGPT 346 (H) 11/06/2019 09:01 AM     Lab Results   Component Value Date/Time    CHOLSTRLTOT 238 (H) 04/25/2017 11:05 AM     (H) 04/25/2017 11:05 AM    HDL 81 04/25/2017 11:05 AM    TRIGLYCERIDE 66 04/25/2017 11:05 AM       No results for input(s): NTPROBNP in the last 72 hours.    Cardiac Imaging and Procedures Review  EKG:  My personal interpretation of the EKG dated 11/6/19 is Sinus rhtyhm    Echocardiogram (ELIZABETH):  11/5/19  Left Ventricle  Normal systolic function with EF by FAC in transgastric short axis view   65%.  no evidence of ventricular thrombus     Right Ventricle  Normal     Right Atrium  Normal with Chiari network present     Left Atrium  Normal     LA Appendage  Large EMILY.  No evident thrombus.  PW velocities above 30 cm/s     IA Septum  Suggestive of PFO by architecture but no trans septal flow detected   with color Doppler     IV Septum  Normal     Mitral Valve  Normal     Aortic Valve  Normal     Tricuspid Valve  Normal     Pulmonic Valve  Normal     Pericardium  Normal without pericardial effusion     Aorta  Normal    Assessment/Plan  1. Atrial Fibrillation:  - S/P  PVI by  11/5/19.  - Maintaining Sinus this AM.   - Clinically doing well from arrhythmia standpoint.  Continue low dose Toprol and Xarelto.     2. Transaminitis:  - AST/ALT elevated on post procedure lab this AM.  Normal findings preop.   - She is not having any significant N/V, abd pain this AM.   - Med list reviewed, continue current medications at this time.   - Will get stat U/S of liver/surrounding vessels.  Will be completed later this afternoon as needs to be NPO for 8 hrs.   - consider GI consult pending results of U/S.     Discussed plan with patient/ and questions answered.   Case/Plan in collaboration with Dr. Anglin.     Please contact me with any questions.    ROSA Duvall.   [facility]  [phone]

## 2019-11-06 NOTE — DISCHARGE SUMMARY
CHIEF COMPLAINT ON ADMISSION  Admission for elective Atrial Fibrillation Ablation.     CODE STATUS  Full Code    HPI & HOSPITAL COURSE  This is a 64 y.o. year old female here for elective ablation for symptomatic paroxysmal atrial fibrilaltion.  She has a history of palpitations, ambulatory monitor showed short runs of AT and non sustained AF.  She then presented to the ED with sustained AF for which she was symptomatic.  She has tried antiarrhythmic medications and has not been able to tolerate in the past. She is followed longitudinally in our office by Dr. Chang and Dr. Moody for EP.  Last seen by Dr. Moody 8/13/19.      Past medical history significant for use of chronic anticoagulation and anxiety disorder.       Procedural Conclusions per Dr. Moody's Op Note dated 11/5/19  Procedure(s) Performed:   1) Atrial fibrillation ablation and EP study  2) 3D mapping  3) ICE during diagnostics and intervention  4) Arrhythmia induction with IV drug infusion  Indication(s):   Paroxysmal atrial fibrillation  Complications:  None  Total ablation time: 791 seconds   Electrophysiologic Findings:    1. Sinus  734 ms. AVBCL = 430 ms. AVNERP 500/380.   Impressions:    1. Paroxysmal atrial fibrillation.    2. Successful RF ablation pulmonary vein isolation procedure.       Hospital stay extended one night secondary to transaminitis noted on post op lab and completion of hepatic U/S with surrounding blood vessels.     The patient has been seen and examined in EP rounds this AM.  Her monitored rhythm is sinus presently.  Telemetry history has been reviewed and is without arrhythmias or abnormalities.   EKG, lab data, and vital signs have been reviewed and are stable with the exception of elevation to her AST and ALT, though these have improved the last 24 hours with a current AST of 130 and a ALT of 242.  Hepatic U/S was completed last night and showed no abnormality of the liver, surrounding organs, or surrounding blood vessels.     The  patient denies any chest pain, dyspnea, dizziness, paraesthesias, or other complaints.  Her physical exam is without acute findgins; specifically her bilateral femoral access sites are clean and dry; there is no evidence of erytehma, edema, or hematoma.  She has no abdominal pain/tenderness/ nausea/vomiting or other problem.  She has been out of bed and ambulated without difficulty.     Therefore, she is discharged in good and stable condition with close outpatient follow-up.    PROCEDURES  Atrial Fibrilaltion ablation, Dr. Moody.  Please seen full procedure note for details.     CONSULTATIONS  None.     DISCHARGE PROBLEM LIST  1. Paroxysmal Atrial Fibrillation  2. S/P ablation of atrial fibrillation  3. Chronic Anticoagulation     MEDICATIONS ON DISCHARGE   Ariane Szymanski   Home Medication Instructions KADEN:[id number]    Printed on:11/07/19 8747   Medication Information                      acetaminophen (TYLENOL) 325 MG Tab  Take 1 Tab by mouth 1 time daily as needed. Patient will not taken until follow up labs/results reviewed.             metoprolol SR (TOPROL XL) 25 MG TABLET SR 24 HR  Take 0.5 Tabs by mouth every bedtime.             omeprazole (PRILOSEC) 20 MG delayed-release capsule  Take 1 Cap by mouth every morning before breakfast.             rivaroxaban (XARELTO) 20 MG Tab tablet  Take 1 Tab by mouth with dinner.             sucralfate (CARAFATE) 1 GM/10ML Suspension  Take 10 mL by mouth 4 Times a Day,Before Meals and at Bedtime.               Medications at time of discharge have been reviewed in detail with the patient.     DIET  Cardiac    ACTIVITY/POST ABLATION INSTRUCTIONS:  Post Ablation Patient Instructions:  No lifting > 10 lbs x 1 week.  No baths or hot tubs x 1 week.  May shower on Thursday (11/6/19) and take off groin dressings.  Continue to monitor sites daily for warmth, redness, discolored drainage    Please take the esophageal protection medication as prescribed to you (prilosec and  Carafate) without missed doses.  Please do not miss any doses of your blood thinner.      Please walk and take deep breaths after discharge.  After discharge, if  experiences severe chest pain, shortness of breath, neurological changes, high fever, trouble with catheter sites needs to be seen in the emergency dept.       LABORATORY  Lab Results   Component Value Date/Time    SODIUM 139 11/07/2019 03:37 AM    POTASSIUM 4.2 11/07/2019 03:37 AM    CHLORIDE 104 11/07/2019 03:37 AM    CO2 27 11/07/2019 03:37 AM    GLUCOSE 113 (H) 11/07/2019 03:37 AM    BUN 12 11/07/2019 03:37 AM    CREATININE 0.74 11/07/2019 03:37 AM    CREATININE 0.8 10/20/2008 11:59 AM     Lab Results   Component Value Date/Time    ASTSGOT 130 (H) 11/07/2019 03:37 AM    ALTSGPT 242 (H) 11/07/2019 03:37 AM             Lab Results   Component Value Date/Time    WBC 6.8 11/06/2019 07:29 AM    HEMOGLOBIN 13.2 11/06/2019 07:29 AM    HEMATOCRIT 41.5 11/06/2019 07:29 AM    PLATELETCT 214 11/06/2019 07:29 AM        FOLLOW UP  SPECIFIC OUTPATIENT FOLLOW-UP  Patient will be seen in approximately 2 weeks for procedural follow up.  Will recheck CMP in one week, my office will mail lab slip to her today.  I have discussed this with Ms Szymanski and her .      The above discharge plan was created in collaboration with Dr. Moody and discussed in detail with the patient and she verbalizes understanding and is in agreement with the discharge plan.     Sandra Cruz   11/7/2019 9:30 AM

## 2019-11-07 VITALS
BODY MASS INDEX: 21.59 KG/M2 | OXYGEN SATURATION: 92 % | DIASTOLIC BLOOD PRESSURE: 67 MMHG | HEART RATE: 67 BPM | SYSTOLIC BLOOD PRESSURE: 120 MMHG | RESPIRATION RATE: 16 BRPM | HEIGHT: 67 IN | WEIGHT: 137.57 LBS | TEMPERATURE: 99.5 F

## 2019-11-07 DIAGNOSIS — R79.89 ELEVATED LIVER FUNCTION TESTS: ICD-10-CM

## 2019-11-07 LAB
ALBUMIN SERPL BCP-MCNC: 3.9 G/DL (ref 3.2–4.9)
ALBUMIN/GLOB SERPL: 1.6 G/DL
ALP SERPL-CCNC: 111 U/L (ref 30–99)
ALT SERPL-CCNC: 242 U/L (ref 2–50)
ANION GAP SERPL CALC-SCNC: 8 MMOL/L (ref 0–11.9)
AST SERPL-CCNC: 130 U/L (ref 12–45)
BILIRUB SERPL-MCNC: 0.4 MG/DL (ref 0.1–1.5)
BUN SERPL-MCNC: 12 MG/DL (ref 8–22)
CALCIUM SERPL-MCNC: 9.2 MG/DL (ref 8.5–10.5)
CHLORIDE SERPL-SCNC: 104 MMOL/L (ref 96–112)
CO2 SERPL-SCNC: 27 MMOL/L (ref 20–33)
CREAT SERPL-MCNC: 0.74 MG/DL (ref 0.5–1.4)
GLOBULIN SER CALC-MCNC: 2.4 G/DL (ref 1.9–3.5)
GLUCOSE SERPL-MCNC: 113 MG/DL (ref 65–99)
POTASSIUM SERPL-SCNC: 4.2 MMOL/L (ref 3.6–5.5)
PROT SERPL-MCNC: 6.3 G/DL (ref 6–8.2)
SODIUM SERPL-SCNC: 139 MMOL/L (ref 135–145)

## 2019-11-07 PROCEDURE — A9270 NON-COVERED ITEM OR SERVICE: HCPCS | Performed by: INTERNAL MEDICINE

## 2019-11-07 PROCEDURE — 700102 HCHG RX REV CODE 250 W/ 637 OVERRIDE(OP): Performed by: INTERNAL MEDICINE

## 2019-11-07 PROCEDURE — 36415 COLL VENOUS BLD VENIPUNCTURE: CPT

## 2019-11-07 PROCEDURE — 80053 COMPREHEN METABOLIC PANEL: CPT

## 2019-11-07 PROCEDURE — G0378 HOSPITAL OBSERVATION PER HR: HCPCS

## 2019-11-07 RX ORDER — ACETAMINOPHEN 325 MG/1
325 TABLET ORAL
Status: ON HOLD | COMMUNITY
Start: 2019-11-07 | End: 2021-08-18

## 2019-11-07 RX ORDER — ACETAMINOPHEN 325 MG/1
325 TABLET ORAL DAILY
COMMUNITY
Start: 2019-11-07 | End: 2019-11-07 | Stop reason: SDUPTHER

## 2019-11-07 RX ORDER — IBUPROFEN 200 MG
200 TABLET ORAL ONCE
Status: DISCONTINUED | OUTPATIENT
Start: 2019-11-07 | End: 2019-11-07 | Stop reason: HOSPADM

## 2019-11-07 RX ADMIN — SUCRALFATE 1 G: 1 SUSPENSION ORAL at 05:53

## 2019-11-07 RX ADMIN — OMEPRAZOLE 20 MG: 20 CAPSULE, DELAYED RELEASE ORAL at 05:53

## 2019-11-07 NOTE — PROGRESS NOTES
Pt A&Ox4.  Independent with ambulation.  Steady on feet.  US on liver today.  Possible GI consult pending us results.

## 2019-11-07 NOTE — DISCHARGE INSTRUCTIONS
Post Ablation Patient Instructions: No lifting > 10 lbs x 1 week.  No baths or hot tubs x 1 week.  May shower on Thursday and take off groin/wrist dressings Friday.  Continue to monitor sites daily for warmth, redness, discolored drainage Please take the esophageal protection medication (prilosec, Carafate) as prescribed without missed doses.  Please do not miss any doses of your blood thinner.   Please walk and take deep breaths after discharge.  After discharge, if  experiences severe chest pain, shortness of breath,  neurological changes, high fever, trouble with catheter sites needs to be seen in the emergency dept.    Discharge Instructions    Discharged to home by car with relative. Discharged via wheelchair, hospital escort: Yes.  Special equipment needed: Not Applicable    Be sure to schedule a follow-up appointment with your primary care doctor or any specialists as instructed.     Discharge Plan:   Influenza Vaccine Indication: Indicated: 65 years and older  Influenza Vaccine Given - only chart on this line when given: Influenza Vaccine Given (See MAR)    I understand that a diet low in cholesterol, fat, and sodium is recommended for good health. Unless I have been given specific instructions below for another diet, I accept this instruction as my diet prescription.   Other diet: cardiac    Special Instructions: None    · Is patient discharged on Warfarin / Coumadin?   No     Depression / Suicide Risk    As you are discharged from [facility], it is important to learn how to keep safe from harming yourself.    Recognize the warning signs:  · Abrupt changes in personality, positive or negative- including increase in energy   · Giving away possessions  · Change in eating patterns- significant weight changes-  positive or negative  · Change in sleeping patterns- unable to sleep or sleeping all the time   · Unwillingness or inability to communicate  · Depression  · Unusual sadness,  discouragement and loneliness  · Talk of wanting to die  · Neglect of personal appearance   · Rebelliousness- reckless behavior  · Withdrawal from people/activities they love  · Confusion- inability to concentrate     If you or a loved one observes any of these behaviors or has concerns about self-harm, here's what you can do:  · Talk about it- your feelings and reasons for harming yourself  · Remove any means that you might use to hurt yourself (examples: pills, rope, extension cords, firearm)  · Get professional help from the community (Mental Health, Substance Abuse, psychological counseling)  · Do not be alone:Call your Safe Contact- someone whom you trust who will be there for you.  · Call your local CRISIS HOTLINE 756-6275 or 515-146-8118  · Call your local Children's Mobile Crisis Response Team [place] (561) 721-6302 or www.Mojeek  · Call the toll free National Suicide Prevention Hotlines   · National Suicide Prevention Lifeline 848-760-EKSK (4293)  · [place] Line Network 800-SUICIDE (319-0412)      Cardiac Ablation  Cardiac ablation is a procedure to stop some heart tissue from causing problems. The heart has many electrical connections. Sometimes these connections cause the heart to beat very fast or irregularly. Removing some of the problem areas can improve heart rhythm or make it normal. Ablation is done for people who:  · Have Felisa-Parkinson-White syndrome.  · Have other fast heart rhythms (tachycardia).  · Have taken medicines for an abnormal heart rhythm (arrhythmia) and the medicines had:  ¨ No success.  ¨ Side effects.  · May have a type of heartbeat that could cause death.  What happens before the procedure?  · Follow instructions from your doctor about eating and drinking before the procedure.  · Take your medicines as told by your doctor. Take them at regular times with water unless told differently by your doctor.  · If you are taking diabetes medicine, ask your doctor how  to take it. Ask if there are any special instructions you should follow. Your doctor may change how much insulin you take the day of the procedure.  What happens during the procedure?  · A special type of X-ray will be used. The X-ray helps your doctor see images of your heart during the procedure.  · A small cut (incision) will be made in your neck or groin.  · An IV tube will be started before the procedure begins.  · You will be given a numbing medicine (anesthetic) or a medicine to help you relax (sedative).  · The skin on your neck or groin will be numbed.  · A needle will be put into a large vein in your neck or groin.  · A thin, flexible tube (catheter) will be put in to reach your heart.  · A dye will be put in the tube. The dye will show up on X-rays. It will help your doctor see the area of the heart that needs treatment.  · When the heart tissue that is causing problems is found, the tip of the tube will send an electrical current to it. This will stop it from causing problems.  · The tube will be taken out.  · Pressure will be put on the area where the tube was. This will keep it from bleeding. A bandage will be placed over the area.  What happens after the procedure?  · You will be taken to a recovery area. Your blood pressure, heart rate, and breathing will be watched. The area where the tube was will also be watched for bleeding.  · You will need to lie still for 4-6 hours. This keeps the area where the tube was from bleeding.  This information is not intended to replace advice given to you by your health care provider. Make sure you discuss any questions you have with your health care provider.  Document Released: 08/20/2014 Document Revised: 05/25/2017 Document Reviewed: 05/15/2014  Elsevier Interactive Patient Education © 2017 [employer]

## 2019-11-07 NOTE — CARE PLAN
Problem: Venous Thromboembolism (VTW)/Deep Vein Thrombosis (DVT) Prevention:  Goal: Patient will participate in Venous Thrombosis (VTE)/Deep Vein Thrombosis (DVT)Prevention Measures  Outcome: PROGRESSING AS EXPECTED  Note:   Patient educated on VTE prophylaxis including mechanical and pharmacologic methods. Medication orders followed and patient encouraged to ambulate frequently. Education on the importance of VTE prophylaxis due to decreased mobility while in the hospital. Verbalized understanding, no additional questions at this time.       Problem: Discharge Barriers/Planning  Goal: Patient's continuum of care needs will be met  Outcome: PROGRESSING AS EXPECTED  Note:   Post ablation, patient remains in SR, EKGs to confirm. D/t elevated liver enzymes, awaiting US of liver to result, possible GI consult pending results.

## 2019-11-07 NOTE — PROGRESS NOTES
Bedside report received from day RN. Patient's plan of care reviewed. Patient found sitting up in bed, A&Ox4. VSS, on RA. No signs of distress, declines pain at this time. All needs met. Safety precautions in place. Tele box on. Bed in lowest/locked position. Call light and personal belongings within reach. Will continue to monitor.

## 2019-11-07 NOTE — PROGRESS NOTES
Patient discharged home with . IV discontinued. Discharge instructions explained and all questions answered. Patient declined wheelchair and preferred to walk.

## 2019-11-14 ENCOUNTER — HOSPITAL ENCOUNTER (OUTPATIENT)
Dept: LAB | Facility: MEDICAL CENTER | Age: 64
End: 2019-11-14
Attending: INTERNAL MEDICINE
Payer: MEDICARE

## 2019-11-14 DIAGNOSIS — R79.89 ELEVATED LIVER FUNCTION TESTS: ICD-10-CM

## 2019-11-14 LAB
ALBUMIN SERPL BCP-MCNC: 4 G/DL (ref 3.2–4.9)
ALBUMIN/GLOB SERPL: 1.7 G/DL
ALP SERPL-CCNC: 91 U/L (ref 30–99)
ALT SERPL-CCNC: 39 U/L (ref 2–50)
ANION GAP SERPL CALC-SCNC: 6 MMOL/L (ref 0–11.9)
AST SERPL-CCNC: 19 U/L (ref 12–45)
BILIRUB SERPL-MCNC: 0.3 MG/DL (ref 0.1–1.5)
BUN SERPL-MCNC: 21 MG/DL (ref 8–22)
CALCIUM SERPL-MCNC: 9.5 MG/DL (ref 8.5–10.5)
CHLORIDE SERPL-SCNC: 105 MMOL/L (ref 96–112)
CO2 SERPL-SCNC: 29 MMOL/L (ref 20–33)
CREAT SERPL-MCNC: 0.63 MG/DL (ref 0.5–1.4)
GLOBULIN SER CALC-MCNC: 2.3 G/DL (ref 1.9–3.5)
GLUCOSE SERPL-MCNC: 71 MG/DL (ref 65–99)
POTASSIUM SERPL-SCNC: 4.3 MMOL/L (ref 3.6–5.5)
PROT SERPL-MCNC: 6.3 G/DL (ref 6–8.2)
SODIUM SERPL-SCNC: 140 MMOL/L (ref 135–145)

## 2019-11-14 PROCEDURE — 36415 COLL VENOUS BLD VENIPUNCTURE: CPT

## 2019-11-14 PROCEDURE — 80053 COMPREHEN METABOLIC PANEL: CPT

## 2019-11-25 NOTE — PROGRESS NOTES
Cardiology/Electrophysiology Follow-up Note      Subjective:   Chief Complaint:   Chief Complaint   Patient presents with   • Atrial Fibrillation     HFU DX:PAF       Susy Szymanski is a 64 y.o. female who presents today for hospital follow up after atrial fibrillation ablation by Dr. Moody 11/5/19.  Hospital stay was extended secondary to acute transaminitis post ablation.  She underwent liver and surrounding vascular ultrasound that was negative.  LFTs trended down and she was discharge with plan for close follow up.     Procedural Conclusions per Dr. Moody's Op Note:  Procedure(s) Performed:   1) Atrial fibrillation ablation and EP study  2) 3D mapping  3) ICE during diagnostics and intervention  4) Arrhythmia induction with IV drug infusion  Indication(s):   Paroxysmal atrial fibrillation  Complications:  None   Total ablation time: 791 seconds  Electrophysiologic Findings:    1. Sinus  734 ms. AVBCL = 430 ms. AVNERP 500/380.  Impressions:    1. Paroxysmal atrial fibrillation.    2. Successful RF ablation pulmonary vein isolation procedure.        She is followed by Dr. Chang and Dr Moody for EP.  Past medical history also significant for paroxysmal AF, chronic anticoagulation, GERD, anxiety.     Today in follow up she tells me that she has overall been doing well post procedure.  She continued to feel fatigued and had felt some palpitations and when this occurs she does have associated dyspnea and brief twinges in her chest.  She reports that her bilateral femoral access sites are healed without pain, erythema, or ecchymosis.  She is up to walking about 2 miles without cardiorespiratory symptoms.      She currently denies dizziness, pre syncope or syncope, PND, orthopnea, or lower extremity edema.        Patient endorses medication compliance        Past Medical History:   Diagnosis Date   • Anxiety    • Arrhythmia     afib   • ASTHMA    • Bowel habit changes     constipation   • Depression    • GERD  (gastroesophageal reflux disease)    • Hiatal hernia with GERD 11/5/2019   • History of esophageal stricture 11/5/2019   • Indigestion    • Osteoporosis    • Palpitations    • Premature atrial contractions      Past Surgical History:   Procedure Laterality Date   • COLONOSCOPY WITH POLYP  June 2008    benign polyp  Dr Paul    • CHOLECYSTECTOMY  January 2008    laparoscopic   • EGD WITH ASP/BX  September 2006    inflammation only ---Dr Paul   • CHOLECYSTECTOMY     • SINUSOTOMIES       Family History   Problem Relation Age of Onset   • Cancer Father 70        cancer of unknown primary   • Heart Disease Mother    • Heart Disease Son 42        MI and coronary stent placement   • Diabetes Son    • Hypertension Son    • Diabetes Sister      Social History     Socioeconomic History   • Marital status:      Spouse name: Not on file   • Number of children: Not on file   • Years of education: Not on file   • Highest education level: Not on file   Occupational History   • Occupation: retired   Social Needs   • Financial resource strain: Not on file   • Food insecurity:     Worry: Not on file     Inability: Not on file   • Transportation needs:     Medical: Not on file     Non-medical: Not on file   Tobacco Use   • Smoking status: Never Smoker   • Smokeless tobacco: Never Used   Substance and Sexual Activity   • Alcohol use: No     Alcohol/week: 0.0 oz   • Drug use: No   • Sexual activity: Yes     Partners: Male     Birth control/protection: Post-Menopausal   Lifestyle   • Physical activity:     Days per week: Not on file     Minutes per session: Not on file   • Stress: Not on file   Relationships   • Social connections:     Talks on phone: Not on file     Gets together: Not on file     Attends Sabianist service: Not on file     Active member of club or organization: Not on file     Attends meetings of clubs or organizations: Not on file     Relationship status: Not on file   • Intimate partner violence:     Fear of  current or ex partner: Not on file     Emotionally abused: Not on file     Physically abused: Not on file     Forced sexual activity: Not on file   Other Topics Concern   • Not on file   Social History Narrative   • Not on file     Allergies   Allergen Reactions   • Amitriptyline      Excessive sedation --even at 10 mg dose   • Bactrim [Sulfamethoxazole W-Trimethoprim]      Shaking   • Levaquin      Nausea    • Lovastatin      Muscle aches  Muscle aches     • Trazodone      Irregular pulse       Current Outpatient Medications   Medication Sig Dispense Refill   • acetaminophen (TYLENOL) 325 MG Tab Take 1 Tab by mouth 1 time daily as needed. Patient will not taken until follow up labs/results reviewed.     • omeprazole (PRILOSEC) 20 MG delayed-release capsule Take 1 Cap by mouth every morning before breakfast. 30 Cap 0   • sucralfate (CARAFATE) 1 GM/10ML Suspension Take 10 mL by mouth 4 Times a Day,Before Meals and at Bedtime. 600 mL 0   • metoprolol SR (TOPROL XL) 25 MG TABLET SR 24 HR Take 0.5 Tabs by mouth every bedtime. 90 Tab 3   • rivaroxaban (XARELTO) 20 MG Tab tablet Take 1 Tab by mouth with dinner. 30 Tab 11     No current facility-administered medications for this visit.        Review of Systems   Constitutional: Positive for malaise/fatigue. Negative for chills, fever and weight loss.   HENT: Negative for congestion, nosebleeds, sore throat and tinnitus.    Eyes: Negative for blurred vision and double vision.   Respiratory: Positive for shortness of breath (with palpitaitons ). Negative for cough, hemoptysis, sputum production, wheezing and stridor.    Cardiovascular: Negative for chest pain, palpitations, orthopnea, leg swelling and PND.   Gastrointestinal: Negative for abdominal pain, blood in stool, diarrhea, heartburn, melena, nausea and vomiting.   Genitourinary: Negative for hematuria.   Skin: Negative for rash.   Neurological: Negative for dizziness, tingling, tremors, sensory change, speech change,  "focal weakness, loss of consciousness and headaches.     All others systems reviewed and negative.     Objective:     /74 (BP Location: Left arm, Patient Position: Sitting, BP Cuff Size: Adult)   Pulse 69   Ht 1.702 m (5' 7\")   Wt 65.8 kg (145 lb)   SpO2 96%  Body mass index is 22.71 kg/m².    Physical Exam   Constitutional: She is oriented to person, place, and time and well-developed, well-nourished, and in no distress.   HENT:   Head: Normocephalic and atraumatic.   Eyes: Pupils are equal, round, and reactive to light. Conjunctivae and EOM are normal.   Neck: Normal range of motion. Neck supple. No JVD present.   Cardiovascular: Normal rate, regular rhythm, normal heart sounds and intact distal pulses. Exam reveals no gallop and no friction rub.   No murmur heard.  Pulmonary/Chest: Effort normal and breath sounds normal. No respiratory distress. She has no wheezes. She has no rales. She exhibits no tenderness.   Abdominal: Soft. Bowel sounds are normal. There is no tenderness.   Musculoskeletal: Normal range of motion.         General: No edema.   Neurological: She is alert and oriented to person, place, and time.   Skin: Skin is warm and dry. No rash noted. No erythema.   Psychiatric: Mood, memory, affect and judgment normal.         Cardiac Imaging and Procedures Review:    EKG (11/25/19) reviewed by myself:   Sinus rhtyhm    Echo (11/5/19):   Left Ventricle  Normal systolic function with EF by FAC in transgastric short axis view   65%.  no evidence of ventricular thrombus     Right Ventricle  Normal     Right Atrium  Normal with Chiari network present     Left Atrium  Normal     LA Appendage  Large EMILY.  No evident thrombus.  PW velocities above 30 cm/s     IA Septum  Suggestive of PFO by architecture but no trans septal flow detected   with color Doppler     IV Septum  Normal     Mitral Valve  Normal     Aortic Valve  Normal     Tricuspid Valve  Normal     Pulmonic " Valve  Normal     Pericardium  Normal without pericardial effusion     Aorta  Normal    Labs (personally reviewed and notable for):   Lab Results   Component Value Date/Time    SODIUM 140 11/14/2019 11:36 AM    POTASSIUM 4.3 11/14/2019 11:36 AM    CHLORIDE 105 11/14/2019 11:36 AM    CO2 29 11/14/2019 11:36 AM    GLUCOSE 71 11/14/2019 11:36 AM    BUN 21 11/14/2019 11:36 AM    CREATININE 0.63 11/14/2019 11:36 AM      Lab Results   Component Value Date/Time    WBC 6.8 11/06/2019 07:29 AM    RBC 4.44 11/06/2019 07:29 AM    HEMOGLOBIN 13.2 11/06/2019 07:29 AM    HEMATOCRIT 41.5 11/06/2019 07:29 AM    MCV 93.5 11/06/2019 07:29 AM    MCH 29.7 11/06/2019 07:29 AM    MCHC 31.8 (L) 11/06/2019 07:29 AM    MPV 9.9 11/06/2019 07:29 AM    NEUTSPOLYS 65.40 11/04/2019 11:32 AM    LYMPHOCYTES 25.30 11/04/2019 11:32 AM    MONOCYTES 8.30 11/04/2019 11:32 AM    EOSINOPHILS 0.40 11/04/2019 11:32 AM    BASOPHILS 0.40 11/04/2019 11:32 AM      PT/INR:   Lab Results   Component Value Date/Time    PROTHROMBTM 13.6 11/04/2019 11:32 AM    INR 1.02 11/04/2019 11:32 AM       Assessment:     1. PAF (paroxysmal atrial fibrillation) (HCC)  EKG    Comp Metabolic Panel   2. H/O cardiac radiofrequency ablation     3. Chronic anticoagulation     4. Palpitations         Medical Decision Making:  Today's Assessment / Status / Plan:   1. Paroxysmal Atrial Fibrillation:  - S/P PVI by Dr. Moody 11/5/19.  She was previously on AADs which she was not able to tolerate.   - She is in sinus on 12 lead EKG in office today.   - She will continue low dose Toprol and Xarelto.   - We have discussed expected course of healing post procedure.    2. Transaminitis:  - Recent CMP reviewed and LFTs have normalized.  She remains without symptoms.   - Liver U/S and surrounding vessels without abnormalities.   - Do not think she needs a GI referral at this time.  Would recommend continued surveillance.        Plan reviewed in detail with the patient and she verbalizes  understanding and is in agreement.   RTC in 8 weeks for reivew, sooner if clinical condition changes  Collaborating MD/ADD: ROSA Rivera.

## 2019-11-26 ENCOUNTER — OFFICE VISIT (OUTPATIENT)
Dept: CARDIOLOGY | Facility: MEDICAL CENTER | Age: 64
End: 2019-11-26
Payer: MEDICARE

## 2019-11-26 VITALS
SYSTOLIC BLOOD PRESSURE: 112 MMHG | OXYGEN SATURATION: 96 % | HEART RATE: 69 BPM | BODY MASS INDEX: 22.76 KG/M2 | WEIGHT: 145 LBS | HEIGHT: 67 IN | DIASTOLIC BLOOD PRESSURE: 74 MMHG

## 2019-11-26 DIAGNOSIS — I48.0 PAF (PAROXYSMAL ATRIAL FIBRILLATION) (HCC): ICD-10-CM

## 2019-11-26 DIAGNOSIS — Z79.01 CHRONIC ANTICOAGULATION: ICD-10-CM

## 2019-11-26 DIAGNOSIS — Z98.890 H/O CARDIAC RADIOFREQUENCY ABLATION: ICD-10-CM

## 2019-11-26 DIAGNOSIS — R00.2 PALPITATIONS: ICD-10-CM

## 2019-11-26 PROCEDURE — 93000 ELECTROCARDIOGRAM COMPLETE: CPT | Performed by: INTERNAL MEDICINE

## 2019-11-26 PROCEDURE — 99213 OFFICE O/P EST LOW 20 MIN: CPT | Performed by: NURSE PRACTITIONER

## 2019-11-26 RX ORDER — ESOMEPRAZOLE MAGNESIUM 40 MG/1
CAPSULE, DELAYED RELEASE ORAL
Refills: 6 | COMMUNITY
Start: 2019-09-11 | End: 2020-09-16 | Stop reason: SDUPTHER

## 2019-11-26 RX ORDER — CARVEDILOL 6.25 MG/1
6.25 TABLET ORAL DAILY
COMMUNITY
Start: 2019-09-03 | End: 2020-08-31

## 2019-11-26 RX ORDER — DILTIAZEM HYDROCHLORIDE 120 MG/1
CAPSULE, COATED, EXTENDED RELEASE ORAL
COMMUNITY
Start: 2019-09-13 | End: 2020-08-31

## 2019-11-26 RX ORDER — MIRABEGRON 50 MG/1
TABLET, FILM COATED, EXTENDED RELEASE ORAL
COMMUNITY
Start: 2019-09-13 | End: 2021-06-30

## 2019-11-26 ASSESSMENT — ENCOUNTER SYMPTOMS
PALPITATIONS: 0
FOCAL WEAKNESS: 0
SPEECH CHANGE: 0
SHORTNESS OF BREATH: 1
VOMITING: 0
SENSORY CHANGE: 0
COUGH: 0
HEMOPTYSIS: 0
HEADACHES: 0
WHEEZING: 0
BLURRED VISION: 0
PND: 0
ORTHOPNEA: 0
SORE THROAT: 0
TREMORS: 0
STRIDOR: 0
DIARRHEA: 0
BLOOD IN STOOL: 0
HEARTBURN: 0
NAUSEA: 0
LOSS OF CONSCIOUSNESS: 0
TINGLING: 0
FEVER: 0
WEIGHT LOSS: 0
CHILLS: 0
SPUTUM PRODUCTION: 0
DIZZINESS: 0
ABDOMINAL PAIN: 0
DOUBLE VISION: 0

## 2019-11-30 LAB — EKG IMPRESSION: NORMAL

## 2019-12-02 ENCOUNTER — TELEPHONE (OUTPATIENT)
Dept: CARDIOLOGY | Facility: MEDICAL CENTER | Age: 64
End: 2019-12-02

## 2019-12-02 DIAGNOSIS — I48.0 PAROXYSMAL ATRIAL FIBRILLATION (HCC): ICD-10-CM

## 2019-12-02 NOTE — TELEPHONE ENCOUNTER
I will ask Dr Moody. Sandra is out today. Usually he prescribes it only once but we will let you know what he says.       ===View-only below this line===      ----- Message -----     From: Susy Szymanski     Sent: 12/1/2019  2:26 PM PST       To: DEEPA Duvall  Subject: RE: Prescription Question    Sandra    After my ablation Dr Moody prescribed Carafate(sp?).  I finished the prescription and am now having pain in my esphogeous. There was no pain when taking the Carafate. Is it possible to get another prescription for Carafate? Also is it possible to only take 2x per day instead of 4x per day?    Thank You, Ariane Szymanski

## 2019-12-04 RX ORDER — OMEPRAZOLE 20 MG/1
20 CAPSULE, DELAYED RELEASE ORAL
Qty: 30 CAP | Refills: 0 | Status: SHIPPED | OUTPATIENT
Start: 2019-12-04 | End: 2020-05-13

## 2019-12-04 NOTE — TELEPHONE ENCOUNTER
Per Dr Moody, Pt can have 1 more month of omeprazole instead of Carafate. Pt emailed. Rx sent in.

## 2019-12-04 NOTE — TELEPHONE ENCOUNTER
Pt called. She states she has had esophageal problems for years, Has had trouble swallowing food in the past, and they had to stretch it. It is worse again since procedure. She has 4 omeprazole tabs left. She states when she swallows food, it feels like it is scratching her throat, carafate helped. Being on on=meprazole does not help. She is driving to [place] today for the winter. She has a regular GI dr down there. She will make appt. To be seen there.     Advised her continue omeprazole, try tums with it until can see GI    To  any other advice?

## 2019-12-09 RX ORDER — SUCRALFATE 1 G/1
1 TABLET ORAL
Qty: 120 TAB | Refills: 0 | Status: SHIPPED | OUTPATIENT
Start: 2019-12-09 | End: 2021-02-17

## 2019-12-09 NOTE — TELEPHONE ENCOUNTER
"   Called pt. To advise. Refill sent to pharmacy. \"I just felt so much better on it.\" She promised to follow up with her gastroenterologist.    Message   Received: Today   Message Contents   Israeling ELIZABETH Moody.  Roberta Guillen R.N.; Aline York L.P.N.   Caller: Unspecified (1 week ago,  9:43 AM)             Okay can refill carafate. Refer to GI.        "

## 2020-02-08 LAB
ALBUMIN SERPL-MCNC: 4.3 G/DL (ref 3.8–4.8)
ALBUMIN/GLOB SERPL: 2.2 {RATIO} (ref 1.2–2.2)
ALP SERPL-CCNC: 82 IU/L (ref 39–117)
ALT SERPL-CCNC: 15 IU/L (ref 0–32)
AST SERPL-CCNC: 21 IU/L (ref 0–40)
BILIRUB SERPL-MCNC: 0.3 MG/DL (ref 0–1.2)
BUN SERPL-MCNC: 17 MG/DL (ref 8–27)
BUN/CREAT SERPL: 27 (ref 12–28)
CALCIUM SERPL-MCNC: 9.6 MG/DL (ref 8.7–10.3)
CHLORIDE SERPL-SCNC: 103 MMOL/L (ref 96–106)
CO2 SERPL-SCNC: 24 MMOL/L (ref 20–29)
CREAT SERPL-MCNC: 0.62 MG/DL (ref 0.57–1)
GLOBULIN SER CALC-MCNC: 2 G/DL (ref 1.5–4.5)
GLUCOSE SERPL-MCNC: 81 MG/DL (ref 65–99)
POTASSIUM SERPL-SCNC: 4.4 MMOL/L (ref 3.5–5.2)
PROT SERPL-MCNC: 6.3 G/DL (ref 6–8.5)
SODIUM SERPL-SCNC: 140 MMOL/L (ref 134–144)

## 2020-02-19 ENCOUNTER — OFFICE VISIT (OUTPATIENT)
Dept: CARDIOLOGY | Facility: MEDICAL CENTER | Age: 65
End: 2020-02-19
Payer: MEDICARE

## 2020-02-19 VITALS
SYSTOLIC BLOOD PRESSURE: 116 MMHG | WEIGHT: 149 LBS | OXYGEN SATURATION: 94 % | HEART RATE: 70 BPM | BODY MASS INDEX: 23.39 KG/M2 | DIASTOLIC BLOOD PRESSURE: 76 MMHG | HEIGHT: 67 IN

## 2020-02-19 DIAGNOSIS — I48.0 PAF (PAROXYSMAL ATRIAL FIBRILLATION) (HCC): ICD-10-CM

## 2020-02-19 DIAGNOSIS — R00.2 PALPITATIONS: ICD-10-CM

## 2020-02-19 DIAGNOSIS — F41.9 ANXIETY: ICD-10-CM

## 2020-02-19 LAB — EKG IMPRESSION: NORMAL

## 2020-02-19 PROCEDURE — 93000 ELECTROCARDIOGRAM COMPLETE: CPT | Performed by: INTERNAL MEDICINE

## 2020-02-19 PROCEDURE — 99214 OFFICE O/P EST MOD 30 MIN: CPT | Performed by: INTERNAL MEDICINE

## 2020-03-24 ENCOUNTER — TELEPHONE (OUTPATIENT)
Dept: HEALTH INFORMATION MANAGEMENT | Facility: OTHER | Age: 65
End: 2020-03-24

## 2020-03-24 NOTE — TELEPHONE ENCOUNTER
1. Caller Name: Pt :Ariane Nelson                       Call Back Number: [phone]  [facility] PCP or Specialty Provider: Yes         2.  Does patient have any active symptoms of respiratory illness (fever OR cough OR shortness of breath OR sore throat)? No. Reports short of breath with exercise related to atrial fibrillation. Denies any fever, cough or respiratory symptoms.     3.  Does patient have any comoribidities? None     4.  Has the patient traveled in the last 14 days OR had any known contact with someone who is suspected or confirmed to have COVID-19?  No.    5. Disposition: Cleared by RN Triage; OK to keep/schedule appointment    Note routed to [facility] Provider: [facility]LAINE only.

## 2020-05-13 ENCOUNTER — TELEMEDICINE (OUTPATIENT)
Dept: TELEHEALTH | Facility: TELEMEDICINE | Age: 65
End: 2020-05-13
Payer: MEDICARE

## 2020-05-13 VITALS — BODY MASS INDEX: 23.39 KG/M2 | HEIGHT: 67 IN | WEIGHT: 149 LBS

## 2020-05-13 DIAGNOSIS — N30.01 ACUTE CYSTITIS WITH HEMATURIA: Primary | ICD-10-CM

## 2020-05-13 PROCEDURE — 99213 OFFICE O/P EST LOW 20 MIN: CPT | Mod: 95,CR | Performed by: PHYSICIAN ASSISTANT

## 2020-05-13 RX ORDER — CEFDINIR 300 MG/1
300 CAPSULE ORAL EVERY 12 HOURS
Qty: 10 CAP | Refills: 0 | Status: SHIPPED | OUTPATIENT
Start: 2020-05-13 | End: 2020-05-18

## 2020-05-13 ASSESSMENT — ENCOUNTER SYMPTOMS
CHILLS: 0
FLANK PAIN: 0
VOMITING: 0
FEVER: 0
SHORTNESS OF BREATH: 0
COUGH: 0
ABDOMINAL PAIN: 0
CONSTIPATION: 0
DIARRHEA: 0
NAUSEA: 0

## 2020-05-13 ASSESSMENT — FIBROSIS 4 INDEX: FIB4 SCORE: 1.7

## 2020-05-13 NOTE — PROGRESS NOTES
Telemedicine Visit: Established Patient     This encounter was conducted via Zoom .   Verbal consent was obtained. Patient's identity was verified.    Subjective:   CC:   Chief Complaint   Patient presents with   • Dysuria     Susy Szymanski is a 65 y.o. female presenting for evaluation and management of:    Dysuria    This is a new problem. Episode onset: 2 weeks  The problem occurs intermittently. The problem has been waxing and waning. The quality of the pain is described as burning. There has been no fever. There is no history of pyelonephritis. Associated symptoms include frequency, hesitancy and urgency. Pertinent negatives include no chills, flank pain, hematuria, nausea or vomiting. Treatments tried: urostat  The treatment provided no relief. Her past medical history is significant for kidney stones and recurrent UTIs.     Pt completed otc urine analysis showing positive leukocytes, negative nitrites.      Review of Systems   Constitutional: Negative for chills, fever and malaise/fatigue.   Respiratory: Negative for cough and shortness of breath.    Gastrointestinal: Negative for abdominal pain, constipation, diarrhea, nausea and vomiting.   Genitourinary: Positive for dysuria, frequency, hesitancy and urgency. Negative for flank pain and hematuria.   All other systems reviewed and are negative.        Allergies   Allergen Reactions   • Amitriptyline      Excessive sedation --even at 10 mg dose   • Bactrim [Sulfamethoxazole W-Trimethoprim]      Shaking   • Levaquin      Nausea    • Lovastatin      Muscle aches  Muscle aches     • Trazodone      Irregular pulse       Current medicines (including changes today)  Current Outpatient Medications   Medication Sig Dispense Refill   • cefdinir (OMNICEF) 300 MG Cap Take 1 Cap by mouth every 12 hours for 5 days. 10 Cap 0   • sucralfate (CARAFATE) 1 GM Tab Take 1 Tab by mouth 4 Times a Day,Before Meals and at Bedtime. 120 Tab 0   • MYRBETRIQ 50 MG TABLET SR 24 HR       • esomeprazole (NEXIUM) 40 MG delayed-release capsule TAKE 1 CAPSULE BY MOUTH ONCE A DAY 30 MINUTES BEFORE BREAKFAST MEAL  6   • DILTIAZem CD (CARDIZEM CD) 120 MG CAPSULE SR 24 HR      • carvedilol (COREG) 6.25 MG Tab Take 6.25 mg by mouth every day.     • acetaminophen (TYLENOL) 325 MG Tab Take 1 Tab by mouth 1 time daily as needed. Patient will not taken until follow up labs/results reviewed.     • metoprolol SR (TOPROL XL) 25 MG TABLET SR 24 HR Take 0.5 Tabs by mouth every bedtime. 90 Tab 3   • rivaroxaban (XARELTO) 20 MG Tab tablet Take 1 Tab by mouth with dinner. 30 Tab 11     No current facility-administered medications for this visit.        Patient Active Problem List    Diagnosis Date Noted   • Chronic anticoagulation 08/12/2019     Priority: High   • Paroxysmal atrial fibrillation (HCC) 07/11/2019     Priority: High   • Shortness of breath 06/24/2019     Priority: High   • Palpitations 04/26/2019     Priority: High   • Atrial premature complexes 05/31/2013     Priority: Medium   • Gastroesophageal reflux disease without esophagitis 10/08/2009     Priority: Medium   • Hiatal hernia with GERD 11/05/2019   • History of esophageal stricture 11/05/2019   • Weight gain 04/09/2019   • Muscle cramping 04/09/2019   • Abnormal CT of the abdomen 10/18/2018   • Chronic hip pain, bilateral 10/18/2018   • Chronic fatigue 04/20/2017   • Preventative health care 04/20/2017   • OSTEOPOROSIS 08/27/2012   • History of colonic polyps 08/13/2012   • Social anxiety disorder 09/09/2011   • LDL (low density lipoprotein receptor disorder) 09/09/2011   • Anxiety 10/08/2009       Family History   Problem Relation Age of Onset   • Cancer Father 70        cancer of unknown primary   • Heart Disease Mother    • Heart Disease Son 42        MI and coronary stent placement   • Diabetes Son    • Hypertension Son    • Diabetes Sister        PMH: has a past medical history of Anxiety, Arrhythmia, ASTHMA, Bowel habit changes, Depression,  "GERD (gastroesophageal reflux disease), Hiatal hernia with GERD (11/5/2019), History of esophageal stricture (11/5/2019), Indigestion, Osteoporosis, Palpitations, and Premature atrial contractions.  Surgical Hx:   Past Surgical History:   Procedure Laterality Date   • COLONOSCOPY WITH POLYP  June 2008    benign polyp  Dr Paul    • CHOLECYSTECTOMY  January 2008    laparoscopic   • EGD WITH ASP/BX  September 2006    inflammation only ---Dr Paul   • CHOLECYSTECTOMY     • SINUSOTOMIES        Social hx:   Social History     Tobacco Use   • Smoking status: Never Smoker   • Smokeless tobacco: Never Used   Substance Use Topics   • Alcohol use: No     Alcohol/week: 0.0 oz   • Drug use: No               Objective:   Vitals obtained by patient:  Ht 1.702 m (5' 7\")   Wt 67.6 kg (149 lb)   LMP 01/01/2004   BMI 23.34 kg/m²     Physical Exam:  Constitutional: Alert, no distress, well-groomed.  Skin: No rashes in visible areas.  Eye: Round. Conjunctiva clear, lids normal. No icterus.   ENMT: Lips pink without lesions, good dentition, moist mucous membranes. Phonation normal.  Neck: No masses, no thyromegaly. Moves freely without pain.  CV: Pulse as reported by patient  Respiratory: Unlabored respiratory effort, no cough or audible wheeze  Psych: Alert and oriented x3, normal affect and mood.       Assessment and Plan:   The following treatment plan was discussed:     1. Acute cystitis with hematuria  - cefdinir (OMNICEF) 300 MG Cap; Take 1 Cap by mouth every 12 hours for 5 days.  Dispense: 10 Cap; Refill: 0      Supportive care reviewed. Continue to increase fluids and probiotic. The pt will be treated empirically with cefdinir, if sx fail to improve she will report to a nonrespiratory clinic or telemedicine for urine culture/reevaluation. All side effects of medication discussed including allergic response, GI upset, tendon injury, etc. Patient confirmed understanding of information.      Follow-up: Return if symptoms " worsen or fail to improve.

## 2020-07-01 ENCOUNTER — APPOINTMENT (RX ONLY)
Dept: URBAN - METROPOLITAN AREA CLINIC 35 | Facility: CLINIC | Age: 65
Setting detail: DERMATOLOGY
End: 2020-07-01

## 2020-07-01 DIAGNOSIS — L81.1 CHLOASMA: ICD-10-CM

## 2020-07-01 DIAGNOSIS — L81.8 OTHER SPECIFIED DISORDERS OF PIGMENTATION: ICD-10-CM

## 2020-07-01 DIAGNOSIS — L82.1 OTHER SEBORRHEIC KERATOSIS: ICD-10-CM

## 2020-07-01 PROCEDURE — ? COUNSELING

## 2020-07-01 PROCEDURE — ? PRESCRIPTION

## 2020-07-01 PROCEDURE — ? PHOTO-DOCUMENTATION

## 2020-07-01 PROCEDURE — 99213 OFFICE O/P EST LOW 20 MIN: CPT

## 2020-07-01 PROCEDURE — ? ADDITIONAL NOTES

## 2020-07-01 PROCEDURE — ? DIAGNOSIS COMMENT

## 2020-07-01 RX ORDER — FLUOCINOLONE ACETONIDE, HYDROQUINONE, AND TRETINOIN .1; 40; .5 MG/G; MG/G; MG/G
CREAM TOPICAL QHS
Qty: 1 | Refills: 2 | Status: CANCELLED
Stop reason: CLARIF

## 2020-07-01 ASSESSMENT — LOCATION DETAILED DESCRIPTION DERM
LOCATION DETAILED: RIGHT 2ND TOENAIL
LOCATION DETAILED: LEFT RIB CAGE

## 2020-07-01 ASSESSMENT — LOCATION SIMPLE DESCRIPTION DERM
LOCATION SIMPLE: ABDOMEN
LOCATION SIMPLE: RIGHT 2ND TOE

## 2020-07-01 ASSESSMENT — LOCATION ZONE DERM
LOCATION ZONE: TRUNK
LOCATION ZONE: TOENAIL

## 2020-07-01 NOTE — PROCEDURE: ADDITIONAL NOTES
Additional Notes: Lesion of concern clinically consistent with a seborrheic keratosis\\nOffered LN2, patient declines
Detail Level: Detailed

## 2020-07-01 NOTE — PROCEDURE: DIAGNOSIS COMMENT
Comment: Subungal blue nevus also considered. Patient states she has had the lesion for “a long time” although unsure how long. No known trauma. Lesion hasn’t been changing per patient. Photo taken to monitor. Case discussed with Dr. Mosley. Patient will call with any significant changes
Detail Level: Detailed

## 2020-08-24 ENCOUNTER — APPOINTMENT (RX ONLY)
Dept: URBAN - METROPOLITAN AREA CLINIC 4 | Facility: CLINIC | Age: 65
Setting detail: DERMATOLOGY
End: 2020-08-24

## 2020-08-24 DIAGNOSIS — L82.1 OTHER SEBORRHEIC KERATOSIS: ICD-10-CM

## 2020-08-24 DIAGNOSIS — L81.1 CHLOASMA: ICD-10-CM

## 2020-08-24 DIAGNOSIS — Z71.89 OTHER SPECIFIED COUNSELING: ICD-10-CM

## 2020-08-24 DIAGNOSIS — L81.4 OTHER MELANIN HYPERPIGMENTATION: ICD-10-CM

## 2020-08-24 DIAGNOSIS — D18.0 HEMANGIOMA: ICD-10-CM

## 2020-08-24 DIAGNOSIS — D22 MELANOCYTIC NEVI: ICD-10-CM

## 2020-08-24 PROBLEM — D22.62 MELANOCYTIC NEVI OF LEFT UPPER LIMB, INCLUDING SHOULDER: Status: ACTIVE | Noted: 2020-08-24

## 2020-08-24 PROBLEM — D22.71 MELANOCYTIC NEVI OF RIGHT LOWER LIMB, INCLUDING HIP: Status: ACTIVE | Noted: 2020-08-24

## 2020-08-24 PROBLEM — D18.01 HEMANGIOMA OF SKIN AND SUBCUTANEOUS TISSUE: Status: ACTIVE | Noted: 2020-08-24

## 2020-08-24 PROBLEM — D22.5 MELANOCYTIC NEVI OF TRUNK: Status: ACTIVE | Noted: 2020-08-24

## 2020-08-24 PROBLEM — D22.39 MELANOCYTIC NEVI OF OTHER PARTS OF FACE: Status: ACTIVE | Noted: 2020-08-24

## 2020-08-24 PROBLEM — D22.61 MELANOCYTIC NEVI OF RIGHT UPPER LIMB, INCLUDING SHOULDER: Status: ACTIVE | Noted: 2020-08-24

## 2020-08-24 PROBLEM — D22.72 MELANOCYTIC NEVI OF LEFT LOWER LIMB, INCLUDING HIP: Status: ACTIVE | Noted: 2020-08-24

## 2020-08-24 PROCEDURE — ? PRESCRIPTION

## 2020-08-24 PROCEDURE — ? COUNSELING

## 2020-08-24 PROCEDURE — ? BENIGN DESTRUCTION COSMETIC

## 2020-08-24 PROCEDURE — ? ADDITIONAL NOTES

## 2020-08-24 PROCEDURE — 99214 OFFICE O/P EST MOD 30 MIN: CPT

## 2020-08-24 RX ORDER — FLUOCINOLONE ACETONIDE, HYDROQUINONE, AND TRETINOIN .1; 40; .5 MG/G; MG/G; MG/G
CREAM TOPICAL QHS
Qty: 1 | Refills: 2 | Status: ERX | COMMUNITY
Start: 2020-08-24

## 2020-08-24 RX ADMIN — FLUOCINOLONE ACETONIDE, HYDROQUINONE, AND TRETINOIN 1: .1; 40; .5 CREAM TOPICAL at 00:00

## 2020-08-24 ASSESSMENT — LOCATION SIMPLE DESCRIPTION DERM
LOCATION SIMPLE: LEFT POSTERIOR THIGH
LOCATION SIMPLE: LEFT ANTERIOR NECK
LOCATION SIMPLE: LEFT FOREHEAD
LOCATION SIMPLE: LEFT CHEEK
LOCATION SIMPLE: RIGHT CHEEK
LOCATION SIMPLE: RIGHT SHOULDER
LOCATION SIMPLE: RIGHT LIP
LOCATION SIMPLE: UPPER BACK
LOCATION SIMPLE: LEFT FOREARM
LOCATION SIMPLE: RIGHT UPPER ARM
LOCATION SIMPLE: LEFT CLAVICULAR SKIN
LOCATION SIMPLE: RIGHT POSTERIOR THIGH
LOCATION SIMPLE: CHEST
LOCATION SIMPLE: LEFT LIP
LOCATION SIMPLE: RIGHT FOREARM
LOCATION SIMPLE: LEFT UPPER BACK
LOCATION SIMPLE: RIGHT UPPER BACK
LOCATION SIMPLE: ABDOMEN
LOCATION SIMPLE: LEFT UPPER ARM
LOCATION SIMPLE: LEFT BREAST

## 2020-08-24 ASSESSMENT — LOCATION DETAILED DESCRIPTION DERM
LOCATION DETAILED: LEFT PROXIMAL DORSAL FOREARM
LOCATION DETAILED: RIGHT UPPER CUTANEOUS LIP
LOCATION DETAILED: RIGHT PROXIMAL DORSAL FOREARM
LOCATION DETAILED: RIGHT PROXIMAL POSTERIOR THIGH
LOCATION DETAILED: LEFT CLAVICULAR SKIN
LOCATION DETAILED: RIGHT ANTERIOR DISTAL UPPER ARM
LOCATION DETAILED: LEFT INFERIOR ANTERIOR NECK
LOCATION DETAILED: LEFT LATERAL BREAST 3-4:00 REGION
LOCATION DETAILED: LEFT DISTAL POSTERIOR THIGH
LOCATION DETAILED: SUPERIOR THORACIC SPINE
LOCATION DETAILED: LEFT DISTAL DORSAL FOREARM
LOCATION DETAILED: LEFT LATERAL BREAST 5-6:00 REGION
LOCATION DETAILED: LEFT SUPERIOR ANTERIOR NECK
LOCATION DETAILED: LEFT SUPERIOR MEDIAL UPPER BACK
LOCATION DETAILED: MIDDLE STERNUM
LOCATION DETAILED: LEFT MEDIAL UPPER BACK
LOCATION DETAILED: LEFT LATERAL BREAST 4-5:00 REGION
LOCATION DETAILED: LOWER STERNUM
LOCATION DETAILED: RIGHT INFERIOR LATERAL UPPER BACK
LOCATION DETAILED: RIGHT DISTAL POSTERIOR THIGH
LOCATION DETAILED: INFERIOR THORACIC SPINE
LOCATION DETAILED: LEFT UPPER CUTANEOUS LIP
LOCATION DETAILED: LEFT PROXIMAL POSTERIOR THIGH
LOCATION DETAILED: LEFT INFERIOR CENTRAL MALAR CHEEK
LOCATION DETAILED: RIGHT ANTERIOR SHOULDER
LOCATION DETAILED: LEFT LATERAL BREAST 2-3:00 REGION
LOCATION DETAILED: LEFT SUPERIOR CENTRAL BUCCAL CHEEK
LOCATION DETAILED: RIGHT SUPERIOR CENTRAL MALAR CHEEK
LOCATION DETAILED: LEFT AXILLARY TAIL OF BREAST
LOCATION DETAILED: EPIGASTRIC SKIN
LOCATION DETAILED: LEFT INFERIOR MEDIAL FOREHEAD
LOCATION DETAILED: LEFT ANTERIOR DISTAL UPPER ARM
LOCATION DETAILED: RIGHT DISTAL DORSAL FOREARM
LOCATION DETAILED: LEFT CENTRAL MALAR CHEEK

## 2020-08-24 ASSESSMENT — LOCATION ZONE DERM
LOCATION ZONE: NECK
LOCATION ZONE: LIP
LOCATION ZONE: TRUNK
LOCATION ZONE: ARM
LOCATION ZONE: FACE
LOCATION ZONE: LEG

## 2020-08-24 NOTE — PROCEDURE: ADDITIONAL NOTES
Detail Level: Simple
Additional Notes: Discussed treatment options including hydroquinone, BBL.\\nPatient advised to contact our cosmetic coordinator, Liliana for cosmetic consult\\nCosmetic pamphlet given.

## 2020-08-24 NOTE — PROCEDURE: BENIGN DESTRUCTION COSMETIC
Anesthesia Volume In Cc: 0
Detail Level: Detailed
Post-Care Instructions: I reviewed with the patient in detail post-care instructions. Patient is to wear sunprotection, and avoid picking at any of the treated lesions. Pt may apply Vaseline to crusted or scabbing areas.
Price (Use Numbers Only, No Special Characters Or $): 125.00
Consent: The patient's consent was obtained including but not limited to risks of crusting, scabbing, blistering, scarring, darker or lighter pigmentary change, recurrence, incomplete removal and infection.

## 2020-08-31 ENCOUNTER — OFFICE VISIT (OUTPATIENT)
Dept: CARDIOLOGY | Facility: MEDICAL CENTER | Age: 65
End: 2020-08-31
Payer: MEDICARE

## 2020-08-31 VITALS
OXYGEN SATURATION: 96 % | BODY MASS INDEX: 21.82 KG/M2 | DIASTOLIC BLOOD PRESSURE: 82 MMHG | SYSTOLIC BLOOD PRESSURE: 118 MMHG | WEIGHT: 139 LBS | HEART RATE: 63 BPM | HEIGHT: 67 IN

## 2020-08-31 DIAGNOSIS — I48.0 PAF (PAROXYSMAL ATRIAL FIBRILLATION) (HCC): ICD-10-CM

## 2020-08-31 DIAGNOSIS — R53.82 CHRONIC FATIGUE: ICD-10-CM

## 2020-08-31 DIAGNOSIS — Z79.01 CHRONIC ANTICOAGULATION: ICD-10-CM

## 2020-08-31 PROCEDURE — 93000 ELECTROCARDIOGRAM COMPLETE: CPT | Performed by: INTERNAL MEDICINE

## 2020-08-31 PROCEDURE — 99214 OFFICE O/P EST MOD 30 MIN: CPT | Performed by: INTERNAL MEDICINE

## 2020-08-31 ASSESSMENT — FIBROSIS 4 INDEX: FIB4 SCORE: 1.7

## 2020-08-31 NOTE — PROGRESS NOTES
Arrhythmia Clinic Note (Established patient)    DOS: 8/31/2020    Chief complaint/Reason for consult: F/u PAF    Interval History:  Patient is a 63 yo F. She has a history of symptomatic PAF along with PACs. Underwent PVI with me in November. Subsequently symptoms have improved. She says she still get short episodes of palpitations lasting upwards of a few minutes but much less so than before procedure.    Her main complaint today is dyspnea on exertion. She says she cannot hike nearly as much as was able previously. She wonders if this could be cardiac.     ROS (+ highlighted in red):  General--Negative for fatigue, weight loss or weight gain  Cardiovascular--Negative for CP, orthopnea, PND    Past Medical History:   Diagnosis Date   • Anxiety    • Arrhythmia     afib   • ASTHMA    • Bowel habit changes     constipation   • Depression    • GERD (gastroesophageal reflux disease)    • Hiatal hernia with GERD 11/5/2019   • History of esophageal stricture 11/5/2019   • Indigestion    • Osteoporosis    • Palpitations    • Premature atrial contractions        Past Surgical History:   Procedure Laterality Date   • COLONOSCOPY WITH POLYP  June 2008    benign polyp  Dr Paul    • CHOLECYSTECTOMY  January 2008    laparoscopic   • EGD WITH ASP/BX  September 2006    inflammation only ---Dr Paul   • CHOLECYSTECTOMY     • SINUSOTOMIES         Social History     Socioeconomic History   • Marital status:      Spouse name: Not on file   • Number of children: Not on file   • Years of education: Not on file   • Highest education level: Not on file   Occupational History   • Occupation: retired   Social Needs   • Financial resource strain: Not on file   • Food insecurity     Worry: Not on file     Inability: Not on file   • Transportation needs     Medical: Not on file     Non-medical: Not on file   Tobacco Use   • Smoking status: Never Smoker   • Smokeless tobacco: Never Used   Substance and Sexual Activity   • Alcohol use:  No     Alcohol/week: 0.0 oz   • Drug use: No   • Sexual activity: Yes     Partners: Male     Birth control/protection: Post-Menopausal   Lifestyle   • Physical activity     Days per week: Not on file     Minutes per session: Not on file   • Stress: Not on file   Relationships   • Social connections     Talks on phone: Not on file     Gets together: Not on file     Attends Restorationism service: Not on file     Active member of club or organization: Not on file     Attends meetings of clubs or organizations: Not on file     Relationship status: Not on file   • Intimate partner violence     Fear of current or ex partner: Not on file     Emotionally abused: Not on file     Physically abused: Not on file     Forced sexual activity: Not on file   Other Topics Concern   • Not on file   Social History Narrative   • Not on file       Family History   Problem Relation Age of Onset   • Cancer Father 70        cancer of unknown primary   • Heart Disease Mother    • Heart Disease Son 42        MI and coronary stent placement   • Diabetes Son    • Hypertension Son    • Diabetes Sister        Allergies   Allergen Reactions   • Amitriptyline      Excessive sedation --even at 10 mg dose   • Bactrim [Sulfamethoxazole W-Trimethoprim]      Shaking   • Levaquin      Nausea    • Lovastatin      Muscle aches  Muscle aches     • Trazodone      Irregular pulse       Current Outpatient Medications   Medication Sig Dispense Refill   • sucralfate (CARAFATE) 1 GM Tab Take 1 Tab by mouth 4 Times a Day,Before Meals and at Bedtime. 120 Tab 0   • esomeprazole (NEXIUM) 40 MG delayed-release capsule TAKE 1 CAPSULE BY MOUTH ONCE A DAY 30 MINUTES BEFORE BREAKFAST MEAL  6   • MYRBETRIQ 50 MG TABLET SR 24 HR      • DILTIAZem CD (CARDIZEM CD) 120 MG CAPSULE SR 24 HR      • carvedilol (COREG) 6.25 MG Tab Take 6.25 mg by mouth every day.     • acetaminophen (TYLENOL) 325 MG Tab Take 1 Tab by mouth 1 time daily as needed. Patient will not taken until follow up  "labs/results reviewed.     • metoprolol SR (TOPROL XL) 25 MG TABLET SR 24 HR Take 0.5 Tabs by mouth every bedtime. (Patient not taking: Reported on 8/31/2020) 90 Tab 3   • rivaroxaban (XARELTO) 20 MG Tab tablet Take 1 Tab by mouth with dinner. (Patient not taking: Reported on 8/31/2020) 30 Tab 11     No current facility-administered medications for this visit.        Physical Exam:  Vitals:    08/31/20 1431   BP: 118/82   BP Location: Right arm   Patient Position: Sitting   BP Cuff Size: Adult   Pulse: 63   SpO2: 96%   Weight: 63 kg (139 lb)   Height: 1.702 m (5' 7\")     General appearance: NAD, conversant  HEENT: PERRL, neck is supple with FROM  Lungs: Clear to auscultation, normal respiratory effort  CV: RRR, no murmurs/rubs/gallops, no JVD  Abdomen: Soft, non-tender with normal bowel sounds  Extremities: No peripheral edema, no clubbing or cyanosis  Skin: No rash, lesions, or ulcers  Psych: Alert and oriented to person, place and time    Data:  Labs reviewed    Prior echo/stress reviewed:  Preserved function    EKG interpreted by me:  Sinus, unusual p wave axis    Impression/Plan:  1. PAF (paroxysmal atrial fibrillation) ([facility])  EKG    ZIO PATCH MONITOR    Cardiac Stress Test Treadmill Only   2. Chronic fatigue     3. Chronic anticoagulation       -She inquired about a cardiac stress test I think this is not unreasonable  -Will order plain treadmill for her  -I will get a zio to look for arrhythmia recurrence  -She is off OAC    Josue Moody MD    "

## 2020-09-02 ENCOUNTER — NON-PROVIDER VISIT (OUTPATIENT)
Dept: CARDIOLOGY | Facility: MEDICAL CENTER | Age: 65
End: 2020-09-02
Attending: INTERNAL MEDICINE
Payer: MEDICARE

## 2020-09-02 VITALS
WEIGHT: 139 LBS | SYSTOLIC BLOOD PRESSURE: 116 MMHG | BODY MASS INDEX: 21.82 KG/M2 | HEIGHT: 67 IN | DIASTOLIC BLOOD PRESSURE: 72 MMHG | OXYGEN SATURATION: 94 % | HEART RATE: 93 BPM

## 2020-09-02 DIAGNOSIS — R06.02 SHORTNESS OF BREATH: ICD-10-CM

## 2020-09-02 DIAGNOSIS — I48.0 PAF (PAROXYSMAL ATRIAL FIBRILLATION) (HCC): ICD-10-CM

## 2020-09-02 LAB
EKG IMPRESSION: NORMAL
TREADMILL STRESS: NORMAL

## 2020-09-02 PROCEDURE — 93015 CV STRESS TEST SUPVJ I&R: CPT | Performed by: INTERNAL MEDICINE

## 2020-09-02 ASSESSMENT — FIBROSIS 4 INDEX: FIB4 SCORE: 1.7

## 2020-09-03 ENCOUNTER — TELEPHONE (OUTPATIENT)
Dept: CARDIOLOGY | Facility: MEDICAL CENTER | Age: 65
End: 2020-09-03

## 2020-09-03 ENCOUNTER — NON-PROVIDER VISIT (OUTPATIENT)
Dept: CARDIOLOGY | Facility: MEDICAL CENTER | Age: 65
End: 2020-09-03
Attending: INTERNAL MEDICINE
Payer: MEDICARE

## 2020-09-03 DIAGNOSIS — I47.19 ATRIAL TACHYCARDIA (HCC): ICD-10-CM

## 2020-09-03 DIAGNOSIS — I48.0 PAF (PAROXYSMAL ATRIAL FIBRILLATION) (HCC): ICD-10-CM

## 2020-09-16 ENCOUNTER — OFFICE VISIT (OUTPATIENT)
Dept: MEDICAL GROUP | Facility: LAB | Age: 65
End: 2020-09-16
Payer: MEDICARE

## 2020-09-16 VITALS
RESPIRATION RATE: 13 BRPM | SYSTOLIC BLOOD PRESSURE: 118 MMHG | TEMPERATURE: 99.4 F | HEIGHT: 68 IN | BODY MASS INDEX: 21.07 KG/M2 | HEART RATE: 88 BPM | WEIGHT: 139 LBS | DIASTOLIC BLOOD PRESSURE: 72 MMHG | OXYGEN SATURATION: 95 %

## 2020-09-16 DIAGNOSIS — K21.9 GASTROESOPHAGEAL REFLUX DISEASE WITHOUT ESOPHAGITIS: ICD-10-CM

## 2020-09-16 DIAGNOSIS — I48.0 PAROXYSMAL ATRIAL FIBRILLATION (HCC): ICD-10-CM

## 2020-09-16 DIAGNOSIS — Z76.89 ENCOUNTER TO ESTABLISH CARE: ICD-10-CM

## 2020-09-16 DIAGNOSIS — Z12.31 ENCOUNTER FOR SCREENING MAMMOGRAM FOR BREAST CANCER: ICD-10-CM

## 2020-09-16 DIAGNOSIS — M81.0 AGE-RELATED OSTEOPOROSIS WITHOUT CURRENT PATHOLOGICAL FRACTURE: ICD-10-CM

## 2020-09-16 DIAGNOSIS — Z23 NEED FOR VACCINATION: ICD-10-CM

## 2020-09-16 PROBLEM — R63.5 WEIGHT GAIN: Status: RESOLVED | Noted: 2019-04-09 | Resolved: 2020-09-16

## 2020-09-16 PROBLEM — R93.5 ABNORMAL CT OF THE ABDOMEN: Status: RESOLVED | Noted: 2018-10-18 | Resolved: 2020-09-16

## 2020-09-16 PROBLEM — Z00.00 PREVENTATIVE HEALTH CARE: Status: RESOLVED | Noted: 2017-04-20 | Resolved: 2020-09-16

## 2020-09-16 PROBLEM — R53.82 CHRONIC FATIGUE: Status: RESOLVED | Noted: 2017-04-20 | Resolved: 2020-09-16

## 2020-09-16 PROBLEM — R25.2 MUSCLE CRAMPING: Status: RESOLVED | Noted: 2019-04-09 | Resolved: 2020-09-16

## 2020-09-16 PROBLEM — R06.02 SHORTNESS OF BREATH: Status: RESOLVED | Noted: 2019-06-24 | Resolved: 2020-09-16

## 2020-09-16 PROBLEM — R00.2 PALPITATIONS: Status: RESOLVED | Noted: 2019-04-26 | Resolved: 2020-09-16

## 2020-09-16 PROBLEM — Z79.01 CHRONIC ANTICOAGULATION: Status: RESOLVED | Noted: 2019-08-12 | Resolved: 2020-09-16

## 2020-09-16 PROCEDURE — 90732 PPSV23 VACC 2 YRS+ SUBQ/IM: CPT | Performed by: FAMILY MEDICINE

## 2020-09-16 PROCEDURE — 99214 OFFICE O/P EST MOD 30 MIN: CPT | Mod: 25 | Performed by: FAMILY MEDICINE

## 2020-09-16 PROCEDURE — G0009 ADMIN PNEUMOCOCCAL VACCINE: HCPCS | Performed by: FAMILY MEDICINE

## 2020-09-16 RX ORDER — ESOMEPRAZOLE MAGNESIUM 40 MG/1
CAPSULE, DELAYED RELEASE ORAL
Qty: 30 CAP | Refills: 6 | Status: SHIPPED | OUTPATIENT
Start: 2020-09-16 | End: 2021-11-29

## 2020-09-16 ASSESSMENT — ENCOUNTER SYMPTOMS
DIARRHEA: 0
CHILLS: 0
ABDOMINAL PAIN: 0
CONSTIPATION: 0
SHORTNESS OF BREATH: 0
WHEEZING: 0
PALPITATIONS: 0
DOUBLE VISION: 0
NAUSEA: 0
FEVER: 0
VOMITING: 0
BLURRED VISION: 0

## 2020-09-16 ASSESSMENT — FIBROSIS 4 INDEX: FIB4 SCORE: 1.7

## 2020-09-16 NOTE — PROGRESS NOTES
"Subjective:   Susy Szymanski is a 65 y.o. female here today for   Chief Complaint   Patient presents with   • Establish Care   • Immunizations     PPSV-23    • Medication Management     GERD/ACID REFLUX .       #Establish care:  -Reviewed all past medical history, family history, social history.  -Reviewed all screening/vaccinations: Patient is due for zoster, pneumococcal vaccine.  -Diet and Exercise: Appropriate for age  -Tobacco, alcohol, recreational drug use: Denies any tobacco, alcohol, recreational drug use.  -Sexually active: Yes, with , monogamous, no concerns regarding relationship.  -Occupation: Tired.  Patient states her and her  \"snowbirds\", spend winter months in [place].    #GERD:  -Patient with longstanding history of gastroesophageal reflux disease, history of hiatal hernia.  -Has been also medications including H2 inhibitors, PPIs.  Currently taking Nexium for symptom control.  Has been followed by gastroenterology for several years, EGDs previous completed in 2017 as well as 2018 showed signs of esophagitis as well as hiatal hernia.  -Patient is also been using sucralfate as needed and feels like this also helps.  -Working on avoiding foods that will elicit her GERD symptoms as well as sleeping with her head slightly elevated.  -Denies any difficulty swallowing, pain with swallowing, fevers, chills, nodules, despite weight loss.    #Related osteoporosis:  -This is previously seen on a DEXA scan completed 2012.  Patient states that she is never been treated for osteoporosis, never been on any vitamin supplementation.  She does state that she tries to stay active, working on walking/jogging daily.  -No history of any hip fractures, vertebral fractures.  No concerns at this time regarding hip or back pain.    #Paroxysmal atrial fibrillation:  -Longstanding history, new to me.  Patient is being followed by cardiologist, status post ablation in 2019.  Patient states that while she was " asymptomatic for a while she is has recently started having palpitations, presyncopal episodes.  Is wearing a monitor at this time and is following up with cardiologist regarding this.  Patient is currently not on any anticoagulation.      Allergies   Allergen Reactions   • Amitriptyline      Excessive sedation --even at 10 mg dose   • Bactrim [Sulfamethoxazole W-Trimethoprim]      Shaking   • Levaquin      Nausea    • Lovastatin      Muscle aches  Muscle aches     • Trazodone      Irregular pulse         Current medicines (including changes today)  Current Outpatient Medications   Medication Sig Dispense Refill   • sucralfate (CARAFATE) 1 GM Tab Take 1 Tab by mouth 4 Times a Day,Before Meals and at Bedtime. 120 Tab 0   • esomeprazole (NEXIUM) 40 MG delayed-release capsule TAKE 1 CAPSULE BY MOUTH ONCE A DAY 30 MINUTES BEFORE BREAKFAST MEAL  6   • acetaminophen (TYLENOL) 325 MG Tab Take 1 Tab by mouth 1 time daily as needed. Patient will not taken until follow up labs/results reviewed.     • MYRBETRIQ 50 MG TABLET SR 24 HR        No current facility-administered medications for this visit.      She  has a past medical history of Anxiety, Arrhythmia, ASTHMA, Bowel habit changes, Depression, GERD (gastroesophageal reflux disease), Hiatal hernia with GERD (11/5/2019), History of esophageal stricture (11/5/2019), Indigestion, Osteoporosis, Palpitations, and Premature atrial contractions.    ROS   Review of Systems   Constitutional: Negative for chills and fever.   HENT: Negative for hearing loss and tinnitus.    Eyes: Negative for blurred vision and double vision.   Respiratory: Negative for shortness of breath and wheezing.    Cardiovascular: Negative for chest pain and palpitations.   Gastrointestinal: Negative for abdominal pain, constipation, diarrhea, nausea and vomiting.   Skin: Negative for rash.        Objective:     Physical Exam:  /72   Pulse 88   Temp 37.4 °C (99.4 °F) (Temporal)   Resp 13   Ht 1.715  "m (5' 7.52\")   Wt 63 kg (139 lb)   SpO2 95%  Body mass index is 21.44 kg/m².   Constitutional: Alert, no distress.  Skin: Warm, dry, good turgor, no rashes in visible areas.  Eye: Equal, round and reactive, conjunctiva clear, lids normal.  ENMT: TM's clear bilaterally, lips without lesions, good dentition, oropharynx clear.  Neck: Trachea midline, no masses, no thyromegaly. No cervical or supraclavicular lymphadenopathy.  Respiratory: Unlabored respiratory effort, lungs clear to auscultation, no wheezes, no rhonchi.  Cardiovascular: Normal S1, S2, no murmur, no edema.  Abdomen: Soft, non-tender, no masses, no hepatosplenomegaly.  Psych: Alert and oriented x3, normal affect and mood.    Assessment and Plan:     1. Encounter to establish care  -All questions concerns were answered at this time.  -Vaccinations/screenings needed at this time: We will complete pneumococcal today.  Patient will follow-up with pharmacist for flu shot.  Screenings as below.  -Labs reviewed, completed February 2020, unremarkable, no concerns.  -Discussed the importance of a healthy, Mediterranean style diet, routine exercise regimen.  -Discussed general safety measures including seatbelts, helmets, avoidance of smoking, sunscreen, hydration.  -Follow-up for general physical exam on a yearly basis, sooner if needed.    2. Gastroesophageal reflux disease without esophagitis  -Status: Stable.  Patient continue treatment with Nexium 40 mg daily.  Labs are normal as of beginning of this year.  -Patient will continue to follow-up with gastroenterologist as needed.  -Continue to work on conservative treatment measures for GERD.  - esomeprazole (NEXIUM) 40 MG delayed-release capsule; TAKE 1 CAPSULE BY MOUTH ONCE A DAY 30 MINUTES BEFORE BREAKFAST MEAL  Dispense: 30 Cap; Refill: 6    3. Age-related osteoporosis without current pathological fracture  -Review of DEXA in 2012 does show signs of osteoporosis.  Discussed possibly starting medication " which patient is not interested in doing right now.  We will get another bone density scan, if her osteoporosis is worsening we will discuss possible treatment thereof.  -Continue to work on vitamin D, calcium supplementation, weightbearing exercises.  - DS-BONE DENSITY STUDY (DEXA); Future    4. Paroxysmal atrial fibrillation (HCC)  -Status: Stable.  Patient continued to be symptomatic at this time but following up with cardiology.  She is not on anticoagulation, discussed the importance of following up with cardiologist as far as Holter monitor results.    5. Encounter for screening mammogram for breast cancer  - MA-SCREENING MAMMO BILAT W/CAD; Future    6. Need for vaccination  Patient was agreeable to receiving the pneumococcal 23 vaccine in clinic today after discussion of potential risks, benefits, and side effects. Vaccine was administered without adverse effects.  - Pneumoccocal Polysaccharide Vaccine 23-Valent =>1yo SQ/IM    Patient was seen for 25 minutes face to face of which > 50% of appointment time was spent on counseling and coordination of care regarding the above.      Followup: Return in about 7 months (around 4/16/2021) for Medicare AWV.         PLEASE NOTE: This dictation was created using voice recognition software. I have made every reasonable attempt to correct obvious errors, but I expect that there are errors of grammar and possibly content that I did not discover before finalizing the note.

## 2020-09-24 ENCOUNTER — APPOINTMENT (OUTPATIENT)
Dept: MEDICAL GROUP | Facility: LAB | Age: 65
End: 2020-09-24
Payer: MEDICARE

## 2020-09-24 PROCEDURE — 0298T PR EXT ECG > 48HR TO 21 DAY REVIEW AND INTERPRETATN: CPT | Performed by: INTERNAL MEDICINE

## 2020-09-24 PROCEDURE — 0296T PR EXT ECG > 48HR TO 21 DAY RCRD W/CONECT INTL RCRD: CPT | Performed by: INTERNAL MEDICINE

## 2020-09-28 ENCOUNTER — TELEPHONE (OUTPATIENT)
Dept: CARDIOLOGY | Facility: MEDICAL CENTER | Age: 65
End: 2020-09-28

## 2020-09-28 NOTE — TELEPHONE ENCOUNTER
SS    Hello, patient would like a call back regarding her ziopatch results. She will like a call back at [phone]

## 2020-10-07 RX ORDER — PROPRANOLOL HYDROCHLORIDE 20 MG/1
20 TABLET ORAL 3 TIMES DAILY PRN
Qty: 90 TAB | Refills: 6 | Status: SHIPPED | OUTPATIENT
Start: 2020-10-07 | End: 2021-06-30

## 2020-10-07 NOTE — TELEPHONE ENCOUNTER
Josue Moody M.D.  You 55 minutes ago (3:14 PM)     Okay to go back, please write her for propranolol 20 TID PRN    Message text

## 2020-10-07 NOTE — TELEPHONE ENCOUNTER
Ariane was informed that Zio showed no Atrial Fib; however, she would like to go back on the PRN Propanolol for the frequent runs of atrial tach if ok with Dr. Moody.  She says that the tach makes her SOB and she is anxious when they come about as well.      To Dr. Moody to advise.

## 2020-12-30 ENCOUNTER — OFFICE VISIT (OUTPATIENT)
Dept: CARDIOLOGY | Facility: MEDICAL CENTER | Age: 65
End: 2020-12-30
Payer: MEDICARE

## 2020-12-30 VITALS
HEIGHT: 67 IN | HEART RATE: 65 BPM | DIASTOLIC BLOOD PRESSURE: 70 MMHG | SYSTOLIC BLOOD PRESSURE: 128 MMHG | WEIGHT: 130 LBS | OXYGEN SATURATION: 98 % | BODY MASS INDEX: 20.4 KG/M2

## 2020-12-30 DIAGNOSIS — G25.0 ESSENTIAL TREMOR: ICD-10-CM

## 2020-12-30 DIAGNOSIS — I49.1 ATRIAL PREMATURE COMPLEXES: ICD-10-CM

## 2020-12-30 DIAGNOSIS — I48.0 PAF (PAROXYSMAL ATRIAL FIBRILLATION) (HCC): ICD-10-CM

## 2020-12-30 PROCEDURE — 99214 OFFICE O/P EST MOD 30 MIN: CPT | Performed by: INTERNAL MEDICINE

## 2020-12-30 PROCEDURE — 93000 ELECTROCARDIOGRAM COMPLETE: CPT | Performed by: INTERNAL MEDICINE

## 2020-12-30 RX ORDER — PROPRANOLOL HCL 60 MG
60 CAPSULE, EXTENDED RELEASE 24HR ORAL DAILY
Qty: 30 CAP | Refills: 11 | Status: SHIPPED | OUTPATIENT
Start: 2020-12-30 | End: 2021-06-30

## 2020-12-30 ASSESSMENT — FIBROSIS 4 INDEX: FIB4 SCORE: 1.7

## 2020-12-30 NOTE — PROGRESS NOTES
Arrhythmia Clinic Note (Established patient)    DOS: 12/30/2020    Chief complaint/Reason for consult: F/u PAF    Interval History:  Patient is a 64 yo F. History of symptomatic PAF. S/p ablation. Afterwards most of her symptoms resolved. With heavy exertion still with SOB and palpitations. Underwent ETT with me along with ambulatory monitoring. 14 day monitor with no sustained arrhythmias. ETT normal. 10+ METS.    ROS (+ in BOLD):  General--Negative for fatigue, weight loss or weight gain  Cardiovascular--Negative for CP, orthopnea, PND    Past Medical History:   Diagnosis Date   • Anxiety    • Arrhythmia     afib   • ASTHMA    • Bowel habit changes     constipation   • Depression    • GERD (gastroesophageal reflux disease)    • Hiatal hernia with GERD 11/5/2019   • History of esophageal stricture 11/5/2019   • Indigestion    • Osteoporosis    • Palpitations    • Premature atrial contractions        Past Surgical History:   Procedure Laterality Date   • COLONOSCOPY WITH POLYP  June 2008    benign polyp  Dr Paul    • CHOLECYSTECTOMY  January 2008    laparoscopic   • EGD WITH ASP/BX  September 2006    inflammation only ---Dr Paul   • CHOLECYSTECTOMY     • SINUSOTOMIES         Social History     Socioeconomic History   • Marital status:      Spouse name: Not on file   • Number of children: Not on file   • Years of education: Not on file   • Highest education level: Not on file   Occupational History   • Occupation: retired   Social Needs   • Financial resource strain: Not on file   • Food insecurity     Worry: Not on file     Inability: Not on file   • Transportation needs     Medical: Not on file     Non-medical: Not on file   Tobacco Use   • Smoking status: Never Smoker   • Smokeless tobacco: Never Used   Substance and Sexual Activity   • Alcohol use: No     Alcohol/week: 0.0 oz   • Drug use: No   • Sexual activity: Yes     Partners: Male     Birth control/protection: Post-Menopausal   Lifestyle   •  Physical activity     Days per week: Not on file     Minutes per session: Not on file   • Stress: Not on file   Relationships   • Social connections     Talks on phone: Not on file     Gets together: Not on file     Attends Roman [employer] service: Not on file     Active member of club or organization: Not on file     Attends meetings of clubs or organizations: Not on file     Relationship status: Not on file   • Intimate partner violence     Fear of current or ex partner: Not on file     Emotionally abused: Not on file     Physically abused: Not on file     Forced sexual activity: Not on file   Other Topics Concern   • Not on file   Social History Narrative   • Not on file       Family History   Problem Relation Age of Onset   • Cancer Father 70        cancer of unknown primary   • Heart Disease Mother    • Heart Disease Son 42        MI and coronary stent placement   • Diabetes Son    • Hypertension Son    • Diabetes Sister        Allergies   Allergen Reactions   • Amitriptyline      Excessive sedation --even at 10 mg dose   • Bactrim [Sulfamethoxazole W-Trimethoprim]      Shaking   • Levaquin      Nausea    • Lovastatin      Muscle aches  Muscle aches     • Trazodone      Irregular pulse       Current Outpatient Medications   Medication Sig Dispense Refill   • diphenhydramine (SOMINEX) 25 MG tablet Take 25 mg by mouth.     • propranolol LA (INDERAL LA) 60 MG CAPSULE SR 24 HR Take 1 Cap by mouth every day. 30 Cap 11   • propranolol (INDERAL) 20 MG Tab Take 1 Tab by mouth 3 times a day as needed. 90 Tab 6   • esomeprazole (NEXIUM) 40 MG delayed-release capsule TAKE 1 CAPSULE BY MOUTH ONCE A DAY 30 MINUTES BEFORE BREAKFAST MEAL 30 Cap 6   • sucralfate (CARAFATE) 1 GM Tab Take 1 Tab by mouth 4 Times a Day,Before Meals and at Bedtime. (Patient not taking: Reported on 12/30/2020) 120 Tab 0   • MYRBETRIQ 50 MG TABLET SR 24 HR      • acetaminophen (TYLENOL) 325 MG Tab Take 1 Tab by mouth 1 time daily as needed. Patient will  "not taken until follow up labs/results reviewed.       No current facility-administered medications for this visit.        Physical Exam:  Vitals:    12/30/20 1447   BP: 128/70   BP Location: Left arm   Patient Position: Sitting   BP Cuff Size: Adult   Pulse: 65   SpO2: 98%   Weight: 59 kg (130 lb)   Height: 1.702 m (5' 7\")     General appearance: NAD, conversant  HEENT: PERRL, neck is supple with FROM  Lungs: Clear to auscultation, normal respiratory effort  CV: RRR, no murmurs/rubs/gallops, no JVD  Abdomen: Soft, non-tender with normal bowel sounds  Extremities: No peripheral edema, no clubbing or cyanosis  Skin: No rash, lesions, or ulcers  Psych: Alert and oriented to person, place and time    Data:  Labs reviewed    Prior echo/stress reviewed:  LVEF 65%    EKG interpreted by me:  NSR, low amplitude p waves    Impression/Plan:  1. PAF (paroxysmal atrial fibrillation) ([facility])  EKG   2. Essential tremor     3. Atrial premature complexes       -She thinks PRN propranolol I wrote for her maybe helping some of her occasional palpitations and definitely her tremor  -Wants to try extended release  -Will do 60 extended release  -If tolerating stick with that  -F/u in 6 mo    Josue Moody MD    "

## 2021-01-01 LAB — EKG IMPRESSION: NORMAL

## 2021-01-26 ENCOUNTER — IMMUNIZATION (OUTPATIENT)
Dept: FAMILY PLANNING/WOMEN'S HEALTH CLINIC | Facility: IMMUNIZATION CENTER | Age: 66
End: 2021-01-26
Payer: MEDICARE

## 2021-01-26 DIAGNOSIS — Z23 ENCOUNTER FOR VACCINATION: Primary | ICD-10-CM

## 2021-01-26 PROCEDURE — 91300 PFIZER SARS-COV-2 VACCINE: CPT

## 2021-01-26 PROCEDURE — 0001A PFIZER SARS-COV-2 VACCINE: CPT

## 2021-01-27 ENCOUNTER — NURSE TRIAGE (OUTPATIENT)
Dept: HEALTH INFORMATION MANAGEMENT | Facility: OTHER | Age: 66
End: 2021-01-27

## 2021-01-27 DIAGNOSIS — Z23 NEED FOR VACCINATION: ICD-10-CM

## 2021-02-12 ENCOUNTER — APPOINTMENT (OUTPATIENT)
Dept: RADIOLOGY | Facility: MEDICAL CENTER | Age: 66
End: 2021-02-12
Attending: EMERGENCY MEDICINE
Payer: MEDICARE

## 2021-02-12 ENCOUNTER — HOSPITAL ENCOUNTER (EMERGENCY)
Facility: MEDICAL CENTER | Age: 66
End: 2021-02-12
Attending: EMERGENCY MEDICINE
Payer: MEDICARE

## 2021-02-12 VITALS
TEMPERATURE: 98.3 F | OXYGEN SATURATION: 96 % | RESPIRATION RATE: 20 BRPM | BODY MASS INDEX: 19.9 KG/M2 | HEART RATE: 52 BPM | SYSTOLIC BLOOD PRESSURE: 121 MMHG | HEIGHT: 67 IN | DIASTOLIC BLOOD PRESSURE: 67 MMHG | WEIGHT: 126.76 LBS

## 2021-02-12 DIAGNOSIS — R00.0 TACHYCARDIA: ICD-10-CM

## 2021-02-12 LAB
ALBUMIN SERPL BCP-MCNC: 4.3 G/DL (ref 3.2–4.9)
ALBUMIN/GLOB SERPL: 1.7 G/DL
ALP SERPL-CCNC: 87 U/L (ref 30–99)
ALT SERPL-CCNC: 15 U/L (ref 2–50)
ANION GAP SERPL CALC-SCNC: 11 MMOL/L (ref 7–16)
AST SERPL-CCNC: 24 U/L (ref 12–45)
BASOPHILS # BLD AUTO: 0.5 % (ref 0–1.8)
BASOPHILS # BLD: 0.03 K/UL (ref 0–0.12)
BILIRUB SERPL-MCNC: 0.4 MG/DL (ref 0.1–1.5)
BUN SERPL-MCNC: 19 MG/DL (ref 8–22)
CALCIUM SERPL-MCNC: 9.9 MG/DL (ref 8.4–10.2)
CHLORIDE SERPL-SCNC: 104 MMOL/L (ref 96–112)
CO2 SERPL-SCNC: 27 MMOL/L (ref 20–33)
CREAT SERPL-MCNC: 0.73 MG/DL (ref 0.5–1.4)
EKG IMPRESSION: NORMAL
EKG IMPRESSION: NORMAL
EOSINOPHIL # BLD AUTO: 0.03 K/UL (ref 0–0.51)
EOSINOPHIL NFR BLD: 0.5 % (ref 0–6.9)
ERYTHROCYTE [DISTWIDTH] IN BLOOD BY AUTOMATED COUNT: 47.7 FL (ref 35.9–50)
GLOBULIN SER CALC-MCNC: 2.6 G/DL (ref 1.9–3.5)
GLUCOSE SERPL-MCNC: 107 MG/DL (ref 65–99)
HCT VFR BLD AUTO: 44.7 % (ref 37–47)
HGB BLD-MCNC: 14.2 G/DL (ref 12–16)
IMM GRANULOCYTES # BLD AUTO: 0.01 K/UL (ref 0–0.11)
IMM GRANULOCYTES NFR BLD AUTO: 0.2 % (ref 0–0.9)
LYMPHOCYTES # BLD AUTO: 1.45 K/UL (ref 1–4.8)
LYMPHOCYTES NFR BLD: 22 % (ref 22–41)
MCH RBC QN AUTO: 29.8 PG (ref 27–33)
MCHC RBC AUTO-ENTMCNC: 31.8 G/DL (ref 33.6–35)
MCV RBC AUTO: 93.7 FL (ref 81.4–97.8)
MONOCYTES # BLD AUTO: 0.47 K/UL (ref 0–0.85)
MONOCYTES NFR BLD AUTO: 7.1 % (ref 0–13.4)
NEUTROPHILS # BLD AUTO: 4.59 K/UL (ref 2–7.15)
NEUTROPHILS NFR BLD: 69.7 % (ref 44–72)
NRBC # BLD AUTO: 0 K/UL
NRBC BLD-RTO: 0 /100 WBC
PLATELET # BLD AUTO: 245 K/UL (ref 164–446)
PMV BLD AUTO: 10.5 FL (ref 9–12.9)
POTASSIUM SERPL-SCNC: 4.2 MMOL/L (ref 3.6–5.5)
PROT SERPL-MCNC: 6.9 G/DL (ref 6–8.2)
RBC # BLD AUTO: 4.77 M/UL (ref 4.2–5.4)
SODIUM SERPL-SCNC: 142 MMOL/L (ref 135–145)
TROPONIN T SERPL-MCNC: 13 NG/L (ref 6–19)
TSH SERPL DL<=0.005 MIU/L-ACNC: 3.5 UIU/ML (ref 0.38–5.33)
WBC # BLD AUTO: 6.6 K/UL (ref 4.8–10.8)

## 2021-02-12 PROCEDURE — 93005 ELECTROCARDIOGRAM TRACING: CPT

## 2021-02-12 PROCEDURE — 700101 HCHG RX REV CODE 250: Performed by: EMERGENCY MEDICINE

## 2021-02-12 PROCEDURE — 96374 THER/PROPH/DIAG INJ IV PUSH: CPT

## 2021-02-12 PROCEDURE — 85025 COMPLETE CBC W/AUTO DIFF WBC: CPT

## 2021-02-12 PROCEDURE — 80053 COMPREHEN METABOLIC PANEL: CPT

## 2021-02-12 PROCEDURE — 99284 EMERGENCY DEPT VISIT MOD MDM: CPT

## 2021-02-12 PROCEDURE — 71045 X-RAY EXAM CHEST 1 VIEW: CPT

## 2021-02-12 PROCEDURE — 84443 ASSAY THYROID STIM HORMONE: CPT

## 2021-02-12 PROCEDURE — 36415 COLL VENOUS BLD VENIPUNCTURE: CPT

## 2021-02-12 PROCEDURE — 93005 ELECTROCARDIOGRAM TRACING: CPT | Performed by: EMERGENCY MEDICINE

## 2021-02-12 PROCEDURE — 84484 ASSAY OF TROPONIN QUANT: CPT

## 2021-02-12 RX ORDER — METOPROLOL TARTRATE 1 MG/ML
5 INJECTION, SOLUTION INTRAVENOUS ONCE
Status: COMPLETED | OUTPATIENT
Start: 2021-02-12 | End: 2021-02-12

## 2021-02-12 RX ADMIN — METOPROLOL TARTRATE 5 MG: 1 INJECTION, SOLUTION INTRAVENOUS at 06:40

## 2021-02-12 ASSESSMENT — FIBROSIS 4 INDEX: FIB4 SCORE: 1.7

## 2021-02-12 NOTE — ED NOTES
D/c pt home, with spouse no  rx given . Pt aware of f/u instructions , aware to return for any changes or concerns. No further questions upon d/c home from ed

## 2021-02-12 NOTE — ED PROVIDER NOTES
"ED Provider Note    CHIEF COMPLAINT  Chief Complaint   Patient presents with    Tachycardia     c/o \"heart beating out of my chest\" started around 9 PM last night, worse when laying flat    Irregular Heart Beat     pt has hx of Afib, had ablation done about a year ago and was taken off meds last february        HPI  Susy Szymanski is a 65 y.o. female who presents to the ED with complaints of palpitations.  The patient has had a history of atrial fibrillation and had an ablation by Dr. James about 1 year ago.  The patient's only medication is propanolol to be taken as needed if she feels bouts of tachycardia.  Last night about 9 PM she started feeling sensations like she was having tachycardia/atrial fibrillation.  Her Apple Watch stated that she was in A. fib.  The patient at midnight did take a propanolol dose but it continued throughout the morning and she just noted that she was feeling slightly dizzy and she was feeling very anxious so she presents to the emergency department for evaluation.  Upon arrival here in my initial evaluation she converted to sinus on the monitor while I was talking to the patient and she was states that she was feeling much better.  Patient denies any chest pains fevers chills nausea vomiting or any other symptoms.    REVIEW OF SYSTEMS  See HPI for further details. All other systems are negative.     PAST MEDICAL HISTORY  Past Medical History:   Diagnosis Date    Anxiety     Arrhythmia     afib    ASTHMA     Bowel habit changes     constipation    Depression     GERD (gastroesophageal reflux disease)     Hiatal hernia with GERD 11/5/2019    History of esophageal stricture 11/5/2019    Indigestion     Osteoporosis     Palpitations     Premature atrial contractions        FAMILY HISTORY  Family History   Problem Relation Age of Onset    Cancer Father 70        cancer of unknown primary    Heart Disease Mother     Heart Disease Son 42        MI and coronary stent placement    Diabetes " Son     Hypertension Son     Diabetes Sister        SOCIAL HISTORY  Social History     Socioeconomic History    Marital status:      Spouse name: Not on file    Number of children: Not on file    Years of education: Not on file    Highest education level: Not on file   Occupational History    Occupation: retired   Tobacco Use    Smoking status: Never Smoker    Smokeless tobacco: Never Used   Substance and Sexual Activity    Alcohol use: No     Alcohol/week: 0.0 oz    Drug use: No    Sexual activity: Yes     Partners: Male     Birth control/protection: Post-Menopausal   Other Topics Concern    Not on file   Social History Narrative    Not on file     Social Determinants of Health     Financial Resource Strain:     Difficulty of Paying Living Expenses:    Food Insecurity:     Worried About Running Out of Food in the Last Year:     Ran Out of Food in the Last Year:    Transportation Needs:     Lack of Transportation (Medical):     Lack of Transportation (Non-Medical):    Physical Activity:     Days of Exercise per Week:     Minutes of Exercise per Session:    Stress:     Feeling of Stress :    Social Connections:     Frequency of Communication with Friends and Family:     Frequency of Social Gatherings with Friends and Family:     Attends Yarsanism Services:     Active Member of Clubs or Organizations:     Attends Club or Organization Meetings:     Marital Status:    Intimate Partner Violence:     Fear of Current or Ex-Partner:     Emotionally Abused:     Physically Abused:     Sexually Abused:       Tano Clay M.D.      SURGICAL HISTORY  Past Surgical History:   Procedure Laterality Date    COLONOSCOPY WITH POLYP  June 2008    benign polyp  Dr Paul     CHOLECYSTECTOMY  January 2008    laparoscopic    EGD WITH ASP/BX  September 2006    inflammation only ---Dr Paul    CHOLECYSTECTOMY      SINUSOTOMIES         CURRENT MEDICATIONS  Home Medications    **Home medications have not yet been reviewed  "for this encounter**       No current facility-administered medications on file prior to encounter.     Current Outpatient Medications on File Prior to Encounter   Medication Sig Dispense Refill    diphenhydramine (SOMINEX) 25 MG tablet Take 25 mg by mouth.      propranolol LA (INDERAL LA) 60 MG CAPSULE SR 24 HR Take 1 Cap by mouth every day. 30 Cap 11    propranolol (INDERAL) 20 MG Tab Take 1 Tab by mouth 3 times a day as needed. 90 Tab 6    esomeprazole (NEXIUM) 40 MG delayed-release capsule TAKE 1 CAPSULE BY MOUTH ONCE A DAY 30 MINUTES BEFORE BREAKFAST MEAL 30 Cap 6    sucralfate (CARAFATE) 1 GM Tab Take 1 Tab by mouth 4 Times a Day,Before Meals and at Bedtime. (Patient not taking: Reported on 12/30/2020) 120 Tab 0    MYRBETRIQ 50 MG TABLET SR 24 HR       acetaminophen (TYLENOL) 325 MG Tab Take 1 Tab by mouth 1 time daily as needed. Patient will not taken until follow up labs/results reviewed.           ALLERGIES  Allergies   Allergen Reactions    Amitriptyline      Excessive sedation --even at 10 mg dose    Bactrim [Sulfamethoxazole W-Trimethoprim]      Shaking    Levaquin      Nausea     Lovastatin      Muscle aches  Muscle aches      Trazodone      Irregular pulse       PHYSICAL EXAM  VITAL SIGNS: /67   Pulse (!) 52   Temp 36.8 °C (98.3 °F) (Temporal)   Resp 20   Ht 1.702 m (5' 7\")   Wt 57.5 kg (126 lb 12.2 oz)   LMP 01/01/2004   SpO2 96%   BMI 19.85 kg/m²    Pulse Oximetry was obtained. It showed a reading of Pulse Oximetry: 99 %.  I interpreted this as nonhypoxic.     Constitutional: Well developed, Well nourished, No acute distress, Non-toxic appearance.   HENT: Normocephalic, Atraumatic, Bilateral external ears normal, bilateral tympanic membranes normal, Oropharynx mucous membranes, No oral exudates, Nose normal.   Eyes:  conjunctiva is normal, there are no signs of exudate.   Neck: Supple, no cervical lymphadenopathy, no meningeal signs..   Lymphatic: No lymphadenopathy noted. "   Cardiovascular: Regular rate and rhythm without murmurs gallops or rubs.   Thorax & Lungs: Lungs are clear to auscultation bilaterally, there are no wheezes no rales. Chest wall is nontender.  Abdomen: Soft, nontender nondistended. Bowel sounds are present.   Skin: Warm, Dry, No erythema,   Back: No tenderness, No CVA tenderness.  Musculoskeletal: Good range of motion in all major joints. No tenderness to palpation or major deformities noted. Intact distal pulses, no clubbing, no cyanosis, no edema     Neurologic: Alert & oriented x 3, Normal motor function, Normal sensory function, No focal deficits noted.   Psychiatric: Affect normal, Judgment normal, Mood normal.     EKG  Interpreted below by myself x2    RADIOLOGY/PROCEDURES  DX-CHEST-PORTABLE (1 VIEW)   Final Result         1.  No acute cardiopulmonary disease.   2.  Hyperexpansion of lungs favors changes of COPD.          Results for orders placed or performed during the hospital encounter of 02/12/21   CBC with Differential   Result Value Ref Range    WBC 6.6 4.8 - 10.8 K/uL    RBC 4.77 4.20 - 5.40 M/uL    Hemoglobin 14.2 12.0 - 16.0 g/dL    Hematocrit 44.7 37.0 - 47.0 %    MCV 93.7 81.4 - 97.8 fL    MCH 29.8 27.0 - 33.0 pg    MCHC 31.8 (L) 33.6 - 35.0 g/dL    RDW 47.7 35.9 - 50.0 fL    Platelet Count 245 164 - 446 K/uL    MPV 10.5 9.0 - 12.9 fL    Neutrophils-Polys 69.70 44.00 - 72.00 %    Lymphocytes 22.00 22.00 - 41.00 %    Monocytes 7.10 0.00 - 13.40 %    Eosinophils 0.50 0.00 - 6.90 %    Basophils 0.50 0.00 - 1.80 %    Immature Granulocytes 0.20 0.00 - 0.90 %    Nucleated RBC 0.00 /100 WBC    Neutrophils (Absolute) 4.59 2.00 - 7.15 K/uL    Lymphs (Absolute) 1.45 1.00 - 4.80 K/uL    Monos (Absolute) 0.47 0.00 - 0.85 K/uL    Eos (Absolute) 0.03 0.00 - 0.51 K/uL    Baso (Absolute) 0.03 0.00 - 0.12 K/uL    Immature Granulocytes (abs) 0.01 0.00 - 0.11 K/uL    NRBC (Absolute) 0.00 K/uL   Complete Metabolic Panel (CMP)   Result Value Ref Range    Sodium 142  135 - 145 mmol/L    Potassium 4.2 3.6 - 5.5 mmol/L    Chloride 104 96 - 112 mmol/L    Co2 27 20 - 33 mmol/L    Anion Gap 11.0 7.0 - 16.0    Glucose 107 (H) 65 - 99 mg/dL    Bun 19 8 - 22 mg/dL    Creatinine 0.73 0.50 - 1.40 mg/dL    Calcium 9.9 8.4 - 10.2 mg/dL    AST(SGOT) 24 12 - 45 U/L    ALT(SGPT) 15 2 - 50 U/L    Alkaline Phosphatase 87 30 - 99 U/L    Total Bilirubin 0.4 0.1 - 1.5 mg/dL    Albumin 4.3 3.2 - 4.9 g/dL    Total Protein 6.9 6.0 - 8.2 g/dL    Globulin 2.6 1.9 - 3.5 g/dL    A-G Ratio 1.7 g/dL   Troponin   Result Value Ref Range    Troponin T 13 6 - 19 ng/L   TSH   Result Value Ref Range    TSH 3.500 0.380 - 5.330 uIU/mL   ESTIMATED GFR   Result Value Ref Range    GFR If African American >60 >60 mL/min/1.73 m 2    GFR If Non African American >60 >60 mL/min/1.73 m 2   EKG   Result Value Ref Range    Report       [facility] Emergency Dept.    Test Date:  2021  Pt Name:    DEAN CRUZ               Department: [facility]  MRN:        [id number]                      [facility]   Gender:     Female                       Technician: [id number]  :        1955                   Requested By:ER TRIAGE PROTOCOL  Order #:    [id number]                    Reading MD: CHINA SAMUEL MD    Measurements  Intervals                                Axis  Rate:       122                          P:          0  OH:         105                          QRS:        82  QRSD:       88                           T:          -9  QT:         297  QTc:        423    Interpretive Statements  Sinus tachycardia  Borderline right axis deviation  Abnormal R-wave progression, early transition  Borderline repol abnormality, diffuse leads  Compared to ECG 2020 14:56:24  Sinus rhythm no longer present  ST (T wave) deviation no longer present  Electronically Signed On 2021 6:34:2 8 PST by CHINA SAMUEL MD     EKG   Result Value Ref Range    Report       [facility] Emergency Dept.    Test Date:  2021  Pt Name:    DEAN CRUZ               Department: [facility]  MRN:        [id number]                      Room:       -ROOM 7  Gender:     Female                       Technician: [id number]  :        1955                   Requested By:CHINA SAMUEL  Order #:    [id number]                    Reading MD: CHINA SAMUEL MD    Measurements  Intervals                                Axis  Rate:       54                           P:          89  PA:         161                          QRS:        76  QRSD:       94                           T:          64  QT:         432  QTc:        410    Interpretive Statements  Sinus rhythm  Compared to ECG 2021 06:07:58  Sinus tachycardia no longer present  Electronically Signed On 2021 7:45:36 PST by CHINA SAMUEL MD           COURSE & MEDICAL DECISION MAKING  Pertinent Labs & Imaging studies reviewed. (See chart for details)  Presents emerge department for evaluation.  Clinically the patient was tachycardic at 129 EKG seems to be more sinus in nature there is no significant irregularity.  Then while I was speaking to the patient in the room after I reviewed her initial EKG she spontaneously went down to a rate of 60.  Is very possible it is in a flutter slow rate causing her very consistent rhythm.  I did give her a dose of metoprolol 5 mg and observed for approximately 2 hours.  The patient had no other bouts of ectopy or abnormalities.  Is very possible that the patient was in a flutter causing the tachycardic rate but there is no signs of overt A. fib.  At this point she does have some medications of propanolol and flecainide that she had been taking prior.  At this point if she were to have any return to her palpitation I recommended for her to take the propanolol flecainide.  The patient is to follow-up with Dr. Moody her electrophysiologist for recheck in 1 week.  I started the patient on aspirin.  Patient  should return as needed.    FINAL IMPRESSION  1. Tachycardia            The patient will return for new or worsening symptoms and is stable at the time of discharge.    The patient is referred to a primary physician for blood pressure management, diabetic screening, and for all other preventative health concerns.        DISPOSITION:  Patient will be discharged home in stable condition.    FOLLOW UP:  Josue Moody M.D.  [address] [place] [address]  [facility] [address]  [phone]            OUTPATIENT MEDICATIONS:  Discharge Medication List as of 2/12/2021  8:00 AM              Electronically signed by: Zach Blanco M.D., 2/12/2021 6:31 AM

## 2021-02-12 NOTE — ED TRIAGE NOTES
"Chief Complaint   Patient presents with   • Tachycardia     c/o \"heart beating out of my chest\" started around 9 PM last night, worse when laying flat   • Irregular Heart Beat     pt has hx of Afib, had ablation done about a year ago and was taken off meds last february     /97   Pulse (!) 126   Temp 36.8 °C (98.3 °F) (Temporal)   Ht 1.702 m (5' 7\")   Wt 57.5 kg (126 lb 12.2 oz)   LMP 01/01/2004   SpO2 97%   BMI 19.85 kg/m²     Pt BIB spouse for above concern. EKG done and labs drawn    COVID screen negative, mask in place  "

## 2021-02-17 ENCOUNTER — TELEMEDICINE (OUTPATIENT)
Dept: MEDICAL GROUP | Facility: LAB | Age: 66
End: 2021-02-17
Payer: MEDICARE

## 2021-02-17 VITALS
WEIGHT: 124 LBS | DIASTOLIC BLOOD PRESSURE: 80 MMHG | SYSTOLIC BLOOD PRESSURE: 130 MMHG | BODY MASS INDEX: 19.46 KG/M2 | HEIGHT: 67 IN

## 2021-02-17 DIAGNOSIS — I48.0 PAROXYSMAL ATRIAL FIBRILLATION (HCC): ICD-10-CM

## 2021-02-17 PROCEDURE — 99213 OFFICE O/P EST LOW 20 MIN: CPT | Mod: 95,CR | Performed by: FAMILY MEDICINE

## 2021-02-17 ASSESSMENT — PATIENT HEALTH QUESTIONNAIRE - PHQ9: CLINICAL INTERPRETATION OF PHQ2 SCORE: 0

## 2021-02-17 ASSESSMENT — FIBROSIS 4 INDEX: FIB4 SCORE: 1.69

## 2021-02-18 NOTE — PROGRESS NOTES
"Virtual Visit: Established Patient   This visit was conducted via Zoom using secure and encrypted videoconferencing technology. The patient was in a private location in the state of [place].    The patient's identity was confirmed and verbal consent was obtained for this virtual visit.    Subjective:   CC:   Chief Complaint   Patient presents with   • Hospital Follow-up       Susy Szymanski is a 65 y.o. female presenting for evaluation and management of:    #ER follow up:  -Patient was recently seen in the emergency room after having episodes of tachycardia and palpitations. She does have a history of atrial fibrillation is being followed by cardiology.  She does take propranolol 20 mg 3 times daily as needed for palpitations and tachycardia.  -During hospitalization chest x-ray, EKG were completed.  She did convert spontaneously to sinus rhythm and was discharged from hospital after being evaluated by cardiology.  She plans on following up with cardiology.  -There was some findings on chest x-ray showing hyperexpansion of lungs which \"favor COPD\".  Patient has no history of COPD, currently not on any medications for treatment thereof.  -Today patient states she is feeling well.  Denies any palpitations, tachycardia, shortness of breath or difficulty breathing.  She states that she has had only one other episode of atrial fibrillation since getting a cardiac ablation several years ago.  She is not too concerned at this time but she does state if this happens again she will follow up with cardiology soon.    ROS   Denies any recent fevers or chills. No nausea or vomiting. No chest pains or shortness of breath.     Allergies   Allergen Reactions   • Amitriptyline      Excessive sedation --even at 10 mg dose   • Bactrim [Sulfamethoxazole W-Trimethoprim]      Shaking   • Levaquin      Nausea    • Lovastatin      Muscle aches  Muscle aches     • Trazodone      Irregular pulse       Current medicines (including changes " today)  Current Outpatient Medications   Medication Sig Dispense Refill   • diphenhydramine (SOMINEX) 25 MG tablet Take 25 mg by mouth.     • propranolol LA (INDERAL LA) 60 MG CAPSULE SR 24 HR Take 1 Cap by mouth every day. 30 Cap 11   • propranolol (INDERAL) 20 MG Tab Take 1 Tab by mouth 3 times a day as needed. 90 Tab 6   • esomeprazole (NEXIUM) 40 MG delayed-release capsule TAKE 1 CAPSULE BY MOUTH ONCE A DAY 30 MINUTES BEFORE BREAKFAST MEAL 30 Cap 6   • sucralfate (CARAFATE) 1 GM Tab Take 1 Tab by mouth 4 Times a Day,Before Meals and at Bedtime. (Patient not taking: Reported on 12/30/2020) 120 Tab 0   • MYRBETRIQ 50 MG TABLET SR 24 HR      • acetaminophen (TYLENOL) 325 MG Tab Take 1 Tab by mouth 1 time daily as needed. Patient will not taken until follow up labs/results reviewed.       No current facility-administered medications for this visit.       Patient Active Problem List    Diagnosis Date Noted   • Paroxysmal atrial fibrillation (HCC) 07/11/2019     Priority: High   • Atrial premature complexes 05/31/2013     Priority: Medium   • Gastroesophageal reflux disease without esophagitis 10/08/2009     Priority: Medium   • Hiatal hernia with GERD 11/05/2019   • History of esophageal stricture 11/05/2019   • Chronic hip pain, bilateral 10/18/2018   • OSTEOPOROSIS 08/27/2012   • Social anxiety disorder 09/09/2011   • LDL (low density lipoprotein receptor disorder) 09/09/2011       Family History   Problem Relation Age of Onset   • Cancer Father 70        cancer of unknown primary   • Heart Disease Mother    • Heart Disease Son 42        MI and coronary stent placement   • Diabetes Son    • Hypertension Son    • Diabetes Sister        She  has a past medical history of Anxiety, Arrhythmia, ASTHMA, Bowel habit changes, Depression, GERD (gastroesophageal reflux disease), Hiatal hernia with GERD (11/5/2019), History of esophageal stricture (11/5/2019), Indigestion, Osteoporosis, Palpitations, and Premature atrial  "contractions.  She  has a past surgical history that includes egd with asp/bx (September 2006); cholecystectomy (January 2008); colonoscopy with polyp (June 2008); sinusotomies; and cholecystectomy.       Objective:   /80   Ht 1.702 m (5' 7.01\")   Wt 56.2 kg (124 lb)   LMP 01/01/2004   BMI 19.42 kg/m²     Physical Exam:  Constitutional: Alert, no distress, well-groomed.  Skin: No rashes in visible areas.  Eye: Round. Conjunctiva clear, lids normal. No icterus.   ENMT: Lips pink without lesions, good dentition, moist mucous membranes. Phonation normal.  Neck: No masses, no thyromegaly. Moves freely without pain.  Respiratory: Unlabored respiratory effort, no cough or audible wheeze  Psych: Alert and oriented x3, normal affect and mood.       Assessment and Plan:   The following treatment plan was discussed:     1. Paroxysmal atrial fibrillation (HCC)  -Status: Stable.  No concerns at this time.  Return precautions were discussed.  The patient begins having other episodes of atrial fibrillation she will follow-up with cardiologist.      Follow-up: Return if symptoms worsen or fail to improve.         "

## 2021-02-23 ENCOUNTER — APPOINTMENT (OUTPATIENT)
Dept: RADIOLOGY | Facility: MEDICAL CENTER | Age: 66
DRG: 023 | End: 2021-02-23
Attending: EMERGENCY MEDICINE
Payer: MEDICARE

## 2021-02-23 ENCOUNTER — HOSPITAL ENCOUNTER (INPATIENT)
Facility: MEDICAL CENTER | Age: 66
LOS: 2 days | DRG: 023 | End: 2021-02-26
Attending: EMERGENCY MEDICINE | Admitting: HOSPITALIST
Payer: MEDICARE

## 2021-02-23 DIAGNOSIS — K22.4 ESOPHAGEAL MOTOR DISORDER: ICD-10-CM

## 2021-02-23 DIAGNOSIS — I63.40 CEREBROVASCULAR ACCIDENT (CVA) DUE TO EMBOLISM OF CEREBRAL ARTERY (HCC): ICD-10-CM

## 2021-02-23 DIAGNOSIS — R10.13 ABDOMINAL PAIN, EPIGASTRIC: ICD-10-CM

## 2021-02-23 DIAGNOSIS — I63.411 CEREBROVASCULAR ACCIDENT (CVA) DUE TO EMBOLISM OF RIGHT MIDDLE CEREBRAL ARTERY (HCC): ICD-10-CM

## 2021-02-23 LAB
APTT PPP: 24.7 SEC (ref 24.7–36)
BASOPHILS # BLD AUTO: 0.2 % (ref 0–1.8)
BASOPHILS # BLD: 0.01 K/UL (ref 0–0.12)
EOSINOPHIL # BLD AUTO: 0.06 K/UL (ref 0–0.51)
EOSINOPHIL NFR BLD: 1 % (ref 0–6.9)
ERYTHROCYTE [DISTWIDTH] IN BLOOD BY AUTOMATED COUNT: 49.5 FL (ref 35.9–50)
HCT VFR BLD AUTO: 40.4 % (ref 37–47)
HGB BLD-MCNC: 12.6 G/DL (ref 12–16)
IMM GRANULOCYTES # BLD AUTO: 0.02 K/UL (ref 0–0.11)
IMM GRANULOCYTES NFR BLD AUTO: 0.3 % (ref 0–0.9)
INR PPP: 0.94 (ref 0.87–1.13)
LYMPHOCYTES # BLD AUTO: 1.74 K/UL (ref 1–4.8)
LYMPHOCYTES NFR BLD: 28.1 % (ref 22–41)
MCH RBC QN AUTO: 29.6 PG (ref 27–33)
MCHC RBC AUTO-ENTMCNC: 31.2 G/DL (ref 33.6–35)
MCV RBC AUTO: 95.1 FL (ref 81.4–97.8)
MONOCYTES # BLD AUTO: 0.65 K/UL (ref 0–0.85)
MONOCYTES NFR BLD AUTO: 10.5 % (ref 0–13.4)
NEUTROPHILS # BLD AUTO: 3.71 K/UL (ref 2–7.15)
NEUTROPHILS NFR BLD: 59.9 % (ref 44–72)
NRBC # BLD AUTO: 0 K/UL
NRBC BLD-RTO: 0 /100 WBC
PLATELET # BLD AUTO: 204 K/UL (ref 164–446)
PMV BLD AUTO: 10.9 FL (ref 9–12.9)
PROTHROMBIN TIME: 12.9 SEC (ref 12–14.6)
RBC # BLD AUTO: 4.25 M/UL (ref 4.2–5.4)
WBC # BLD AUTO: 6.2 K/UL (ref 4.8–10.8)

## 2021-02-23 PROCEDURE — 84484 ASSAY OF TROPONIN QUANT: CPT

## 2021-02-23 PROCEDURE — 37195 THROMBOLYTIC THERAPY STROKE: CPT

## 2021-02-23 PROCEDURE — 700117 HCHG RX CONTRAST REV CODE 255

## 2021-02-23 PROCEDURE — 85610 PROTHROMBIN TIME: CPT

## 2021-02-23 PROCEDURE — 70496 CT ANGIOGRAPHY HEAD: CPT | Mod: MG

## 2021-02-23 PROCEDURE — 85730 THROMBOPLASTIN TIME PARTIAL: CPT

## 2021-02-23 PROCEDURE — U0003 INFECTIOUS AGENT DETECTION BY NUCLEIC ACID (DNA OR RNA); SEVERE ACUTE RESPIRATORY SYNDROME CORONAVIRUS 2 (SARS-COV-2) (CORONAVIRUS DISEASE [COVID-19]), AMPLIFIED PROBE TECHNIQUE, MAKING USE OF HIGH THROUGHPUT TECHNOLOGIES AS DESCRIBED BY CMS-2020-01-R: HCPCS

## 2021-02-23 PROCEDURE — 70498 CT ANGIOGRAPHY NECK: CPT | Mod: MG

## 2021-02-23 PROCEDURE — 70450 CT HEAD/BRAIN W/O DYE: CPT | Mod: MG

## 2021-02-23 PROCEDURE — 93005 ELECTROCARDIOGRAM TRACING: CPT

## 2021-02-23 PROCEDURE — 86850 RBC ANTIBODY SCREEN: CPT

## 2021-02-23 PROCEDURE — 0042T CT-CEREBRAL PERFUSION ANALYSIS: CPT

## 2021-02-23 PROCEDURE — 99291 CRITICAL CARE FIRST HOUR: CPT | Performed by: PSYCHIATRY & NEUROLOGY

## 2021-02-23 PROCEDURE — 700111 HCHG RX REV CODE 636 W/ 250 OVERRIDE (IP): Performed by: EMERGENCY MEDICINE

## 2021-02-23 PROCEDURE — 99291 CRITICAL CARE FIRST HOUR: CPT

## 2021-02-23 PROCEDURE — 85025 COMPLETE CBC W/AUTO DIFF WBC: CPT

## 2021-02-23 PROCEDURE — 96374 THER/PROPH/DIAG INJ IV PUSH: CPT

## 2021-02-23 PROCEDURE — 93005 ELECTROCARDIOGRAM TRACING: CPT | Performed by: EMERGENCY MEDICINE

## 2021-02-23 PROCEDURE — 86900 BLOOD TYPING SEROLOGIC ABO: CPT

## 2021-02-23 PROCEDURE — 87426 SARSCOV CORONAVIRUS AG IA: CPT

## 2021-02-23 PROCEDURE — 86901 BLOOD TYPING SEROLOGIC RH(D): CPT

## 2021-02-23 PROCEDURE — 80053 COMPREHEN METABOLIC PANEL: CPT

## 2021-02-23 PROCEDURE — U0005 INFEC AGEN DETEC AMPLI PROBE: HCPCS

## 2021-02-23 RX ORDER — LORAZEPAM 2 MG/ML
0.5 INJECTION INTRAMUSCULAR ONCE
Status: COMPLETED | OUTPATIENT
Start: 2021-02-24 | End: 2021-02-23

## 2021-02-23 RX ADMIN — IOHEXOL 80 ML: 350 INJECTION, SOLUTION INTRAVENOUS at 23:50

## 2021-02-23 RX ADMIN — LORAZEPAM 0.5 MG: 2 INJECTION INTRAMUSCULAR; INTRAVENOUS at 23:59

## 2021-02-23 RX ADMIN — IOHEXOL 40 ML: 350 INJECTION, SOLUTION INTRAVENOUS at 23:48

## 2021-02-23 ASSESSMENT — FIBROSIS 4 INDEX: FIB4 SCORE: 2.04

## 2021-02-24 ENCOUNTER — APPOINTMENT (OUTPATIENT)
Dept: RADIOLOGY | Facility: MEDICAL CENTER | Age: 66
DRG: 023 | End: 2021-02-24
Attending: STUDENT IN AN ORGANIZED HEALTH CARE EDUCATION/TRAINING PROGRAM
Payer: MEDICARE

## 2021-02-24 ENCOUNTER — HOSPITAL ENCOUNTER (INPATIENT)
Facility: REHABILITATION | Age: 66
End: 2021-02-24
Payer: MEDICARE

## 2021-02-24 ENCOUNTER — APPOINTMENT (OUTPATIENT)
Dept: CARDIOLOGY | Facility: MEDICAL CENTER | Age: 66
DRG: 023 | End: 2021-02-24
Attending: INTERNAL MEDICINE
Payer: MEDICARE

## 2021-02-24 ENCOUNTER — APPOINTMENT (OUTPATIENT)
Dept: RADIOLOGY | Facility: MEDICAL CENTER | Age: 66
DRG: 023 | End: 2021-02-24
Attending: PSYCHIATRY & NEUROLOGY
Payer: MEDICARE

## 2021-02-24 PROBLEM — G25.0 ESSENTIAL TREMOR: Status: ACTIVE | Noted: 2021-02-24

## 2021-02-24 PROBLEM — I63.9 STROKE (HCC): Status: ACTIVE | Noted: 2021-02-24

## 2021-02-24 LAB
ABO + RH BLD: NORMAL
ABO GROUP BLD: NORMAL
ALBUMIN SERPL BCP-MCNC: 3.8 G/DL (ref 3.2–4.9)
ALBUMIN/GLOB SERPL: 1.5 G/DL
ALP SERPL-CCNC: 74 U/L (ref 30–99)
ALT SERPL-CCNC: 24 U/L (ref 2–50)
ANION GAP SERPL CALC-SCNC: 12 MMOL/L (ref 7–16)
AST SERPL-CCNC: 30 U/L (ref 12–45)
BILIRUB SERPL-MCNC: 0.2 MG/DL (ref 0.1–1.5)
BLD GP AB SCN SERPL QL: NORMAL
BUN SERPL-MCNC: 22 MG/DL (ref 8–22)
CALCIUM SERPL-MCNC: 9.1 MG/DL (ref 8.5–10.5)
CHLORIDE SERPL-SCNC: 106 MMOL/L (ref 96–112)
CO2 SERPL-SCNC: 19 MMOL/L (ref 20–33)
CREAT SERPL-MCNC: 0.65 MG/DL (ref 0.5–1.4)
EKG IMPRESSION: NORMAL
EKG IMPRESSION: NORMAL
EST. AVERAGE GLUCOSE BLD GHB EST-MCNC: 108 MG/DL
GLOBULIN SER CALC-MCNC: 2.5 G/DL (ref 1.9–3.5)
GLUCOSE SERPL-MCNC: 103 MG/DL (ref 65–99)
HBA1C MFR BLD: 5.4 % (ref 4–5.6)
LV EJECT FRACT  99904: 65
LV EJECT FRACT MOD 2C 99903: 73.71
LV EJECT FRACT MOD 4C 99902: 61.79
LV EJECT FRACT MOD BP 99901: 69.63
POTASSIUM SERPL-SCNC: 4.2 MMOL/L (ref 3.6–5.5)
PROT SERPL-MCNC: 6.3 G/DL (ref 6–8.2)
RH BLD: NORMAL
SARS-COV+SARS-COV-2 AG RESP QL IA.RAPID: NOTDETECTED
SARS-COV-2 RNA RESP QL NAA+PROBE: NOTDETECTED
SODIUM SERPL-SCNC: 137 MMOL/L (ref 135–145)
SPECIMEN SOURCE: NORMAL
SPECIMEN SOURCE: NORMAL
TROPONIN T SERPL-MCNC: 13 NG/L (ref 6–19)

## 2021-02-24 PROCEDURE — A9270 NON-COVERED ITEM OR SERVICE: HCPCS | Performed by: INTERNAL MEDICINE

## 2021-02-24 PROCEDURE — 03CG3ZZ EXTIRPATION OF MATTER FROM INTRACRANIAL ARTERY, PERCUTANEOUS APPROACH: ICD-10-PCS | Performed by: RADIOLOGY

## 2021-02-24 PROCEDURE — 93005 ELECTROCARDIOGRAM TRACING: CPT | Performed by: STUDENT IN AN ORGANIZED HEALTH CARE EDUCATION/TRAINING PROGRAM

## 2021-02-24 PROCEDURE — 83036 HEMOGLOBIN GLYCOSYLATED A1C: CPT

## 2021-02-24 PROCEDURE — 700102 HCHG RX REV CODE 250 W/ 637 OVERRIDE(OP): Performed by: INTERNAL MEDICINE

## 2021-02-24 PROCEDURE — C9803 HOPD COVID-19 SPEC COLLECT: HCPCS | Performed by: EMERGENCY MEDICINE

## 2021-02-24 PROCEDURE — 95816 EEG AWAKE AND DROWSY: CPT | Mod: 26 | Performed by: PSYCHIATRY & NEUROLOGY

## 2021-02-24 PROCEDURE — 93926 LOWER EXTREMITY STUDY: CPT | Mod: RT

## 2021-02-24 PROCEDURE — 700102 HCHG RX REV CODE 250 W/ 637 OVERRIDE(OP): Performed by: STUDENT IN AN ORGANIZED HEALTH CARE EDUCATION/TRAINING PROGRAM

## 2021-02-24 PROCEDURE — 700105 HCHG RX REV CODE 258: Performed by: INTERNAL MEDICINE

## 2021-02-24 PROCEDURE — 4A00X4Z MEASUREMENT OF CENTRAL NERVOUS ELECTRICAL ACTIVITY, EXTERNAL APPROACH: ICD-10-PCS | Performed by: PSYCHIATRY & NEUROLOGY

## 2021-02-24 PROCEDURE — 3E05317 INTRODUCTION OF OTHER THROMBOLYTIC INTO PERIPHERAL ARTERY, PERCUTANEOUS APPROACH: ICD-10-PCS | Performed by: EMERGENCY MEDICINE

## 2021-02-24 PROCEDURE — 99291 CRITICAL CARE FIRST HOUR: CPT | Performed by: INTERNAL MEDICINE

## 2021-02-24 PROCEDURE — 99233 SBSQ HOSP IP/OBS HIGH 50: CPT | Mod: 25 | Performed by: PSYCHIATRY & NEUROLOGY

## 2021-02-24 PROCEDURE — 93306 TTE W/DOPPLER COMPLETE: CPT | Mod: 26 | Performed by: INTERNAL MEDICINE

## 2021-02-24 PROCEDURE — 93306 TTE W/DOPPLER COMPLETE: CPT

## 2021-02-24 PROCEDURE — 92610 EVALUATE SWALLOWING FUNCTION: CPT

## 2021-02-24 PROCEDURE — 95816 EEG AWAKE AND DROWSY: CPT | Performed by: PSYCHIATRY & NEUROLOGY

## 2021-02-24 PROCEDURE — 93010 ELECTROCARDIOGRAM REPORT: CPT | Performed by: INTERNAL MEDICINE

## 2021-02-24 PROCEDURE — A9270 NON-COVERED ITEM OR SERVICE: HCPCS | Performed by: STUDENT IN AN ORGANIZED HEALTH CARE EDUCATION/TRAINING PROGRAM

## 2021-02-24 PROCEDURE — 700111 HCHG RX REV CODE 636 W/ 250 OVERRIDE (IP): Mod: JG | Performed by: EMERGENCY MEDICINE

## 2021-02-24 PROCEDURE — 93926 LOWER EXTREMITY STUDY: CPT | Mod: 26,GZ,RT | Performed by: INTERNAL MEDICINE

## 2021-02-24 PROCEDURE — 37184 PRIM ART M-THRMBC 1ST VSL: CPT

## 2021-02-24 PROCEDURE — 770022 HCHG ROOM/CARE - ICU (200)

## 2021-02-24 PROCEDURE — 71045 X-RAY EXAM CHEST 1 VIEW: CPT

## 2021-02-24 RX ORDER — ASPIRIN 325 MG
325 TABLET ORAL DAILY
Status: DISCONTINUED | OUTPATIENT
Start: 2021-02-25 | End: 2021-02-25

## 2021-02-24 RX ORDER — POLYETHYLENE GLYCOL 3350 17 G/17G
1 POWDER, FOR SOLUTION ORAL
Status: DISCONTINUED | OUTPATIENT
Start: 2021-02-24 | End: 2021-02-26 | Stop reason: HOSPADM

## 2021-02-24 RX ORDER — ATORVASTATIN CALCIUM 20 MG/1
40 TABLET, FILM COATED ORAL EVERY EVENING
Status: DISCONTINUED | OUTPATIENT
Start: 2021-02-24 | End: 2021-02-24

## 2021-02-24 RX ORDER — ATORVASTATIN CALCIUM 40 MG/1
40 TABLET, FILM COATED ORAL EVERY EVENING
Status: DISCONTINUED | OUTPATIENT
Start: 2021-02-24 | End: 2021-02-26 | Stop reason: HOSPADM

## 2021-02-24 RX ORDER — LABETALOL HYDROCHLORIDE 5 MG/ML
10 INJECTION, SOLUTION INTRAVENOUS EVERY 6 HOURS PRN
Status: DISCONTINUED | OUTPATIENT
Start: 2021-02-24 | End: 2021-02-24

## 2021-02-24 RX ORDER — AMOXICILLIN 250 MG
2 CAPSULE ORAL 2 TIMES DAILY
Status: DISCONTINUED | OUTPATIENT
Start: 2021-02-24 | End: 2021-02-26 | Stop reason: HOSPADM

## 2021-02-24 RX ORDER — ONDANSETRON 2 MG/ML
4 INJECTION INTRAMUSCULAR; INTRAVENOUS EVERY 6 HOURS PRN
Status: DISCONTINUED | OUTPATIENT
Start: 2021-02-24 | End: 2021-02-26 | Stop reason: HOSPADM

## 2021-02-24 RX ORDER — ONDANSETRON 4 MG/1
4 TABLET, ORALLY DISINTEGRATING ORAL EVERY 6 HOURS PRN
Status: DISCONTINUED | OUTPATIENT
Start: 2021-02-24 | End: 2021-02-26 | Stop reason: HOSPADM

## 2021-02-24 RX ORDER — OMEPRAZOLE 20 MG/1
20 CAPSULE, DELAYED RELEASE ORAL DAILY
Status: DISCONTINUED | OUTPATIENT
Start: 2021-02-24 | End: 2021-02-26 | Stop reason: HOSPADM

## 2021-02-24 RX ORDER — OXYCODONE HYDROCHLORIDE 5 MG/1
5 TABLET ORAL EVERY 4 HOURS PRN
Status: DISCONTINUED | OUTPATIENT
Start: 2021-02-24 | End: 2021-02-26 | Stop reason: HOSPADM

## 2021-02-24 RX ORDER — LABETALOL HYDROCHLORIDE 5 MG/ML
10 INJECTION, SOLUTION INTRAVENOUS EVERY 6 HOURS PRN
Status: DISCONTINUED | OUTPATIENT
Start: 2021-02-24 | End: 2021-02-25

## 2021-02-24 RX ORDER — SODIUM CHLORIDE, SODIUM LACTATE, POTASSIUM CHLORIDE, CALCIUM CHLORIDE 600; 310; 30; 20 MG/100ML; MG/100ML; MG/100ML; MG/100ML
INJECTION, SOLUTION INTRAVENOUS CONTINUOUS
Status: DISCONTINUED | OUTPATIENT
Start: 2021-02-24 | End: 2021-02-24

## 2021-02-24 RX ORDER — BISACODYL 10 MG
10 SUPPOSITORY, RECTAL RECTAL
Status: DISCONTINUED | OUTPATIENT
Start: 2021-02-24 | End: 2021-02-26 | Stop reason: HOSPADM

## 2021-02-24 RX ORDER — MIDAZOLAM HYDROCHLORIDE 1 MG/ML
INJECTION INTRAMUSCULAR; INTRAVENOUS
Status: COMPLETED
Start: 2021-02-24 | End: 2021-02-24

## 2021-02-24 RX ORDER — SODIUM CHLORIDE 9 MG/ML
50 INJECTION, SOLUTION INTRAVENOUS ONCE
Status: ACTIVE | OUTPATIENT
Start: 2021-02-24 | End: 2021-02-25

## 2021-02-24 RX ORDER — OXYCODONE HYDROCHLORIDE 10 MG/1
10 TABLET ORAL EVERY 4 HOURS PRN
Status: DISCONTINUED | OUTPATIENT
Start: 2021-02-24 | End: 2021-02-26 | Stop reason: HOSPADM

## 2021-02-24 RX ORDER — ASPIRIN 300 MG/1
300 SUPPOSITORY RECTAL DAILY
Status: DISCONTINUED | OUTPATIENT
Start: 2021-02-25 | End: 2021-02-25

## 2021-02-24 RX ORDER — ASPIRIN 81 MG/1
324 TABLET, CHEWABLE ORAL DAILY
Status: DISCONTINUED | OUTPATIENT
Start: 2021-02-25 | End: 2021-02-25

## 2021-02-24 RX ORDER — HYDRALAZINE HYDROCHLORIDE 20 MG/ML
20 INJECTION INTRAMUSCULAR; INTRAVENOUS EVERY 6 HOURS PRN
Status: DISCONTINUED | OUTPATIENT
Start: 2021-02-24 | End: 2021-02-26 | Stop reason: HOSPADM

## 2021-02-24 RX ORDER — ACETAMINOPHEN 325 MG/1
650 TABLET ORAL EVERY 4 HOURS PRN
Status: DISCONTINUED | OUTPATIENT
Start: 2021-02-24 | End: 2021-02-26 | Stop reason: HOSPADM

## 2021-02-24 RX ADMIN — ACETAMINOPHEN 650 MG: 325 TABLET, FILM COATED ORAL at 22:44

## 2021-02-24 RX ADMIN — SODIUM CHLORIDE, POTASSIUM CHLORIDE, SODIUM LACTATE AND CALCIUM CHLORIDE: 600; 310; 30; 20 INJECTION, SOLUTION INTRAVENOUS at 06:36

## 2021-02-24 RX ADMIN — ALTEPLASE 46.3 MG: KIT at 00:15

## 2021-02-24 RX ADMIN — DOCUSATE SODIUM 50 MG AND SENNOSIDES 8.6 MG 2 TABLET: 8.6; 5 TABLET, FILM COATED ORAL at 17:09

## 2021-02-24 RX ADMIN — ATORVASTATIN CALCIUM 40 MG: 40 TABLET, FILM COATED ORAL at 17:10

## 2021-02-24 RX ADMIN — SODIUM CHLORIDE, POTASSIUM CHLORIDE, SODIUM LACTATE AND CALCIUM CHLORIDE: 600; 310; 30; 20 INJECTION, SOLUTION INTRAVENOUS at 18:27

## 2021-02-24 ASSESSMENT — COGNITIVE AND FUNCTIONAL STATUS - GENERAL
PERSONAL GROOMING: A LITTLE
SUGGESTED CMS G CODE MODIFIER DAILY ACTIVITY: CH
DRESSING REGULAR UPPER BODY CLOTHING: A LITTLE
DAILY ACTIVITIY SCORE: 24
SUGGESTED CMS G CODE MODIFIER MOBILITY: CH
SUGGESTED CMS G CODE MODIFIER MOBILITY: CH
MOBILITY SCORE: 24
MOBILITY SCORE: 24

## 2021-02-24 ASSESSMENT — LIFESTYLE VARIABLES
EVER FELT BAD OR GUILTY ABOUT YOUR DRINKING: NO
EVER FELT BAD OR GUILTY ABOUT YOUR DRINKING: NO
HOW MANY TIMES IN THE PAST YEAR HAVE YOU HAD 5 OR MORE DRINKS IN A DAY: 0
DOES PATIENT WANT TO STOP DRINKING: NO
TOTAL SCORE: 0
ON A TYPICAL DAY WHEN YOU DRINK ALCOHOL HOW MANY DRINKS DO YOU HAVE: 0
AVERAGE NUMBER OF DAYS PER WEEK YOU HAVE A DRINK CONTAINING ALCOHOL: 0
TOTAL SCORE: 0
HAVE YOU EVER FELT YOU SHOULD CUT DOWN ON YOUR DRINKING: NO
TOTAL SCORE: 0
ALCOHOL_USE: NO
EVER HAD A DRINK FIRST THING IN THE MORNING TO STEADY YOUR NERVES TO GET RID OF A HANGOVER: NO
HAVE PEOPLE ANNOYED YOU BY CRITICIZING YOUR DRINKING: NO
SUBSTANCE_ABUSE: 0
HAVE YOU EVER FELT YOU SHOULD CUT DOWN ON YOUR DRINKING: NO
ALCOHOL_USE: NO
CONSUMPTION TOTAL: INCOMPLETE
HAVE PEOPLE ANNOYED YOU BY CRITICIZING YOUR DRINKING: NO
EVER HAD A DRINK FIRST THING IN THE MORNING TO STEADY YOUR NERVES TO GET RID OF A HANGOVER: NO
DOES PATIENT WANT TO STOP DRINKING: NO
CONSUMPTION TOTAL: NEGATIVE
TOTAL SCORE: 0

## 2021-02-24 ASSESSMENT — ENCOUNTER SYMPTOMS
HEADACHES: 0
PALPITATIONS: 0
DOUBLE VISION: 0
FEVER: 0
NERVOUS/ANXIOUS: 0
WEAKNESS: 0
SINUS PAIN: 0
HEADACHES: 1
EYE PAIN: 0
LOSS OF CONSCIOUSNESS: 0
COUGH: 0
ABDOMINAL PAIN: 0
SEIZURES: 0
HALLUCINATIONS: 0
FOCAL WEAKNESS: 0
NECK PAIN: 0
MYALGIAS: 0
HEMOPTYSIS: 0
EYE DISCHARGE: 0
BRUISES/BLEEDS EASILY: 0
BLURRED VISION: 0
PSYCHIATRIC NEGATIVE: 1
SHORTNESS OF BREATH: 0
NAUSEA: 0
DIAPHORESIS: 0
EYE REDNESS: 0
VOMITING: 0
FOCAL WEAKNESS: 1
CHILLS: 0

## 2021-02-24 ASSESSMENT — FIBROSIS 4 INDEX: FIB4 SCORE: 2.01

## 2021-02-24 ASSESSMENT — PAIN DESCRIPTION - PAIN TYPE
TYPE: ACUTE PAIN

## 2021-02-24 NOTE — ASSESSMENT & PLAN NOTE
-take propanolol at home, holding while managing BP following thrombectomy, restart as appropriate

## 2021-02-24 NOTE — CONSULTS
PULMONARY AND CRITICAL CARE MEDICINE CONSULTATION    Date of Consultation:  2/24/2021    Requesting Physician:  Reggie Freedman MD    Consulting Physician:  Lazaro Sanchez MD    Reason for Consultation: Critical care management in lady with acute ischemic stroke    Chief Complaint: Left-sided weakness    History of Present Illness:    I was kindly asked to see and evaluate Susy Szymanski, a 65 y.o. female for evaluation and management of the above problem.    This lady has a history of atrial fibrillation and has undergone prior ablation in November 2019, asthma, depression, GERD and an esophageal stricture.  She has not been on any anticoagulants for her history of atrial fibrillation.  She doctors with Dr. Moody with [facility].  She presented to the emergency room this evening with the acute onset of left-sided weakness which occurred while she was brushing her teeth.  She had left lower extremity greater than left upper extremity weakness.  She was immediately evaluated in the emergency department and seen by Dr. Michele.  Imaging revealed a right M2 thrombus.  She was administered alteplase and then emergently went to IR for thrombectomy.  She underwent successful mechanical thrombectomy with TICI 2b flow.  She has now in the ICU.    Medications Prior to Admission:    No current facility-administered medications on file prior to encounter.     Current Outpatient Medications on File Prior to Encounter   Medication Sig Dispense Refill   • diphenhydramine (SOMINEX) 25 MG tablet Take 25 mg by mouth.     • propranolol LA (INDERAL LA) 60 MG CAPSULE SR 24 HR Take 1 Cap by mouth every day. 30 Cap 11   • propranolol (INDERAL) 20 MG Tab Take 1 Tab by mouth 3 times a day as needed. 90 Tab 6   • esomeprazole (NEXIUM) 40 MG delayed-release capsule TAKE 1 CAPSULE BY MOUTH ONCE A DAY 30 MINUTES BEFORE BREAKFAST MEAL 30 Cap 6   • MYRBETRIQ 50 MG TABLET SR 24 HR      • acetaminophen (TYLENOL) 325 MG Tab Take 1  Tab by mouth 1 time daily as needed. Patient will not taken until follow up labs/results reviewed.         Current Medications:      Current Facility-Administered Medications:   •  [COMPLETED] alteplase (ACTIVASE) BOLUS injection 5.1 mg, 0.09 mg/kg, Intravenous, Once, 5.1 mg at 02/24/21 0010 **FOLLOWED BY** [COMPLETED] alteplase (ACTIVASE) injection 46.3 mg, 0.81 mg/kg, Intravenous, Once, 46.3 mg at 02/24/21 0015 **FOLLOWED BY** NS infusion 50 mL, 50 mL, Intravenous, Once, Reggie Freedman M.D.  •  niCARdipine (CARDENE) 25 mg in  mL Infusion, 0-15 mg/hr, Intravenous, Continuous, Chas Wagner M.D., Stopped at 02/24/21 0215  •  iohexol (OMNIPAQUE) 300 mg/mL, 40 mL, Intra-arterial, Once, Harlan Michele M.D.  •  senna-docusate (PERICOLACE or SENOKOT S) 8.6-50 MG per tablet 2 tablet, 2 tablet, Oral, BID **AND** polyethylene glycol/lytes (MIRALAX) PACKET 1 Packet, 1 Packet, Oral, QDAY PRN **AND** magnesium hydroxide (MILK OF MAGNESIA) suspension 30 mL, 30 mL, Oral, QDAY PRN **AND** bisacodyl (DULCOLAX) suppository 10 mg, 10 mg, Rectal, QDAY PRN, Houston Wesley M.D.  •  lactated ringers infusion, , Intravenous, Continuous, Houston Wesley M.D.    Allergies:    Amitriptyline, Bactrim [sulfamethoxazole w-trimethoprim], Levaquin, Lovastatin, and Trazodone    Past Surgical History:    Past Surgical History:   Procedure Laterality Date   • COLONOSCOPY WITH POLYP  June 2008    benign polyp  Dr Paul    • CHOLECYSTECTOMY  January 2008    laparoscopic   • EGD WITH ASP/BX  September 2006    inflammation only ---Dr Paul   • CHOLECYSTECTOMY     • SINUSOTOMIES         Past Medical History:    Past Medical History:   Diagnosis Date   • Anxiety    • Arrhythmia     afib   • ASTHMA    • Bowel habit changes     constipation   • Depression    • GERD (gastroesophageal reflux disease)    • Hiatal hernia with GERD 11/5/2019   • History of esophageal stricture 11/5/2019   • Indigestion    • Osteoporosis    • Palpitations    •  Premature atrial contractions        Social History:    Social History     Socioeconomic History   • Marital status:      Spouse name: Not on file   • Number of children: Not on file   • Years of education: Not on file   • Highest education level: Not on file   Occupational History   • Occupation: retired   Tobacco Use   • Smoking status: Never Smoker   • Smokeless tobacco: Never Used   Substance and Sexual Activity   • Alcohol use: No     Alcohol/week: 0.0 oz   • Drug use: No   • Sexual activity: Yes     Partners: Male     Birth control/protection: Post-Menopausal   Other Topics Concern   • Not on file   Social History Narrative   • Not on file     Social Determinants of Health     Financial Resource Strain:    • Difficulty of Paying Living Expenses:    Food Insecurity:    • Worried About Running Out of Food in the Last Year:    • Ran Out of Food in the Last Year:    Transportation Needs:    • Lack of Transportation (Medical):    • Lack of Transportation (Non-Medical):    Physical Activity:    • Days of Exercise per Week:    • Minutes of Exercise per Session:    Stress:    • Feeling of Stress :    Social Connections:    • Frequency of Communication with Friends and Family:    • Frequency of Social Gatherings with Friends and Family:    • Attends [employer] Services:    • Active Member of Clubs or Organizations:    • Attends Club or Organization Meetings:    • Marital Status:    Intimate Partner Violence:    • Fear of Current or Ex-Partner:    • Emotionally Abused:    • Physically Abused:    • Sexually Abused:        Family History:    Family History   Problem Relation Age of Onset   • Cancer Father 70        cancer of unknown primary   • Heart Disease Mother    • Heart Disease Son 42        MI and coronary stent placement   • Diabetes Son    • Hypertension Son    • Diabetes Sister        Review of System:    Review of Systems   Constitutional: Negative for chills, diaphoresis and fever.   HENT: Negative for  "ear discharge, ear pain and nosebleeds.    Eyes: Negative for pain, discharge and redness.   Respiratory: Negative for cough, hemoptysis and shortness of breath.    Cardiovascular: Negative for chest pain, palpitations and leg swelling.   Gastrointestinal: Negative for abdominal pain, nausea and vomiting.   Genitourinary: Negative for dysuria, hematuria and urgency.   Musculoskeletal: Negative for myalgias and neck pain.   Skin: Negative for rash.   Neurological: Positive for focal weakness and headaches. Negative for seizures and loss of consciousness.   Endo/Heme/Allergies: Does not bruise/bleed easily.   Psychiatric/Behavioral: Negative for hallucinations, substance abuse and suicidal ideas. The patient is not nervous/anxious.        Physical Examination:    /55   Pulse (!) 58   Temp 36.6 °C (97.8 °F) (Temporal)   Resp 18   Ht 1.702 m (5' 7\")   Wt 59.6 kg (131 lb 6.3 oz)   LMP 01/01/2004   SpO2 96%   Breastfeeding No   BMI 20.58 kg/m²   Physical Exam   HENT:   Head: Normocephalic and atraumatic.   Right Ear: External ear normal.   Left Ear: External ear normal.   Nose: Nose normal.   Mouth/Throat: No oropharyngeal exudate.   Eyes: Conjunctivae are normal. Right eye exhibits no discharge. Left eye exhibits no discharge.   Right pupil 3 mm.  Left pupil 4 mm.  Both react briskly.   Neck: No tracheal deviation present.   Cardiovascular: Intact distal pulses. Exam reveals no gallop.   No murmur heard.  Sinus rhythm   Pulmonary/Chest: No stridor. She has no wheezes. She has no rales.   Abdominal: Soft. Bowel sounds are normal. She exhibits no distension. There is no abdominal tenderness. There is no rebound.   Musculoskeletal:         General: No tenderness, deformity or edema. Normal range of motion.      Cervical back: Normal range of motion and neck supple.      Comments: No clubbing or cyanosis   Neurological:   Right pupil 3 mm.  Left pupil 4 mm.  Both react briskly.  Mild weakness in the left arm " and left leg.  Fully oriented.  Awake and alert.   Skin: Skin is warm and dry. No rash noted. She is not diaphoretic. No erythema.       Laboratory Data:        Recent Labs     02/23/21  2332   WBC 6.2   RBC 4.25   HEMOGLOBIN 12.6   HEMATOCRIT 40.4   MCV 95.1   MCH 29.6   MCHC 31.2*   RDW 49.5   PLATELETCT 204   MPV 10.9     Recent Labs     02/23/21  2332   SODIUM 137   POTASSIUM 4.2   CHLORIDE 106   CO2 19*   GLUCOSE 103*   BUN 22   CREATININE 0.65   CALCIUM 9.1                   Imaging:    I personally viewed all the available CXR and CT scan images as well as reviewed the radiology interpretation reports.    DX-CHEST-PORTABLE (1 VIEW)   Final Result      No acute cardiopulmonary abnormality.      CT-CTA NECK WITH & W/O-POST PROCESSING   Final Result      Beaded appearance of the cervical internal carotid arteries is suggestive of fibromuscular dysplasia.      No significant stenosis or dissection of the neck arteries.      CT-CEREBRAL PERFUSION ANALYSIS   Final Result      1.  Cerebral blood flow less than 30% likely representing completed infarct = 0 mL.      2.  T Max more than 6 seconds likely representing combination of completed infarct and ischemia = 4 mL.      3.  Mismatched volume likely representing ischemic brain/penumbra = 4 mL      4.  Please note that the cerebral perfusion was performed on the limited brain tissue around the basal ganglia region. Infarct/ischemia outside the CT perfusion sections can be missed in this study.      CT-HEAD W/O   Final Result      No acute intracranial abnormality.      CT-CTA HEAD WITH & W/O-POST PROCESS    (Results Pending)   IR-THROMBO MECHANICAL ARTERY,INIT    (Results Pending)   MR-BRAIN-W/O    (Results Pending)       Assessment and Plan:    * Stroke (HCC)  Assessment & Plan  Acute ischemic stroke characterized by left-sided weakness  Imaging revealed a right M2 thrombus  S/P alteplase with mechanical thrombectomy with resulting TICI 2b flow  Strict blood  pressure control with goal SBP less than 160  Neuro checks every hour  Lipid panel, glycohemoglobin, echocardiogram, MRI brain  Begin aspirin 24 hours after alteplase if repeat imaging shows no evidence of ICH  High intensity statin    Paroxysmal atrial fibrillation (HCC)- (present on admission)  Assessment & Plan  Currently in sinus rhythm    Gastroesophageal reflux disease without esophagitis- (present on admission)  Assessment & Plan  Continue PPI    I have assessed and reassessed her blood pressure, hemodynamics, cardiovascular status and neurologic status.  She is at increased risk for worsening CNS system dysfunction.    High risk of deterioration and worsening vital organ dysfunction and death without the above critical care interventions.    Thank you for allowing me to participate in the care of this lady.  I will continue to follow her with great interest.    The patient is critically ill.  Critical care time = 35 minutes in directly providing and coordinating critical care and extensive data review.  No time overlap and excludes procedures.    Discussed with RN, ER physician    Lazaro Sanchez MD  Pulmonary and Critical Care Medicine

## 2021-02-24 NOTE — PROGRESS NOTES
Hospital Neurology Progress Note:     Interval History: No acute events overnight. No complaints today.     Objective:   Vitals:    02/24/21 0805 02/24/21 0830 02/24/21 0900 02/24/21 0930   BP: 140/62 128/61 122/60 132/61   Pulse: (!) 55 63 (!) 56 (!) 54   Resp: 19 (!) 27 17 18   Temp:       TempSrc:       SpO2:       Weight:       Height:           Labs:     Lab Results   Component Value Date/Time    PROTHROMBTM 12.9 02/23/2021 11:32 PM    INR 0.94 02/23/2021 11:32 PM      Lab Results   Component Value Date/Time    WBC 6.2 02/23/2021 11:32 PM    RBC 4.25 02/23/2021 11:32 PM    HEMOGLOBIN 12.6 02/23/2021 11:32 PM    HEMATOCRIT 40.4 02/23/2021 11:32 PM    MCV 95.1 02/23/2021 11:32 PM    MCH 29.6 02/23/2021 11:32 PM    MCHC 31.2 (L) 02/23/2021 11:32 PM    MPV 10.9 02/23/2021 11:32 PM    NEUTSPOLYS 59.90 02/23/2021 11:32 PM    LYMPHOCYTES 28.10 02/23/2021 11:32 PM    MONOCYTES 10.50 02/23/2021 11:32 PM    EOSINOPHILS 1.00 02/23/2021 11:32 PM    BASOPHILS 0.20 02/23/2021 11:32 PM    HYPOCHROMIA 1+ 10/20/2008 11:59 AM      Lab Results   Component Value Date/Time    SODIUM 137 02/23/2021 11:32 PM    POTASSIUM 4.2 02/23/2021 11:32 PM    CHLORIDE 106 02/23/2021 11:32 PM    CO2 19 (L) 02/23/2021 11:32 PM    GLUCOSE 103 (H) 02/23/2021 11:32 PM    BUN 22 02/23/2021 11:32 PM    CREATININE 0.65 02/23/2021 11:32 PM    CREATININE 0.8 10/20/2008 11:59 AM    BUNCREATRAT 27 02/07/2020 10:18 AM      Lab Results   Component Value Date/Time    CHOLSTRLTOT 238 (H) 04/25/2017 11:05 AM     (H) 04/25/2017 11:05 AM    HDL 81 04/25/2017 11:05 AM    TRIGLYCERIDE 66 04/25/2017 11:05 AM       Lab Results   Component Value Date/Time    ALKPHOSPHAT 74 02/23/2021 11:32 PM    ASTSGOT 30 02/23/2021 11:32 PM    ALTSGPT 24 02/23/2021 11:32 PM    TBILIRUBIN 0.2 02/23/2021 11:32 PM        Imaging/Testing:   CT head without contrast personally reviewed without evidence of hemorrhage    CTA of the head and neck reviewed in chart.    CT perfusion  reviewed in chart.    Physical Exam:     General: 65-year-old female in bed in no acute distres  Cardio: Normal S1/S2. No peripheral edema.   Pulm: CTAX2. No respiratory distress.   Skin: Warm, dry, no rashes or lesions   Psychiatric: Appropriate affect. No active psychosis.  HEENT: Atraumatic head, normal sclera and conjunctiva, moist oral mucosa. No lid lag.  Abdomen: Soft, non tender. No masses or hepatosplenomegaly.    1a. Level of Consciousness (Alert, drowsy, etc): 0= Alert    1b. LOC Questions (Month, age): 0= Answers both correctly    1c. LOC Commands (Open/close eyes make fist/let go): 0= Obeys both correctly    2.   Best Gaze (Eyes open - patient follows examiner's finger on face): 0= Normal    3.   Visual Fields (introduce visual stimulus/threat to patient's field quadrants): 0= No visual loss  4.   Facial Paresis (Show teeth, raise eyebrows and squeeze eyes shut): 0= Normal     5a. Motor Arm - Left (Elevate arm to 90 degrees if patient is sitting, 45 degrees if  supine): 0= No drift    5b. Motor Arm - Right (Elevate arm to 90 degrees if patient is sitting, 45 degrees if supine): 0= No drift    6a. Motor Leg - Left (Elevate leg 30 degrees with patient supine): 0= No drift    6b. Motor Leg - Right  (Elevate leg 30 degrees with patient supine): 0= No drift    7.   Limb Ataxia (Finger-nose, heel down shin): 0= No ataxia    8.   Sensory (Pin prick to face, arm, trunk and leg - compare side to side): 0= Normal    9.  Best Language (Name item, describe a picture and read sentences): 0= No aphasia    10. Dysarthria (Evaluate speech clarity by patient repeating listed words): 0= Normal articulation    11. Extinction and Inattention (Use information from prior testing to identify neglect or  double simultaneous stimuli testing): 0= No neglect    Total NIH Score: 0    Assessment/Plan:    Susy Szymanski is a 65 y.o. female with history of atrial fibrillation status post ablation not currently on anticoagulation,  depression, GERD, osteoporosis presenting to the hospital for stroke and consulted for stroke.  Patient received TPA and had thrombectomy performed with good reperfusion.  Currently her NIH stroke scale is of 0.  Etiology of stroke is likely atrial fibrillation given the patient is not on anticoagulation.  We will proceed with post TPA care.  MRI of the brain would be helpful to determine timeline for initiation of anticoagulation.    Plan:  -Maintain blood pressure <140  -Hold antiplatelets for 24 hours after TPA administration.  -Obtain STAT head CT for acute neurologic deterioration, otherwise repeat Head CT 24 hours after TPA.  -Neuro checks/vital signs per protocol.  -MRI brain without contrast  -Hemoglobin A1c 5.4.  At goal.  -LDL pending  -Obtain Echocardiogram.  -Initiate smoking cessation/stroke education  -Schedule evaluation with PT/OT/ST  -Recommendations on initiation of anticoagulation pending MRI  -Plan discussed with consulting physician and patient's nurse.     Derikc Morales M.D., Diplomat of the American Board of Psychiatry and Neurology  Diplomat of [employer] Epilepsy Subspecialty   Assistant Clinical Professor, [facility] Neurology Consultant

## 2021-02-24 NOTE — ASSESSMENT & PLAN NOTE
On propranolol at home, mostly controlled  Neurology recommends follow-up for additional options if symptoms persist

## 2021-02-24 NOTE — ED TRIAGE NOTES
"Chief Complaint   Patient presents with   • Possible Stroke     LNW 2233. Difficulty walking, LLE/LUE weakness. GCS 15 a/o x 4. - thinners     Pt arrived for above c/o as Stroke Pre Alert BIB EMS from home. Hx a-fib. Denies thinners.     /78   Pulse 69   Temp 36.9 °C (98.4 °F) (Temporal)   Resp 18   Ht 1.702 m (5' 7\")   Wt 57.2 kg (126 lb)   LMP 01/01/2004   SpO2 97%   BMI 19.73 kg/m²   "

## 2021-02-24 NOTE — CONSULTS
"Neurology STROKE CODE H&P  Neurohospitalist Service, [facility]    Referring Physician: Dr. Reggie Freedman    STROKE CODE:   Chief Complaint   Patient presents with   • Possible Stroke     LNW 2233. Difficulty walking, LLE/LUE weakness. GCS 15 a/o x 4. - thinners       To obtain the most accurate data regarding the time called, and time patient seen, refer to the stroke run-sheet and chart.  For time of CT, refer to the radiology report. See A&P below for TPA Decision and door to needle time if and when applicable.    HPI: Susy Szymanski is a 65 y.o. woman with a history of afib s/p ablation, off AC presenting for whom neurology has been consulted for acute onset left sided weakness.  The patient was last seen normal 2230 when symptoms were noted.  Patient admits mild low grade headache, described as pressure, with a retro-orbital focus without nausea nor photophobia.  The patient denies vertigo, changes in vision, nor changes in speech.  The patient says that typically she is capable of walking miles at a time but since onset of symptoms feels debilitated and \"barely able to walk.\"    Review of systems: In addition to what is detailed in the HPI above, all other systems reviewed and are negative.    Past Medical History:    has a past medical history of Anxiety, Arrhythmia, ASTHMA, Bowel habit changes, Depression, GERD (gastroesophageal reflux disease), Hiatal hernia with GERD (11/5/2019), History of esophageal stricture (11/5/2019), Indigestion, Osteoporosis, Palpitations, and Premature atrial contractions.    FHx:  family history includes Cancer (age of onset: 70) in her father; Diabetes in her sister and son; Heart Disease in her mother; Heart Disease (age of onset: 42) in her son; Hypertension in her son.    SHx:   reports that she has never smoked. She has never used smokeless tobacco. She reports that she does not drink alcohol and does not use drugs.    Allergies:  Allergies " "  Allergen Reactions   • Amitriptyline      Excessive sedation --even at 10 mg dose   • Bactrim [Sulfamethoxazole W-Trimethoprim]      Shaking   • Levaquin      Nausea    • Lovastatin      Muscle aches  Muscle aches     • Trazodone      Irregular pulse       Medications:    Current Facility-Administered Medications:   •  [START ON 2/24/2021] LORazepam (ATIVAN) injection 0.5 mg, 0.5 mg, Intravenous, Once, Reggie Freedman M.D.    Current Outpatient Medications:   •  diphenhydramine (SOMINEX) 25 MG tablet, Take 25 mg by mouth., Disp: , Rfl:   •  propranolol LA (INDERAL LA) 60 MG CAPSULE SR 24 HR, Take 1 Cap by mouth every day., Disp: 30 Cap, Rfl: 11  •  propranolol (INDERAL) 20 MG Tab, Take 1 Tab by mouth 3 times a day as needed., Disp: 90 Tab, Rfl: 6  •  esomeprazole (NEXIUM) 40 MG delayed-release capsule, TAKE 1 CAPSULE BY MOUTH ONCE A DAY 30 MINUTES BEFORE BREAKFAST MEAL, Disp: 30 Cap, Rfl: 6  •  MYRBETRIQ 50 MG TABLET SR 24 HR, , Disp: , Rfl:   •  acetaminophen (TYLENOL) 325 MG Tab, Take 1 Tab by mouth 1 time daily as needed. Patient will not taken until follow up labs/results reviewed., Disp: , Rfl:     Physical Examination:    Vitals:    02/23/21 2347   BP: 160/78   Pulse: 69   Resp: 18   Temp: 36.9 °C (98.4 °F)   TempSrc: Temporal   SpO2: 97%   Weight: 57.2 kg (126 lb)   Height: 1.702 m (5' 7\")       General: Patient is awake and in no acute distress  Eyes: examination of optic disks not indicated at this time given acuity of consult  CV: afib    NEUROLOGICAL EXAM:     Mental status: Awake, alert and fully oriented, follows simple commands  Speech and language: speech is not dysarthric. The patient is able to name and repeat.  Cranial nerve exam: Pupils are equal, round and reactive to light bilaterally. Visual fields are full. Extraocular muscles are intact. Sensation in the face is intact to light touch. Face is notable for mild left sided nasolabial fold flattening. Hearing to finger rub equal. Palate " elevates symmetrically. Shoulder shrug is full. Tongue is midline.  Motor exam: Strength is 4/5 in the left extremities with decreased strength distally and proximally compared to the right.  + pronator drift.  No abnormal movements were seen on exam.  Sensory exam: No sensory deficits identified   Deep tendon reflexes: 1+ and symmetric  Coordination: no ataxia   Gait: deferred given patient preference    NIH Stroke Scale:    1a. Level of Consciousness (Alert, drowsy, etc): 0= Alert    1b. LOC Questions (Month, age): 0= Answers both correctly    1c. LOC Commands (Open/close eyes make fist/let go): 0= Obeys both correctly    2.   Best Gaze (Eyes open - patient follows examiner's finger on face): 0= Normal    3.   Visual Fields (introduce visual stimulus/threat to patient's field quadrants): 0= No visual loss  4.   Facial Paresis (Show teeth, raise eyebrows and squeeze eyes shut): 1= Minor     5a. Motor Arm - Left (Elevate arm to 90 degrees if patient is sitting, 45 degrees if  supine): 1= Drift    5b. Motor Arm - Right (Elevate arm to 90 degrees if patient is sitting, 45 degrees if supine): 0= No drift    6a. Motor Leg - Left (Elevate leg 30 degrees with patient supine): 1= Drift    6b. Motor Leg - Right  (Elevate leg 30 degrees with patient supine): 0= No drift    7.   Limb Ataxia (Finger-nose, heel down shin): 0= No ataxia    8.   Sensory (Pin prick to face, arm, trunk and leg - compare side to side): 0= Normal    9.  Best Language (Name item, describe a picture and read sentences): 0= No aphasia    10. Dysarthria (Evaluate speech clarity by patient repeating listed words): 0= Normal articulation    11. Extinction and Inattention (Use information from prior testing to identify neglect or  double simultaneous stimuli testing): 0= No neglect    Total NIH Score: 3    Modified Ami Scale (MRS): 0 = No symptoms    Objective Data:    Labs:  Lab Results   Component Value Date/Time    PROTHROMBTM 12.9 02/23/2021 11:32 PM     INR 0.94 02/23/2021 11:32 PM      Lab Results   Component Value Date/Time    WBC 6.2 02/23/2021 11:32 PM    RBC 4.25 02/23/2021 11:32 PM    HEMOGLOBIN 12.6 02/23/2021 11:32 PM    HEMATOCRIT 40.4 02/23/2021 11:32 PM    MCV 95.1 02/23/2021 11:32 PM    MCH 29.6 02/23/2021 11:32 PM    MCHC 31.2 (L) 02/23/2021 11:32 PM    MPV 10.9 02/23/2021 11:32 PM    NEUTSPOLYS 59.90 02/23/2021 11:32 PM    LYMPHOCYTES 28.10 02/23/2021 11:32 PM    MONOCYTES 10.50 02/23/2021 11:32 PM    EOSINOPHILS 1.00 02/23/2021 11:32 PM    BASOPHILS 0.20 02/23/2021 11:32 PM    HYPOCHROMIA 1+ 10/20/2008 11:59 AM      Lab Results   Component Value Date/Time    SODIUM 142 02/12/2021 06:19 AM    POTASSIUM 4.2 02/12/2021 06:19 AM    CHLORIDE 104 02/12/2021 06:19 AM    CO2 27 02/12/2021 06:19 AM    GLUCOSE 107 (H) 02/12/2021 06:19 AM    BUN 19 02/12/2021 06:19 AM    CREATININE 0.73 02/12/2021 06:19 AM    CREATININE 0.8 10/20/2008 11:59 AM    BUNCREATRAT 27 02/07/2020 10:18 AM      Lab Results   Component Value Date/Time    CHOLSTRLTOT 238 (H) 04/25/2017 11:05 AM     (H) 04/25/2017 11:05 AM    HDL 81 04/25/2017 11:05 AM    TRIGLYCERIDE 66 04/25/2017 11:05 AM       Lab Results   Component Value Date/Time    ALKPHOSPHAT 87 02/12/2021 06:19 AM    ASTSGOT 24 02/12/2021 06:19 AM    ALTSGPT 15 02/12/2021 06:19 AM    TBILIRUBIN 0.4 02/12/2021 06:19 AM        Imaging/Testing:    I interpreted and/or reviewed the patient's neuroimaging    CT-CEREBRAL PERFUSION ANALYSIS   Final Result      1.  Cerebral blood flow less than 30% likely representing completed infarct = 0 mL.      2.  T Max more than 6 seconds likely representing combination of completed infarct and ischemia = 4 mL.      3.  Mismatched volume likely representing ischemic brain/penumbra = 4 mL      4.  Please note that the cerebral perfusion was performed on the limited brain tissue around the basal ganglia region. Infarct/ischemia outside the CT perfusion sections can be missed in this study.       CT-HEAD W/O   Final Result      No acute intracranial abnormality.      DX-CHEST-PORTABLE (1 VIEW)    (Results Pending)   CT-CTA HEAD WITH & W/O-POST PROCESS    (Results Pending)   CT-CTA NECK WITH & W/O-POST PROCESSING    (Results Pending)   IR-THROMBO MECHANICAL ARTERY,INIT    (Results Pending)     CTA head 2/23/21: right M2 occlusion    Assessment and Plan:    Susy Szymanski is a 65 y.o.woman with a history of afib s/p ablation, off AC, presenting for whom neurology has been consulted for acute onset right sided weakness.  The patient is within the window for tPA with no apparent contraindications.  The differential diagnosis as it pertains to etiology includes cardioembolic phenomena int he setting of afib off AC.  GWTG: note that a minor delay was incurred in door to needle time as patient was presented with the indication and potential risk of tPA, with some equivocation given low NIHSS; ultimately it was the results of the CTP and CTA and officially radiology read that pushed patient to opt for thrombolytic therapy ie. Clinical decision time.  Support for IV tPA in the literature per PRISMS trial: debilitating stroke eg. Weakness with low NIHSS. The CTA did demonstrate a right M2 occlusion in the territory of the deficit with perfusion abnormality demonstrated on CTP.  The ASPECTS score is favorable: 10 with good collaterals.  Patient to be treated with tPA followed by STAT IR intervention in an attempt to retreive acute thrombus, then admitted to the ICU for additional workup and to institute secondary stroke prevention measures.    Plan:    - ACUTE TREATMENT WITH TPA as managed with pharmacy and coordinated care  - tPA followed by STAT IR intervention for mechanical thrombectomy; order placed  - Admit to the intensive care unit  - Neurology checks and vital signs per protocol; perform swallow screen  - obtain normoglycemia and avoid hypo- or hyper -natremia; aim for normothermia  - Hold  antiplatelets and any blood thinners for 24 hours status post tPA administration  - high intensity statin per SPARCL Trial if and when safe to swallow  - serum studies for stroke risk factors: lipid panel & hemoglobin A1C  - To identify stroke territory, obtain MRI brain  - Obtain a 2D echocardiogram  - evaluate and treat with PT/OT/ST; physiatry consult  - stroke education    After the endovascular intervention, please follow the following recommendations regarding blood pressure parameters:    If TICI 3: maintain systolic BP < 140  If TICI 2b: maintain systolic BP < 160  If TICI 2a or less, maintain systolic BP < 180 per tPA protocol    This is in an effort to minimize reperfusion injury and/or hemorrhagic conversion    The evaluation of the patient, and recommended management, was discussed with Dr. Reggie Freedman (ERP), and Dr. Elias Wagner (neuro-IR).    Post procedure addendum: TICI 2b.  Recommend SBP < 160; control with nicardipine gtt.    Harlan Michele MD  Neurohospitalist, Acute Care Services   of Neurology    CRITICAL CARE    Upon my evaluation, this patient had a high probability of imminent or life-threatening deterioration due to cerebrovascular accident which required my direct attention, intervention, and personal management.  I personally provided 37 minutes of total critical care time outside of time spent on separately billable/documented procedures. Time includes: determination of tPA and IR candidacy, review of laboratory data, review of radiology studies, discussion with consultants, discussion with family/patient, monitoring for potential decompensation.  Interventions were performed as documented in the chart.

## 2021-02-24 NOTE — ASSESSMENT & PLAN NOTE
Acute ischemic stroke characterized by left-sided weakness  Imaging revealed a right M2 thrombus  Per TOAST likely cardioembolic given history of paroxysmal afib  S/P alteplase with mechanical thrombectomy with resulting TICI 2b flow  Strict blood pressure control with goal SBP less than 160, labetalol and hydralazine as needed  Neuro checks per protocol  Echocardiogram shows normal EF 65% with no significant valvular abnormalities, probable dilated right ventricle and RVSP 35 mmHg  Lipid panel unremarkable  Ejection fraction 65% on echo  MRI brain results pending  Begin aspirin 24 hours after alteplase  High intensity statin

## 2021-02-24 NOTE — PROGRESS NOTES
Pt. Groin site has small area that is semi-firm with small spot of blood.  No neuro changes.  Notified Dr. Pereyra.  Dr. Pereyra came to bedside and assessed pt. No new orders at this time.

## 2021-02-24 NOTE — PROGRESS NOTES
"Pt. Complained of having an aura and \"seeing lines\" during thrombectomy procedure, no other neuro changes, states \"I get auras at home, I do not have a headache with them, they usually go away within a half an hour if I lay down in a dark room.\"  Notified radiology who stated to notify neurology.  Notified Dr. Michele of neurology who gave orders for a routine EEG.  Will place, will continue to assess and intervene appropriately.   "

## 2021-02-24 NOTE — PROCEDURES
ROUTINE ELECTROENCEPHALOGRAM REPORT      Referring provider: Dr. Michele.     DOS: 2/24/2021 (total recording of 25 minutes)    INDICATION:  Susy Szymanski 65 y.o. female presenting with strokelike symptoms, weakness, and ataxia.    CURRENT ANTIEPILEPTIC REGIMEN: None.    TECHNIQUE: 30 channel routine electroencephalogram (EEG) was performed in accordance with the international 10-20 system. The study was reviewed in bipolar and referential montages. The recording examined the patient during wakeful and drowsy state(s).     DESCRIPTION OF THE RECORD:  During the wakefulness, the background showed a symmetrical 9 Hz alpha activity posteriorly with amplitude of 70 mV.  There was reactivity to eye closure/opening.  A normal anterior-posterior gradient was noted with faster beta frequencies seen anteriorly.  During drowsiness, theta/delta frequencies were seen.    ACTIVATION PROCEDURES:   Intermittent Photic stimulation was performed in a stepwise fashion from 1 to 30 Hz and elicited a normal response (photic driving), most noticeable in the posterior leads.      ICTAL AND/OR INTERICTAL FINDINGS:   No focal or generalized epileptiform activity noted. No regional slowing was seen during this routine study.  No clinical events or seizures were reported or recorded during the study.     EKG: sampling of the EKG recording demonstrated sinus rhythm.       INTERPRETATION:  This is a normal routine EEG recording in the awake and drowsy state(s).  Clinical correlation is recommended.    Note: A normal EEG does not rule out epilepsy.  If the clinical suspicion remains high for seizures, a prolonged recording to capture clinical or subclinical events may be helpful.        David España MD   Epilepsy and Neurodiagnostics.   Clinical  of Neurology [facility].   Diplomate in Neurology, Epilepsy, and Electrodiagnostic Medicine.   Office: [phone]  Fax:  [phone]

## 2021-02-24 NOTE — PROGRESS NOTES
Interventional Radiology RN Note:     Cerebral angiogram with mechanical thrombectomy performed by Dr. Wagner with assistance of RT Hathaway; Prior to start, procedure explained to patient's  Jesús by MD, consent obtained, all questions/concerns addressed; No procedural sedation administered per MD request.     Procedure completed via right femoral artery; thrombectomy completed using penumbra suction; Puncture site sealed with angioseal upon completion, covered with gauze and tegaderm; Dressing C/D/I.    Angioseal Information:  Terumo Angio-Seal VIP Vascular Closure Device; 8Fr (2.67mm); REF# [id number]; LOT# [id number]     Patient tolerated procedure well; Alert and appropriate post-intervention.     Right pedal pulse 1+ pre-procedure; 1+ post-procedure.    Post-procedure TICI Score: 2B  Post-procedure NIHSS: 1     ICU RN Mikayla at  to receive patient upon completion of procedure; Report given to ICU RN, who then assumed patient care.

## 2021-02-24 NOTE — THERAPY
Physical Therapy Contact Note    PT consult received and acknowledged. Per chart review patient is s/p thrombectomy. Will initiate PT eval as appropriate 24 hours after procedure as per protocol.    Yamini Cam, PT, DPT  [phone]

## 2021-02-24 NOTE — THERAPY
Occupational Therapy Contact Note:    OT orders received, pt is s/p thrombectomy, not cleared to mobilize until 5AM on 2/25. Will hold and round back as able.    Zeny Jacob, OTR/L

## 2021-02-24 NOTE — ASSESSMENT & PLAN NOTE
-LLE weakness/difficulty ambulating per HPI  -CTA suggestive of M2 occlusion  -s/p TPA and IR thrombectomy  -admitted to ICU  -neurology consulted, recs included in plan  -TICI 2b flow following IR thrombectomy, goal SBP <160 and nicardipine drip ordered  -goal for normoglycemia/natremia/thermia, lipid panel, A1c  -hold antiplatelets/blood thinners for 24hr s/p TPA  -high intensity statin when cleared for diet  -MRI brain, echo ordered  -PT/OT/ST, phsyiatry  -NPO until speech eval  -neurochecks

## 2021-02-24 NOTE — CARE PLAN
Problem: Acute Care of the Stroke Patient  Goal: Optimal Outcome for the Stroke patient  Intervention: Vital Signs and Neuro Checks per order  Note: Pt. Post TPA/thrombectomy, experienced aura, notified neurology, EEG ordered per Dr. Michele.      Problem: Skin Integrity  Goal: Skin Integrity is maintained or improved  Intervention: Measure and Document any Skin Breakdown  Note: Pt. Post thrombectomy to R groin, site CDI, soft, mild pain.

## 2021-02-24 NOTE — CONSULTS
Physical Medicine and Rehabilitation Consultation         Initial Consult      Initial Consultation Date: 2/24/2021  Consulting provider: Dr. Sanchez; Dr. Michele  Reason for consultation: assess for acute inpatient rehab appropriateness  LOS: 0 Day(s)      Chief complaint: stroke      HPI:   The patient is a 65 y.o. female with a past medical history of essential tremor on propranolol; GERD on esomeprazole; hx of AFib ablation (2019);  who presented on 2/23/2021 11:33 PM with LLE weakness and difficulty walking. CT with right M1 acute occlusion s/p TPA and IR thrombectomy on 2/24 with subsequent TICI 2b flow. EEG 2/24 without abnormal findings.         Social Hx:  spouse/family may be able to assist      Current level of function:   PT/OT: pending eval when feasible, likely 2/25  SLP, 2/24: reg/thin        PMH:  Past Medical History:   Diagnosis Date   • Anxiety    • Arrhythmia     afib   • ASTHMA    • Bowel habit changes     constipation   • Depression    • GERD (gastroesophageal reflux disease)    • Hiatal hernia with GERD 11/5/2019   • History of esophageal stricture 11/5/2019   • Indigestion    • Osteoporosis    • Palpitations    • Premature atrial contractions          PSH:  Past Surgical History:   Procedure Laterality Date   • COLONOSCOPY WITH POLYP  June 2008    benign polyp  Dr Paul    • CHOLECYSTECTOMY  January 2008    laparoscopic   • EGD WITH ASP/BX  September 2006    inflammation only ---Dr Paul   • CHOLECYSTECTOMY     • SINUSOTOMIES           FHX: Reviewed.  Family History   Problem Relation Age of Onset   • Cancer Father 70        cancer of unknown primary   • Heart Disease Mother    • Heart Disease Son 42        MI and coronary stent placement   • Diabetes Son    • Hypertension Son    • Diabetes Sister                  Medications:  Current Facility-Administered Medications   Medication Dose   • NS infusion 50 mL  50 mL   • iohexol (OMNIPAQUE) 300 mg/mL  40 mL   • senna-docusate  "(PERICOLACE or SENOKOT S) 8.6-50 MG per tablet 2 tablet  2 tablet    And   • polyethylene glycol/lytes (MIRALAX) PACKET 1 Packet  1 Packet    And   • magnesium hydroxide (MILK OF MAGNESIA) suspension 30 mL  30 mL    And   • bisacodyl (DULCOLAX) suppository 10 mg  10 mg   • lactated ringers infusion     • omeprazole (PRILOSEC) capsule 20 mg  20 mg   • [START ON 2/25/2021] aspirin (ASA) tablet 325 mg  325 mg    Or   • [START ON 2/25/2021] aspirin (ASA) chewable tab 324 mg  324 mg    Or   • [START ON 2/25/2021] aspirin (ASA) suppository 300 mg  300 mg   • atorvastatin (LIPITOR) tablet 40 mg  40 mg   • hydrALAZINE (APRESOLINE) injection 20 mg  20 mg   • labetalol (NORMODYNE/TRANDATE) injection 10 mg  10 mg       Allergies:  Allergies   Allergen Reactions   • Amitriptyline      Excessive sedation --even at 10 mg dose   • Bactrim [Sulfamethoxazole W-Trimethoprim]      Shaking   • Levaquin      Nausea    • Lovastatin      Muscle aches  Muscle aches     • Trazodone      Irregular pulse         Vitals: /61   Pulse (!) 54   Temp 35.9 °C (96.7 °F) (Temporal)   Resp 18   Ht 1.702 m (5' 7\")   Wt 59.6 kg (131 lb 6.3 oz)   SpO2 98%           Labs: Reviewed and significant for 2/23: Hgb 12.6, Na 137, K 4.2, Cr 0.65  Recent Labs     02/23/21  2332   RBC 4.25   HEMOGLOBIN 12.6   HEMATOCRIT 40.4   PLATELETCT 204   PROTHROMBTM 12.9   APTT 24.7   INR 0.94     Recent Labs     02/23/21  2332   SODIUM 137   POTASSIUM 4.2   CHLORIDE 106   CO2 19*   GLUCOSE 103*   BUN 22   CREATININE 0.65   CALCIUM 9.1     Recent Results (from the past 24 hour(s))   COD (ADULT)    Collection Time: 02/23/21 11:31 PM   Result Value Ref Range    ABO Grouping Only A     Rh Grouping Only POS     Antibody Screen-Cod NEG    CBC WITH DIFFERENTIAL    Collection Time: 02/23/21 11:32 PM   Result Value Ref Range    WBC 6.2 4.8 - 10.8 K/uL    RBC 4.25 4.20 - 5.40 M/uL    Hemoglobin 12.6 12.0 - 16.0 g/dL    Hematocrit 40.4 37.0 - 47.0 %    MCV 95.1 81.4 - 97.8 " fL    MCH 29.6 27.0 - 33.0 pg    MCHC 31.2 (L) 33.6 - 35.0 g/dL    RDW 49.5 35.9 - 50.0 fL    Platelet Count 204 164 - 446 K/uL    MPV 10.9 9.0 - 12.9 fL    Neutrophils-Polys 59.90 44.00 - 72.00 %    Lymphocytes 28.10 22.00 - 41.00 %    Monocytes 10.50 0.00 - 13.40 %    Eosinophils 1.00 0.00 - 6.90 %    Basophils 0.20 0.00 - 1.80 %    Immature Granulocytes 0.30 0.00 - 0.90 %    Nucleated RBC 0.00 /100 WBC    Neutrophils (Absolute) 3.71 2.00 - 7.15 K/uL    Lymphs (Absolute) 1.74 1.00 - 4.80 K/uL    Monos (Absolute) 0.65 0.00 - 0.85 K/uL    Eos (Absolute) 0.06 0.00 - 0.51 K/uL    Baso (Absolute) 0.01 0.00 - 0.12 K/uL    Immature Granulocytes (abs) 0.02 0.00 - 0.11 K/uL    NRBC (Absolute) 0.00 K/uL   COMP METABOLIC PANEL    Collection Time: 02/23/21 11:32 PM   Result Value Ref Range    Sodium 137 135 - 145 mmol/L    Potassium 4.2 3.6 - 5.5 mmol/L    Chloride 106 96 - 112 mmol/L    Co2 19 (L) 20 - 33 mmol/L    Anion Gap 12.0 7.0 - 16.0    Glucose 103 (H) 65 - 99 mg/dL    Bun 22 8 - 22 mg/dL    Creatinine 0.65 0.50 - 1.40 mg/dL    Calcium 9.1 8.5 - 10.5 mg/dL    AST(SGOT) 30 12 - 45 U/L    ALT(SGPT) 24 2 - 50 U/L    Alkaline Phosphatase 74 30 - 99 U/L    Total Bilirubin 0.2 0.1 - 1.5 mg/dL    Albumin 3.8 3.2 - 4.9 g/dL    Total Protein 6.3 6.0 - 8.2 g/dL    Globulin 2.5 1.9 - 3.5 g/dL    A-G Ratio 1.5 g/dL   PROTHROMBIN TIME    Collection Time: 02/23/21 11:32 PM   Result Value Ref Range    PT 12.9 12.0 - 14.6 sec    INR 0.94 0.87 - 1.13   APTT    Collection Time: 02/23/21 11:32 PM   Result Value Ref Range    APTT 24.7 24.7 - 36.0 sec   TROPONIN    Collection Time: 02/23/21 11:32 PM   Result Value Ref Range    Troponin T 13 6 - 19 ng/L   ESTIMATED GFR    Collection Time: 02/23/21 11:32 PM   Result Value Ref Range    GFR If African American >60 >60 mL/min/1.73 m 2    GFR If Non African American >60 >60 mL/min/1.73 m 2   EKG (NOW)    Collection Time: 02/23/21 11:49 PM   Result Value Ref Range    Report       [facility]  [facility] Emergency Dept.    Test Date:  2021  Pt Name:    DEAN CRUZ               Department: ER  MRN:        [id number]                      Room:       [facility] [place]  Gender:     Female                       Technician: [id number]  :        [id number]                   Requested By:DENISE MONROE  Order #:    [id number]                    Reading MD: DENISE MONROE MD    Measurements  Intervals                                Axis  Rate:       72                           P:          74  NJ:         160                          QRS:        77  QRSD:       80                           T:          57  QT:         416  QTc:        456    Interpretive Statements  SINUS RHYTHM  Compared to ECG 2021 06:42:07  No significant changes  Electronically Signed On 2021 0:46:18 PST by DENISE MONROE MD     SARS-COV Antigen RADHA: Collect dry nasal swab AND NP swab in VTM    Collection Time: 21 11:56 PM   Result Value Ref Range    SARS-CoV-2 Source NP Swab     SARS-COV ANTIGEN RADHA NotDetected Not-Detected   SARS-CoV-2 PCR (24 hour In-House): Collect NP swab in VTM    Collection Time: 21 11:56 PM    Specimen: Nasopharyngeal; Respirate   Result Value Ref Range    SARS-CoV-2 Source NP Swab     SARS-CoV-2 by PCR NotDetected    EKG    Collection Time: 21  2:32 AM   Result Value Ref Range    Report       [facility]    Test Date:  2021  Pt Name:    DEAN CRUZ               Department: ICC  MRN:        [id number]                      Room:       R102  Gender:     Female                       Technician: DGG  :        1955                   Requested By:BARBARA LEMOS  Order #:    [id number]                    Reading MD: Dilcia Cheney MD    Measurements  Intervals                                Axis  Rate:       67                           P:          67  NJ:         163                          QRS:        69  QRSD:       91                           T:          62  QT:          434  QTc:        458    Interpretive Statements  SINUS RHYTHM  Compared to ECG 02/23/2021 23:49:32  No significant changes  Electronically Signed On 2- 6:55:09 PST by Dilcia Cheney MD     ABO Rh Confirm    Collection Time: 02/24/21  5:50 AM   Result Value Ref Range    ABO Rh Confirm A POS    Hemoglobin A1C    Collection Time: 02/24/21  5:50 AM   Result Value Ref Range    Glycohemoglobin 5.4 4.0 - 5.6 %    Est Avg Glucose 108 mg/dL   EC-ECHOCARDIOGRAM COMPLETE W/O CONT    Collection Time: 02/24/21  9:04 AM   Result Value Ref Range    Eject.Frac. MOD BP 69.63     Eject.Frac. MOD 4C 61.79     Eject.Frac. MOD 2C 73.71     Left Ventrical Ejection Fraction 65            Imaging:  CT-CTA HEAD WITH & W/O-POST PROCESS  Result Date: 2/23/2021 2/23/2021 11:33 PM HISTORY/REASON FOR EXAM:  Emergency Medical Condition ? Stroke Dizziness, left-sided weakness TECHNIQUE/EXAM DESCRIPTION: CT angiogram of the [place] without and with contrast.  Initial precontrast images were obtained of the head from the skull base through the vertex.  Postcontrast images were obtained of the [place] following the power injection of nonionic contrast at 5.0 mL/sec. Thin-section helical images were obtained with overlapping reconstruction interval. Coronal and sagittal multiplanar volume reformats were generated.  3D angiographic images were reviewed on PACS.  Maximum intensity projection (MIP) images were generated and reviewed. 80 mL of Omnipaque 350 nonionic contrast was injected intravenously. Low dose optimization technique was utilized for this CT exam including automated exposure control and adjustment of the mA and/or kV according to patient size. COMPARISON:  None. FINDINGS: Petrocavernous and supraclinoid ICA segments are patent.  There is filling defect in the proximal M2 segment inferior division right middle cerebral artery with some filling more distally suggesting a nonocclusive thromboembolus. Large left  posterior communicating artery. JESSICA and left MCA branches are patent. The intradural vertebral, basilar and posterior cerebral arteries are patent. No aneurysm or high flow vascular malformation. 3D angiographic/MIP images of the vasculature confirm the vascular findings as described above.     Filling defect in the proximal M2 segment right middle cerebral artery inferior division consistent with thrombus/embolus. These findings were discussed with DENISE MONROE on 2/23/2021 11:55 PM.       CT-CTA NECK WITH & W/O-POST PROCESSING  Result Date: 2/23/2021 2/23/2021 11:33 PM HISTORY/REASON FOR EXAM:  Emergency Medical Condition ? Stroke Dizziness, left-sided weakness TECHNIQUE/EXAM DESCRIPTION: CT angiogram of the neck with contrast. Postcontrast images were obtained of the neck from the great vessels through the skull base following the power injection of nonionic contrast at 5.0 mL/sec. Thin-section helical images were obtained with overlapping reconstruction interval. Coronal and oblique multiplanar volume reformats were generated. Cervical internal carotid artery percent stenosis is calculated using the standard method according to the NASCET criteria wherein a segment of uniform caliber mid or distal cervical internal carotid is used as the reference denominator. 3D angiographic images were reviewed on PACS.  Maximum intensity projection (MIP) images were generated and reviewed 80 mL of Omnipaque 350 nonionic contrast was injected intravenously. Low dose optimization technique was utilized for this CT exam including automated exposure control and adjustment of the mA and/or kV according to patient size. COMPARISON:  None. FINDINGS: There is a three-vessel aortic arch.  Origin of the supra aortic arteries are patent.  Irregular beaded appearance of the mid and distal cervical internal carotid arteries is most suggestive of fibromuscular dysplasia. No significant plaque or stenosis of the carotid arteries  is seen. No dissection.  Vertebral arteries are patent from their origins to the vertebrobasilar junction.  The right vertebral artery is dominant. 3D angiographic/MIP images of the vasculature confirm the vascular findings as described above.     Beaded appearance of the cervical internal carotid arteries is suggestive of fibromuscular dysplasia. No significant stenosis or dissection of the neck arteries.      CT-HEAD W/O  Result Date: 2/23/2021 2/23/2021 11:32 PM HISTORY/REASON FOR EXAM:  Emergency Medical Condition ? Stroke. Dizziness, left-sided weakness TECHNIQUE/EXAM DESCRIPTION AND NUMBER OF VIEWS: CT of the head without contrast. The study was performed on a helical multidetector CT scanner. Contiguous 2.5 mm axial sections were obtained from the skull base through the vertex. Up to date radiation dose reduction adjustments have been utilized to meet ALARA standards for radiation dose reduction. COMPARISON:  None available FINDINGS: Ventricles, cisterns and cortical sulci are within normal limits. No acute intracranial hemorrhage, major vascular territory infarct, mass effect, midline shift or hydrocephalus. The visualized paranasal sinuses and mastoids are clear. No depressed calvarial fracture.     No acute intracranial abnormality.      EC-ECHOCARDIOGRAM COMPLETE W/O CONT  Result Date: 2/24/2021  Transthoracic Echo Report Echocardiography Laboratory CONCLUSIONS Normal left ventricular size, wall thickness, and systolic function. Right ventricular free wall not well visualized; probably dilated right ventricle. Mild tricuspid regurgitation. Estimated right ventricular systolic pressure  is 35 mmHg; borderline. No pericardial effusion seen. Compared to prior echo 07/02/19 similar findings.               ASSESSMENT:  Patient is a 65 y.o. female admitted with LLE weakness and difficulty walking. CT with right M1 acute occlusion s/p TPA and IR thrombectomy on 2/24 with subsequent TICI 2b flow. EEG 2/24  without abnormal findings.     #Rehab:   -Vitals: borderline robert; stable BP, RA  -Insurance: Medicare/[employer]  -Rehab Impairment Code: 0001.1 - Stroke: Left Body Involvement (Right Brain)  -Reason for admission: Stroke (HCC)  -Discharge support: spouse/family may be able to assist  -Pending therapy eval when feasible; Given current function, anticipate she may be too high functioning (NIH 0) for inpatient rehab, but will await formal evaluation    #Neuro: LLE weakness and difficulty walking. CT with right M1 acute occlusion s/p TPA and IR thrombectomy on 2/24 with subsequent TICI 2b flow. EEG 2/24 without abnormal findings. NIH 0 as of 2/24.   -asa (to start 24h post TPA)  -atorvastatin  -MRI brain pending; AC depending on MRI     #GI: GERD  -omeprazole    #CV: Hx of Afib s/p ablation; Echo reassuring 2/24  -asa, atorvastatin    #Essential tremor:   -home propranolol     #Bowel/Bladder: none since admitted earlier today; senna s    #DVT PPX:   -SCDs  -AC depending on MRI        Thank you for allowing us to participate in the care of this patient.             Kaleb Hall MD  Physical Medicine and Rehabilitation   2/24/2021

## 2021-02-24 NOTE — CARE PLAN
Problem: Communication  Goal: The ability to communicate needs accurately and effectively will improve  Outcome: PROGRESSING AS EXPECTED     Problem: Safety  Goal: Will remain free from injury  Outcome: PROGRESSING AS EXPECTED  Goal: Will remain free from falls  Outcome: PROGRESSING AS EXPECTED     Problem: Infection  Goal: Will remain free from infection  Outcome: PROGRESSING AS EXPECTED     Problem: Venous Thromboembolism (VTW)/Deep Vein Thrombosis (DVT) Prevention:  Goal: Patient will participate in Venous Thrombosis (VTE)/Deep Vein Thrombosis (DVT)Prevention Measures  Outcome: PROGRESSING AS EXPECTED     Problem: Bowel/Gastric:  Goal: Normal bowel function is maintained or improved  Outcome: PROGRESSING AS EXPECTED  Goal: Will not experience complications related to bowel motility  Outcome: PROGRESSING AS EXPECTED     Problem: Knowledge Deficit  Goal: Knowledge of disease process/condition, treatment plan, diagnostic tests, and medications will improve  Outcome: PROGRESSING AS EXPECTED  Goal: Knowledge of the prescribed therapeutic regimen will improve  Outcome: PROGRESSING AS EXPECTED     Problem: Discharge Barriers/Planning  Goal: Patient's continuum of care needs will be met  Outcome: PROGRESSING AS EXPECTED     Problem: Pain Management  Goal: Pain level will decrease to patient's comfort goal  Outcome: PROGRESSING AS EXPECTED     Problem: Fluid Volume:  Goal: Will maintain balanced intake and output  Outcome: PROGRESSING AS EXPECTED     Problem: Emergency Care of the Stroke Patient  Goal: Establish Time of Onset  Outcome: PROGRESSING AS EXPECTED  Note:             Goal: Medication as ordered  Outcome: PROGRESSING AS EXPECTED  Note:      Goal: Activity as appropriate  Outcome: PROGRESSING AS EXPECTED  Note:      Goal: Consult Placed  Outcome: PROGRESSING AS EXPECTED  Note:         Problem: Acute Care of the Stroke Patient  Goal: Optimal Outcome for the Stroke patient  Outcome: PROGRESSING AS EXPECTED      Problem: Safety  Goal: Free from accidental injury  Outcome: PROGRESSING AS EXPECTED  Goal: Free from intentional harm  Outcome: PROGRESSING AS EXPECTED  Goal: Free from self harm  Outcome: PROGRESSING AS EXPECTED  Goal: Isolation Precautions for patient and staff safety  Outcome: PROGRESSING AS EXPECTED     Problem: Knowledge Deficit  Goal: Patient/Significant other demonstrates understanding of disease process, treatment plan, medications and discharge instructions  Outcome: PROGRESSING AS EXPECTED     Problem: Psychosocial needs  Goal: Spiritual needs incorporated in hospitalization  Outcome: PROGRESSING AS EXPECTED  Goal: Cultural needs incorporated in hospitalization  Outcome: PROGRESSING AS EXPECTED  Goal: Anxiety reduction  Outcome: PROGRESSING AS EXPECTED     Problem: Discharge Barriers/Planning  Goal: Patient's Continuum of care needs are met  Outcome: PROGRESSING AS EXPECTED     Problem: Risk for Impaired Mobility--Activity Intolerance  Goal: Mobilize and/or Transfer Safely with Maximum [place]  Outcome: PROGRESSING AS EXPECTED  Goal: Activity Level appropriate for Discharge or Transfer  Outcome: PROGRESSING AS EXPECTED     Problem: Oxygenation/Respiratory Function  Goal: Patient will Achieve/Maintain Optimum Respiratory Rate/Effort  Outcome: PROGRESSING AS EXPECTED     Problem: Risk of Aspiration  Goal: Absence of aspiration  Outcome: PROGRESSING AS EXPECTED     Problem: Pain  Goal: Alleviation of Pain or a reduction in pain to the patient's comfort goal  Outcome: PROGRESSING AS EXPECTED     Problem: Hemodynamic Status  Goal: Stable Vital Signs and Fluid Balance  Outcome: PROGRESSING AS EXPECTED     Problem: Risk for Deep Vein Thrombosis/Venous Thromboembolism  Goal: DVT/VTE Prevention Measures in Place  Outcome: PROGRESSING AS EXPECTED     Problem: Nutrition Deficit  Goal: Adequate Food and Fluid Intake  Outcome: PROGRESSING AS EXPECTED     Problem: Self Care Deficit  Goal: Ability to bathe, groom  and dress independently or with assistance  Outcome: PROGRESSING AS EXPECTED  Goal: Ability to feed and maintain oral hygiene independently or with assistance  Outcome: PROGRESSING AS EXPECTED  Goal: Ability to toilet independently or with assistance  Outcome: PROGRESSING AS EXPECTED     Problem: Elimination  Goal: Establishment and Maintenance of regular bowel elimination  Outcome: PROGRESSING AS EXPECTED  Goal: Regular urinary elimination  Outcome: PROGRESSING AS EXPECTED     Problem: Risk for injury related to physical restraint use  Goal: Safe and appropriate use of physical restraints. Restraints discontinued at the earliest possibility while ensuring patient safety.  Outcome: PROGRESSING AS EXPECTED

## 2021-02-24 NOTE — DISCHARGE PLANNING
Medical Social Work    Referral: Stroke    Intervention: Pt is a 65 year old female brought in by [facility] from home for possible stroke.  Pt is Susy Szymanski (: 1955).  Pt is alert and oriented at this time.  Per chart pt's emergency contact is her , Jesús ([phone]).      Plan: SW will follow as needed.

## 2021-02-24 NOTE — DISCHARGE PLANNING
[facility] Acute [facility] Transitional Care Coordination     Referral from:  Dr. Sanhcez    Facesheet indicates: MCR/ANT    Potential Rehab Diagnosis: R M2 occlusion.    Chart review indicates patient may have on going medical management and may have therapy needs to possibly meet inpatient rehab facility criteria with the goal of returning to community.    D/C support: TBD     Physiatry consultation forwarded per protocol.      R M2 occlusion.  S/P mechanical thrombectomy.  Would appreciate TX evals once appropriate.  Physiatry to consult. Waiting on additional information to determine appropriateness for acute inpatient rehabilitation. Will continue to follow.     Last Covid test date 02-23-21 not detected    Thank you for the referral.

## 2021-02-24 NOTE — THERAPY
Speech Language Pathology   Clinical Swallow Evaluation     Patient Name: Susy Szymanski  AGE:  65 y.o., SEX:  female  Medical Record #: [id number]  Today's Date: 2/24/2021          Assessment  Patient is 65 y.o. female admitted today with difficulty walking and LLE weakness. Found to have right M2 occlusion. S/p tPA and thrombectomy. Head CT negative for acute abnormality. CXR negative. No previous SLP notes in EMR.      Patient seen this date for clinical swallow evaluation. Patient strict NPO/NN pending evaluation. Patient's oral motor evaluation was WNL. Patient denied any dysphagia or any changes in speech with onset of symptoms. Patient consumed PO trials of single ice chips, purees, pudding, soft solids, mixed consistencies, crackers, and thin liquids via cup sip and straw. Patient consumed all PO trials with no overt s/sx of aspiration. Laryngeal elevation palpated as complete and initiation of swallow trigger was timely. She required minimal assistance with feeding d/t essential tremors which she reports are baseline.     At this time, recommend patient start regular diet w/ thin liquids. Patient does not appear to require any further acute SLP services for dysphagia. SLP will follow for cognitive evaluation as able and appropriate.     Plan  Recommend Speech Therapy for Evaluation only  (However, SLP will follow for cognitive evaluation if warranted)     Discharge Recommendations: SLP evaluation completed.  Patient is not being actively followed for therapy services at this time, however may be seen if requested by attending provider for 1 more visit within 30 days to address any discharge needs or if the patient has a change in status.       Objective     02/24/21 1048   Vitals   O2 Delivery Device None - Room Air   Oral Motor Eval    Is Patient Able to Complete Oral Motor Eval Yes, Within Normal Limits   Laryngeal Function   Voice Quality Within Functional Limits   Volutional Cough Within Functional  Limits   Excursion Upon Swallow Complete   Max Phonation Time (Seconds) 5   Oral Food Presentation   Ice Chips Within Functional Limits   Single Swallow Thin (0) Within Functional Limits   Serial Swallow Thin (0) Within Functional Limits   Liquidised (3) Within Functional Limits   Pureed (4) Within Functional Limits   Soft & Bite-Sized (6) - (Dysphagia III) Within Functional Limits   Regular (7) Within Functional Limits   Regular-Easy to Chew (7) Within Functional Limits   Self Feeding Independent   Dysphagia Strategies / Recommendations   Strategies / Interventions Recommended (Yes / No) Yes   Compensatory Strategies Head of Bed 90 Degrees During Eating / Drinking   Diet / Liquid Recommendation Regular (7);Thin (0)   Medication Administration  Whole with Liquid Wash   Therapy Interventions No Swallowing Intervention Required   Anticipated Discharge Needs   Discharge Recommendations Anticipate that the patient will have no further speech therapy needs after discharge from the hospital

## 2021-02-24 NOTE — ASSESSMENT & PLAN NOTE
Currently in sinus rhythm, status post ablation in 2019 however says she has continued to get episodes of afib and palpitations  Likely a contributor to her acute stroke  Neurology recommends starting anticoagulation depending on MRI results  Cautioned her not to take flecainide PRN unless specifically instructed by cardiology

## 2021-02-24 NOTE — ED PROVIDER NOTES
ED Provider Note        Primary care provider: Tano Clay M.D.    I verified that the patient was wearing a mask and I was wearing appropriate PPE every time I entered the room. The patient's mask was on the patient at all times during my encounter except for a brief view of the oropharynx.      CHIEF COMPLAINT  Chief Complaint   Patient presents with   • Possible Stroke     LNW 2233. Difficulty walking, LLE/LUE weakness. GCS 15 a/o x 4. - thinners       HPI  Susy Szymanski is a 65 y.o. female who presents to the Emergency Department with chief complaint of stroke alert.  Patient reports that at 1030 this evening she was standing at her sink brushing her teeth when she had acute onset of left-sided weakness more pronounced in the left lower extremity but slight weakness in the left upper extremity as well.  States that she had difficulty ambulating at that time and was constantly drifting to the left.  She states that earlier in the day she has slight headache in the right side of her head behind her right eye.  She states that this headache resolved several hours before the acute onset of these symptoms.  Patient reports that she felt well prior to this she states that she went for an 8 mile walk earlier in the day.  She has had no fevers no chills no cough congestion no chest pain no shortness of breath no abdominal pain she is had no problems with urination or bowel movements.  She does have a significant past medical history of atrial fibrillation.  She is status post ablation by approximately 1 year.  States that she is taken no anticoagulation since that time she is not in no anticoagulation including no aspirin over the last several days.  She does report that over the last several weeks she has had a few bouts of what she believes is paroxysmal atrial fibrillation when she gets a sensation that her heart is racing out of her chest and has severe palpitations.    REVIEW OF SYSTEMS  10 systems  "reviewed and otherwise negative, pertinent positives and negatives listed in the history of present illness.    PAST MEDICAL HISTORY   has a past medical history of Anxiety, Arrhythmia, ASTHMA, Bowel habit changes, Depression, GERD (gastroesophageal reflux disease), Hiatal hernia with GERD (11/5/2019), History of esophageal stricture (11/5/2019), Indigestion, Osteoporosis, Palpitations, and Premature atrial contractions.    SURGICAL HISTORY   has a past surgical history that includes egd with asp/bx (September 2006); cholecystectomy (January 2008); colonoscopy with polyp (June 2008); sinusotomies; and cholecystectomy.    SOCIAL HISTORY  Social History     Tobacco Use   • Smoking status: Never Smoker   • Smokeless tobacco: Never Used   Substance Use Topics   • Alcohol use: No     Alcohol/week: 0.0 oz   • Drug use: No      Social History     Substance and Sexual Activity   Drug Use No       FAMILY HISTORY  Non-Contributory    CURRENT MEDICATIONS  Home Medications     Reviewed by Junie Disla R.N. (Registered Nurse) on 02/23/21 at 2352  Med List Status: Not Addressed   Medication Last Dose Status   acetaminophen (TYLENOL) 325 MG Tab  Active   diphenhydramine (SOMINEX) 25 MG tablet  Active   esomeprazole (NEXIUM) 40 MG delayed-release capsule  Active   MYRBETRIQ 50 MG TABLET SR 24 HR  Active   propranolol (INDERAL) 20 MG Tab  Active   propranolol LA (INDERAL LA) 60 MG CAPSULE SR 24 HR  Active                ALLERGIES  Allergies   Allergen Reactions   • Amitriptyline      Excessive sedation --even at 10 mg dose   • Bactrim [Sulfamethoxazole W-Trimethoprim]      Shaking   • Levaquin      Nausea    • Lovastatin      Muscle aches  Muscle aches     • Trazodone      Irregular pulse       PHYSICAL EXAM:  VITAL SIGNS: BP (!) 161/72   Pulse 70   Temp 36.9 °C (98.4 °F) (Temporal)   Resp (!) 26   Ht 1.702 m (5' 7\")   Wt 57.2 kg (126 lb)   LMP 01/01/2004   SpO2 98%   BMI 19.73 kg/m²   Pulse ox interpretation: I interpret " this pulse ox as normal.  Constitutional: Alert and oriented x 3, moderate distress  HEENT: Atraumatic normocephalic, pupils are equal round reactive to light extraocular movements are intact. The nares is clear, external ears are normal, mouth shows moist mucous membranes  Neck: Supple, no JVD no tracheal deviation  Cardiovascular: Regular rate and rhythm no murmur rub or gallop 2+ pulses peripherally x4  Thorax & Lungs: No respiratory distress, no wheezes rales or rhonchi, No chest tenderness.   GI: Soft nontender nondistended positive bowel sounds, no peritoneal signs  Skin: Warm dry no acute rash or lesion  Musculoskeletal: Moving all extremities with full range and 5 of 5 strength, no acute  deformity  Neurologic: No facial asymmetry cranial nerves III through XII are grossly intact.  Equal shoulder shrug equal tongue deviation.  No dysarthria no aphasia no visual field defect.  Patient has slight decreased strength in the left upper extremity and comparison to the right upper, fairly profound weakness in the left lower extremity comparison of the right lower with drift to the bed after a few seconds.  Psychiatric: Appropriate affect for situation at this time      DIAGNOSTIC STUDIES / PROCEDURES  LABS      Results for orders placed or performed during the hospital encounter of 02/23/21   CBC WITH DIFFERENTIAL   Result Value Ref Range    WBC 6.2 4.8 - 10.8 K/uL    RBC 4.25 4.20 - 5.40 M/uL    Hemoglobin 12.6 12.0 - 16.0 g/dL    Hematocrit 40.4 37.0 - 47.0 %    MCV 95.1 81.4 - 97.8 fL    MCH 29.6 27.0 - 33.0 pg    MCHC 31.2 (L) 33.6 - 35.0 g/dL    RDW 49.5 35.9 - 50.0 fL    Platelet Count 204 164 - 446 K/uL    MPV 10.9 9.0 - 12.9 fL    Neutrophils-Polys 59.90 44.00 - 72.00 %    Lymphocytes 28.10 22.00 - 41.00 %    Monocytes 10.50 0.00 - 13.40 %    Eosinophils 1.00 0.00 - 6.90 %    Basophils 0.20 0.00 - 1.80 %    Immature Granulocytes 0.30 0.00 - 0.90 %    Nucleated RBC 0.00 /100 WBC    Neutrophils (Absolute)  3.71 2.00 - 7.15 K/uL    Lymphs (Absolute) 1.74 1.00 - 4.80 K/uL    Monos (Absolute) 0.65 0.00 - 0.85 K/uL    Eos (Absolute) 0.06 0.00 - 0.51 K/uL    Baso (Absolute) 0.01 0.00 - 0.12 K/uL    Immature Granulocytes (abs) 0.02 0.00 - 0.11 K/uL    NRBC (Absolute) 0.00 K/uL   COMP METABOLIC PANEL   Result Value Ref Range    Sodium 137 135 - 145 mmol/L    Potassium 4.2 3.6 - 5.5 mmol/L    Chloride 106 96 - 112 mmol/L    Co2 19 (L) 20 - 33 mmol/L    Anion Gap 12.0 7.0 - 16.0    Glucose 103 (H) 65 - 99 mg/dL    Bun 22 8 - 22 mg/dL    Creatinine 0.65 0.50 - 1.40 mg/dL    Calcium 9.1 8.5 - 10.5 mg/dL    AST(SGOT) 30 12 - 45 U/L    ALT(SGPT) 24 2 - 50 U/L    Alkaline Phosphatase 74 30 - 99 U/L    Total Bilirubin 0.2 0.1 - 1.5 mg/dL    Albumin 3.8 3.2 - 4.9 g/dL    Total Protein 6.3 6.0 - 8.2 g/dL    Globulin 2.5 1.9 - 3.5 g/dL    A-G Ratio 1.5 g/dL   PROTHROMBIN TIME   Result Value Ref Range    PT 12.9 12.0 - 14.6 sec    INR 0.94 0.87 - 1.13   APTT   Result Value Ref Range    APTT 24.7 24.7 - 36.0 sec   COD (ADULT)   Result Value Ref Range    ABO Grouping Only A     Rh Grouping Only POS     Antibody Screen-Cod NEG    TROPONIN   Result Value Ref Range    Troponin T 13 6 - 19 ng/L   SARS-COV Antigen RADHA: Collect dry nasal swab AND NP swab in VTM   Result Value Ref Range    SARS-CoV-2 Source NP Swab     SARS-COV ANTIGEN RADHA NotDetected Not-Detected   SARS-CoV-2 PCR (24 hour In-House): Collect NP swab in VTM    Specimen: Nasopharyngeal; Respirate   Result Value Ref Range    SARS-CoV-2 Source NP Swab    ESTIMATED GFR   Result Value Ref Range    GFR If African American >60 >60 mL/min/1.73 m 2    GFR If Non African American >60 >60 mL/min/1.73 m 2   EKG (NOW)   Result Value Ref Range    Report       [facility] Emergency Dept.    Test Date:  2021-02-23  Pt Name:    DEAN CRUZ               Department: ER  MRN:        [id number]                      Room:       [facility] [place]  Gender:     Female                        Technician: [id number]  :        [id number]                   Requested By:DENISE MONROE  Order #:    [id number]                    Reading MD: DENISE MONROE MD    Measurements  Intervals                                Axis  Rate:       72                           P:          74  AZ:         160                          QRS:        77  QRSD:       80                           T:          57  QT:         416  QTc:        456    Interpretive Statements  SINUS RHYTHM  Compared to ECG 2021 06:42:07  No significant changes  Electronically Signed On 2021 0:46:18 PST by DENISE MONROE MD       All labs reviewed by me.      RADIOLOGY  DX-CHEST-PORTABLE (1 VIEW)   Final Result      No acute cardiopulmonary abnormality.      CT-CTA NECK WITH & W/O-POST PROCESSING   Final Result      Beaded appearance of the cervical internal carotid arteries is suggestive of fibromuscular dysplasia.      No significant stenosis or dissection of the neck arteries.      CT-CEREBRAL PERFUSION ANALYSIS   Final Result      1.  Cerebral blood flow less than 30% likely representing completed infarct = 0 mL.      2.  T Max more than 6 seconds likely representing combination of completed infarct and ischemia = 4 mL.      3.  Mismatched volume likely representing ischemic brain/penumbra = 4 mL      4.  Please note that the cerebral perfusion was performed on the limited brain tissue around the basal ganglia region. Infarct/ischemia outside the CT perfusion sections can be missed in this study.      CT-HEAD W/O   Final Result      No acute intracranial abnormality.      CT-CTA HEAD WITH & W/O-POST PROCESS    (Results Pending)   IR-THROMBO MECHANICAL ARTERY,INIT    (Results Pending)   MR-BRAIN-W/O    (Results Pending)     The radiologist's interpretation of all radiological studies have been reviewed by me.    COURSE & MEDICAL DECISION MAKING  Pertinent Labs & Imaging studies reviewed. (See chart for details)    12:42 AM -  Patient seen and examined at bedside.     Coagulation studies were ordered in light of stroke and need for anticoagulation    Patient noted to have slightly elevated blood pressure likely circumstantial secondary to presenting complaint. Referred to primary care physician for further evaluation.      Medical Decision Making: Pleasant 65-year-old female presents with acute onset of left-sided weakness profound weakness in the left lower extremity to a lesser degree in the left upper and she has difficulty with ambulation.  Normally very high functioning went for an 8 mile walk earlier in the day.  She is a very low NIH 2-3 however high risk for fairly profound debilitation from this.  I discussed alteplase with her prior to results from the CAT scans however see TPA she has an acute occlusion of the right-sided M2.  This was discussed with Dr. Griffin.  He feels as though it is amenable to thrombectomy and patient will be taken to the interventional radiology suite for such.  Her presentation history and physical were also discussed with neurologist Dr. Chapa and we are all in agreement that the patient would benefit from IV alteplase.  She is given bolus in the emergency department and drip was initiated prior to transport to interventional radiologist.  I discussed with the internal medicine resident housestaff and I have also discussed with intensivist Dr. Sanchez.  Patient will be admitted to the ICU service following thrombectomy.  Admitted to the ICU in critical condition    Critical care:  40 minutes of critical care time was spent with this patient including initial evaluation initial resuscitation the ordering of labs EKGs images and rhythm strip interpretation of such. Interventions based on such and documentation. Discussing the case with the patient the patient's family members consulting physicians. Does not include separately billable procedures.      BP (!) 161/72   Pulse 70   Temp 36.9 °C (98.4 °F)  "(Temporal)   Resp (!) 26   Ht 1.702 m (5' 7\")   Wt 57.2 kg (126 lb)   LMP 01/01/2004   SpO2 98%   BMI 19.73 kg/m²            FINAL IMPRESSION  1.  Acute ischemic CVA  2.  Right M2 occlusion  3.  Critical care 40 minutes      This dictation has been created using voice recognition software and/or scribes. The accuracy of the dictation is limited by the abilities of the software and the expertise of the scribes. I expect there may be some errors of grammar and possibly content. I made every attempt to manually correct the errors within my dictation. However, errors related to voice recognition software and/or scribes may still exist and should be interpreted within the appropriate context.            "

## 2021-02-24 NOTE — PROGRESS NOTES
[facility] ICU Progress Note      Admit Date: 2/23/2021    Resident(s): Mitchell Zhao M.D.   Attending:  ANDREW CURRIE/ Dr. Pereyra    Patient ID:    Name:  Susy Szymanski   YOB: 1955  Age:  65 y.o.  female   MRN:  [id number]    Hospital Course (carried forward and updated):  Susy Szymanski is a 65 y.o. female with history of paroxysmal afib status post ablation, GERD, essential tremor who presented 2/23/2021 with an acute episode of left-sided weakness while brushing her teeth, brought in by EMS as a code stroke, found by CTA to have M2 occlusion and status post TPA and mechanical thrombectomy by neuro interventional radiology. She endorses recent ED visit for an episode of palpitations which she believes to be fibrillation, says she takes propranolol as needed but also flecainide which she had from a prior cardiology prescription.  Said she had a ED visit at [facility] about a week prior with palpitations and symptoms of A. fib, per the record she was in sinus rhythm on monitoring there with resolution of her symptoms.  She has not been on anticoagulation.    Consultants:  Critical Care  Neurology  Interventional Radiology     Interval Events:    Earlier yesterday, apparently complained of some aura and EEG ordered by neurology.  This morning she is not complaining of any headache photophobia or other pain.  She is mainly uncomfortable from having to lie flat status post thrombectomy.    NEURO:   ANO x4  CARDIOVASC:  Sinus rhythm rate 50s to 70s.  RESPIRATORY:  No distress, on room air  GI/NUTRITION:  N.p.o. pending speech evaluation  RENAL/FLUID/LYTES: LR at 83 cc/h  HEME/ONC:   No anemia  INFECTIOUS D:  Afebrile  ENDOCRINE:   Blood glucose 103    Vitals Range last 24h:  Temp:  [35.9 °C (96.7 °F)-36.9 °C (98.4 °F)] 35.9 °C (96.7 °F)  Pulse:  [52-70] 61  Resp:  [13-33] 26  BP: (109-161)/(55-78) 144/66  SpO2:  [95 %-100 %] 98 %      Intake/Output Summary (Last 24 hours) at 2/24/2021 1144  Last  data filed at 2/24/2021 0600  Gross per 24 hour   Intake --   Output 2 ml   Net -2 ml        Review of Systems   Constitutional: Negative for chills and fever.   HENT: Negative for ear pain, hearing loss and sinus pain.    Eyes: Negative for blurred vision and double vision.   Respiratory: Negative for cough and shortness of breath.    Cardiovascular: Negative for chest pain and palpitations.   Gastrointestinal: Negative for nausea and vomiting.   Genitourinary: Negative for dysuria.   Neurological: Negative for focal weakness, weakness and headaches.   Psychiatric/Behavioral: Negative.         PHYSICAL EXAM:  Vitals:    02/24/21 0900 02/24/21 0930 02/24/21 1000 02/24/21 1100   BP: 122/60 132/61 144/66    Pulse: (!) 56 (!) 54 (!) 59 61   Resp: 17 18 (!) 22 (!) 26   Temp:       TempSrc:       SpO2:       Weight:       Height:        Body mass index is 20.58 kg/m².    O2 therapy: Pulse Oximetry: 98 %, O2 (LPM): 2, O2 Delivery Device: None - Room Air         Physical Exam  Constitutional:       General: She is not in acute distress.     Appearance: Normal appearance.   HENT:      Head: Normocephalic and atraumatic.      Nose: Nose normal.      Mouth/Throat:      Mouth: Mucous membranes are moist.   Eyes:      Extraocular Movements: Extraocular movements intact.      Conjunctiva/sclera: Conjunctivae normal.      Pupils: Pupils are equal, round, and reactive to light.   Cardiovascular:      Rate and Rhythm: Normal rate and regular rhythm.      Heart sounds: No murmur. No friction rub. No gallop.    Pulmonary:      Effort: Pulmonary effort is normal. No respiratory distress.      Breath sounds: Normal breath sounds. No wheezing, rhonchi or rales.   Abdominal:      General: Abdomen is flat. There is no distension.      Palpations: Abdomen is soft.      Tenderness: There is no abdominal tenderness.   Musculoskeletal:         General: No swelling or deformity. Normal range of motion.   Skin:     General: Skin is warm and  dry.      Capillary Refill: Capillary refill takes less than 2 seconds.      Comments: Femoral cathether site dressing CDI with no active bleeding or hematoma, tenderness only to deep palpation   Neurological:      General: No focal deficit present.      Mental Status: She is alert and oriented to person, place, and time.      Cranial Nerves: No cranial nerve deficit.      Motor: No weakness.      Comments: Slight resting tremor of head. No dysmetria but tremor while she is performing FNF. NIHSS 0   Psychiatric:         Mood and Affect: Mood normal.         Behavior: Behavior normal.      Comments: Anxious at times             Recent Labs     02/23/21  2332   SODIUM 137   POTASSIUM 4.2   CHLORIDE 106   CO2 19*   BUN 22   CREATININE 0.65   CALCIUM 9.1     Recent Labs     02/23/21  2332   ALTSGPT 24   ASTSGOT 30   ALKPHOSPHAT 74   TBILIRUBIN 0.2   GLUCOSE 103*     Recent Labs     02/23/21  2332   RBC 4.25   HEMOGLOBIN 12.6   HEMATOCRIT 40.4   PLATELETCT 204   PROTHROMBTM 12.9   APTT 24.7   INR 0.94     Recent Labs     02/23/21 2332   WBC 6.2   NEUTSPOLYS 59.90   LYMPHOCYTES 28.10   MONOCYTES 10.50   EOSINOPHILS 1.00   BASOPHILS 0.20   ASTSGOT 30   ALTSGPT 24   ALKPHOSPHAT 74   TBILIRUBIN 0.2       Meds:  • NS  50 mL Stopped (02/24/21 0115)   • iohexol  40 mL     • senna-docusate  2 tablet      And   • polyethylene glycol/lytes  1 Packet      And   • magnesium hydroxide  30 mL      And   • bisacodyl  10 mg     • LR   83 mL/hr at 02/24/21 0636   • omeprazole  20 mg     • [START ON 2/25/2021] aspirin  325 mg      Or   • [START ON 2/25/2021] aspirin  324 mg      Or   • [START ON 2/25/2021] aspirin  300 mg     • atorvastatin  40 mg     • hydrALAZINE  20 mg     • labetalol  10 mg          Procedures:  IR thrombectomy 2/24/21    Imaging:  EC-ECHOCARDIOGRAM COMPLETE W/O CONT   Final Result      DX-CHEST-PORTABLE (1 VIEW)   Final Result      No acute cardiopulmonary abnormality.      CT-CTA NECK WITH & W/O-POST PROCESSING    Final Result      Beaded appearance of the cervical internal carotid arteries is suggestive of fibromuscular dysplasia.      No significant stenosis or dissection of the neck arteries.      CT-CTA HEAD WITH & W/O-POST PROCESS   Final Result      Filling defect in the proximal M2 segment right middle cerebral artery inferior division consistent with thrombus/embolus.      These findings were discussed with DENISE MONROE on 2/23/2021 11:55 PM.         CT-CEREBRAL PERFUSION ANALYSIS   Final Result      1.  Cerebral blood flow less than 30% likely representing completed infarct = 0 mL.      2.  T Max more than 6 seconds likely representing combination of completed infarct and ischemia = 4 mL.      3.  Mismatched volume likely representing ischemic brain/penumbra = 4 mL      4.  Please note that the cerebral perfusion was performed on the limited brain tissue around the basal ganglia region. Infarct/ischemia outside the CT perfusion sections can be missed in this study.      CT-HEAD W/O   Final Result      No acute intracranial abnormality.      IR-THROMBO MECHANICAL ARTERY,INIT    (Results Pending)   MR-BRAIN-W/O    (Results Pending)       ASSESSEMENT and PLAN:    * Stroke (HCC)  Assessment & Plan  Acute ischemic stroke characterized by left-sided weakness  Imaging revealed a right M2 thrombus  Per TOAST likely cardioembolic given history of paroxysmal afib  S/P alteplase with mechanical thrombectomy with resulting TICI 2b flow  Strict blood pressure control with goal SBP less than 160, labetalol and hydralazine as needed  Neuro checks per protocol  Echocardiogram shows normal EF 65% with no significant valvular abnormalities, probable dilated right ventricle and RVSP 35 mmHg  Lipid panel, echocardiogram, MRI brain pending  Begin aspirin 24 hours after alteplase  High intensity statin     Paroxysmal atrial fibrillation (HCC)- (present on admission)  Assessment & Plan  Currently in sinus rhythm, status post  ablation in 2019 however says she has continued to get episodes of afib and palpitations  Likely a contributor to her acute stroke  Neurology recommends starting anticoagulation depending on MRI results  Cautioned her not to take flecainide PRN unless specifically instructed by cardiology    Gastroesophageal reflux disease without esophagitis- (present on admission)  Assessment & Plan  Continue PPI    Essential tremor  Assessment & Plan  On propranolol at home, mostly controlled  Neurology recommends follow-up for additional options if symptoms persist      DISPO: continue ICU stay    CODE STATUS: Full    Quality Measures:  Feeding: NPO until speech evaluation  Analgesia: none  Sedation: none  Thromboprophylaxis: SCD, chemoppx contraindicated post TPA  Head of bed: >30 degrees  Ulcer prophylaxis: home omeprazole  Glycemic control: Correctional: NA / Basal: NA  Bowel care: bowel regimen  Indwelling lines: PIV x2  Deescalation of antibiotics: none      Mitchell Zhao M.D.

## 2021-02-24 NOTE — H&P
History & Physical Note    Date of Admission: 2/24/2021  Admission Status: Inpatient  [facility] Team: [facility] Gold [facility]  Attending: RIGOBERTO Pichardo*   Senior Resident: Dr. Wesley  Contact Number: [phone]    Chief Complaint: difficulty walking/LLE weakness    History of Present Illness (HPI):   65F, PMH as below; presents for LLE weakness and difficulty walking after brushing her teeth and then attempting to head to bed. Patient notes she also had a dull constant headache behind her right eye for most of the day. States onset of weakness and difficulty walking was about 2230. She does have baseline essential tremor for which she takes propanolol, and takes nexium daily for heartburn. Had hx of ablation for afib in 11/2019. Otherwise no other symptoms.    CT scans suggestive of right M2 acute occlusion amenable to IR thrombectomy, neurology and IR consulted by ED. After discussion in ED proceeded with TPA and thrombectomy. Admitted to ICU following.      Review of Systems:   Review of Systems   Constitutional: Negative for chills and fever.   HENT: Negative for ear pain, hearing loss and sinus pain.    Eyes: Negative for blurred vision and double vision.   Respiratory: Negative for cough and shortness of breath.    Cardiovascular: Negative for chest pain and palpitations.   Gastrointestinal: Negative for nausea and vomiting.   Neurological: Positive for focal weakness and headaches.       Past Medical History:   Past Medical History was reviewed with patient.   has a past medical history of Anxiety, Arrhythmia, ASTHMA, Bowel habit changes, Depression, GERD (gastroesophageal reflux disease), Hiatal hernia with GERD (11/5/2019), History of esophageal stricture (11/5/2019), Indigestion, Osteoporosis, Palpitations, and Premature atrial contractions.    Past Surgical History: Past Surgical History was reviewed with patient.   has a past surgical history that includes egd with asp/bx (September 2006); cholecystectomy  (January 2008); colonoscopy with polyp (June 2008); sinusotomies; and cholecystectomy.    Medications: Medications have been reviewed with patient.  Prior to Admission Medications   Prescriptions Last Dose Informant Patient Reported? Taking?   MYRBETRIQ 50 MG TABLET SR 24 HR   Yes No   acetaminophen (TYLENOL) 325 MG Tab   Yes No   Sig: Take 1 Tab by mouth 1 time daily as needed. Patient will not taken until follow up labs/results reviewed.   diphenhydramine (SOMINEX) 25 MG tablet   Yes No   Sig: Take 25 mg by mouth.   esomeprazole (NEXIUM) 40 MG delayed-release capsule   No No   Sig: TAKE 1 CAPSULE BY MOUTH ONCE A DAY 30 MINUTES BEFORE BREAKFAST MEAL   propranolol (INDERAL) 20 MG Tab   No No   Sig: Take 1 Tab by mouth 3 times a day as needed.   propranolol LA (INDERAL LA) 60 MG CAPSULE SR 24 HR   No No   Sig: Take 1 Cap by mouth every day.      Facility-Administered Medications: None        Allergies: Allergies have been reviewed with patient.  Allergies   Allergen Reactions   • Amitriptyline      Excessive sedation --even at 10 mg dose   • Bactrim [Sulfamethoxazole W-Trimethoprim]      Shaking   • Levaquin      Nausea    • Lovastatin      Muscle aches  Muscle aches     • Trazodone      Irregular pulse       Family History:   family history includes Cancer (age of onset: 70) in her father; Diabetes in her sister and son; Heart Disease in her mother; Heart Disease (age of onset: 42) in her son; Hypertension in her son.     Social History:   Social History     Socioeconomic History   • Marital status:      Spouse name: Not on file   • Number of children: Not on file   • Years of education: Not on file   • Highest education level: Not on file   Occupational History   • Occupation: retired   Tobacco Use   • Smoking status: Never Smoker   • Smokeless tobacco: Never Used   Substance and Sexual Activity   • Alcohol use: No     Alcohol/week: 0.0 oz   • Drug use: No   • Sexual activity: Yes     Partners: Male      Birth control/protection: Post-Menopausal   Other Topics Concern   • Not on file   Social History Narrative   • Not on file     Social Determinants of Health     Financial Resource Strain:    • Difficulty of Paying Living Expenses:    Food Insecurity:    • Worried About Running Out of Food in the Last Year:    • Ran Out of Food in the Last Year:    Transportation Needs:    • Lack of Transportation (Medical):    • Lack of Transportation (Non-Medical):    Physical Activity:    • Days of Exercise per Week:    • Minutes of Exercise per Session:    Stress:    • Feeling of Stress :    Social Connections:    • Frequency of Communication with Friends and Family:    • Frequency of Social Gatherings with Friends and Family:    • Attends Confucianism Services:    • Active Member of Clubs or Organizations:    • Attends Club or Organization Meetings:    • Marital Status:    Intimate Partner Violence:    • Fear of Current or Ex-Partner:    • Emotionally Abused:    • Physically Abused:    • Sexually Abused:        Primary Care Provider:  Tano Clay M.D.  Physical Exam:   Vitals:  Temp:  [36.1 °C (97 °F)-36.9 °C (98.4 °F)] 36.6 °C (97.8 °F)  Pulse:  [58-70] 58  Resp:  [13-29] 18  BP: (109-161)/(55-78) 109/55  SpO2:  [95 %-100 %] 96 %    Physical Exam  Constitutional:       General: She is not in acute distress.     Appearance: Normal appearance.   HENT:      Head: Normocephalic and atraumatic.      Nose: Nose normal.      Mouth/Throat:      Mouth: Mucous membranes are moist.   Eyes:      Extraocular Movements: Extraocular movements intact.      Conjunctiva/sclera: Conjunctivae normal.      Pupils: Pupils are equal, round, and reactive to light.   Cardiovascular:      Rate and Rhythm: Normal rate and regular rhythm.      Heart sounds: No murmur. No friction rub. No gallop.    Pulmonary:      Effort: Pulmonary effort is normal. No respiratory distress.      Breath sounds: Normal breath sounds. No wheezing, rhonchi or rales.    Abdominal:      General: Abdomen is flat. There is no distension.      Palpations: Abdomen is soft.      Tenderness: There is no abdominal tenderness.   Musculoskeletal:         General: No swelling or deformity. Normal range of motion.   Skin:     General: Skin is warm and dry.      Capillary Refill: Capillary refill takes less than 2 seconds.   Neurological:      General: No focal deficit present.      Mental Status: She is alert and oriented to person, place, and time.      Cranial Nerves: No cranial nerve deficit.      Comments: CN2-12 intact, NIHSS 0 (s/p TPA and thrombectomy) however unable to assess right leg ataxia as leg needs to be straight following procedure   Psychiatric:         Mood and Affect: Mood normal.         Behavior: Behavior normal.         Labs:   Recent Results (from the past 24 hour(s))   COD (ADULT)    Collection Time: 02/23/21 11:31 PM   Result Value Ref Range    ABO Grouping Only A     Rh Grouping Only POS     Antibody Screen-Cod NEG    CBC WITH DIFFERENTIAL    Collection Time: 02/23/21 11:32 PM   Result Value Ref Range    WBC 6.2 4.8 - 10.8 K/uL    RBC 4.25 4.20 - 5.40 M/uL    Hemoglobin 12.6 12.0 - 16.0 g/dL    Hematocrit 40.4 37.0 - 47.0 %    MCV 95.1 81.4 - 97.8 fL    MCH 29.6 27.0 - 33.0 pg    MCHC 31.2 (L) 33.6 - 35.0 g/dL    RDW 49.5 35.9 - 50.0 fL    Platelet Count 204 164 - 446 K/uL    MPV 10.9 9.0 - 12.9 fL    Neutrophils-Polys 59.90 44.00 - 72.00 %    Lymphocytes 28.10 22.00 - 41.00 %    Monocytes 10.50 0.00 - 13.40 %    Eosinophils 1.00 0.00 - 6.90 %    Basophils 0.20 0.00 - 1.80 %    Immature Granulocytes 0.30 0.00 - 0.90 %    Nucleated RBC 0.00 /100 WBC    Neutrophils (Absolute) 3.71 2.00 - 7.15 K/uL    Lymphs (Absolute) 1.74 1.00 - 4.80 K/uL    Monos (Absolute) 0.65 0.00 - 0.85 K/uL    Eos (Absolute) 0.06 0.00 - 0.51 K/uL    Baso (Absolute) 0.01 0.00 - 0.12 K/uL    Immature Granulocytes (abs) 0.02 0.00 - 0.11 K/uL    NRBC (Absolute) 0.00 K/uL   COMP METABOLIC PANEL     Collection Time: 21 11:32 PM   Result Value Ref Range    Sodium 137 135 - 145 mmol/L    Potassium 4.2 3.6 - 5.5 mmol/L    Chloride 106 96 - 112 mmol/L    Co2 19 (L) 20 - 33 mmol/L    Anion Gap 12.0 7.0 - 16.0    Glucose 103 (H) 65 - 99 mg/dL    Bun 22 8 - 22 mg/dL    Creatinine 0.65 0.50 - 1.40 mg/dL    Calcium 9.1 8.5 - 10.5 mg/dL    AST(SGOT) 30 12 - 45 U/L    ALT(SGPT) 24 2 - 50 U/L    Alkaline Phosphatase 74 30 - 99 U/L    Total Bilirubin 0.2 0.1 - 1.5 mg/dL    Albumin 3.8 3.2 - 4.9 g/dL    Total Protein 6.3 6.0 - 8.2 g/dL    Globulin 2.5 1.9 - 3.5 g/dL    A-G Ratio 1.5 g/dL   PROTHROMBIN TIME    Collection Time: 21 11:32 PM   Result Value Ref Range    PT 12.9 12.0 - 14.6 sec    INR 0.94 0.87 - 1.13   APTT    Collection Time: 21 11:32 PM   Result Value Ref Range    APTT 24.7 24.7 - 36.0 sec   TROPONIN    Collection Time: 21 11:32 PM   Result Value Ref Range    Troponin T 13 6 - 19 ng/L   ESTIMATED GFR    Collection Time: 21 11:32 PM   Result Value Ref Range    GFR If African American >60 >60 mL/min/1.73 m 2    GFR If Non African American >60 >60 mL/min/1.73 m 2   EKG (NOW)    Collection Time: 21 11:49 PM   Result Value Ref Range    Report       [facility]    Test Date:  2021  Pt Name:    DEAN CRUZ               Department: ER  MRN:        [id number]                      [place]       [facility] [place]  Gender:     Female                       Technician: [id number]  :        1955                   Requested By:DENISE MONROE  Order #:    [id number]                    Reading MD: DENISE MONROE MD    Measurements  Intervals                                Axis  Rate:       72                           P:          74  AL:         160                          QRS:        77  QRSD:       80                           T:          57  QT:         416  QTc:        456    Interpretive Statements  SINUS RHYTHM  Compared to ECG 2021  06:42:07  No significant changes  Electronically Signed On 2021 0:46:18 PST by DENISE MONROE MD     SARS-COV Antigen RADHA: Collect dry nasal swab AND NP swab in VTM    Collection Time: 21 11:56 PM   Result Value Ref Range    SARS-CoV-2 Source NP Swab     SARS-COV ANTIGEN RADHA NotDetected Not-Detected   SARS-CoV-2 PCR (24 hour In-House): Collect NP swab in VTM    Collection Time: 21 11:56 PM    Specimen: Nasopharyngeal; Respirate   Result Value Ref Range    SARS-CoV-2 Source NP Swab    EKG    Collection Time: 21  2:32 AM   Result Value Ref Range    Report       [facility]    Test Date:  2021  Pt Name:    DEAN CRUZ               Department: [facility]  MRN:        [id number]                      Room:       [facility]  Gender:     Female                       Technician: BARBY  :        1955                   Requested By:BARBARA LEMOS  Order #:    [id number]                    Reading MD:    Measurements  Intervals                                Axis  Rate:       67                           P:          67  KY:         163                          QRS:        69  QRSD:       91                           T:          62  QT:         434  QTc:        458    Interpretive Statements  SINUS RHYTHM  Compared to ECG 2021 23:49:32  No significant changes               Imaging:   DX-CHEST-PORTABLE (1 VIEW)   Final Result      No acute cardiopulmonary abnormality.      CT-CTA NECK WITH & W/O-POST PROCESSING   Final Result      Beaded appearance of the cervical internal carotid arteries is suggestive of fibromuscular dysplasia.      No significant stenosis or dissection of the neck arteries.      CT-CEREBRAL PERFUSION ANALYSIS   Final Result      1.  Cerebral blood flow less than 30% likely representing completed infarct = 0 mL.      2.  T Max more than 6 seconds likely representing combination of completed infarct and ischemia = 4 mL.      3.  Mismatched volume likely representing ischemic  brain/penumbra = 4 mL      4.  Please note that the cerebral perfusion was performed on the limited brain tissue around the basal ganglia region. Infarct/ischemia outside the CT perfusion sections can be missed in this study.      CT-HEAD W/O   Final Result      No acute intracranial abnormality.      CT-CTA HEAD WITH & W/O-POST PROCESS    (Results Pending)   IR-THROMBO MECHANICAL ARTERY,INIT    (Results Pending)   MR-BRAIN-W/O    (Results Pending)   EC-ECHOCARDIOGRAM COMPLETE W/O CONT    (Results Pending)         Problem Representation:     * Stroke (HCC)  Assessment & Plan  -LLE weakness/difficulty ambulating per HPI  -CTA suggestive of M2 occlusion  -s/p TPA and IR thrombectomy  -admitted to ICU  -neurology consulted, recs included in plan  -TICI 2b flow following IR thrombectomy, goal SBP <160 and nicardipine drip ordered  -goal for normoglycemia/natremia/thermia, lipid panel, A1c  -hold antiplatelets/blood thinners for 24hr s/p TPA  -high intensity statin when cleared for diet  -MRI brain, echo ordered  -PT/OT/ST, phsyiatry  -NPO until speech eval  -neurochecks    Paroxysmal atrial fibrillation (HCC)- (present on admission)  Assessment & Plan  -s/p ablation, continue to monitor    Gastroesophageal reflux disease without esophagitis- (present on admission)  Assessment & Plan  -omeprazole when cleared for diet    Essential tremor  Assessment & Plan  -take propanolol at home, holding while managing BP following thrombectomy, restart as appropriate     Telemetry concerning for ST elevations but not reproduced upon repeat EKG, patient denied chest pain.    Of note patient is also scheduled for 2nd dose of pfizer covid vaccine tomorrow, day team to address with patient/pharmacy if indicated or logistically feasible to administer while admitted.

## 2021-02-24 NOTE — OR SURGEON
Immediate Post- Operative Note        PostOp Diagnosis: right M1 thrombus     Procedure(s): Mechanical thrombectomy     TICI 2 b flow      Estimated Blood Loss: Less than 5 ml        Complications: None            2/24/2021     1:27 AM     Chas Wagner M.D.

## 2021-02-24 NOTE — PROGRESS NOTES
Status post mechanical thrombectomy of right M2  occlusion.    TICI 2b flow     Neuro interventional recommendations:    Please keep the systolic blood pressure less than 160.

## 2021-02-24 NOTE — PROGRESS NOTES
Received bedside report from IR RN, neuro assessment complete with IR RN.  Pt. Up from IR via 2 ACLS RN's, transport monitor in place, chart in place.  Pt.  at bedside, updated pt. And  on POC, questions answered.  Belongings at bedside.

## 2021-02-25 ENCOUNTER — APPOINTMENT (OUTPATIENT)
Dept: RADIOLOGY | Facility: MEDICAL CENTER | Age: 66
DRG: 023 | End: 2021-02-25
Attending: PSYCHIATRY & NEUROLOGY
Payer: MEDICARE

## 2021-02-25 LAB
ALBUMIN SERPL BCP-MCNC: 3.4 G/DL (ref 3.2–4.9)
ALBUMIN/GLOB SERPL: 1.5 G/DL
ALP SERPL-CCNC: 69 U/L (ref 30–99)
ALT SERPL-CCNC: 19 U/L (ref 2–50)
ANION GAP SERPL CALC-SCNC: 8 MMOL/L (ref 7–16)
AST SERPL-CCNC: 22 U/L (ref 12–45)
BASOPHILS # BLD AUTO: 0.4 % (ref 0–1.8)
BASOPHILS # BLD: 0.02 K/UL (ref 0–0.12)
BILIRUB SERPL-MCNC: 0.4 MG/DL (ref 0.1–1.5)
BUN SERPL-MCNC: 13 MG/DL (ref 8–22)
CALCIUM SERPL-MCNC: 9.4 MG/DL (ref 8.5–10.5)
CHLORIDE SERPL-SCNC: 107 MMOL/L (ref 96–112)
CHOLEST SERPL-MCNC: 160 MG/DL (ref 100–199)
CO2 SERPL-SCNC: 25 MMOL/L (ref 20–33)
CREAT SERPL-MCNC: 0.59 MG/DL (ref 0.5–1.4)
EKG IMPRESSION: NORMAL
EOSINOPHIL # BLD AUTO: 0.07 K/UL (ref 0–0.51)
EOSINOPHIL NFR BLD: 1.4 % (ref 0–6.9)
ERYTHROCYTE [DISTWIDTH] IN BLOOD BY AUTOMATED COUNT: 48.1 FL (ref 35.9–50)
GLOBULIN SER CALC-MCNC: 2.3 G/DL (ref 1.9–3.5)
GLUCOSE BLD-MCNC: 86 MG/DL (ref 65–99)
GLUCOSE SERPL-MCNC: 89 MG/DL (ref 65–99)
HCT VFR BLD AUTO: 37.8 % (ref 37–47)
HDLC SERPL-MCNC: 57 MG/DL
HGB BLD-MCNC: 12.1 G/DL (ref 12–16)
IMM GRANULOCYTES # BLD AUTO: 0.01 K/UL (ref 0–0.11)
IMM GRANULOCYTES NFR BLD AUTO: 0.2 % (ref 0–0.9)
LDLC SERPL CALC-MCNC: 92 MG/DL
LYMPHOCYTES # BLD AUTO: 1.33 K/UL (ref 1–4.8)
LYMPHOCYTES NFR BLD: 26 % (ref 22–41)
MCH RBC QN AUTO: 29.8 PG (ref 27–33)
MCHC RBC AUTO-ENTMCNC: 32 G/DL (ref 33.6–35)
MCV RBC AUTO: 93.1 FL (ref 81.4–97.8)
MONOCYTES # BLD AUTO: 0.52 K/UL (ref 0–0.85)
MONOCYTES NFR BLD AUTO: 10.2 % (ref 0–13.4)
NEUTROPHILS # BLD AUTO: 3.17 K/UL (ref 2–7.15)
NEUTROPHILS NFR BLD: 61.8 % (ref 44–72)
NRBC # BLD AUTO: 0 K/UL
NRBC BLD-RTO: 0 /100 WBC
PLATELET # BLD AUTO: 194 K/UL (ref 164–446)
PMV BLD AUTO: 11.2 FL (ref 9–12.9)
POTASSIUM SERPL-SCNC: 3.9 MMOL/L (ref 3.6–5.5)
PROT SERPL-MCNC: 5.7 G/DL (ref 6–8.2)
RBC # BLD AUTO: 4.06 M/UL (ref 4.2–5.4)
SODIUM SERPL-SCNC: 140 MMOL/L (ref 135–145)
TRIGL SERPL-MCNC: 57 MG/DL (ref 0–149)
WBC # BLD AUTO: 5.1 K/UL (ref 4.8–10.8)

## 2021-02-25 PROCEDURE — 85025 COMPLETE CBC W/AUTO DIFF WBC: CPT

## 2021-02-25 PROCEDURE — A9270 NON-COVERED ITEM OR SERVICE: HCPCS | Performed by: INTERNAL MEDICINE

## 2021-02-25 PROCEDURE — 97165 OT EVAL LOW COMPLEX 30 MIN: CPT

## 2021-02-25 PROCEDURE — 82962 GLUCOSE BLOOD TEST: CPT

## 2021-02-25 PROCEDURE — 93010 ELECTROCARDIOGRAM REPORT: CPT | Performed by: INTERNAL MEDICINE

## 2021-02-25 PROCEDURE — 770020 HCHG ROOM/CARE - TELE (206)

## 2021-02-25 PROCEDURE — A9270 NON-COVERED ITEM OR SERVICE: HCPCS | Performed by: STUDENT IN AN ORGANIZED HEALTH CARE EDUCATION/TRAINING PROGRAM

## 2021-02-25 PROCEDURE — 700102 HCHG RX REV CODE 250 W/ 637 OVERRIDE(OP): Performed by: PSYCHIATRY & NEUROLOGY

## 2021-02-25 PROCEDURE — 97161 PT EVAL LOW COMPLEX 20 MIN: CPT

## 2021-02-25 PROCEDURE — 99232 SBSQ HOSP IP/OBS MODERATE 35: CPT | Performed by: PSYCHIATRY & NEUROLOGY

## 2021-02-25 PROCEDURE — A9270 NON-COVERED ITEM OR SERVICE: HCPCS | Performed by: PSYCHIATRY & NEUROLOGY

## 2021-02-25 PROCEDURE — 70551 MRI BRAIN STEM W/O DYE: CPT | Mod: MH

## 2021-02-25 PROCEDURE — 700102 HCHG RX REV CODE 250 W/ 637 OVERRIDE(OP): Performed by: INTERNAL MEDICINE

## 2021-02-25 PROCEDURE — 700102 HCHG RX REV CODE 250 W/ 637 OVERRIDE(OP): Performed by: STUDENT IN AN ORGANIZED HEALTH CARE EDUCATION/TRAINING PROGRAM

## 2021-02-25 PROCEDURE — 80061 LIPID PANEL: CPT

## 2021-02-25 PROCEDURE — 80053 COMPREHEN METABOLIC PANEL: CPT

## 2021-02-25 RX ORDER — ASPIRIN 81 MG/1
81 TABLET, CHEWABLE ORAL DAILY
Status: DISCONTINUED | OUTPATIENT
Start: 2021-02-25 | End: 2021-02-26 | Stop reason: HOSPADM

## 2021-02-25 RX ORDER — LABETALOL HYDROCHLORIDE 5 MG/ML
10 INJECTION, SOLUTION INTRAVENOUS EVERY 6 HOURS PRN
Status: DISCONTINUED | OUTPATIENT
Start: 2021-02-25 | End: 2021-02-26 | Stop reason: HOSPADM

## 2021-02-25 RX ADMIN — OMEPRAZOLE 20 MG: 20 CAPSULE, DELAYED RELEASE ORAL at 05:06

## 2021-02-25 RX ADMIN — ACETAMINOPHEN 650 MG: 325 TABLET, FILM COATED ORAL at 20:59

## 2021-02-25 RX ADMIN — DOCUSATE SODIUM 50 MG AND SENNOSIDES 8.6 MG 2 TABLET: 8.6; 5 TABLET, FILM COATED ORAL at 05:07

## 2021-02-25 RX ADMIN — ACETAMINOPHEN 650 MG: 325 TABLET, FILM COATED ORAL at 05:27

## 2021-02-25 RX ADMIN — DOCUSATE SODIUM 50 MG AND SENNOSIDES 8.6 MG 2 TABLET: 8.6; 5 TABLET, FILM COATED ORAL at 17:01

## 2021-02-25 RX ADMIN — ATORVASTATIN CALCIUM 40 MG: 40 TABLET, FILM COATED ORAL at 17:02

## 2021-02-25 RX ADMIN — ASPIRIN 81 MG CHEWABLE TABLET 81 MG: 81 TABLET CHEWABLE at 10:28

## 2021-02-25 ASSESSMENT — COGNITIVE AND FUNCTIONAL STATUS - GENERAL
DAILY ACTIVITIY SCORE: 24
SUGGESTED CMS G CODE MODIFIER DAILY ACTIVITY: CH
MOBILITY SCORE: 24
SUGGESTED CMS G CODE MODIFIER MOBILITY: CH

## 2021-02-25 ASSESSMENT — GAIT ASSESSMENTS
DISTANCE (FEET): 200
GAIT LEVEL OF ASSIST: SUPERVISED
DEVIATION: OTHER (COMMENT)

## 2021-02-25 ASSESSMENT — PAIN DESCRIPTION - PAIN TYPE
TYPE: ACUTE PAIN

## 2021-02-25 ASSESSMENT — ACTIVITIES OF DAILY LIVING (ADL): TOILETING: INDEPENDENT

## 2021-02-25 NOTE — PROGRESS NOTES
[facility] Neurology Progress Note:     Interval History: No acute events overnight. No complaints today.     Objective:   Vitals:    02/25/21 0700 02/25/21 0800 02/25/21 0900 02/25/21 1000   BP: 138/65 121/60 130/95 118/62   Pulse: (!) 53 (!) 53 (!) 56 (!) 59   Resp: 20 20 (!) 51 (!) 21   Temp:  36.6 °C (97.8 °F)  36.7 °C (98.1 °F)   TempSrc:  Temporal  Temporal   SpO2: 97% 95% 97% 96%   Weight:       Height:           Labs:     Lab Results   Component Value Date/Time    PROTHROMBTM 12.9 02/23/2021 11:32 PM    INR 0.94 02/23/2021 11:32 PM      Lab Results   Component Value Date/Time    WBC 5.1 02/25/2021 05:08 AM    RBC 4.06 (L) 02/25/2021 05:08 AM    HEMOGLOBIN 12.1 02/25/2021 05:08 AM    HEMATOCRIT 37.8 02/25/2021 05:08 AM    MCV 93.1 02/25/2021 05:08 AM    MCH 29.8 02/25/2021 05:08 AM    MCHC 32.0 (L) 02/25/2021 05:08 AM    MPV 11.2 02/25/2021 05:08 AM    NEUTSPOLYS 61.80 02/25/2021 05:08 AM    LYMPHOCYTES 26.00 02/25/2021 05:08 AM    MONOCYTES 10.20 02/25/2021 05:08 AM    EOSINOPHILS 1.40 02/25/2021 05:08 AM    BASOPHILS 0.40 02/25/2021 05:08 AM    HYPOCHROMIA 1+ 10/20/2008 11:59 AM      Lab Results   Component Value Date/Time    SODIUM 140 02/25/2021 05:08 AM    POTASSIUM 3.9 02/25/2021 05:08 AM    CHLORIDE 107 02/25/2021 05:08 AM    CO2 25 02/25/2021 05:08 AM    GLUCOSE 89 02/25/2021 05:08 AM    BUN 13 02/25/2021 05:08 AM    CREATININE 0.59 02/25/2021 05:08 AM    CREATININE 0.8 10/20/2008 11:59 AM    BUNCREATRAT 27 02/07/2020 10:18 AM      Lab Results   Component Value Date/Time    CHOLSTRLTOT 160 02/25/2021 05:08 AM    LDL 92 02/25/2021 05:08 AM    HDL 57 02/25/2021 05:08 AM    TRIGLYCERIDE 57 02/25/2021 05:08 AM       Lab Results   Component Value Date/Time    ALKPHOSPHAT 69 02/25/2021 05:08 AM    ASTSGOT 22 02/25/2021 05:08 AM    ALTSGPT 19 02/25/2021 05:08 AM    TBILIRUBIN 0.4 02/25/2021 05:08 AM        Imaging/Testing:   MRI Brain W/O CST personally reviewed w/ small infarct and hemorrhagic  transformation.    CT perfusion reviewed in chart.    Physical Exam:     General: 65-year-old female in bed in no acute distres  Cardio: Normal S1/S2. No peripheral edema.   Pulm: CTAX2. No respiratory distress.   Skin: Warm, dry, no rashes or lesions   Psychiatric: Appropriate affect. No active psychosis.  HEENT: Atraumatic head, normal sclera and conjunctiva, moist oral mucosa. No lid lag.  Abdomen: Soft, non tender. No masses or hepatosplenomegaly.    1a. Level of Consciousness (Alert, drowsy, etc): 0= Alert    1b. LOC Questions (Month, age): 0= Answers both correctly    1c. LOC Commands (Open/close eyes make fist/let go): 0= Obeys both correctly    2.   Best Gaze (Eyes open - patient follows examiner's finger on face): 0= Normal    3.   Visual Fields (introduce visual stimulus/threat to patient's field quadrants): 0= No visual loss  4.   Facial Paresis (Show teeth, raise eyebrows and squeeze eyes shut): 0= Normal     5a. Motor Arm - Left (Elevate arm to 90 degrees if patient is sitting, 45 degrees if  supine): 0= No drift    5b. Motor Arm - Right (Elevate arm to 90 degrees if patient is sitting, 45 degrees if supine): 0= No drift    6a. Motor Leg - Left (Elevate leg 30 degrees with patient supine): 0= No drift    6b. Motor Leg - Right  (Elevate leg 30 degrees with patient supine): 0= No drift    7.   Limb Ataxia (Finger-nose, heel down shin): 0= No ataxia    8.   Sensory (Pin prick to face, arm, trunk and leg - compare side to side): 0= Normal    9.  Best Language (Name item, describe a picture and read sentences): 0= No aphasia    10. Dysarthria (Evaluate speech clarity by patient repeating listed words): 0= Normal articulation    11. Extinction and Inattention (Use information from prior testing to identify neglect or  double simultaneous stimuli testing): 0= No neglect    Total NIH Score: 0    Patient examined on 2/25/21 and compared to 2/24/21 no significant clinical change was seen.      Assessment/Plan:    Susy Szymanski is a 65 y.o. female with history of atrial fibrillation status post ablation not currently on anticoagulation, depression, GERD, osteoporosis presenting to the hospital for stroke and consulted for stroke.  Patient received TPA and had thrombectomy performed with good reperfusion.  Currently her NIH stroke scale is of 0.  Etiology of stroke is likely atrial fibrillation given the patient is not on anticoagulation.  We will proceed with post TPA care.  MRI of the brain would be helpful to determine timeline for initiation of anticoagulation.    Plan:  -Aim for normotension.  -MRI Brain w/ small amount of hemorrhage. OK to start AC 4-5 days post stroke (Feb 28/March1). Patient previously on Xarelto and tolerated well. Ok to start Xarelto.  -Ok to be on aspirin until initiates AC. D/C ASA then.   -PT/OT/ST as needed.  -Stroke bridge clinic referral placed.  -LDL 92. Goal < 70  -Hgb A1C 5.2, at goal  -No further recommendations at this time, signing off.     Derick Morales M.D., Diplomat of the American Board of Psychiatry and Neurology  Diplomat of [employer]BP[employer] Epilepsy Subspecialty   Assistant Clinical Professor, [facility]  Acute Neurology Consultant

## 2021-02-25 NOTE — PROGRESS NOTES
Pt. Returned from MRI on MRI [facility], via ACLS RN and [facility] transport.  Transport monitor in place.  Tolerated well.  Await MRI results.

## 2021-02-25 NOTE — PROGRESS NOTES
64 y/o admitted with AIS s/p tpa and thrombectomy. Doing well. NIHSS 0. MRI with clinically asymptomatic small hemorrhagic conversion. Hold ASA and DVT PPX for now. Small groin hematoma at arterial puncture site. No pseudoaneurysm. Hgb stable. OK for the floor.

## 2021-02-25 NOTE — THERAPY
Physical Therapy   Initial Evaluation     Patient Name: uSsy Szymanski  Age:  65 y.o., Sex:  female  Medical Record #: [id number]  Today's Date: 2/25/2021          Assessment  After initial evaluation and pt education, pt has no further acute PT needs. Both pt and spouse report no functional concerns with dc to home once medically cleared and pt appears to be back to functional baseline at this time. She does have mild essential tremors though this is baseline. See below for details/recs.     Plan    Recommend Physical Therapy for Evaluation only     DC Equipment Recommendations: None  Discharge Recommendations: Anticipate that the patient will have no further physical therapy needs after discharge from the hospital          02/25/21 1052   Prior Living Situation   Prior Services Home-Independent   Housing / Facility 1 Story House   Steps Into Home 1   Steps In Home 0   Bathroom Set up Walk In Shower   Equipment Owned None   Lives with - Patient's Self Care Capacity Spouse   Comments reports quite active at baseline; walking often/excercising regularly   Prior Level of Functional Mobility   Bed Mobility Independent   Transfer Status Independent   Ambulation Independent   Distance Ambulation (Feet)   (community)   Assistive Devices Used None   Stairs Independent   Comments I with IADls and ADLs prior   Cognition    Cognition / Consciousness WDL   Level of Consciousness Alert   Comments very pleasant and cooperative   Passive ROM Lower Body   Passive ROM Lower Body WDL   Active ROM Lower Body    Active ROM Lower Body  WDL   Strength Lower Body   Lower Body Strength  WDL   Sensation Lower Body   Lower Extremity Sensation   WDL   Strength Upper Body   Upper Body Strength  WDL   Balance Assessment   Sitting Balance (Static) Good   Sitting Balance (Dynamic) Good   Standing Balance (Static) Good   Standing Balance (Dynamic) Fair +   Weight Shift Sitting Good   Weight Shift Standing Good   Comments no junito LOB during  ambulation with no AD; accepts challengs to gait without issue   Gait Analysis   Gait Level Of Assist Supervised   Assistive Device None   Distance (Feet) 200   # of Times Distance was Traveled 1   Deviation Other (Comment)  (reciprocal gait)   # of Stairs Climbed 0   Weight Bearing Status no restrictions   Comments see balance   Bed Mobility    Supine to Sit Supervised   Sit to Supine Supervised   Scooting Supervised   Functional Mobility   Sit to Stand Supervised   Bed, Chair, Wheelchair Transfer Supervised   Mobility   (with no AD)   How much difficulty does the patient currently have...   Turning over in bed (including adjusting bedclothes, sheets and blankets)? 4   Sitting down on and standing up from a chair with arms (e.g., wheelchair, bedside commode, etc.) 4   Moving from lying on back to sitting on the side of the bed? 4   How much help from another person does the patient currently need...   Moving to and from a bed to a chair (including a wheelchair)? 4   Need to walk in a hospital room? 4   Climbing 3-5 steps with a railing? 4   6 clicks Mobility Score 24   Activity Tolerance   Sitting in Chair NT   Sitting Edge of Bed at least 5 mins   Standing at least 10 mins   Comments no overt/acute fatigue

## 2021-02-25 NOTE — DIETARY
"Nutrition services: Day 1 of admit.  Susy Szymanski is a 65 y.o. female with admitting DX of ischemic stroke.  Pt noted with unsure wt loss on nutrition admit screen.    Assessment:  Height: 170.2 cm (5' 7\")  Weight: 59.6 kg (131 lb 6.3 oz) - via bed scale.   Body mass index is 20.58 kg/m²., BMI classification: WNL.    Diet/Intake: Regular.  Consuming >50% thus far.     Evaluation:   1. Pt noted with paroxysmal a-fib, GERD, and essential tremor.    2. Additional PMHx reviewed.  Hx of constipation and esophageal stricture.    3. Unable to interview pt @ this time.  Will f/u regarding wt hx as appropriate.  4. Pt underwent thrombectomy 2/24.    5. Per chart review, pt weighed 65.8 kg November 2019 and 67.6 kg February 2020.  Pt with an 11.8% wt loss over the past year, which is not severe however complete wt hx unclear @ this time.  6. Current clinical picture and MD progress notes reviewed.  7. Labs and meds reviewed @ this time.  8. LBM: 2/25.     Malnutrition Risk: Unable to be determined.    Recommendations/Plan:   1. Encourage intake of meals.  Continue to document % consumed under ADLs.  2. Continue to monitor weight.  3. Nutrition rep will continue to see patient for ongoing meal and snack preferences.     RD follows.             "

## 2021-02-25 NOTE — THERAPY
"Occupational Therapy   Initial Evaluation     Patient Name: Susy Szymanski  Age:  65 y.o., Sex:  female  Medical Record #: [id number]  Today's Date: 2/25/2021          Assessment  Patient is 65 y.o. female who presents to acute following ischemic stroke in R m2 thrombus impacting L side. Pt is s/p TPA and thrombectomy. Pt has returned close to functional baseline performing BADLs at SPV level. Recommend DC home. No further Acute OT needs noted at this time.     Plan    Recommend Occupational Therapy Eval only    DC Equipment Recommendations: (P) None  Discharge Recommendations: (P) Anticipate that the patient will have no further occupational therapy needs after discharge from the hospital     Subjective    \"I walked 9 miles the day this happened\"     Objective       02/25/21 1051   Total Time Spent   Total Time Spent (Mins) 20   Charge Group   OT Evaluation OT Evaluation Low   Initial Contact Note    Initial Contact Note Order Received and Verified, Occupational Therapy Evaluation in Progress with Full Report to Follow.   Prior Living Situation   Prior Services Home-Independent   Housing / Facility 1 Story House   Bathroom Set up Walk In Shower   Equipment Owned None   Lives with - Patient's Self Care Capacity Spouse   Comments reports very active lifestyle, spouse is a retired    Prior Level of ADL Function   Self Feeding Independent   Grooming / Hygiene Independent   Bathing Independent   Dressing Independent   Toileting Independent   Prior Level of IADL Function   Medication Management Independent   Laundry Independent   Kitchen Mobility Independent   Finances Independent   Home Management Independent   Shopping Independent   Prior Level Of Mobility Independent Without Device in Community;Independent Without Device in Home   Driving / Transportation Driving Independent   Occupation (Pre-Hospital Vocational) Retired Due To Age   Pain 0 - 10 Group   Therapist Pain Assessment Post Activity Pain Same as " Prior to Activity;Nurse Notified  (no c/o pain during session)   Cognition    Cognition / Consciousness WDL   Level of Consciousness Alert   Comments very pleasent, cooperative    Active ROM Upper Body   Active ROM Upper Body  WDL   Strength Upper Body   Upper Body Strength  WDL   Coordination Upper Body   Comments essential tremor, reports baseline   Balance Assessment   Sitting Balance (Static) Good   Sitting Balance (Dynamic) Good   Standing Balance (Static) Fair +   Standing Balance (Dynamic) Fair +   Weight Shift Sitting Good   Weight Shift Standing Good   Comments no AD, no junito LOB   Bed Mobility    Supine to Sit Supervised   Sit to Supine Supervised   Scooting Supervised   ADL Assessment   Grooming Supervision;Standing   Lower Body Dressing Supervision  (donned underpants)   How much help from another person does the patient currently need...   Putting on and taking off regular lower body clothing? 4   Bathing (including washing, rinsing, and drying)? 4   Toileting, which includes using a toilet, bedpan, or urinal? 4   Putting on and taking off regular upper body clothing? 4   Taking care of personal grooming such as brushing teeth? 4   Eating meals? 4   6 Clicks Daily Activity Score 24   Functional Mobility   Sit to Stand Supervised   Bed, Chair, Wheelchair Transfer Supervised   ICU Target Mobility Level   ICU Mobility - Targeted Level Level 4   Activity Tolerance   Sitting in Chair NT   Sitting Edge of Bed 5 min   Standing 10 min   Education Group   Role of Occupational Therapist Patient Response Patient;Family;Acceptance;Explanation;Demonstration;Verbal Demonstration   Problem List   Problem List None   Anticipated Discharge Equipment and Recommendations   DC Equipment Recommendations None   Discharge Recommendations Anticipate that the patient will have no further occupational therapy needs after discharge from the hospital   Interdisciplinary Plan of Care Collaboration   IDT Collaboration with  Nursing    Patient Position at End of Therapy In Bed;Call Light within Reach;Tray Table within Reach;Phone within Reach;Family / Friend in Room   Collaboration Comments report given   Session Information   Date / Session Number  2/25, 1x only

## 2021-02-25 NOTE — PROGRESS NOTES
Pt. Complained of neck/headache and nausea, no neuro changes at this time, notified Dr. Wesley, tylenol and zofran ordered.  Will continue to assess and intervene appropriately.

## 2021-02-25 NOTE — CARE PLAN
Problem: Acute Care of the Stroke Patient  Goal: Optimal Outcome for the Stroke patient  Intervention: Consider MRI/Consider MRA  Note: Pt. To have follow up imaging this PM.  Will await results.      Problem: Risk for Impaired Mobility--Activity Intolerance  Goal: Mobilize and/or Transfer Safely with Maximum [place]  Intervention: Progressive Mobilization--ADL Flow Sheet  Note: Pt. Remains bedrest at this time, post TPA/thrombectomy, will provide mobility once past 24hrs post TPA/thrombectomy.

## 2021-02-25 NOTE — PROGRESS NOTES
Pt. To MRI via ACLS RN and [facility] on MRI [place].  Transport monitor in place.  MRI monitor placed in MRI.

## 2021-02-25 NOTE — PROGRESS NOTES
Brief PM&R Follow Up Note      -OT, 2/25: Superv bed mobility and ADLs  -PT, 2/25: pending eval  -SLP, 2/24: reg/thin    Patient is a 65 y.o. female admitted with LLE weakness and difficulty walking. CT with right M1 acute occlusion s/p TPA and IR thrombectomy on 2/24 with subsequent TICI 2b flow. EEG 2/24 without abnormal findings.     #Rehab:   -Vitals: borderline robert; stable BP, RA  -Insurance: Medicare/[employer]  -Rehab Impairment Code: 0001.1 - Stroke: Left Body Involvement (Right Brain)  -Reason for admission: Stroke (HCC)  -Discharge support: spouse/family may be able to assist  -Pending PT eval, but based on OT & SLP evaluation, patient does not have sufficient therapy needs for inpatient rehab.   -Recommend home with HH/outpatient therapies as needed on discharge.            Kaleb Hall MD  Physical Medicine and Rehabilitation   2/25/2021

## 2021-02-26 ENCOUNTER — NURSE TRIAGE (OUTPATIENT)
Dept: HEALTH INFORMATION MANAGEMENT | Facility: OTHER | Age: 66
End: 2021-02-26

## 2021-02-26 ENCOUNTER — TELEPHONE (OUTPATIENT)
Dept: HOSPITALIST | Facility: MEDICAL CENTER | Age: 66
End: 2021-02-26

## 2021-02-26 VITALS
HEIGHT: 67 IN | SYSTOLIC BLOOD PRESSURE: 127 MMHG | TEMPERATURE: 98.5 F | HEART RATE: 63 BPM | BODY MASS INDEX: 20.62 KG/M2 | WEIGHT: 131.39 LBS | DIASTOLIC BLOOD PRESSURE: 72 MMHG | RESPIRATION RATE: 16 BRPM | OXYGEN SATURATION: 96 %

## 2021-02-26 LAB
ALBUMIN SERPL BCP-MCNC: 3.7 G/DL (ref 3.2–4.9)
ALBUMIN/GLOB SERPL: 1.5 G/DL
ALP SERPL-CCNC: 72 U/L (ref 30–99)
ALT SERPL-CCNC: 17 U/L (ref 2–50)
ANION GAP SERPL CALC-SCNC: 11 MMOL/L (ref 7–16)
AST SERPL-CCNC: 18 U/L (ref 12–45)
BASOPHILS # BLD AUTO: 0.2 % (ref 0–1.8)
BASOPHILS # BLD: 0.01 K/UL (ref 0–0.12)
BILIRUB SERPL-MCNC: 0.3 MG/DL (ref 0.1–1.5)
BUN SERPL-MCNC: 15 MG/DL (ref 8–22)
CALCIUM SERPL-MCNC: 9.4 MG/DL (ref 8.5–10.5)
CHLORIDE SERPL-SCNC: 104 MMOL/L (ref 96–112)
CHOLEST SERPL-MCNC: 167 MG/DL (ref 100–199)
CO2 SERPL-SCNC: 21 MMOL/L (ref 20–33)
CREAT SERPL-MCNC: 0.56 MG/DL (ref 0.5–1.4)
EOSINOPHIL # BLD AUTO: 0.06 K/UL (ref 0–0.51)
EOSINOPHIL NFR BLD: 1.1 % (ref 0–6.9)
ERYTHROCYTE [DISTWIDTH] IN BLOOD BY AUTOMATED COUNT: 47.8 FL (ref 35.9–50)
GLOBULIN SER CALC-MCNC: 2.4 G/DL (ref 1.9–3.5)
GLUCOSE SERPL-MCNC: 86 MG/DL (ref 65–99)
HCT VFR BLD AUTO: 39.3 % (ref 37–47)
HDLC SERPL-MCNC: 63 MG/DL
HGB BLD-MCNC: 12.8 G/DL (ref 12–16)
IMM GRANULOCYTES # BLD AUTO: 0.02 K/UL (ref 0–0.11)
IMM GRANULOCYTES NFR BLD AUTO: 0.4 % (ref 0–0.9)
LDLC SERPL CALC-MCNC: 92 MG/DL
LYMPHOCYTES # BLD AUTO: 1.26 K/UL (ref 1–4.8)
LYMPHOCYTES NFR BLD: 24 % (ref 22–41)
MCH RBC QN AUTO: 30 PG (ref 27–33)
MCHC RBC AUTO-ENTMCNC: 32.6 G/DL (ref 33.6–35)
MCV RBC AUTO: 92 FL (ref 81.4–97.8)
MONOCYTES # BLD AUTO: 0.56 K/UL (ref 0–0.85)
MONOCYTES NFR BLD AUTO: 10.6 % (ref 0–13.4)
NEUTROPHILS # BLD AUTO: 3.35 K/UL (ref 2–7.15)
NEUTROPHILS NFR BLD: 63.7 % (ref 44–72)
NRBC # BLD AUTO: 0 K/UL
NRBC BLD-RTO: 0 /100 WBC
PLATELET # BLD AUTO: 175 K/UL (ref 164–446)
PMV BLD AUTO: 10.9 FL (ref 9–12.9)
POTASSIUM SERPL-SCNC: 4.5 MMOL/L (ref 3.6–5.5)
PROT SERPL-MCNC: 6.1 G/DL (ref 6–8.2)
RBC # BLD AUTO: 4.27 M/UL (ref 4.2–5.4)
SODIUM SERPL-SCNC: 136 MMOL/L (ref 135–145)
TRIGL SERPL-MCNC: 62 MG/DL (ref 0–149)
WBC # BLD AUTO: 5.3 K/UL (ref 4.8–10.8)

## 2021-02-26 PROCEDURE — 700102 HCHG RX REV CODE 250 W/ 637 OVERRIDE(OP): Performed by: PSYCHIATRY & NEUROLOGY

## 2021-02-26 PROCEDURE — 80053 COMPREHEN METABOLIC PANEL: CPT

## 2021-02-26 PROCEDURE — 80061 LIPID PANEL: CPT

## 2021-02-26 PROCEDURE — 85025 COMPLETE CBC W/AUTO DIFF WBC: CPT

## 2021-02-26 PROCEDURE — A9270 NON-COVERED ITEM OR SERVICE: HCPCS | Performed by: INTERNAL MEDICINE

## 2021-02-26 PROCEDURE — 700102 HCHG RX REV CODE 250 W/ 637 OVERRIDE(OP): Performed by: INTERNAL MEDICINE

## 2021-02-26 PROCEDURE — 700102 HCHG RX REV CODE 250 W/ 637 OVERRIDE(OP): Performed by: STUDENT IN AN ORGANIZED HEALTH CARE EDUCATION/TRAINING PROGRAM

## 2021-02-26 PROCEDURE — A9270 NON-COVERED ITEM OR SERVICE: HCPCS | Performed by: STUDENT IN AN ORGANIZED HEALTH CARE EDUCATION/TRAINING PROGRAM

## 2021-02-26 PROCEDURE — 36415 COLL VENOUS BLD VENIPUNCTURE: CPT

## 2021-02-26 PROCEDURE — A9270 NON-COVERED ITEM OR SERVICE: HCPCS | Performed by: PSYCHIATRY & NEUROLOGY

## 2021-02-26 PROCEDURE — 99239 HOSP IP/OBS DSCHRG MGMT >30: CPT | Performed by: INTERNAL MEDICINE

## 2021-02-26 RX ORDER — ATORVASTATIN CALCIUM 40 MG/1
40 TABLET, FILM COATED ORAL EVERY EVENING
Qty: 90 TABLET | Refills: 0 | Status: SHIPPED | OUTPATIENT
Start: 2021-02-26 | End: 2021-04-12

## 2021-02-26 RX ORDER — ASPIRIN 81 MG/1
81 TABLET, CHEWABLE ORAL DAILY
Qty: 5 TABLET | Refills: 0 | Status: SHIPPED | OUTPATIENT
Start: 2021-02-27 | End: 2021-03-04

## 2021-02-26 RX ADMIN — DOCUSATE SODIUM 50 MG AND SENNOSIDES 8.6 MG 2 TABLET: 8.6; 5 TABLET, FILM COATED ORAL at 04:46

## 2021-02-26 RX ADMIN — OMEPRAZOLE 20 MG: 20 CAPSULE, DELAYED RELEASE ORAL at 04:46

## 2021-02-26 RX ADMIN — ASPIRIN 81 MG CHEWABLE TABLET 81 MG: 81 TABLET CHEWABLE at 04:46

## 2021-02-26 NOTE — PROGRESS NOTES
Patient transported to [facility] via wheelchair on monitor with ACLS RN and student. Patient transported with all belongings including glasses, phone, tablet and phone .

## 2021-02-26 NOTE — PROGRESS NOTES
PIVs removed with tips intact and sites covered with gauze and tape. D/C instructions provided and pt acknowledged understanding. All belongings gathered.
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
## 2021-02-26 NOTE — DISCHARGE SUMMARY
Discharge Summary    CHIEF COMPLAINT ON ADMISSION  Chief Complaint   Patient presents with   • Possible Stroke     LNW 2233. Difficulty walking, LLE/LUE weakness. GCS 15 a/o x 4. - thinners       Reason for Admission  EMS     Admission Date  2/23/2021    CODE STATUS  Full Code    HPI & HOSPITAL COURSE  65-year-old female past medical history of baseline tremor (atenolol) anxiety, asthma, atrial fibrillation status post ablation, GERD, esophageal stricture presented 2/24/2021 with difficulty walking cane, left lower extremity weakness.  CT scan on arrival suggestive of M2 acute occlusion.  IR was consulted and she did have mechanical thrombectomy.  EEG was also done was negative.  Patient was monitored in ICU for close monitoring.  Neurology was consulted.  MRI brain small amount of hemorrhage.  Neurology did recommend to start Xarelto 3/5/2021.  In the meantime she will continue aspirin.  I specifically advised patient to stop aspirin once she starts Xarelto on 3/5.  prescription provided. Patient was before on Xarelto but she was told to stop after ablation. She was cleared by speech, occupational and physical therapy.    Therefore, she is discharged in guarded and stable condition to home with close outpatient follow-up.    The patient met 2-midnight criteria for an inpatient stay at the time of discharge.    Discharge Date  2/26/2021    FOLLOW UP ITEMS POST DISCHARGE  None     DISCHARGE DIAGNOSES  Principal Problem:    Stroke (HCC) POA: Yes  Active Problems:    Gastroesophageal reflux disease without esophagitis POA: Yes    Paroxysmal atrial fibrillation (HCC) POA: Yes    Essential tremor POA: Yes  Resolved Problems:    * No resolved hospital problems. *      FOLLOW UP  Future Appointments   Date Time Provider Department Center   6/30/2021  1:20 PM Josue Moody M.D. [facility] None     Tano Clay M.D.  [address] [address]  [phone]    In 2 weeks        MEDICATIONS ON  DISCHARGE     Medication List      START taking these medications      Instructions   aspirin 81 MG Chew chewable tablet  Start taking on: February 27, 2021  Commonly known as: ASA   Chew 1 tablet every day for 5 days.  Dose: 81 mg     atorvastatin 40 MG Tabs  Commonly known as: LIPITOR   Take 1 tablet by mouth every evening.  Dose: 40 mg     rivaroxaban 20 MG Tabs tablet  Start taking on: March 1, 2021  Commonly known as: Xarelto   Take 1 tablet by mouth with dinner.  Dose: 20 mg        CONTINUE taking these medications      Instructions   acetaminophen 325 MG Tabs  Commonly known as: Tylenol   Take 1 Tab by mouth 1 time daily as needed. Patient will not taken until follow up labs/results reviewed.  Dose: 325 mg     esomeprazole 40 MG delayed-release capsule  Commonly known as: NEXIUM   TAKE 1 CAPSULE BY MOUTH ONCE A DAY 30 MINUTES BEFORE BREAKFAST MEAL     Myrbetriq 50 MG Tb24  Generic drug: Mirabegron ER      * propranolol 20 MG Tabs  Commonly known as: INDERAL   Take 1 Tab by mouth 3 times a day as needed.  Dose: 20 mg     * propranolol LA 60 MG Cp24  Commonly known as: INDERAL LA   Take 1 Cap by mouth every day.  Dose: 60 mg         * This list has 2 medication(s) that are the same as other medications prescribed for you. Read the directions carefully, and ask your doctor or other care provider to review them with you.            STOP taking these medications    diphenhydramine 25 MG tablet  Commonly known as: SOMINEX            Allergies  Allergies   Allergen Reactions   • Amitriptyline      Excessive sedation --even at 10 mg dose   • Bactrim [Sulfamethoxazole W-Trimethoprim]      Shaking   • Levaquin      Nausea    • Lovastatin      Muscle aches  Muscle aches     • Trazodone      Irregular pulse       DIET  Orders Placed This Encounter   Procedures   • Diet Order Diet: Regular     Standing Status:   Standing     Number of Occurrences:   1     Order Specific Question:   Diet:     Answer:   Regular [1]              ACTIVITY  As tolerated.  Weight bearing as tolerated    CONSULTATIONS  Neurology   IR   Critical care     PROCEDURES  Thrombectomy    LABORATORY  Lab Results   Component Value Date    SODIUM 136 02/26/2021    POTASSIUM 4.5 02/26/2021    CHLORIDE 104 02/26/2021    CO2 21 02/26/2021    GLUCOSE 86 02/26/2021    BUN 15 02/26/2021    CREATININE 0.56 02/26/2021    CREATININE 0.8 10/20/2008        Lab Results   Component Value Date    WBC 5.3 02/26/2021    HEMOGLOBIN 12.8 02/26/2021    HEMATOCRIT 39.3 02/26/2021    PLATELETCT 175 02/26/2021        Total time of the discharge process exceeds 35 minutes.

## 2021-02-26 NOTE — DISCHARGE INSTRUCTIONS
Discharge Instructions    Discharged to home by car with relative. Discharged via wheelchair, hospital escort: Yes.  Special equipment needed: Not Applicable    Be sure to schedule a follow-up appointment with your primary care doctor or any specialists as instructed.     Discharge Plan:   Diet Plan: Discussed  Activity Level: Discussed  Confirmed Follow up Appointment: Appointment Scheduled  Confirmed Symptoms Management: Discussed  Medication Reconciliation Updated: Yes  Influenza Vaccine Indication: Not indicated: Previously immunized this influenza season and > 8 years of age    I understand that a diet low in cholesterol, fat, and sodium is recommended for good health. Unless I have been given specific instructions below for another diet, I accept this instruction as my diet prescription.   Other diet: Regular    Special Instructions:     Stroke/CVA/TIA/Hemorrhagic Ischemia Discharge Instructions  You have had a stroke. Your risk factors have been identified as follows:  Age - Over 55  Atrial Fibrillation  High Cholesterol and lipids  It is important that you reduce your risk factors to avoid another stroke in the future. Here are some general guidelines to follow:  · Eat healthy - avoid food high in fat.  · Get regular exercise.  · Maintain a healthy weight.  · Avoid smoking.  · Avoid alcohol and illegal drug use.  · Take your medications as directed.  For more information regarding risk factors, refer to pages 17-19 in your Stroke Patient Education Guide. Stroke Education Guide was given to patient and family member.    Warning signs of a stroke include (which can also be found on page 3 of your Stroke Patient Education Guide):  · Sudden numbness of weakness of the face, arm or leg (especially on one side of the body).  · Sudden confusion, trouble speaking or understanding.  · Sudden trouble seeing in one or both eyes.  · Sudden trouble walking, dizziness, loss of balance or coordination.  · Sudden severe  headache with no known cause.  It is very important to get treatment quickly when a stroke occurs. If you experience any of the above warning signs, call [phone] immediately.     Some patients who have had a stroke will be going home on a blood thinner medication called Warfarin (Coumadin).  This medication requires very close monitoring and follow up.  This follow up can be provided by either your Primary Care Physician or by [facility] Outpatient Anticoagulation Service.  The Outpatient Anticoagulation Service is located at the [facility] at [facility] ([facility]).  If you do not know when your follow up appointment is scheduled, call [phone] to verify your appointment time.      · Is patient discharged on Warfarin / Coumadin?   No     Depression / Suicide Risk    As you are discharged from this [facility], it is important to learn how to keep safe from harming yourself.    Recognize the warning signs:  · Abrupt changes in personality, positive or negative- including increase in energy   · Giving away possessions  · Change in eating patterns- significant weight changes-  positive or negative  · Change in sleeping patterns- unable to sleep or sleeping all the time   · Unwillingness or inability to communicate  · Depression  · Unusual sadness, discouragement and loneliness  · Talk of wanting to die  · Neglect of personal appearance   · Rebelliousness- reckless behavior  · Withdrawal from people/activities they love  · Confusion- inability to concentrate     If you or a loved one observes any of these behaviors or has concerns about self-harm, here's what you can do:  · Talk about it- your feelings and reasons for harming yourself  · Remove any means that you might use to hurt yourself (examples: pills, rope, extension cords, firearm)  · Get professional help from the community (Mental Health, Substance Abuse, psychological counseling)  · Do not be  alone:Call your Safe Contact- someone whom you trust who will be there for you.  · Call your local CRISIS HOTLINE 238-3487 or 542-566-3060  · Call your local Children's Mobile Crisis Response Team [place] (319) 049-7847 or www.[employer]  · Call the toll free National Suicide Prevention Hotlines   · National Suicide Prevention Lifeline 549-533-VXLL (6894)  · [place] [employer](INOR) Line Network 800-SUICIDE (643-5887)      Discharge Instructions per Neo Worthy M.D.  Start xarelto on 03/01/2021   Continue aspiring until then. Stop aspirin once you start taking xarelto     DIET: healthy     ACTIVITY: as tolerated     DIAGNOSIS: CVA     Return to ER if confusion, focal weakness, fever, severe headache, abdominal pain, unusual bleeding, chest pain, shortness of breath

## 2021-02-26 NOTE — TELEPHONE ENCOUNTER
I did receive a page from patient. She did experience bilateral lower extremity pain when she was discharged.  I did call patient.  she described more muscle cramping.  Generalized weakness.   I did strongly encouraged to provide provide proper hydration.  Possible explanation of the muscle pain is atorvastatin.   I did advise patient to see primary care if that is persistent and he would consider cutting in half the cholesterol medication.   Patient is feeling better already.  Follow up with primary care  Neo Worthy M.D.

## 2021-02-26 NOTE — DISCHARGE PLANNING
[facility] is not requiring 2 of 3 disciplines.  [facility] will no longer follow.  Please reach out to myself @ [phone] with any questions.

## 2021-02-26 NOTE — PROGRESS NOTES
Monitor summary: SB/SR 51-88, MI 0.18, QRS 0.10, QT 0.40, with PACs per strip from monitor room.

## 2021-02-26 NOTE — TELEPHONE ENCOUNTER
"Released today from hospital.  Legs started hurting while patient was in the shower. Pain is 8/10 and feels like \"weak muscle\" Feels like \"really bad muscle pain\" in the thigh area going down to the knee.  Pt has been in bed for 2.5 days.    Baby aspirin.  Will not take blood thinner until 03/01.  Will follow up with MD next month on 03/25  Will contact [facility] for further advice    Reason for Disposition  • MODERATE pain (e.g., interferes with normal activities, limping) and present > 3 days    Additional Information  • Negative: Looks like a broken bone or dislocated joint (e.g., crooked or deformed)  • Negative: Sounds like a life-threatening emergency to the triager  • Negative: Followed a hip injury  • Negative: Followed a knee injury  • Negative: Followed an ankle or foot injury  • Negative: Back pain radiating (shooting) into leg(s)  • Negative: Foot pain is the main symptom  • Negative: Ankle pain is the main symptom  • Negative: Knee pain is the main symptom  • Negative: Leg swelling is the main symptom  • Negative: Chest pain  • Negative: Difficulty breathing  • Negative: Entire foot is cool or blue in comparison to other side  • Negative: Unable to walk  • Negative: Fever and red area (or area very tender to touch)  • Negative: Fever and swollen joint  • Negative: Thigh or calf pain in only one leg and present > 1 hour  • Negative: Thigh, calf, or ankle swelling in only one leg  • Negative: Thigh, calf, or ankle swelling in both legs, but one side is definitely more swollen  • Negative: History of prior 'blood clot' in leg or lungs (i.e., deep vein thrombosis, pulmonary embolism)  • Negative: History of inherited increased risk of blood clots (e.g., factor 5 Leiden, antithrombin 3, protein C or protein S deficiency, prothrombin mutation)  • Negative: Recent illness requiring prolonged bed rest (immobilization)  • Negative: Hip or leg fracture in past 2 months (e.g., or had cast on leg or " "ankle)  • Negative: Major surgery in the past two months  • Negative: Cancer treatment in the past two months (or has cancer now)  • Negative: Recent long-distance travel with prolonged time in car, bus, plane, or train (i.e., within past 2 weeks; 6 or more hours duration)  • Negative: Patient sounds very sick or weak to the triager  • Negative: SEVERE pain (e.g., excruciating, unable to do any normal activities)  • Negative: Cast on leg or ankle and now has increasing pain  • Negative: Red area or streak and large (> 2 in. or 5 cm)  • Negative: Painful rash with multiple small blisters grouped together (i.e., dermatomal distribution or 'band' or 'stripe')  • Negative: Looks like a boil, infected sore, deep ulcer, or other infected rash (spreading redness, pus)  • Negative: Localized rash is very painful (no fever)  • Negative: Numbness in a leg or foot (i.e., loss of sensation)  • Negative: Localized pain, redness or hard lump along vein  • Negative: Patient wants to be seen    Answer Assessment - Initial Assessment Questions  1. ONSET: \"When did the pain start?\"       Started in the shower this morning  2. LOCATION: \"Where is the pain located?\"       Bilateral thigh  3. PAIN: \"How bad is the pain?\"    (Scale 1-10; or mild, moderate, severe)    -  MILD (1-3): doesn't interfere with normal activities     -  MODERATE (4-7): interferes with normal activities (e.g., work or school) or awakens from sleep, limping     -  SEVERE (8-10): excruciating pain, unable to do any normal activities, unable to walk      7/10  4. WORK OR EXERCISE: \"Has there been any recent work or exercise that involved this part of the body?\"       In bed for 2 1/2 days and today discharged to home.  Now has bilateral thigh pain  5. CAUSE: \"What do you think is causing the leg pain?\"      Being in bed for 2 days.  6. OTHER SYMPTOMS: \"Do you have any other symptoms?\" (e.g., chest pain, back pain, breathing difficulty, swelling, rash, fever, " "numbness, weakness)      no  7. PREGNANCY: \"Is there any chance you are pregnant?\" \"When was your last menstrual period?\"      no    Protocols used: LEG PAIN-A-[place]      "

## 2021-02-26 NOTE — FACE TO FACE
Face to Face Supporting Documentation - Home Health    The encounter with this patient was in whole or in part the primary reason for home health admission.    Date of encounter:   Patient:                    MRN:                       YOB: 2023  Susy Szymanski  [id number]  1955     Home health to see patient for:  Physical Therapy evaluation and treatment, Occupational therapy evaluation and treatment and Speech Language Pathology evaluation and treatment    Skilled need for:  New Onset Medical Diagnosis Cerebral vascular accident     Skilled nursing interventions to include:  Comment: PT/OT/speech therapy     Homebound status evidenced by:  Need the aid of supportive devices such as crutches, canes, wheelchairs or walkers, Require the use of special transportation or Needs the assistance of another person in order to leave the home. Leaving home requires a considerable and taxing effort. There is a normal inability to leave the home.    Community Physician to provide follow up care: Tano Clay M.D.     Optional Interventions? No      I certify the face to face encounter for this home health care referral meets the CMS requirements and the encounter/clinical assessment with the patient was, in whole, or in part, for the medical condition(s) listed above, which is the primary reason for home health care. Based on my clinical findings: the service(s) are medically necessary, support the need for home health care, and the homebound criteria are met.  I certify that this patient has had a face to face encounter by myself.  Neo Worthy M.D. - NPI: [id number]

## 2021-02-27 ENCOUNTER — IMMUNIZATION (OUTPATIENT)
Dept: FAMILY PLANNING/WOMEN'S HEALTH CLINIC | Facility: IMMUNIZATION CENTER | Age: 66
End: 2021-02-27
Attending: INTERNAL MEDICINE
Payer: MEDICARE

## 2021-02-27 DIAGNOSIS — Z23 NEED FOR VACCINATION: ICD-10-CM

## 2021-02-27 DIAGNOSIS — Z23 ENCOUNTER FOR VACCINATION: Primary | ICD-10-CM

## 2021-02-27 PROCEDURE — 0002A PFIZER SARS-COV-2 VACCINE: CPT

## 2021-02-27 PROCEDURE — 91300 PFIZER SARS-COV-2 VACCINE: CPT

## 2021-03-01 ENCOUNTER — NURSE TRIAGE (OUTPATIENT)
Dept: HEALTH INFORMATION MANAGEMENT | Facility: OTHER | Age: 66
End: 2021-03-01

## 2021-03-01 NOTE — TELEPHONE ENCOUNTER
Pt called in with questions concerning her cath insertion site, wound care, and medication questions from 2/23 hospital admission. Discussed provider's d/c notes regarding medication and discussed cath site care. All questions answered. Pt peggy for Stroke [place] changed d/t pt out of town.     Reason for Disposition  • Caller has medication question only, adult not sick, and triager answers question    Additional Information  • Negative: Drug overdose and triager unable to answer question  • Negative: Caller requesting information unrelated to medicine  • Negative: Caller requesting a prescription for Strep throat and has a positive culture result  • Negative: Rash while taking a medication or within 3 days of stopping it  • Negative: Immunization reaction suspected  • Negative: Asthma and having symptoms of asthma (cough, wheezing, etc.)  • Negative: Breastfeeding questions about mother's medicines and diet  • Negative: MORE THAN A DOUBLE DOSE of a prescription or over-the-counter (OTC) drug  • Negative: DOUBLE DOSE (an extra dose or lesser amount) of over-the-counter (OTC) drug and any symptoms (e.g., dizziness, nausea, pain, sleepiness)  • Negative: DOUBLE DOSE (an extra dose or lesser amount) of prescription drug and any symptoms (e.g., dizziness, nausea, pain, sleepiness)  • Negative: Took another person's prescription drug  • Negative: DOUBLE DOSE (an extra dose or lesser amount) of prescription drug and NO symptoms (Exception: a double dose of antibiotics)  • Negative: Diabetes drug error or overdose (e.g., took wrong type of insulin or took extra dose)  • Negative: Caller has medication question about med not prescribed by PCP and triager unable to answer question (e.g., compatibility with other med, storage)  • Negative: Request for URGENT new prescription or refill of 'essential' medication (i.e., likelihood of harm to patient if not taken) and triager unable to fill per department policy  • Negative:  "Prescription not at pharmacy and was prescribed today by PCP  • Negative: Pharmacy calling with prescription questions and triager unable to answer question  • Negative: Caller has URGENT medication question about med that PCP prescribed and triager unable to answer question  • Negative: Caller has NON-URGENT medication question about med that PCP prescribed and triager unable to answer question  • Negative: Caller requesting a NON-URGENT new prescription or refill and triager unable to refill per department policy  • Negative: DOUBLE DOSE (an extra dose or lesser amount) of antibiotic drug and NO symptoms  • Negative: DOUBLE DOSE (an extra dose or lesser amount) of over-the-counter (OTC) drug and NO symptoms    Answer Assessment - Initial Assessment Questions  1. SYMPTOMS: \"Do you have any symptoms?\"      None  2. SEVERITY: If symptoms are present, ask \"Are they mild, moderate or severe?\"      N/a    Protocols used: MEDICATION QUESTION CALL-A-[place]      "

## 2021-03-03 ENCOUNTER — TELEPHONE (OUTPATIENT)
Dept: CARDIOLOGY | Facility: MEDICAL CENTER | Age: 66
End: 2021-03-03

## 2021-03-03 NOTE — TELEPHONE ENCOUNTER
SS    Pt , Stacie, called and states that pt is in Afib and is wanting to know what she can take to help. Pt already took propranolol and is now asking for further advise.   Stacie states pt was in the hospital due to a stroke last week and had gotten a blood clot. Stacie and pt are worried of that happening again. Please call stacie back at [phone]    Thank you.

## 2021-03-05 ENCOUNTER — OFFICE VISIT (OUTPATIENT)
Dept: MEDICAL GROUP | Facility: LAB | Age: 66
End: 2021-03-05
Payer: MEDICARE

## 2021-03-05 VITALS
DIASTOLIC BLOOD PRESSURE: 70 MMHG | OXYGEN SATURATION: 96 % | BODY MASS INDEX: 19.93 KG/M2 | HEART RATE: 78 BPM | TEMPERATURE: 98.2 F | WEIGHT: 127 LBS | HEIGHT: 67 IN | RESPIRATION RATE: 13 BRPM | SYSTOLIC BLOOD PRESSURE: 122 MMHG

## 2021-03-05 DIAGNOSIS — D64.9 ANEMIA, UNSPECIFIED TYPE: ICD-10-CM

## 2021-03-05 DIAGNOSIS — I48.0 PAROXYSMAL ATRIAL FIBRILLATION (HCC): ICD-10-CM

## 2021-03-05 DIAGNOSIS — I63.411 CEREBROVASCULAR ACCIDENT (CVA) DUE TO EMBOLISM OF RIGHT MIDDLE CEREBRAL ARTERY (HCC): ICD-10-CM

## 2021-03-05 PROCEDURE — 99214 OFFICE O/P EST MOD 30 MIN: CPT | Performed by: FAMILY MEDICINE

## 2021-03-05 ASSESSMENT — ENCOUNTER SYMPTOMS
SENSORY CHANGE: 0
TINGLING: 0
SPEECH CHANGE: 0
DIZZINESS: 0
HEADACHES: 0
LOSS OF CONSCIOUSNESS: 0
FOCAL WEAKNESS: 1
SHORTNESS OF BREATH: 0
WEAKNESS: 0
PALPITATIONS: 0
CHILLS: 0
WHEEZING: 0
BLURRED VISION: 0
FEVER: 0
SEIZURES: 0

## 2021-03-05 ASSESSMENT — FIBROSIS 4 INDEX: FIB4 SCORE: 1.7

## 2021-03-05 NOTE — TELEPHONE ENCOUNTER
"Returned Jesús's call, upon answering  very upset about the delay in response. Discussed with S/O that unfortunately it can take a some time to return calls due to the volume of calls and if he felt like it was a medical emergency the pt should have gone to the ER  \"well that would have been nice to know!\" \"the person I talked to on the phone should have told us to go to the ER\"     Offered to assist pt and s/o now that I have them on the phone. S/o said forget about it, its resolved now. And he is very upset with the response time. Apologized again and sympathized how it could be very frustrating waiting for a call.  S/o did not want to discuss pts medical care.   "

## 2021-03-05 NOTE — PROGRESS NOTES
Subjective:   Susy Szymanski is a 66 y.o. female here today for   Chief Complaint   Patient presents with   • Hospital Follow-up     Post stroke from Afib.      #Hospital follow-up:  -On 2/23/2021 patient was evaluated emergency room for lower extremity weakness, difficulty walking.  CT scan showed possible M2 acute occlusion, IR completed mechanical thrombectomy.  Further work-up was completed including EEG which was negative.  Patient was kept in hospital for few days given a small amount of hemorrhage seen on MRI brain.  Patient was also placed back on Xarelto (he has a history of atrial fibrillation, status post ablation).  Patient will stop aspirin and started Xarelto.  She was cleared by speech, occupational and physical therapy.  -Today patient that she is doing well.  She does have a slight balance problems but is improving every day.  She is also noticed a weakness in her  on her left hand side.  She also feels like this is improving slightly.  -Has no other weakness, numbness, tingling.  Is working on improving exercise at this time.  -Is taking Xarelto, denies any irregular bleeding.  Here today for clearance to begin regular daily exercise routine      Allergies   Allergen Reactions   • Amitriptyline      Excessive sedation --even at 10 mg dose   • Bactrim [Sulfamethoxazole W-Trimethoprim]      Shaking   • Levaquin      Nausea    • Lovastatin      Muscle aches  Muscle aches     • Trazodone      Irregular pulse         Current medicines (including changes today)  Current Outpatient Medications   Medication Sig Dispense Refill   • rivaroxaban (XARELTO) 20 MG Tab tablet Take 1 tablet by mouth with dinner for 90 days. 90 tablet 3   • atorvastatin (LIPITOR) 40 MG Tab Take 1 tablet by mouth every evening. 90 tablet 0   • propranolol LA (INDERAL LA) 60 MG CAPSULE SR 24 HR Take 1 Cap by mouth every day. 30 Cap 11   • propranolol (INDERAL) 20 MG Tab Take 1 Tab by mouth 3 times a day as needed. 90 Tab 6  "  • esomeprazole (NEXIUM) 40 MG delayed-release capsule TAKE 1 CAPSULE BY MOUTH ONCE A DAY 30 MINUTES BEFORE BREAKFAST MEAL 30 Cap 6   • MYRBETRIQ 50 MG TABLET SR 24 HR      • acetaminophen (TYLENOL) 325 MG Tab Take 1 Tab by mouth 1 time daily as needed. Patient will not taken until follow up labs/results reviewed.       No current facility-administered medications for this visit.     She  has a past medical history of Anxiety, Arrhythmia, ASTHMA, Bowel habit changes, Depression, GERD (gastroesophageal reflux disease), Hiatal hernia with GERD (11/5/2019), History of esophageal stricture (11/5/2019), Indigestion, Osteoporosis, Palpitations, and Premature atrial contractions.    ROS   Review of Systems   Constitutional: Negative for chills and fever.   HENT: Negative for hearing loss.    Eyes: Negative for blurred vision.   Respiratory: Negative for shortness of breath and wheezing.    Cardiovascular: Negative for chest pain and palpitations.   Neurological: Positive for focal weakness. Negative for dizziness, tingling, sensory change, speech change, seizures, loss of consciousness, weakness and headaches.      Objective:     Physical Exam:  /70   Pulse 78   Temp 36.8 °C (98.2 °F) (Temporal)   Resp 13   Ht 1.702 m (5' 7.01\")   Wt 57.6 kg (127 lb)   SpO2 96%  Body mass index is 19.89 kg/m².   Constitutional: Alert, no distress.  Skin: Warm, dry, good turgor, no rashes in visible areas.  Eye: Equal, round and reactive, conjunctiva clear, lids normal.  ENMT: TM's clear bilaterally, lips without lesions, good dentition, oropharynx clear.  Neck: Trachea midline, no masses, no thyromegaly. No cervical or supraclavicular lymphadenopathy.  Respiratory: Unlabored respiratory effort, lungs clear to auscultation, no wheezes, no rhonchi.  Cardiovascular: Normal S1, S2, no murmur, no edema.  Abdomen: Soft, non-tender, no masses, no hepatosplenomegaly.  Psych: Alert and oriented x3, normal affect and mood.  Neuro: " Cranial nerves I through XII are intact, slight decrease in strength of  (4/5) of the left hand.  Romberg negative, Babinski normal.    Assessment and Plan:     1. Paroxysmal atrial fibrillation (HCC)  -At this time is a working diagnosis that patient had thrombectomy secondary to atrial fibrillation.  She is following up with cardiology for reevaluation at this time.  Meanwhile patient will continue with Xarelto.  We will check labs as below to assure no bleeding.  -Return precautions were given, patient will follow-up as needed.  - Comp Metabolic Panel; Future  - CBC WITHOUT DIFFERENTIAL; Future    2. Cerebrovascular accident (CVA) due to embolism of right middle cerebral artery (HCC)  -Status: Stable.  Patient continue to work on improvement of dizziness and left hand strength.  Patient was instructed that if she continues to have symptoms she will let me know at which point we can get her into physical therapy.  -Patient has been placed on a statin which she is taking regularly at this time.  She did have some side effects but currently is doing well.  She is to contact me if she begins having any new muscle pain or weakness.  -We will recheck labs to assure liver function is normal given starting new medication that was symptomatic.  - Comp Metabolic Panel; Future  - CBC WITHOUT DIFFERENTIAL; Future      Followup: Return if symptoms worsen or fail to improve.         PLEASE NOTE: This dictation was created using voice recognition software. I have made every reasonable attempt to correct obvious errors, but I expect that there are errors of grammar and possibly content that I did not discover before finalizing the note.

## 2021-03-09 ENCOUNTER — HOSPITAL ENCOUNTER (OUTPATIENT)
Dept: LAB | Facility: MEDICAL CENTER | Age: 66
End: 2021-03-09
Attending: FAMILY MEDICINE
Payer: MEDICARE

## 2021-03-09 DIAGNOSIS — I48.0 PAROXYSMAL ATRIAL FIBRILLATION (HCC): ICD-10-CM

## 2021-03-09 DIAGNOSIS — I63.411 CEREBROVASCULAR ACCIDENT (CVA) DUE TO EMBOLISM OF RIGHT MIDDLE CEREBRAL ARTERY (HCC): ICD-10-CM

## 2021-03-09 LAB
ALBUMIN SERPL BCP-MCNC: 4 G/DL (ref 3.2–4.9)
ALBUMIN/GLOB SERPL: 1.7 G/DL
ALP SERPL-CCNC: 79 U/L (ref 30–99)
ALT SERPL-CCNC: 21 U/L (ref 2–50)
ANION GAP SERPL CALC-SCNC: 11 MMOL/L (ref 7–16)
AST SERPL-CCNC: 23 U/L (ref 12–45)
BILIRUB SERPL-MCNC: 0.4 MG/DL (ref 0.1–1.5)
BUN SERPL-MCNC: 17 MG/DL (ref 8–22)
CALCIUM SERPL-MCNC: 9.4 MG/DL (ref 8.5–10.5)
CHLORIDE SERPL-SCNC: 101 MMOL/L (ref 96–112)
CO2 SERPL-SCNC: 26 MMOL/L (ref 20–33)
CREAT SERPL-MCNC: 0.62 MG/DL (ref 0.5–1.4)
ERYTHROCYTE [DISTWIDTH] IN BLOOD BY AUTOMATED COUNT: 49.1 FL (ref 35.9–50)
GLOBULIN SER CALC-MCNC: 2.3 G/DL (ref 1.9–3.5)
GLUCOSE SERPL-MCNC: 83 MG/DL (ref 65–99)
HCT VFR BLD AUTO: 37.7 % (ref 37–47)
HGB BLD-MCNC: 11.9 G/DL (ref 12–16)
MCH RBC QN AUTO: 30.2 PG (ref 27–33)
MCHC RBC AUTO-ENTMCNC: 31.6 G/DL (ref 33.6–35)
MCV RBC AUTO: 95.7 FL (ref 81.4–97.8)
PLATELET # BLD AUTO: 257 K/UL (ref 164–446)
PMV BLD AUTO: 10.7 FL (ref 9–12.9)
POTASSIUM SERPL-SCNC: 3.6 MMOL/L (ref 3.6–5.5)
PROT SERPL-MCNC: 6.3 G/DL (ref 6–8.2)
RBC # BLD AUTO: 3.94 M/UL (ref 4.2–5.4)
SODIUM SERPL-SCNC: 138 MMOL/L (ref 135–145)
WBC # BLD AUTO: 6.5 K/UL (ref 4.8–10.8)

## 2021-03-09 PROCEDURE — 80053 COMPREHEN METABOLIC PANEL: CPT

## 2021-03-09 PROCEDURE — 36415 COLL VENOUS BLD VENIPUNCTURE: CPT

## 2021-03-09 PROCEDURE — 85027 COMPLETE CBC AUTOMATED: CPT

## 2021-03-10 ENCOUNTER — OFFICE VISIT (OUTPATIENT)
Dept: CARDIOLOGY | Facility: MEDICAL CENTER | Age: 66
End: 2021-03-10
Payer: MEDICARE

## 2021-03-10 ENCOUNTER — PATIENT MESSAGE (OUTPATIENT)
Dept: CARDIOLOGY | Facility: MEDICAL CENTER | Age: 66
End: 2021-03-10

## 2021-03-10 VITALS
HEART RATE: 64 BPM | BODY MASS INDEX: 19.78 KG/M2 | WEIGHT: 126 LBS | OXYGEN SATURATION: 96 % | SYSTOLIC BLOOD PRESSURE: 124 MMHG | DIASTOLIC BLOOD PRESSURE: 72 MMHG | HEIGHT: 67 IN

## 2021-03-10 DIAGNOSIS — G25.0 ESSENTIAL TREMOR: ICD-10-CM

## 2021-03-10 DIAGNOSIS — I48.0 PAF (PAROXYSMAL ATRIAL FIBRILLATION) (HCC): ICD-10-CM

## 2021-03-10 DIAGNOSIS — Z79.01 CHRONIC ANTICOAGULATION: ICD-10-CM

## 2021-03-10 DIAGNOSIS — I63.429 CEREBROVASCULAR ACCIDENT (CVA) DUE TO EMBOLISM OF ANTERIOR CEREBRAL ARTERY, UNSPECIFIED BLOOD VESSEL LATERALITY (HCC): ICD-10-CM

## 2021-03-10 PROCEDURE — 99214 OFFICE O/P EST MOD 30 MIN: CPT | Performed by: NURSE PRACTITIONER

## 2021-03-10 PROCEDURE — 93000 ELECTROCARDIOGRAM COMPLETE: CPT | Performed by: INTERNAL MEDICINE

## 2021-03-10 ASSESSMENT — ENCOUNTER SYMPTOMS
CLAUDICATION: 0
CHILLS: 0
FEVER: 0
LOSS OF CONSCIOUSNESS: 0
SHORTNESS OF BREATH: 0
DIZZINESS: 0
ORTHOPNEA: 0
DIAPHORESIS: 0
BLOOD IN STOOL: 0
ABDOMINAL PAIN: 0
WEAKNESS: 1
PND: 0
PALPITATIONS: 0
WHEEZING: 0
COUGH: 0

## 2021-03-10 ASSESSMENT — FIBROSIS 4 INDEX: FIB4 SCORE: 1.33

## 2021-03-10 NOTE — PROGRESS NOTES
Cardiology/Electrophysiology Follow-up Note    Subjective:   Chief Complaint:   Chief Complaint   Patient presents with   • Atrial Fibrillation     F/V Dx: PAF (paroxysmal atrial fibrillation) (HCC)     Susy Szymanski is a 66 y.o. female who presents today for hospitalization follow up for Paroxysmal Atrial Fibrillation s/p prior PVI ablation with recent stoke.     She is followed by Dr. Moody and was last seen on 12/30/20. History of symptomatic PAF. S/p PVI ablation by Dr. Moody on 11/5/19. 14 day monitor with no sustained arrhythmias. Doing well. Off anticoagulation.     Recent hospitalization on 2/23/21 for right M2 thrombus stroke s/p TPA and thrombectomy. Minimal residual left sided weakness.  No significant stenosis or dissection of carotids. EEG without abnormal findings. She was started on xarelto.     Today in follow up, she states she is doing better. She denies chest pain, dizziness, palpitations, pre syncope or syncope, dyspnea, PND, orthopnea, or lower extremity edema.  Denies any signs or symptoms of bleeding or bleeding issues. Patient endorses medication compliance.  She is traveling for vacation to [facility] for the next month.    Past Medical History:   Diagnosis Date   • Anxiety    • Arrhythmia     afib   • ASTHMA    • Bowel habit changes     constipation   • Depression    • GERD (gastroesophageal reflux disease)    • Hiatal hernia with GERD 11/5/2019   • History of esophageal stricture 11/5/2019   • Indigestion    • Osteoporosis    • Palpitations    • Premature atrial contractions      Past Surgical History:   Procedure Laterality Date   • COLONOSCOPY WITH POLYP  June 2008    benign polyp  Dr Paul    • CHOLECYSTECTOMY  January 2008    laparoscopic   • EGD WITH ASP/BX  September 2006    inflammation only ---Dr Paul   • CHOLECYSTECTOMY     • SINUSOTOMIES       Family History   Problem Relation Age of Onset   • Cancer Father 70        cancer of unknown primary   • Heart Disease Mother    • Heart  Disease Son 42        MI and coronary stent placement   • Diabetes Son    • Hypertension Son    • Diabetes Sister      Social History     Socioeconomic History   • Marital status:      Spouse name: Not on file   • Number of children: Not on file   • Years of education: Not on file   • Highest education level: Not on file   Occupational History   • Occupation: retired   Tobacco Use   • Smoking status: Never Smoker   • Smokeless tobacco: Never Used   Substance and Sexual Activity   • Alcohol use: No     Alcohol/week: 0.0 oz   • Drug use: No   • Sexual activity: Yes     Partners: Male     Birth control/protection: Post-Menopausal   Other Topics Concern   • Not on file   Social History Narrative   • Not on file     Social Determinants of Health     Financial Resource Strain:    • Difficulty of Paying Living Expenses:    Food Insecurity:    • Worried About Running Out of Food in the Last Year:    • Ran Out of Food in the Last Year:    Transportation Needs:    • Lack of Transportation (Medical):    • Lack of Transportation (Non-Medical):    Physical Activity:    • Days of Exercise per Week:    • Minutes of Exercise per Session:    Stress:    • Feeling of Stress :    Social Connections:    • Frequency of Communication with Friends and Family:    • Frequency of Social Gatherings with Friends and Family:    • Attends Gnosticist Services:    • Active Member of Clubs or Organizations:    • Attends Club or Organization Meetings:    • Marital Status:    Intimate Partner Violence:    • Fear of Current or Ex-Partner:    • Emotionally Abused:    • Physically Abused:    • Sexually Abused:      Allergies   Allergen Reactions   • Amitriptyline      Excessive sedation --even at 10 mg dose   • Bactrim [Sulfamethoxazole W-Trimethoprim]      Shaking   • Levaquin      Nausea    • Lovastatin      Muscle aches  Muscle aches     • Trazodone      Irregular pulse       Current Outpatient Medications   Medication Sig Dispense Refill   •  "rivaroxaban (XARELTO) 20 MG Tab tablet Take 1 tablet by mouth with dinner for 90 days. 90 tablet 3   • atorvastatin (LIPITOR) 40 MG Tab Take 1 tablet by mouth every evening. 90 tablet 0   • propranolol LA (INDERAL LA) 60 MG CAPSULE SR 24 HR Take 1 Cap by mouth every day. 30 Cap 11   • propranolol (INDERAL) 20 MG Tab Take 1 Tab by mouth 3 times a day as needed. 90 Tab 6   • acetaminophen (TYLENOL) 325 MG Tab Take 1 Tab by mouth 1 time daily as needed. Patient will not taken until follow up labs/results reviewed.     • esomeprazole (NEXIUM) 40 MG delayed-release capsule TAKE 1 CAPSULE BY MOUTH ONCE A DAY 30 MINUTES BEFORE BREAKFAST MEAL (Patient not taking: Reported on 3/10/2021) 30 Cap 6   • MYRBETRIQ 50 MG TABLET SR 24 HR        No current facility-administered medications for this visit.     Review of Systems   Constitutional: Negative for chills, diaphoresis and fever.   Respiratory: Negative for cough, shortness of breath and wheezing.    Cardiovascular: Negative for chest pain, palpitations, orthopnea, claudication, leg swelling and PND.   Gastrointestinal: Negative for abdominal pain and blood in stool.   Genitourinary: Negative for hematuria.   Neurological: Positive for weakness. Negative for dizziness and loss of consciousness.     All others systems reviewed and negative.   Objective:   /72 (BP Location: Left arm, Patient Position: Sitting, BP Cuff Size: Adult)   Pulse 64   Ht 1.702 m (5' 7\")   Wt 57.2 kg (126 lb)   SpO2 96%  Body mass index is 19.73 kg/m².  Physical Exam   Constitutional: She is oriented to person, place, and time. No distress.   Neck: No JVD present.   Cardiovascular: Normal rate, regular rhythm and intact distal pulses.   No murmur heard.  Pulmonary/Chest: Effort normal and breath sounds normal. No respiratory distress. She has no wheezes. She has no rales. She exhibits no tenderness.   Musculoskeletal:         General: No edema.   Neurological: She is alert and oriented to " person, place, and time.   Skin: Skin is warm and dry. She is not diaphoretic. No erythema.   Psychiatric: Mood, memory, affect and judgment normal.   Nursing note and vitals reviewed.    Cardiac Imaging and Procedures Review:    EKG 03/10/2021: Sinus rhythm, LVH    Echocardiogram (02/24/2021):   Normal left ventricular size, wall thickness, and systolic function.  Right ventricular free wall not well visualized; probably dilated right ventricle.  Mild tricuspid regurgitation.  Estimated right ventricular systolic pressure  is 35 mmHg; borderline.  No pericardial effusion seen.  Compared to prior echo 07/02/19 similar findings.    CT-CEREBRAL PERFUSION ANALYSIS   Final Result       1.  Cerebral blood flow less than 30% likely representing completed infarct = 0 mL.       2.  T Max more than 6 seconds likely representing combination of completed infarct and ischemia = 4 mL.       3.  Mismatched volume likely representing ischemic brain/penumbra = 4 mL       4.  Please note that the cerebral perfusion was performed on the limited brain tissue around the basal ganglia region. Infarct/ischemia outside the CT perfusion sections can be missed in this study.       CT-HEAD W/O   Final Result       No acute intracranial abnormality.       DX-CHEST-PORTABLE (1 VIEW)    (Results Pending)   CT-CTA HEAD WITH & W/O-POST PROCESS    (Results Pending)   CT-CTA NECK WITH & W/O-POST PROCESSING    (Results Pending)   IR-THROMBO MECHANICAL ARTERY,INIT    (Results Pending)   CTA head 2/23/21: right M2 occlusion    Labs (personally reviewed and notable for):   Lab Results   Component Value Date/Time    SODIUM 138 03/09/2021 12:42 PM    POTASSIUM 3.6 03/09/2021 12:42 PM    CHLORIDE 101 03/09/2021 12:42 PM    CO2 26 03/09/2021 12:42 PM    GLUCOSE 83 03/09/2021 12:42 PM    BUN 17 03/09/2021 12:42 PM    CREATININE 0.62 03/09/2021 12:42 PM    CREATININE 0.8 10/20/2008 11:59 AM    BUNCREATRAT 27 02/07/2020 10:18 AM      Lab Results   Component  Value Date/Time    WBC 6.5 03/09/2021 12:42 PM    RBC 3.94 (L) 03/09/2021 12:42 PM    HEMOGLOBIN 11.9 (L) 03/09/2021 12:42 PM    HEMATOCRIT 37.7 03/09/2021 12:42 PM    MCV 95.7 03/09/2021 12:42 PM    MCH 30.2 03/09/2021 12:42 PM    MCHC 31.6 (L) 03/09/2021 12:42 PM    MPV 10.7 03/09/2021 12:42 PM    NEUTSPOLYS 63.70 02/26/2021 04:51 AM    LYMPHOCYTES 24.00 02/26/2021 04:51 AM    MONOCYTES 10.60 02/26/2021 04:51 AM    EOSINOPHILS 1.10 02/26/2021 04:51 AM    BASOPHILS 0.20 02/26/2021 04:51 AM    HYPOCHROMIA 1+ 10/20/2008 11:59 AM      PT/INR:   Lab Results   Component Value Date/Time    PROTHROMBTM 12.9 02/23/2021 11:32 PM    INR 0.94 02/23/2021 11:32 PM   ]  Assessment:     1. PAF (paroxysmal atrial fibrillation) ([facility])  EKG   2. Cerebrovascular accident (CVA) due to embolism of anterior cerebral artery, unspecified blood vessel laterality (HCC)  CANCELED: US-CAROTID DOPPLER BILAT   3. Essential tremor     4. Chronic anticoagulation       Medical Decision Making:  Today's Assessment / Status / Plan:   1. Paroxysmal Atrial Fibrillation  - S/P recent right M2 thrombus stroke s/p TPA and thrombectomy.  - CVA of unown etiology, likely cardioembolic.  No arrhythmias documented.  EKG 2/12/221 appears ST.   - Per echo, normal LV function. No significant valvular abnormalities. RVSP 35 mmHg.  Consideration sleep apnea eval.   - LDL 92, Goal < 70. On statin.  Repeat lipid 8-12 weeks. A1C at goal, 5.2.  - No acute deficits upon exam.   - EKG shows sinus rhythm, no acute findings.  - On OAC with Xarelto, no s/sx bleeding, continue.   - Take propranolol 60 mg QD. Will help with palpitations/tremors. Monitor BP.   -Discussed treatment options including risk/benefits/alternatives repeat ambulatory monitor x 1 month vs ILR. Information provided. Patient to review and notify office. Reviewed with Dr. Moody.   - Encouraged to monitor BP/HRs, and call office if abnormal.    2. CVA  - Per above.     3. Chronic Anticoagulation: Xarelto      Plan reviewed in detail with the patient and she verbalizes understanding and is in agreement. RTC 2-3 months with Dr. Moody, sooner if clinical condition changes.     Thank you for allowing me to participate in this patients care.  Please contact me with any questions or concerns.     RSOA Kay.   [facility]  [phone] - [phone]

## 2021-03-11 LAB — EKG IMPRESSION: NORMAL

## 2021-03-15 ENCOUNTER — PATIENT MESSAGE (OUTPATIENT)
Dept: CARDIOLOGY | Facility: MEDICAL CENTER | Age: 66
End: 2021-03-15

## 2021-03-15 NOTE — PATIENT COMMUNICATION
S/w pt over the phone. Feels like she has been in AF more than not. Pt has some weakness, light chest pressure, tired, and slight sob. Pt denies inc in caffeine or alcohol intake. 60mg propanolol cause hr to drop to 40s. Pt was bouncing around but did not go higher than 110 for hr without propanolol.    Advised to hold propanolol 60mg for bp ,<100sbp or hr <60    Pt has propanolol 20mg for PRN. Discussed PRN range.

## 2021-03-16 NOTE — PROGRESS NOTES
Looks like she previously did not tolerate use of Flecainide well.  Was that on a scheduled basis or was she taking as needed when she did not feel well on it?  What were her symptoms?    Thank you  leatha

## 2021-03-17 RX ORDER — FLECAINIDE ACETATE 50 MG/1
50 TABLET ORAL 2 TIMES DAILY
Qty: 60 TABLET | Refills: 0 | Status: SHIPPED | OUTPATIENT
Start: 2021-03-17 | End: 2021-06-07

## 2021-03-17 NOTE — PATIENT COMMUNICATION
D/w pt she stopped taking the 60mg propanolol and taking 20mg now. Pt has an apple watch and will try to send a transmission. fle ordered PRN. Will follow up in few days to update us on if it worked for her.

## 2021-03-17 NOTE — PATIENT COMMUNICATION
Pt doesn't remember what her side effects were and she was on a scheduled basis. Pt willing to try and take it again. She has been going in and out of afib still. Pt not sure of rates when she is going in and out. She has been going up over 100 then down into the 40bpm.       Pt would like loop recorder scheduled also

## 2021-03-17 NOTE — PROGRESS NOTES
OK to try low dose flecainide again, 50 mg, BID as needed dosing.   IF pulse rate going low might need to cute back on the propranolol some.  Does she have the 20 mg tab with her?    Does she have a way to document the arrhythmia ie [place]/nathaly that she could potentially send a strip of it in?    We should schedule her a follow up with Dr. Moody sooner if she is having recurrence of arrhythmia.    Would defer ILR to ED or SS who have been following.      Thank you  Sandra

## 2021-03-18 ENCOUNTER — TELEPHONE (OUTPATIENT)
Dept: CARDIOLOGY | Facility: MEDICAL CENTER | Age: 66
End: 2021-03-18

## 2021-03-18 DIAGNOSIS — I48.0 PAROXYSMAL ATRIAL FIBRILLATION (HCC): ICD-10-CM

## 2021-03-18 NOTE — TELEPHONE ENCOUNTER
Chief Complaint   Patient presents with   • URI     for past 3 days, nasal congestion, no cough, denies fever/chills, denies NVD, denies body aches, was in Transfer last week   • Follow-up     Not fasting, needs prescription refills       HPI    Patient ID: Shalom is a 53 year old male coming for f/u on his htn and hypothyroidism.    Needs refills.   bp at home 140/90   Compliant with meds.    Past Medical History:   Diagnosis Date   • Acquired hypothyroidism 10/11/2018   • HTN (hypertension) 10/11/2018   • Obesity 10/11/2018   • Tubular adenoma of colon 5/31/2019   ,   Family History   Problem Relation Age of Onset   • Cancer, Throat Mother    • Cancer, Prostate Father    • Hyperlipidemia Other    • Diabetes Other    ,  History reviewed. No pertinent surgical history.,   Patient Active Problem List   Diagnosis   • Acquired hypothyroidism   • HTN (hypertension)   • Obesity   • Dermatitis   • Tubular adenoma of colon   ,   Current Outpatient Medications   Medication Sig Dispense Refill   • levothyroxine (SYNTHROID, LEVOTHROID) 200 MCG tablet Take 1 tablet by mouth daily (before breakfast). Take with one 50mcg for a total of 250mcg. 90 tablet 2   • levothyroxine (SYNTHROID, LEVOTHROID) 50 MCG tablet Take 1 tablet by mouth daily. Take with one 200mcg tablet for a total of 250mcg. 90 tablet 1   • losartan-hydrochlorothiazide (HYZAAR) 50-12.5 MG per tablet Take 1 tablet by mouth daily. 90 tablet 1   • albuterol 108 (90 Base) MCG/ACT inhaler Inhale 2 puffs into the lungs every 4 hours as needed for Shortness of Breath or Wheezing. 1 Inhaler 0   • triamcinolone (KENALOG) 0.5 % cream Apply topically 2 times daily. 30 g 2     No current facility-administered medications for this visit.       ALLERGIES:  No Known Allergies     Social History     Tobacco Use   • Smoking status: Never Smoker   • Smokeless tobacco: Never Used   Substance Use Topics   • Alcohol use: Yes   • Drug use: Never          Review of Systems   Constitutional:  LM on patient's answering machine to call back to schedule this loop insert.   Negative for activity change, appetite change, chills, diaphoresis, fatigue, fever and unexpected weight change.   HENT: Negative for congestion, drooling, ear discharge, ear pain, facial swelling, hearing loss, mouth sores, nosebleeds, postnasal drip, rhinorrhea, sinus pressure, sinus pain, sneezing, sore throat, tinnitus, trouble swallowing and voice change.    Eyes: Negative for photophobia, pain, discharge, redness, itching and visual disturbance.   Respiratory: Negative for cough, choking, chest tightness, shortness of breath, wheezing and stridor.    Cardiovascular: Negative for chest pain, palpitations and leg swelling.   Gastrointestinal: Negative for abdominal distention, abdominal pain, anal bleeding, blood in stool, constipation, diarrhea, nausea and vomiting.   Endocrine: Negative for cold intolerance, heat intolerance, polydipsia, polyphagia and polyuria.   Genitourinary: Negative for decreased urine volume, difficulty urinating, discharge, dysuria, flank pain, frequency, genital sores, hematuria, testicular pain and urgency.   Musculoskeletal: Negative for arthralgias, back pain, gait problem, joint swelling, myalgias, neck pain and neck stiffness.   Skin: Negative for color change, pallor, rash and wound.   Allergic/Immunologic: Negative for environmental allergies and food allergies.   Neurological: Negative for dizziness, tremors, facial asymmetry, speech difficulty, weakness, light-headedness, numbness and headaches.   Hematological: Negative for adenopathy. Does not bruise/bleed easily.   Psychiatric/Behavioral: Negative for confusion and dysphoric mood. The patient is not nervous/anxious.        Visit Vitals  BP (!) 141/79 (BP Location: LUE - Left upper extremity, Patient Position: Sitting, Cuff Size: Regular)   Pulse (!) 51   Temp 97.6 °F (36.4 °C) (Oral)   Ht 5' 8.7\" (1.745 m)   Wt 89.9 kg (198 lb 4.8 oz)   BMI 29.54 kg/m²       Objective     RECENT LABS:      WBC (K/mcL)   Date Value    10/22/2019 7.9   05/31/2019 7.0     RBC (mil/mcL)   Date Value   10/22/2019 5.05   05/31/2019 4.78     HGB (g/dL)   Date Value   10/22/2019 15.5   05/31/2019 14.9     HCT (%)   Date Value   10/22/2019 45.5   05/31/2019 46.4     MCV   Date Value Ref Range Status   10/22/2019 90.1 78.0 - 100.0 fl Final   05/31/2019 97.1 78.0 - 100.0 fl Final     MCH   Date Value Ref Range Status   10/22/2019 30.7 26.0 - 34.0 pg Final   05/31/2019 31.2 26.0 - 34.0 pg Final     MCHC   Date Value Ref Range Status   10/22/2019 34.1 32.0 - 36.5 g/dL Final   05/31/2019 32.1 32.0 - 36.5 g/dL Final     RDW-CV   Date Value Ref Range Status   10/22/2019 12.6 11.0 - 15.0 % Final   05/31/2019 13.0 11.0 - 15.0 % Final     PLT (K/mcL)   Date Value   10/22/2019 243   05/31/2019 219       Glucose   Date Value Ref Range Status   10/22/2019 102 (H) 65 - 99 mg/dL Final   05/31/2019 86 65 - 99 mg/dL Final     Sodium (mmol/L)   Date Value   10/22/2019 140   05/31/2019 141     Potassium (mmol/L)   Date Value   10/22/2019 4.2   05/31/2019 4.6     Chloride (mmol/L)   Date Value   10/22/2019 106   05/31/2019 108 (H)     Carbon Dioxide   Date Value Ref Range Status   10/22/2019 32 21 - 32 mmol/L Final   05/31/2019 27 21 - 32 mmol/L Final     CALCIUM (mg/dL)   Date Value   10/22/2019 9.2   05/31/2019 9.1     Creatinine (mg/dL)   Date Value   10/22/2019 1.10   05/31/2019 0.92     Lab Results   Component Value Date    BUN 19 10/22/2019    BUN 16 05/31/2019     GFR Estimate, Non  (no units)   Date Value   10/22/2019 76   05/31/2019 >90     GFR Estimate,  (no units)   Date Value   10/22/2019 88   05/31/2019 >90       AST/SGOT (Units/L)   Date Value   10/22/2019 35   05/31/2019 17     ALT/SGPT (Units/L)   Date Value   10/22/2019 33   05/31/2019 29     Albumin (g/dL)   Date Value   10/22/2019 4.1   05/31/2019 4.5     ALK PHOSPHATASE (Units/L)   Date Value   10/22/2019 72   05/31/2019 66     TOTAL BILIRUBIN (mg/dL)   Date Value    10/22/2019 1.0   05/31/2019 1.0           No results found for: BNP    Lab Results   Component Value Date    HGBA1C 5.6 05/31/2019      Lab Results   Component Value Date    CHOLESTEROL 185 05/31/2019     Lab Results   Component Value Date    CALCLDL 129 05/31/2019      Lab Results   Component Value Date    HDL 38 (L) 05/31/2019     Lab Results   Component Value Date    TRIGLYCERIDE 90 05/31/2019     Lab Results   Component Value Date    TSH 1.756 08/22/2019    TSH 56.250 (H) 05/31/2019    TSH 0.649 10/12/2018     Lab Results   Component Value Date    T4FREE 1.5 08/22/2019    T4FREE 0.6 (L) 05/31/2019    T4FREE 1.8 (H) 10/12/2018      No results found for: T3FREE     Lab Results   Component Value Date    MALBCR 11.0 05/31/2019        No results found for: INR  No results found for: PT  No results found for: PTT       Physical Exam  Vitals signs and nursing note reviewed.   Constitutional:       General: He is not in acute distress.     Appearance: Normal appearance. He is well-developed. He is not ill-appearing, toxic-appearing or diaphoretic.   HENT:      Head: Normocephalic and atraumatic.      Right Ear: External ear normal.      Left Ear: External ear normal.      Nose: Nose normal. No congestion or rhinorrhea.      Mouth/Throat:      Mouth: Mucous membranes are moist.      Pharynx: Oropharynx is clear. No oropharyngeal exudate or posterior oropharyngeal erythema.   Eyes:      General: No scleral icterus.        Right eye: No discharge.         Left eye: No discharge.      Extraocular Movements: Extraocular movements intact.      Conjunctiva/sclera: Conjunctivae normal.      Pupils: Pupils are equal, round, and reactive to light.   Neck:      Musculoskeletal: Normal range of motion and neck supple. No neck rigidity or muscular tenderness.      Thyroid: No thyromegaly.      Vascular: No carotid bruit or JVD.      Trachea: No tracheal deviation.   Cardiovascular:      Rate and Rhythm: Normal rate and regular  rhythm.      Pulses: Normal pulses.      Heart sounds: No friction rub. No gallop.    Pulmonary:      Effort: Pulmonary effort is normal. No respiratory distress.      Breath sounds: Normal breath sounds. No stridor. No wheezing, rhonchi or rales.   Chest:      Chest wall: No tenderness.   Abdominal:      General: Bowel sounds are normal. There is no distension.      Palpations: Abdomen is soft. There is no mass.      Tenderness: There is no tenderness. There is no guarding or rebound.      Hernia: No hernia is present.   Musculoskeletal: Normal range of motion.         General: No swelling, tenderness, deformity or signs of injury.      Right lower leg: No edema.      Left lower leg: No edema.   Lymphadenopathy:      Cervical: No cervical adenopathy.   Skin:     General: Skin is warm and dry.      Coloration: Skin is not jaundiced or pale.      Findings: No bruising, erythema or rash.   Neurological:      General: No focal deficit present.      Mental Status: He is alert and oriented to person, place, and time.      Cranial Nerves: No cranial nerve deficit.      Motor: No weakness or abnormal muscle tone.      Coordination: Coordination normal.      Gait: Gait normal.   Psychiatric:         Mood and Affect: Mood normal.         Behavior: Behavior normal.         Assessment   Problem List Items Addressed This Visit     Acquired hypothyroidism    Relevant Orders    THYROID STIMULATING HORMONE    FREE T4    HTN (hypertension) - Primary    Relevant Medications    losartan-hydrochlorothiazide (HYZAAR) 50-12.5 MG per tablet    Other Relevant Orders    CBC WITH DIFFERENTIAL    COMPREHENSIVE METABOLIC PANEL    URINALYSIS WITH MICRO EXAM W/O C/S    THYROID STIMULATING HORMONE    FREE T4      Other Visit Diagnoses     Upper respiratory tract infection, unspecified type        Overweight        Relevant Orders    CBC WITH DIFFERENTIAL    COMPREHENSIVE METABOLIC PANEL    URINALYSIS WITH MICRO EXAM W/O C/S    THYROID  STIMULATING HORMONE    FREE T4        Shalom's blood pressure is not optimally controlled with hydrochlorothiazide.  He has lost 25 pounds by doing physical activity and kept them off.  He is doing a good job in that regard.  We will change hydrochlorothiazide to losartan with hydrochlorothiazide and monitor therapy in the next few weeks.  Patient will return to clinic with blood pressure data and let me know if he has any side effects.    Schedule follow up: In a few weeks to follow-up on your chronic conditions.    Orders Placed This Encounter   • CBC with Automated Differential   • Comprehensive Metabolic Panel   • URINALYSIS WITH MICRO EXAM W/O C/S   • Thyroid Stimulating Hormone   • Free T4   • losartan-hydrochlorothiazide (HYZAAR) 50-12.5 MG per tablet           Carlyle Saldana MD  [address] [place] [id number]  [phone]    Portions of this  note may have been created using the Dragon voice recognition system.  Errors in content may be related to improper recognition of the system.  Effort to review and correct the note has been made but irregularities may still be present.  Medication reconciliation was done but medications not prescribed by me may be inaccurate.

## 2021-03-18 NOTE — PROGRESS NOTES
Dear Mayuri,     See patient mychart message regarding ILR.  Please schedule with SS if patient would like to proceed.     Thanks,     ROSA Kay.   [facility]  [phone] - [phone]

## 2021-03-18 NOTE — PROGRESS NOTES
Agree with plan per below. Although if continued symptomatic arrhythmia recurrence. Would recommend consistently taking Flecainide 50 BID and low dose Propranolol LA 60 mg daily (if BP/HRS tolerate). If not, utilize short acting propranolol 20 mg. Monitor BP/HRS. Utilize LiquiGlidea and/or apple watch. Continue OAC.    Please make sooner appt with Dr. Moody upon return from trip.  Recommend ILR, schedule if patient agreeable.      Dr. Moody to advise if alternative recommendations.     CAC Scoring (5/14/19):  FINDINGS:  Coronary calcification:  LMA - 0.0  LCX - 0.0  LAD - 0.0  RCA - 0.0  Total Calcium Score: 0.0    Thanks,     ROSA Kay.   [facility]  [phone]

## 2021-03-18 NOTE — TELEPHONE ENCOUNTER
Ciara,    Can you please enter the order for the loop insert on this patient?    Thank You,  Mayuri

## 2021-03-19 NOTE — TELEPHONE ENCOUNTER
Patient scheduled for loop insert on 4-13-21 with Dr. Moody. Patient has been instructed to check in at 10:00 for 12:00 case time. Message sent to ervin Flores with [employer] notified.

## 2021-03-23 NOTE — TELEPHONE ENCOUNTER
Case emailed to Ailin and Gelacio is [facility] lab scheduling.

## 2021-04-09 ENCOUNTER — TELEPHONE (OUTPATIENT)
Dept: CARDIOLOGY | Facility: MEDICAL CENTER | Age: 66
End: 2021-04-09

## 2021-04-09 ENCOUNTER — PRE-ADMISSION TESTING (OUTPATIENT)
Dept: ADMISSIONS | Facility: MEDICAL CENTER | Age: 66
End: 2021-04-09
Attending: INTERNAL MEDICINE
Payer: MEDICARE

## 2021-04-09 RX ORDER — OXYBUTYNIN 3.9 MG/D
1 PATCH TRANSDERMAL PRN
COMMUNITY
End: 2021-06-30

## 2021-04-09 NOTE — TELEPHONE ENCOUNTER
ED    Pt called stating she has some questions regarding her upcoming procedure. Please call Pt back at [phone].    Thank you

## 2021-04-12 ENCOUNTER — OFFICE VISIT (OUTPATIENT)
Dept: NEUROLOGY | Facility: MEDICAL CENTER | Age: 66
End: 2021-04-12
Attending: NURSE PRACTITIONER
Payer: MEDICARE

## 2021-04-12 VITALS
OXYGEN SATURATION: 96 % | HEART RATE: 70 BPM | DIASTOLIC BLOOD PRESSURE: 74 MMHG | SYSTOLIC BLOOD PRESSURE: 120 MMHG | TEMPERATURE: 99.2 F | WEIGHT: 126.76 LBS | HEIGHT: 67 IN | BODY MASS INDEX: 19.9 KG/M2 | RESPIRATION RATE: 12 BRPM

## 2021-04-12 DIAGNOSIS — G25.0 ESSENTIAL TREMOR: ICD-10-CM

## 2021-04-12 DIAGNOSIS — I69.30 LATE EFFECT OF STROKE: ICD-10-CM

## 2021-04-12 DIAGNOSIS — I48.0 PAROXYSMAL ATRIAL FIBRILLATION (HCC): ICD-10-CM

## 2021-04-12 PROCEDURE — 99212 OFFICE O/P EST SF 10 MIN: CPT | Performed by: NURSE PRACTITIONER

## 2021-04-12 PROCEDURE — 99215 OFFICE O/P EST HI 40 MIN: CPT | Performed by: NURSE PRACTITIONER

## 2021-04-12 RX ORDER — PRIMIDONE 50 MG/1
TABLET ORAL
Qty: 50 TABLET | Refills: 11 | Status: SHIPPED | OUTPATIENT
Start: 2021-04-12 | End: 2021-05-24

## 2021-04-12 ASSESSMENT — ENCOUNTER SYMPTOMS
FOCAL WEAKNESS: 1
NERVOUS/ANXIOUS: 1
SPEECH CHANGE: 0
SENSORY CHANGE: 0
PALPITATIONS: 1
HEARTBURN: 1
NECK PAIN: 1
NAUSEA: 0
BACK PAIN: 0
VOMITING: 0
COUGH: 0
BLURRED VISION: 1
DIZZINESS: 0
FEVER: 0
DEPRESSION: 0
HEADACHES: 1
TINGLING: 0

## 2021-04-12 ASSESSMENT — FIBROSIS 4 INDEX: FIB4 SCORE: 1.33

## 2021-04-12 NOTE — PROGRESS NOTES
POST ANGIOGRAM  General Care Instructions  • Maintain a bandage over the incision site for 24 hours.  It's normal to find a small bruise or dime-sized lump at the insertion site. This should disappear within a few weeks.  • Do not apply lotions or powders to the site.  Do not immerse the catheter insertion site in water (bathtub/swimming) for five days. It is ok to shower 24 hours after the procedure.  • You may resume your normal diet immediately; on the day of your procedure, drink 6-10 glasses of water to help flush the contrast liquid out of your system.  • If the doctor inserted the catheter in through your groin:  o Walking short distances on a flat surface is OK. Limit going up/down stairs for the first 2 days.  o DO NOT do yard work, drive, squat, lift heavy objects, or play sports for 2 days; or until your health care provider tells you it is OK.  • If the doctor inserted the catheter in your arm:  o For 24 hours, DO NOT lift anything heavier than 10 pounds (approximately a gallon of milk). DO NOT do any heavy pushing, pulling, or twisting.    Medications  • If your current medications need to be changed, you will be provided with an updated list of your medications prior to discharge.  • If you take warfarin (Coumadin), resume taking your usual dose the evening after the procedure.  • DO NOT STOP taking prescribed blood thinning (anti-platelet) medications unless instructed by your cardiologist.  These medications include:  o Aspirin, Clopidogrel (Plavix), Ticagrelor (Brilinta), or Prasugrel (Effient)   • If you take one of the following anticoagulants, RESUME 24 HOURS after your procedure:  o Apixiban (Eliquis), Rivaroxaban (Xarelto), Dabigatran (Pradaxa), Edoxaban (Savaysa)  • If you take metformin (Glucophage), RESUME 48 HOURS after your procedure.    When to call your healthcare provider  Call your cardiologist right away at [phone] if you have any of the following:  ?  Problems/Concerns taking  any of your prescribed heart medicines.  ?  The insertion site has increasing pain, swelling, redness, bleeding, or drainage.  ?  Your arm or leg below where the insertion site changes color, is cool, or is numb.  ?  You have chest pain or shortness of breath that does not go away with rest.  ?  Your pulse feels irregular -- very slow (less than 60 beats/minute) or very fast (over 100 beats/minute).  ?  You have dizziness, fainting, or you are very tired.  ?  You are coughing up blood or yellow or green mucus.  ?  You have chills or a fever over 101°F (38.3°C).   ?  If there is bleeding at the catheter insertion site, apply pressure for 10 minutes.  If bleeding persists, call 911, and continue to hold pressure until advanced medical support arrives.        Exercising Safely After Percutaneous Coronary Intervention (PCI)  After percutaneous coronary intervention (PCI), which involves angioplasty and often stenting, it's important to focus on your heart health. Exercise can help strengthen your heart. It can also help you feel good and improve your overall health. Talk with your health care provider or cardiac rehab team member about good options for you.  • Start slowly. Work up to more vigorous exercise as you get stronger. Aim for at least 150 minutes of exercise each week.  • Include aerobic activities. These make the heart beat faster. They work the heart and lungs, and improve the body's ability to use oxygen. Good choices include walking, swimming, and biking .  Always follow your doctor's recommendation for exercise.   You have been referred to cardiac rehabilitation, which is important for your recovery.  You may contact [facility] at [phone] to learn more and schedule a visit.        Lifestyle Management After Percutaneous Coronary Intervention (PCI)  Percutaneous coronary intervention (PCI)  involves angioplasty and often stenting. This procedure can open arteries and relieve  symptoms. But, it doesn't cure coronary artery disease. New blockages can still form. You need to take steps to prevent this by managing risk factors. Doing so will help make your heart and arteries healthier. Your doctor may prescribe cardiac rehabilitation to help with this lifelong process.  Understanding risk factors  Some risk factors for coronary artery disease can be controlled. These include smoking, high blood pressure, cholesterol, diabetes, and obesity. They can be managed with medication, diet, and exercise. Support and counseling can also play a role. The effort will pay off! Managing risk factors can help you be more active, feel better, and reduce the risk of heart attack.    If you smoke, quit!  COVID-19 Pre-Surgery Screening:     Do you have an undiagnosed respiratory illness or symptoms such as coughing or sneezing? No      Do you have an unexplained fever greater than 100.4 degrees Fahrenheit or 38 degrees Celsius? No  ?  Have you had direct exposure to a patient who tested positive for Covid-19? No    Patient informed of current visitation and mask policies by this RN. Yes

## 2021-04-12 NOTE — PROGRESS NOTES
Subjective:    ALFREDO Szymanski is a 66 y.o. right handed female who presents to [facility] for evaluation of right insular cortex and frontal lobe infarcts.     She presented to [facility] on 2/23/2021 with complaints of left lower extremity weakness. She was given IV tPA, MRI revealed CTA revealed R M2 occlusion, she was taken for thrombectomy which was successful with TICI 2 reperfusion of the R M2.  MRI revealed R MCA territory infarcts with hemorrhagic transformation.    After the tPA and thrombectomy NIHSS was 0, she still felt a bit weak on the left ad felt that her balance was bit off     She reports that she feels when she is in atrial fibrillation,     PMH:  GERD, PAF s/p ablation, chronic hip pain, essential tremor.   Social History:  Denies history of smoking, alcohol or drug use.     She was discharged on Atorvastatin 40mg with orders to resume Xarelto on 3/5/2021.    She also has tremors of both hands.  She is not taking anything, she always has trouble writing and cutting vegetables.  She sometimes take 60mg of Inderal which makes her heart rate go down to 40.         Review of Systems   Constitutional: Negative for fever.   HENT: Negative for hearing loss.    Eyes: Positive for blurred vision.   Respiratory: Negative for cough.    Cardiovascular: Positive for palpitations. Negative for chest pain.   Gastrointestinal: Positive for heartburn. Negative for nausea and vomiting.   Genitourinary: Negative.    Musculoskeletal: Positive for neck pain. Negative for back pain.   Skin: Negative for rash.   Neurological: Positive for focal weakness and headaches. Negative for dizziness, tingling, sensory change and speech change.   Psychiatric/Behavioral: Negative for depression. The patient is nervous/anxious.               Current Outpatient Medications on File Prior to Visit   Medication Sig Dispense Refill   • Fluticasone Propionate (FLONASE ALLERGY RELIEF NA)  "Administer  into affected nostril(S) as needed.     • oxybutynin (OXYTROL FOR WOMEN) 3.9 MG/24HR patch Place 1 Patch on the skin as needed.     • flecainide (TAMBOCOR) 50 MG tablet Take 1 tablet by mouth 2 times a day. (Patient taking differently: Take 50 mg by mouth 2 times a day. Patient reports she only takes it as needed.) 60 tablet 0   • rivaroxaban (XARELTO) 20 MG Tab tablet Take 1 tablet by mouth with dinner for 90 days. 90 tablet 3   • atorvastatin (LIPITOR) 40 MG Tab Take 1 tablet by mouth every evening. 90 tablet 0   • propranolol LA (INDERAL LA) 60 MG CAPSULE SR 24 HR Take 1 Cap by mouth every day. (Patient taking differently: Take 60 mg by mouth every day. Patient reports she only takes it as needed for feelings of \"Irregular heartbeat\") 30 Cap 11   • propranolol (INDERAL) 20 MG Tab Take 1 Tab by mouth 3 times a day as needed. 90 Tab 6   • esomeprazole (NEXIUM) 40 MG delayed-release capsule TAKE 1 CAPSULE BY MOUTH ONCE A DAY 30 MINUTES BEFORE BREAKFAST MEAL (Patient taking differently: as needed. TAKE 1 CAPSULE BY MOUTH ONCE A DAY 30 MINUTES BEFORE BREAKFAST MEAL) 30 Cap 6   • acetaminophen (TYLENOL) 325 MG Tab Take 1 Tab by mouth 1 time daily as needed. Patient will not taken until follow up labs/results reviewed.     • MYRBETRIQ 50 MG TABLET SR 24 HR        No current facility-administered medications on file prior to visit.     Past Medical History:   Diagnosis Date   • Anxiety    • Arrhythmia     afib   • ASTHMA 4/9/21:  Patient denies   • Bowel habit changes     constipation   • Cataract 04/09/2001    \"Small, not affecting eyes\"   • Depression 04/09/2021    Patient denies   • GERD (gastroesophageal reflux disease)    • Hiatal hernia with GERD 11/5/2019   • Hiatus hernia syndrome    • High cholesterol    • History of esophageal stricture 11/5/2019   • Indigestion    • Osteoporosis    • Palpitations    • Palpitations    • Premature atrial contractions    • Stroke (HCC) 04/09/2021    1.5 month ago.  " "Blood clot in brain RT A-Fib          Objective:   I personally reviewed imaging below and agree with the findings  MRI brain 2/25/2021  There are tiny areas of acute infarcts in the right insular cortex and right frontal lobe. Hemorrhagic transformation is noted within the small right parietal cortical infarct.    CTA head  Filling defect in the proximal M2 segment right middle cerebral artery inferior division consistent with thrombus/embolus.     CTA neck  Beaded appearance of the cervical internal carotid arteries is suggestive of fibromuscular dysplasia.     No significant stenosis or dissection of the neck arteries.    IR thrombectomy  1 Occluded proximal right M2 segment.  2.  Successful mechanical thrombectomy.  3.  TICI 2b flow.  4.  Incidental nonflow limiting fibromuscular dysplasia in the mid and distal portion of the right internal carotid artery.    TTE: LVEF 65%, LA size WNL, JW 22.    Stroke Labs:  Creat 0.62, LDL 92, A1C 5.4.    /74   Pulse 70   Temp 37.3 °C (99.2 °F) (Temporal)   Resp 12   Ht 1.702 m (5' 7\")   Wt 57.5 kg (126 lb 12.2 oz)   LMP 01/01/2004   SpO2 96%   BMI 19.85 kg/m²      Physical Exam      Constitutional:  Alert, no apparent distress,  Psych:   mood and affect WNL  Neuro:  Oriented X 4, speech fluent, naming and memory intact  Muskuloskeletal:  Moves all extremities equally, strength 5/5 bilaterally, no drift  Intention tremor noted in both hands.   CN II: Visual fields are full to confrontation. Fundoscopic exam is normal with sharp discs and no vascular changes. Pupils are 3 mm and briskly reactive to light.   CN III, IV, VI  EOMs intact, no ptosis  CN V: Facial sensation is intact to pinprick in all 3 divisions bilaterally. Corneal responses are intact.  CN VII: Face is symmetric with normal eye closure and smile.  CN VII: Hearing is normal to rubbing fingers  CN IX, X: Palate elevates symmetrically. Phonation is normal.  CN XI: Head turning and shoulder shrug " are intact  CN XII: Tongue is midline with normal movements and no atrophy.                           Sensation to PP equal bilaterally                 No limb ataxia with finger to nose and heel to shin                 Ambulates with steady gait.                 Rhomberg negative                Biceps,brachioradialis, tricep, patellar and ankle reflex all 2+     Cardiovascular:    S1S2, no abnormal rhythm auscultated, no peripheral edema  Neck:                     No carotid bruits noted   Pulmonary:            Respirations easy, lungs clear to auscultation all fields.     Skin:                     No obvious rashes.    Iniital NIHSS  3      Current NIHSS    1a. LOC: 0  1b. LOC Questions: 0  1c. LOC Commands: 0  2. Best Gaze:0  3. Visual Fields: 0  4. Facial Paresis: 0  5a. Motor arm left: 0  5b. Motor arm right: 0  6a. Motor leg left: 0  6b. Motor leg right: 0  7. Sensory: 0  8. Best Language: 0  9. Limb Ataxia: 0  10. Dysarthria: 0  11. Extinction/Inattention: 0    Total Score Current 0          Current mRS 0         Assessment/Plan:     1. Late effect of stroke R MCA territory infarcts likely due to atrial fibrillation in the absence of anti-coagulation.   Presumed Mechanism by Toast:  __  Large Artery Atherosclerosis  __  Small Vessel (lacunar)  __XX   Cardioembolic  __   Other (Sickle cell, vasculitis, hypercoagulable)  __   Unknown  __   ESUS      2. Paroxysmal atrial fibrillation (HCC)  Continue Xarelto 20mg PO daily with food, discussed signs of bleeding.  Recommend lifelong anti-coagulation given her history of atrial fibrillation refractory to ablation.     Avoid OTC NSAIDS, use acetaminophen for pain.     Stroke risk factors include atrial fibrillation     Blood pressure goal less than 130/80, currently at goal.       LDL goal < 70, current 92 stop atorvastatin, she is having side effects, even though there is some added benefit to atorvastatin, her stroke was likely due to atrial fibrillation. ,  d  Exercise at least 30 minutes daily, avoid red meat, fried foods, butter, cheese.   Eat 5-6 servings of vegetables and fruits daily, choose lean white meat without skin (chicken, turkey, white fish)--baked, broiled or grilled.      3. Essential Tremor    Primidone 25-50mg PO as needed for tremors.      Follow up around 5/24/2021 or 90 day post tPA/thrombectomy visit.  I spent a total of 62 minutes caring for patient,  my time includes counseling, review of systems, HPI and assessment, review of images, labs and testing as above.  I reviewed the hospital records, PMH, social and family history.   I have counseled patient on stroke prevention strategies, stroke symptoms and mimics.  Diet and exercise modifications.  We discussed medication side effects and instructions.

## 2021-04-12 NOTE — PATIENT INSTRUCTIONS
Atrial Fibrillation    Atrial fibrillation is a type of heartbeat that is irregular or fast (rapid). If you have this condition, your heart beats without any order. This makes it hard for your heart to pump blood in a normal way. Having this condition gives you more risk for stroke, heart failure, and other heart problems.  Atrial fibrillation may start all of a sudden and then stop on its own, or it may become a long-lasting problem.  What are the causes?  This condition may be caused by heart conditions, such as:  · High blood pressure.  · Heart failure.  · Heart valve disease.  · Heart surgery.  Other causes include:  · Pneumonia.  · Obstructive sleep apnea.  · Lung cancer.  · Thyroid disease.  · Drinking too much alcohol.  Sometimes the cause is not known.  What increases the risk?  You are more likely to develop this condition if:  · You smoke.  · You are older.  · You have diabetes.  · You are overweight.  · You have a family history of this condition.  · You exercise often and hard.  What are the signs or symptoms?  Common symptoms of this condition include:  · A feeling like your heart is beating very fast.  · Chest pain.  · Feeling short of breath.  · Feeling light-headed or weak.  · Getting tired easily.  Follow these instructions at home:  Medicines  · Take over-the-counter and prescription medicines only as told by your doctor.  · If your doctor gives you a blood-thinning medicine, take it exactly as told. Taking too much of it can cause bleeding. Taking too little of it does not protect you against clots. Clots can cause a stroke.  Lifestyle         · Do not use any tobacco products. These include cigarettes, chewing tobacco, and e-cigarettes. If you need help quitting, ask your doctor.  · Do not drink alcohol.  · Do not drink beverages that have caffeine. These include coffee, soda, and tea.  · Follow diet instructions as told by your doctor.  · Exercise regularly as told by your doctor.  General  "instructions  · If you have a condition that causes breathing to stop for a short period of time (apnea), treat it as told by your doctor.  · Keep a healthy weight. Do not use diet pills unless your doctor says they are safe for you. Diet pills may make heart problems worse.  · Keep all follow-up visits as told by your doctor. This is important.  Contact a doctor if:  · You notice a change in the speed, rhythm, or strength of your heartbeat.  · You are taking a blood-thinning medicine and you see more bruising.  · You get tired more easily when you move or exercise.  · You have a sudden change in weight.  Get help right away if:    · You have pain in your chest or your belly (abdomen).  · You have trouble breathing.  · You have blood in your vomit, poop, or pee (urine).  · You have any signs of a stroke. \"BE FAST\" is an easy way to remember the main warning signs:  ? B - Balance. Signs are dizziness, sudden trouble walking, or loss of balance.  ? E - Eyes. Signs are trouble seeing or a change in how you see.  ? F - Face. Signs are sudden weakness or loss of feeling in the face, or the face or eyelid drooping on one side.  ? A - Arms. Signs are weakness or loss of feeling in an arm. This happens suddenly and usually on one side of the body.  ? S - Speech. Signs are sudden trouble speaking, slurred speech, or trouble understanding what people say.  ? T - Time. Time to call emergency services. Write down what time symptoms started.  · You have other signs of a stroke, such as:  ? A sudden, very bad headache with no known cause.  ? Feeling sick to your stomach (nausea).  ? Throwing up (vomiting).  ? Jerky movements you cannot control (seizure).  These symptoms may be an emergency. Do not wait to see if the symptoms will go away. Get medical help right away. Call your local emergency services (911 in the [place] Do not drive yourself to the hospital.  Summary  · Atrial fibrillation is a type of heartbeat that is irregular " or fast (rapid).  · You are at higher risk of this condition if you smoke, are older, have diabetes, or are overweight.  · Follow your doctor's instructions about medicines, diet, exercise, and follow-up visits.  · Get help right away if you think that you have signs of a stroke.  This information is not intended to replace advice given to you by your health care provider. Make sure you discuss any questions you have with your health care provider.  Document Released: 09/26/2009 Document Revised: 02/21/2019 Document Reviewed: 02/08/2019  [employer] Patient Education © 2020 [employer]  Stroke Prevention  Some medical conditions and lifestyle choices can lead to a higher risk for a stroke. You can help to prevent a stroke by making nutrition, lifestyle, and other changes.  What nutrition changes can be made?    · Eat healthy foods.  ? Choose foods that are high in fiber. These include:  § Fresh fruits.  § Fresh vegetables.  § Whole grains.  ? Eat at least 5 or more servings of fruits and vegetables each day. Try to fill half of your plate at each meal with fruits and vegetables.  ? Choose lean protein foods. These include:  § Lowfat (lean) cuts of meat.  § Chicken without skin.  § Fish.  § Tofu.  § Beans.  § Nuts.  ? Eat low-fat dairy products.  ? Avoid foods that:  § Are high in salt (sodium).  § Have saturated fat.  § Have trans fat.  § Have cholesterol.  § Are processed.  § Are premade.  · Follow eating guidelines as told by your doctor. These may include:  ? Reducing how many calories you eat and drink each day.  ? Limiting how much salt you eat or drink each day to 1,500 milligrams (mg).  ? Using only healthy fats for cooking. These include:  § Olive oil.  § Canola oil.  § Sunflower oil.  ? Counting how many carbohydrates you eat and drink each day.  What lifestyle changes can be made?  · Try to stay at a healthy weight. Talk to your doctor about what a good weight is for you.  · Get at least 30 minutes of  moderate physical activity at least 5 days a week. This can include:  ? Fast walking.  ? Biking.  ? Swimming.  · Do not use any products that have nicotine or tobacco. This includes cigarettes and e-cigarettes. If you need help quitting, ask your doctor. Avoid being around tobacco smoke in general.  · Limit how much alcohol you drink to no more than 1 drink a day for nonpregnant women and 2 drinks a day for men. One drink equals 12 oz of beer, 5 oz of wine, or 1½ oz of hard liquor.  · Do not use drugs.  · Avoid taking birth control pills. Talk to your doctor about the risks of taking birth control pills if:  ? You are over 35 years old.  ? You smoke.  ? You get migraines.  ? You have had a blood clot.  What other changes can be made?  · Manage your cholesterol.  ? It is important to eat a healthy diet.  ? If your cholesterol cannot be managed through your diet, you may also need to take medicines. Take medicines as told by your doctor.  · Manage your diabetes.  ? It is important to eat a healthy diet and to exercise regularly.  ? If your blood sugar cannot be managed through diet and exercise, you may need to take medicines. Take medicines as told by your doctor.  · Control your high blood pressure (hypertension).  ? Try to keep your blood pressure below 130/80. This can help lower your risk of stroke.  ? It is important to eat a healthy diet and to exercise regularly.  ? If your blood pressure cannot be managed through diet and exercise, you may need to take medicines. Take medicines as told by your doctor.  ? Ask your doctor if you should check your blood pressure at home.  ? Have your blood pressure checked every year. Do this even if your blood pressure is normal.  · Talk to your doctor about getting checked for a sleep disorder. Signs of this can include:  ? Snoring a lot.  ? Feeling very tired.  · Take over-the-counter and prescription medicines only as told by your doctor. These may include aspirin or blood  "thinners (antiplatelets or anticoagulants).  · Make sure that any other medical conditions you have are managed.  Where to find more information  · American Stroke Association: www.strokeassociation.org  · National Stroke Association: www.stroke.org  Get help right away if:  · You have any symptoms of stroke. \"BE FAST\" is an easy way to remember the main warning signs:  ? B - Balance. Signs are dizziness, sudden trouble walking, or loss of balance.  ? E - Eyes. Signs are trouble seeing or a sudden change in how you see.  ? F - Face. Signs are sudden weakness or loss of feeling of the face, or the face or eyelid drooping on one side.  ? A - Arms. Signs are weakness or loss of feeling in an arm. This happens suddenly and usually on one side of the body.  ? S - Speech. Signs are sudden trouble speaking, slurred speech, or trouble understanding what people say.  ? T - Time. Time to call emergency services. Write down what time symptoms started.  · You have other signs of stroke, such as:  ? A sudden, very bad headache with no known cause.  ? Feeling sick to your stomach (nausea).  ? Throwing up (vomiting).  ? Jerky movements you cannot control (seizure).  These symptoms may represent a serious problem that is an emergency. Do not wait to see if the symptoms will go away. Get medical help right away. Call your local emergency services (911 in the [place] Do not drive yourself to the hospital.  Summary  · You can prevent a stroke by eating healthy, exercising, not smoking, drinking less alcohol, and treating other health problems, such as diabetes, high blood pressure, or high cholesterol.  · Do not use any products that contain nicotine or tobacco, such as cigarettes and e-cigarettes.  · Get help right away if you have any signs or symptoms of a stroke.  This information is not intended to replace advice given to you by your health care provider. Make sure you discuss any questions you have with your health care " provider.  Document Released: 06/18/2013 Document Revised: 02/13/2020 Document Reviewed: 03/21/2018  [employer] Patient Education © 2020 [employer]

## 2021-04-13 ENCOUNTER — APPOINTMENT (OUTPATIENT)
Dept: CARDIOLOGY | Facility: MEDICAL CENTER | Age: 66
End: 2021-04-13
Attending: NURSE PRACTITIONER
Payer: MEDICARE

## 2021-04-13 ENCOUNTER — HOSPITAL ENCOUNTER (OUTPATIENT)
Facility: MEDICAL CENTER | Age: 66
End: 2021-04-13
Attending: INTERNAL MEDICINE | Admitting: INTERNAL MEDICINE
Payer: MEDICARE

## 2021-04-13 VITALS
TEMPERATURE: 97.1 F | RESPIRATION RATE: 16 BRPM | BODY MASS INDEX: 18.78 KG/M2 | SYSTOLIC BLOOD PRESSURE: 133 MMHG | WEIGHT: 123.9 LBS | OXYGEN SATURATION: 97 % | DIASTOLIC BLOOD PRESSURE: 70 MMHG | HEART RATE: 57 BPM | HEIGHT: 68 IN

## 2021-04-13 DIAGNOSIS — I48.0 PAROXYSMAL ATRIAL FIBRILLATION (HCC): ICD-10-CM

## 2021-04-13 PROCEDURE — C1764 EVENT RECORDER, CARDIAC: HCPCS

## 2021-04-13 PROCEDURE — 160002 HCHG RECOVERY MINUTES (STAT)

## 2021-04-13 PROCEDURE — 700101 HCHG RX REV CODE 250

## 2021-04-13 PROCEDURE — 33285 INSJ SUBQ CAR RHYTHM MNTR: CPT | Performed by: INTERNAL MEDICINE

## 2021-04-13 PROCEDURE — 700101 HCHG RX REV CODE 250: Performed by: INTERNAL MEDICINE

## 2021-04-13 RX ORDER — LIDOCAINE HYDROCHLORIDE AND EPINEPHRINE BITARTRATE 20; .01 MG/ML; MG/ML
0-15 INJECTION, SOLUTION SUBCUTANEOUS ONCE
Status: COMPLETED | OUTPATIENT
Start: 2021-04-13 | End: 2021-04-13

## 2021-04-13 RX ORDER — LIDOCAINE HYDROCHLORIDE AND EPINEPHRINE 10; 10 MG/ML; UG/ML
INJECTION, SOLUTION INFILTRATION; PERINEURAL
Status: DISCONTINUED
Start: 2021-04-13 | End: 2021-04-13 | Stop reason: HOSPADM

## 2021-04-13 RX ADMIN — LIDOCAINE HYDROCHLORIDE AND EPINEPHRINE 4 ML: 20; 10 INJECTION, SOLUTION INFILTRATION; PERINEURAL at 12:00

## 2021-04-13 ASSESSMENT — FIBROSIS 4 INDEX: FIB4 SCORE: 1.33

## 2021-04-13 NOTE — OP REPORT
PROCEDURE PERFORMED: Implantable Loop Recorder    DATE OF SERVICE:  4/13/2021    : Josue Moody MD    ASSISTANT: None    ANESTHESIA: Local    EBL: <5 cc    SPECIMENS: None    INDICATION(S):  AF    DESCRIPTION OF PROCEDURE:  After informed written consent, the patient was brought to the cath lab pre/post procedure area. The patient was prepped and draped in the usual sterile fashion. The procedure was performed with local anesthetic. Using the supplied incision tool, a <1 cm incision was made in the skin about 2 cm leftward and lateral to the sternal border. Using the supplied insertion tool, a tunnel was made in the subcutaneous tissue at a 45 degree angle to the sternum and the device was inserted with the supplied plunger. Manual compression was used until hemostasis achieved. Sterile dressing was applied.    IMPLANTED DEVICE INFORMATION:  Model: [employer] LNQ22   Serial number: [id number]     IMPRESSIONS:  1. Successful implantable loop recorder implantation    RECOMMENDATIONS:  1. Routine follow-up and device interrogation

## 2021-04-13 NOTE — H&P
"EP Pre-procedure History and Physical    Date of service: 4/13/2021    Reason for visit/Chief complaint: AF    HPI:   Patient is a 67 yo F. History of PAF s/p prior PVI. Afterwards negative for AF stopped OAC and then had embolic stroke. Here for ILR to look for subclinical AF.    Past Medical History:   Diagnosis Date   • Anxiety    • Arrhythmia     afib   • ASTHMA 4/9/21:  Patient denies   • Bowel habit changes     constipation   • Cataract 04/09/2001    \"Small, not affecting eyes\"   • Depression 04/09/2021    Patient denies   • GERD (gastroesophageal reflux disease)    • Hiatal hernia with GERD 11/5/2019   • Hiatus hernia syndrome    • High cholesterol    • History of esophageal stricture 11/5/2019   • Indigestion    • Osteoporosis    • Palpitations    • Palpitations    • Premature atrial contractions    • Stroke (HCC) 04/09/2021    1.5 month ago.  Blood clot in brain RT A-Fib     Past Surgical History:   Procedure Laterality Date   • COLONOSCOPY WITH POLYP  June 2008    benign polyp  Dr Paul    • CHOLECYSTECTOMY  January 2008    laparoscopic   • EGD WITH ASP/BX  September 2006    inflammation only ---Dr Paul   • CHOLECYSTECTOMY     • SINUSOTOMIES       Family History   Problem Relation Age of Onset   • Cancer Father 70        cancer of unknown primary   • Heart Disease Mother    • Heart Disease Son 42        MI and coronary stent placement   • Diabetes Son    • Hypertension Son    • Diabetes Sister          Physical Exam:  Vitals:    04/13/21 1101   BP: 147/69   Pulse: 62   Resp: 18   Temp: 36.7 °C (98 °F)   TempSrc: Temporal   Weight: 56.2 kg (123 lb 14.4 oz)   Height: 1.715 m (5' 7.5\")     Gen: NAD, conversant  HEENT: PERRL, EOMI  LUNGS: CTA B, no w/r/r  CV: RRR, no m/r/g, no JVD  Abd: Soft, NT/ND, +BS  Ext: no edema, warm and well perfused    Labs reviewed    EKG interpreted by me:  Sinus    Impression/Recs:  1. Paroxysmal atrial fibrillation (HCC)  CL-IMPLANTABLE LOOP RECORDER    CL-IMPLANTABLE LOOP " RECORDER   -Risks/benefits/alternatives discussed  -Proceed with ADOLFO Moody MD

## 2021-04-13 NOTE — OR NURSING
1155 Pt arrived to [facility] with Cath lab RN. Heather. VSS. Denies pain and nausea. Left chest dressing is CDI and soft. Belongings returned to pt bedside. Family called.     1209 Discharge instructions reviewed with pt. All questions answered.     1212 Pt taken out to personal vehicle, all belongings with pt.

## 2021-04-13 NOTE — DISCHARGE INSTRUCTIONS
ACTIVITY: Rest and take it easy for the first 24 hours.  A responsible adult is recommended to remain with you during that time.  It is normal to feel sleepy.  We encourage you to not do anything that requires balance, judgment or coordination.    MILD FLU-LIKE SYMPTOMS ARE NORMAL. YOU MAY EXPERIENCE GENERALIZED MUSCLE ACHES, THROAT IRRITATION, HEADACHE AND/OR SOME NAUSEA.    FOR 24 HOURS DO NOT:  Drive, operate machinery or run household appliances.  Drink beer or alcoholic beverages.   Make important decisions or sign legal documents.    SPECIAL INSTRUCTIONS:     DIET: To avoid nausea, slowly advance diet as tolerated, avoiding spicy or greasy foods for the first day.  Add more substantial food to your diet according to your physician's instructions.  Babies can be fed formula or breast milk as soon as they are hungry.  INCREASE FLUIDS AND FIBER TO AVOID CONSTIPATION.    SURGICAL DRESSING/BATHING: Keep dressing dry for 1 week until follow-up appointment. Sponge bath only. Do not submerge in water or bath for 7 days.    FOLLOW-UP APPOINTMENT:  A follow-up appointment should be arranged with your doctor in 1 week with [facility]; call to schedule.    You should CALL YOUR PHYSICIAN if you develop:  Fever greater than 101 degrees F.  Pain not relieved by medication, or persistent nausea or vomiting.  Excessive bleeding (blood soaking through dressing) or unexpected drainage from the wound.  Extreme redness or swelling around the incision site, drainage of pus or foul smelling drainage.  Inability to urinate or empty your bladder within 8 hours.  Problems with breathing or chest pain.    You should call 911 if you develop problems with breathing or chest pain.  If you are unable to contact your doctor or surgical center, you should go to the nearest emergency room or urgent care center.  Physician's telephone #: CARDIOLOGY [phone]    If any questions arise, call your doctor.  If your doctor is not available,  please feel free to call the Surgical Center at [phone]. The Contact Center is open Monday through Friday 7AM to 5PM and may speak to a nurse at [phone], or toll free at [phone].     A registered nurse may call you a few days after your surgery to see how you are doing after your procedure.    MEDICATIONS: Resume taking daily medication.  Take prescribed pain medication with food.  If no medication is prescribed, you may take non-aspirin pain medication if needed.  PAIN MEDICATION CAN BE VERY CONSTIPATING.  Take a stool softener or laxative such as senokot, pericolace, or milk of magnesia if needed.    If your physician has prescribed pain medication that includes Acetaminophen (Tylenol), do not take additional Acetaminophen (Tylenol) while taking the prescribed medication.    Depression / Suicide Risk    As you are discharged from this [facility] facility, it is important to learn how to keep safe from harming yourself.    Recognize the warning signs:  · Abrupt changes in personality, positive or negative- including increase in energy   · Giving away possessions  · Change in eating patterns- significant weight changes-  positive or negative  · Change in sleeping patterns- unable to sleep or sleeping all the time   · Unwillingness or inability to communicate  · Depression  · Unusual sadness, discouragement and loneliness  · Talk of wanting to die  · Neglect of personal appearance   · Rebelliousness- reckless behavior  · Withdrawal from people/activities they love  · Confusion- inability to concentrate     If you or a loved one observes any of these behaviors or has concerns about self-harm, here's what you can do:  · Talk about it- your feelings and reasons for harming yourself  · Remove any means that you might use to hurt yourself (examples: pills, rope, extension cords, firearm)  · Get professional help from the community (Mental Health, Substance Abuse, psychological counseling)  · Do not  be alone:Call your Safe Contact- someone whom you trust who will be there for you.  · Call your local CRISIS HOTLINE 012-9520 or 105-209-9967  · Call your local Children's Mobile Crisis Response Team [place] (339) 437-5360 or www.[employer]  · Call the toll free National Suicide Prevention Hotlines   · National Suicide Prevention Lifeline 927-602-HVOI (0159)  · National [employer] Line Network 800-SUICIDE (753-0505)

## 2021-04-15 ENCOUNTER — HOSPITAL ENCOUNTER (OUTPATIENT)
Dept: LAB | Facility: MEDICAL CENTER | Age: 66
End: 2021-04-15
Attending: FAMILY MEDICINE
Payer: MEDICARE

## 2021-04-15 DIAGNOSIS — D64.9 ANEMIA, UNSPECIFIED TYPE: ICD-10-CM

## 2021-04-15 LAB
ERYTHROCYTE [DISTWIDTH] IN BLOOD BY AUTOMATED COUNT: 46.5 FL (ref 35.9–50)
HCT VFR BLD AUTO: 40.7 % (ref 37–47)
HGB BLD-MCNC: 12.9 G/DL (ref 12–16)
MCH RBC QN AUTO: 29.7 PG (ref 27–33)
MCHC RBC AUTO-ENTMCNC: 31.7 G/DL (ref 33.6–35)
MCV RBC AUTO: 93.6 FL (ref 81.4–97.8)
PLATELET # BLD AUTO: 229 K/UL (ref 164–446)
PMV BLD AUTO: 11.1 FL (ref 9–12.9)
RBC # BLD AUTO: 4.35 M/UL (ref 4.2–5.4)
WBC # BLD AUTO: 5.7 K/UL (ref 4.8–10.8)

## 2021-04-15 PROCEDURE — 36415 COLL VENOUS BLD VENIPUNCTURE: CPT

## 2021-04-15 PROCEDURE — 85027 COMPLETE CBC AUTOMATED: CPT

## 2021-04-20 ENCOUNTER — NON-PROVIDER VISIT (OUTPATIENT)
Dept: CARDIOLOGY | Facility: MEDICAL CENTER | Age: 66
End: 2021-04-20
Payer: MEDICARE

## 2021-04-20 DIAGNOSIS — I48.0 PAROXYSMAL ATRIAL FIBRILLATION (HCC): ICD-10-CM

## 2021-04-20 DIAGNOSIS — Z45.09 ENCOUNTER FOR LOOP RECORDER CHECK: ICD-10-CM

## 2021-04-20 PROCEDURE — 93291 INTERROG DEV EVAL SCRMS IP: CPT | Performed by: INTERNAL MEDICINE

## 2021-04-20 NOTE — NON-PROVIDER
Wound site is healing well. Pt advised to watch for increased redness, swelling, oozing or fever. Pt verbalized understanding.

## 2021-04-22 ENCOUNTER — TELEPHONE (OUTPATIENT)
Dept: CARDIOLOGY | Facility: MEDICAL CENTER | Age: 66
End: 2021-04-22

## 2021-04-22 NOTE — TELEPHONE ENCOUNTER
Remote Transmission 4/22/2021:    1-AF episode 4/22/2021@3:32am lasting 1 hr and 50 mins.    x2-Symptom episodes showing AF w/RVR~130-150 4/22/2021@4am and 4:19am.    Pt on Xarelto.  Next F/U w/SS 6/30/2021.    Full Loop report scanned into media.

## 2021-05-11 ENCOUNTER — TELEPHONE (OUTPATIENT)
Dept: PHYSICAL THERAPY | Facility: MEDICAL CENTER | Age: 66
End: 2021-05-11

## 2021-05-24 ENCOUNTER — OFFICE VISIT (OUTPATIENT)
Dept: NEUROLOGY | Facility: MEDICAL CENTER | Age: 66
End: 2021-05-24
Attending: NURSE PRACTITIONER
Payer: MEDICARE

## 2021-05-24 VITALS
OXYGEN SATURATION: 98 % | BODY MASS INDEX: 19.41 KG/M2 | HEART RATE: 60 BPM | SYSTOLIC BLOOD PRESSURE: 108 MMHG | WEIGHT: 123.68 LBS | HEIGHT: 67 IN | DIASTOLIC BLOOD PRESSURE: 70 MMHG | TEMPERATURE: 97.2 F

## 2021-05-24 DIAGNOSIS — G25.0 ESSENTIAL TREMOR: ICD-10-CM

## 2021-05-24 DIAGNOSIS — I69.30 LATE EFFECT OF STROKE: ICD-10-CM

## 2021-05-24 DIAGNOSIS — I48.0 PAROXYSMAL ATRIAL FIBRILLATION (HCC): ICD-10-CM

## 2021-05-24 PROCEDURE — 99214 OFFICE O/P EST MOD 30 MIN: CPT | Performed by: NURSE PRACTITIONER

## 2021-05-24 PROCEDURE — 99212 OFFICE O/P EST SF 10 MIN: CPT | Performed by: NURSE PRACTITIONER

## 2021-05-24 ASSESSMENT — ENCOUNTER SYMPTOMS
NECK PAIN: 1
DEPRESSION: 0
HEARTBURN: 0
BLURRED VISION: 1
PALPITATIONS: 1
BRUISES/BLEEDS EASILY: 0
TREMORS: 1
VOMITING: 0
FEVER: 0
COUGH: 0
NERVOUS/ANXIOUS: 1
BACK PAIN: 0
NAUSEA: 0

## 2021-05-24 ASSESSMENT — FIBROSIS 4 INDEX: FIB4 SCORE: 1.49

## 2021-05-24 NOTE — PATIENT INSTRUCTIONS
If any new signs of stroke:  sudden weakness, numbness, speech difficulty (slurring or difficulty finding words), imbalance, incoordination, worse headache of life or vision loss occur, call 911.      Take all medications as prescribed unless instructed by your provider.        Tremor  A tremor is trembling or shaking that you cannot control. Most tremors affect the hands or arms. Tremors can also affect the head, vocal cords, face, and other parts of the body. There are many types of tremors. Common types include:  · Essential tremor. These usually occur in people older than 40. It may run in families and can happen in otherwise healthy people.  · Resting tremor. These occur when the muscles are at rest, such as when your hands are resting in your lap. People with Parkinson's disease often have resting tremors.  · Postural tremor. These occur when you try to hold a pose, such as keeping your hands outstretched.  · Kinetic tremor. These occur during purposeful movement, such as trying to touch a finger to your nose.  · Task-specific tremor. These may occur when you perform certain tasks such as writing, speaking, or standing.  · Psychogenic tremor. These dramatically lessen or disappear when you are distracted. They can happen in people of all ages.  Some types of tremors have no known cause. Tremors can also be a symptom of nervous system problems (neurological disorders) that may occur with aging. Some tremors go away with treatment, while others do not.  Follow these instructions at home:  Lifestyle         · Limit alcohol intake to no more than 1 drink a day for nonpregnant women and 2 drinks a day for men. One drink equals 12 oz of beer, 5 oz of wine, or 1½ oz of hard liquor.  · Do not use any products that contain nicotine or tobacco, such as cigarettes and e-cigarettes. If you need help quitting, ask your health care provider.  · Avoid extreme heat and extreme cold.  · Limit your caffeine intake, as told by  your health care provider.  · Try to get 8 hours of sleep each night.  · Find ways to manage your stress, such as meditation or yoga.  General instructions  · Take over-the-counter and prescription medicines only as told by your health care provider.  · Keep all follow-up visits as told by your health care provider. This is important.  Contact a health care provider if you:  · Develop a tremor after starting a new medicine.  · Have a tremor along with other symptoms such as:  ? Numbness.  ? Tingling.  ? Pain.  ? Weakness.  · Notice that your tremor gets worse.  · Notice that your tremor interferes with your day-to-day life.  Summary  · A tremor is trembling or shaking that you cannot control.  · Most tremors affect the hands or arms.  · Some types of tremors have no known cause. Others may be a symptom of nervous system problems (neurological disorders).  · Make sure you discuss any tremors you have with your health care provider.  This information is not intended to replace advice given to you by your health care provider. Make sure you discuss any questions you have with your health care provider.  Document Released: 12/08/2003 Document Revised: 11/30/2018 Document Reviewed: 10/18/2018  Elsevier Patient Education © 2020 [employer]  Stroke Prevention  Some medical conditions and lifestyle choices can lead to a higher risk for a stroke. You can help to prevent a stroke by making nutrition, lifestyle, and other changes.  What nutrition changes can be made?    · Eat healthy foods.  ? Choose foods that are high in fiber. These include:  § Fresh fruits.  § Fresh vegetables.  § Whole grains.  ? Eat at least 5 or more servings of fruits and vegetables each day. Try to fill half of your plate at each meal with fruits and vegetables.  ? Choose lean protein foods. These include:  § Lowfat (lean) cuts of meat.  § Chicken without skin.  § Fish.  § Tofu.  § Beans.  § Nuts.  ? Eat low-fat dairy products.  ? Avoid foods  that:  § Are high in salt (sodium).  § Have saturated fat.  § Have trans fat.  § Have cholesterol.  § Are processed.  § Are premade.  · Follow eating guidelines as told by your doctor. These may include:  ? Reducing how many calories you eat and drink each day.  ? Limiting how much salt you eat or drink each day to 1,500 milligrams (mg).  ? Using only healthy fats for cooking. These include:  § Olive oil.  § Canola oil.  § Sunflower oil.  ? Counting how many carbohydrates you eat and drink each day.  What lifestyle changes can be made?  · Try to stay at a healthy weight. Talk to your doctor about what a good weight is for you.  · Get at least 30 minutes of moderate physical activity at least 5 days a week. This can include:  ? Fast walking.  ? Biking.  ? Swimming.  · Do not use any products that have nicotine or tobacco. This includes cigarettes and e-cigarettes. If you need help quitting, ask your doctor. Avoid being around tobacco smoke in general.  · Limit how much alcohol you drink to no more than 1 drink a day for nonpregnant women and 2 drinks a day for men. One drink equals 12 oz of beer, 5 oz of wine, or 1½ oz of hard liquor.  · Do not use drugs.  · Avoid taking birth control pills. Talk to your doctor about the risks of taking birth control pills if:  ? You are over 35 years old.  ? You smoke.  ? You get migraines.  ? You have had a blood clot.  What other changes can be made?  · Manage your cholesterol.  ? It is important to eat a healthy diet.  ? If your cholesterol cannot be managed through your diet, you may also need to take medicines. Take medicines as told by your doctor.  · Manage your diabetes.  ? It is important to eat a healthy diet and to exercise regularly.  ? If your blood sugar cannot be managed through diet and exercise, you may need to take medicines. Take medicines as told by your doctor.  · Control your high blood pressure (hypertension).  ? Try to keep your blood pressure below 130/80.  "This can help lower your risk of stroke.  ? It is important to eat a healthy diet and to exercise regularly.  ? If your blood pressure cannot be managed through diet and exercise, you may need to take medicines. Take medicines as told by your doctor.  ? Ask your doctor if you should check your blood pressure at home.  ? Have your blood pressure checked every year. Do this even if your blood pressure is normal.  · Talk to your doctor about getting checked for a sleep disorder. Signs of this can include:  ? Snoring a lot.  ? Feeling very tired.  · Take over-the-counter and prescription medicines only as told by your doctor. These may include aspirin or blood thinners (antiplatelets or anticoagulants).  · Make sure that any other medical conditions you have are managed.  Where to find more information  · American Stroke Association: www.strokeassociation.org  · National Stroke Association: www.stroke.org  Get help right away if:  · You have any symptoms of stroke. \"BE FAST\" is an easy way to remember the main warning signs:  ? B - Balance. Signs are dizziness, sudden trouble walking, or loss of balance.  ? E - Eyes. Signs are trouble seeing or a sudden change in how you see.  ? F - Face. Signs are sudden weakness or loss of feeling of the face, or the face or eyelid drooping on one side.  ? A - Arms. Signs are weakness or loss of feeling in an arm. This happens suddenly and usually on one side of the body.  ? S - Speech. Signs are sudden trouble speaking, slurred speech, or trouble understanding what people say.  ? T - Time. Time to call emergency services. Write down what time symptoms started.  · You have other signs of stroke, such as:  ? A sudden, very bad headache with no known cause.  ? Feeling sick to your stomach (nausea).  ? Throwing up (vomiting).  ? Jerky movements you cannot control (seizure).  These symptoms may represent a serious problem that is an emergency. Do not wait to see if the symptoms will go " away. Get medical help right away. Call your local emergency services (911 in the [place] Do not drive yourself to the hospital.  Summary  · You can prevent a stroke by eating healthy, exercising, not smoking, drinking less alcohol, and treating other health problems, such as diabetes, high blood pressure, or high cholesterol.  · Do not use any products that contain nicotine or tobacco, such as cigarettes and e-cigarettes.  · Get help right away if you have any signs or symptoms of a stroke.  This information is not intended to replace advice given to you by your health care provider. Make sure you discuss any questions you have with your health care provider.  Document Released: 06/18/2013 Document Revised: 02/13/2020 Document Reviewed: 03/21/2018  Elsevier Patient Education © 2020 [employer]

## 2021-05-24 NOTE — PROGRESS NOTES
Subjective:      ALFREDO Szymanski is a 66 y.o. right handed female who presents for follow up  to [facility] for evaluation of right insular cortex and frontal lobe infarcts.  I last saw her on 4/12/2021.     She presented to [facility] on 2/23/2021 with complaints of left lower extremity weakness. She was given IV tPA, MRI revealed CTA revealed R M2 occlusion, she was taken for thrombectomy which was successful with TICI 2 reperfusion of the R M2.  MRI revealed R MCA territory infarcts with hemorrhagic transformation.     After the tPA and thrombectomy NIHSS was 0, she still felt a bit weak on the left ad felt that her balance was bit off      She reports that she feels when she is in atrial fibrillation,      PMH:  GERD, PAF s/p ablation, chronic hip pain, essential tremor.   Social History:  Denies history of smoking, alcohol or drug use.      She was discharged on Atorvastatin 40mg with orders to resume Xarelto on 3/5/2021.       She is here today alone.   She is not taking Primidone, it interferes with the Xarelto.  She continues PRN Inderal.           Review of Systems   Constitutional: Negative for fever.   HENT: Negative for hearing loss.    Eyes: Positive for blurred vision.   Respiratory: Negative for cough.    Cardiovascular: Positive for palpitations. Negative for chest pain.   Gastrointestinal: Negative for heartburn, nausea and vomiting.   Genitourinary: Negative for dysuria.   Musculoskeletal: Positive for neck pain. Negative for back pain.   Skin: Negative for rash.   Neurological: Positive for tremors.   Endo/Heme/Allergies: Does not bruise/bleed easily.   Psychiatric/Behavioral: Negative for depression. The patient is nervous/anxious.      Current Outpatient Medications on File Prior to Visit   Medication Sig Dispense Refill   • Fluticasone Propionate (FLONASE ALLERGY RELIEF NA) Administer  into affected nostril(S) as needed.     • oxybutynin (OXYTROL FOR  "WOMEN) 3.9 MG/24HR patch Place 1 Patch on the skin as needed.     • flecainide (TAMBOCOR) 50 MG tablet Take 1 tablet by mouth 2 times a day. (Patient taking differently: Take 50 mg by mouth 2 times a day. Patient reports she only takes it as needed.) 60 tablet 0   • rivaroxaban (XARELTO) 20 MG Tab tablet Take 1 tablet by mouth with dinner for 90 days. 90 tablet 3   • propranolol LA (INDERAL LA) 60 MG CAPSULE SR 24 HR Take 1 Cap by mouth every day. (Patient taking differently: Take 60 mg by mouth every day. Patient reports she only takes it as needed for feelings of \"Irregular heartbeat\") 30 Cap 11   • propranolol (INDERAL) 20 MG Tab Take 1 Tab by mouth 3 times a day as needed. 90 Tab 6   • esomeprazole (NEXIUM) 40 MG delayed-release capsule TAKE 1 CAPSULE BY MOUTH ONCE A DAY 30 MINUTES BEFORE BREAKFAST MEAL (Patient taking differently: as needed. TAKE 1 CAPSULE BY MOUTH ONCE A DAY 30 MINUTES BEFORE BREAKFAST MEAL) 30 Cap 6   • MYRBETRIQ 50 MG TABLET SR 24 HR      • acetaminophen (TYLENOL) 325 MG Tab Take 1 Tab by mouth 1 time daily as needed. Patient will not taken until follow up labs/results reviewed.       No current facility-administered medications on file prior to visit.     Past Medical History:   Diagnosis Date   • Anxiety    • Arrhythmia     afib   • ASTHMA 4/9/21:  Patient denies   • Bowel habit changes     constipation   • Cataract 04/09/2001    \"Small, not affecting eyes\"   • Depression 04/09/2021    Patient denies   • GERD (gastroesophageal reflux disease)    • Hiatal hernia with GERD 11/5/2019   • Hiatus hernia syndrome    • High cholesterol    • History of esophageal stricture 11/5/2019   • Indigestion    • Osteoporosis    • Palpitations    • Palpitations    • Premature atrial contractions    • Stroke (HCC) 04/09/2021    1.5 month ago.  Blood clot in brain RT A-Fib        Objective:   personally reviewed imaging below and agree with the findings  MRI brain 2/25/2021  There are tiny areas of acute " "infarcts in the right insular cortex and right frontal lobe. Hemorrhagic transformation is noted within the small right parietal cortical infarct.     CTA head  Filling defect in the proximal M2 segment right middle cerebral artery inferior division consistent with thrombus/embolus.     CTA neck  Beaded appearance of the cervical internal carotid arteries is suggestive of fibromuscular dysplasia.     No significant stenosis or dissection of the neck arteries.     IR thrombectomy  1 Occluded proximal right M2 segment.  2.  Successful mechanical thrombectomy.  3.  TICI 2b flow.  4.  Incidental nonflow limiting fibromuscular dysplasia in the mid and distal portion of the right internal carotid artery.     TTE: LVEF 65%, LA size WNL, JW 22.     Stroke Labs:  Creat 0.62, LDL 92, A1C 5.4.    Encounter Vitals  Standard Vitals  Vitals  Blood Pressure : 108/70  Temperature: 36.2 °C (97.2 °F)  Temp src: Temporal  Pulse: 60  Pulse Oximetry: 98 %  Height: 170.2 cm (5' 7\")  Weight: 56.1 kg (123 lb 10.9 oz)  Encounter Vitals  Temperature: 36.2 °C (97.2 °F)  Temp src: Temporal  Blood Pressure : 108/70  Pulse: 60  Pulse Oximetry: 98 %  Weight: 56.1 kg (123 lb 10.9 oz)  Height: 170.2 cm (5' 7\")  BMI (Calculated): 19.37  LMP 01/01/2004      Physical Exam     Constitutional:  Alert, no apparent distress,  Psych:   mood and affect WNL  Neuro:  Oriented X 4, speech fluent, naming and memory intact  Muskuloskeletal:  Moves all extremities equally, strength 5/5 bilaterally, no drift  CN II: Visual fields are full to confrontation. Fundoscopic exam is normal with sharp discs and no vascular changes. Pupils are 4 mm and briskly reactive to light.   CN III, IV, VI  EOMs intact, no ptosis  CN V: Facial sensation is intact to pinprick in all 3 divisions bilaterally. Corneal responses are intact.  CN VII: Face is symmetric with normal eye closure and smile.  CN VII: Hearing is normal to rubbing fingers  CN IX, X: Palate elevates " symmetrically. Phonation is normal.  CN XI: Head turning and shoulder shrug are intact  CN XII: Tongue is midline with normal movements and no atrophy.                           Sensation to PP equal bilaterally                 No limb ataxia with finger to nose and heel to shin                 Ambulates with steady gait.                 Rhomberg negative                   Cardiovascular:    S1S2, no abnormal rhythm auscultated, no peripheral edema  Neck:                     No carotid bruits noted   Pulmonary:            Respirations easy, lungs clear to auscultation all fields.     Skin:                     No obvious rashes.            Iniital NIHSS  3      Current NIHSS    1a. LOC: 0  1b. LOC Questions: 0  1c. LOC Commands: 0  2. Best Gaze:0  3. Visual Fields: 0  4. Facial Paresis: 0  5a. Motor arm left: 0  5b. Motor arm right: 0  6a. Motor leg left: 0  6b. Motor leg right: 0  7. Sensory: 0  8. Best Language: 0  9. Limb Ataxia: 0  10. Dysarthria: 0  11. Extinction/Inattention: 0    Total Score Current  0          Current mRS 0     Assessment/Plan:   1. Late effect of stroke R MCA territory infarcts likely due to atrial fibrillation in the absence of anti-coagulation.   She is doing well without deficits.   Presumed Mechanism by Toast:  __  Large Artery Atherosclerosis  __  Small Vessel (lacunar)  __XX   Cardioembolic  __   Other (Sickle cell, vasculitis, hypercoagulable)  __   Unknown  __   ESUS        2. Paroxysmal atrial fibrillation (HCC)  Continue Xarelto 20mg PO daily with food, discussed signs of bleeding.  Recommend lifelong anti-coagulation given her history of atrial fibrillation refractory to ablation.      Avoid OTC NSAIDS, use acetaminophen for pain.      Stroke risk factors include atrial fibrillation      Blood pressure goal less than 130/80, currently at goal.         LDL goal < 70, current 92  she did not tolerate atorvatatin,,  even though there may be  some added benefit to atorvastatin, her  stroke was likely due to atrial fibrillation. ,   Exercise at least 30 minutes daily, avoid red meat, fried foods, butter, cheese.   Eat 5-6 servings of vegetables and fruits daily, choose lean white meat without skin (chicken, turkey, white fish)--baked, broiled or grilled.        3. Essential Tremor     Continue PRN Inderal.    No Primidone due to Xarelto    Will consider referral to movement disorders, she wants to think about it and call us.        I spent a total of 23 minutes caring for patient,  my time includes counseling, review of systems, HPI and assessment, review of images, labs and testing as above.  I reviewed the hospital records, PMH, social and family history.   I have counseled patient on stroke prevention strategies, stroke symptoms and mimics.  Diet and exercise modifications.  We discussed medication side effects and instructions.

## 2021-05-25 ENCOUNTER — NON-PROVIDER VISIT (OUTPATIENT)
Dept: CARDIOLOGY | Facility: MEDICAL CENTER | Age: 66
End: 2021-05-25
Payer: MEDICARE

## 2021-05-25 DIAGNOSIS — Z95.818 STATUS POST PLACEMENT OF IMPLANTABLE LOOP RECORDER: ICD-10-CM

## 2021-05-25 DIAGNOSIS — I63.9 CRYPTOGENIC STROKE (HCC): ICD-10-CM

## 2021-05-25 PROCEDURE — 93298 REM INTERROG DEV EVAL SCRMS: CPT | Performed by: INTERNAL MEDICINE

## 2021-06-01 ENCOUNTER — OFFICE VISIT (OUTPATIENT)
Dept: CARDIOLOGY | Facility: MEDICAL CENTER | Age: 66
End: 2021-06-01
Payer: MEDICARE

## 2021-06-01 ENCOUNTER — TELEPHONE (OUTPATIENT)
Dept: CARDIOLOGY | Facility: MEDICAL CENTER | Age: 66
End: 2021-06-01

## 2021-06-01 VITALS
WEIGHT: 123 LBS | OXYGEN SATURATION: 95 % | HEART RATE: 66 BPM | SYSTOLIC BLOOD PRESSURE: 140 MMHG | HEIGHT: 67 IN | RESPIRATION RATE: 18 BRPM | DIASTOLIC BLOOD PRESSURE: 90 MMHG | BODY MASS INDEX: 19.3 KG/M2

## 2021-06-01 DIAGNOSIS — Z45.09 ENCOUNTER FOR LOOP RECORDER CHECK: ICD-10-CM

## 2021-06-01 DIAGNOSIS — I48.0 PAROXYSMAL ATRIAL FIBRILLATION (HCC): ICD-10-CM

## 2021-06-01 DIAGNOSIS — I49.3 PREMATURE VENTRICULAR BEATS: ICD-10-CM

## 2021-06-01 DIAGNOSIS — I48.0 PAF (PAROXYSMAL ATRIAL FIBRILLATION) (HCC): ICD-10-CM

## 2021-06-01 PROCEDURE — 99215 OFFICE O/P EST HI 40 MIN: CPT | Mod: 25 | Performed by: INTERNAL MEDICINE

## 2021-06-01 PROCEDURE — 93000 ELECTROCARDIOGRAM COMPLETE: CPT | Mod: 59 | Performed by: INTERNAL MEDICINE

## 2021-06-01 PROCEDURE — 93291 INTERROG DEV EVAL SCRMS IP: CPT | Performed by: INTERNAL MEDICINE

## 2021-06-01 ASSESSMENT — FIBROSIS 4 INDEX: FIB4 SCORE: 1.49

## 2021-06-01 NOTE — TELEPHONE ENCOUNTER
----- Message from Josue Moody M.D. sent at 6/1/2021 11:02 AM PDT -----  Let's schedule repeat AF ablation. No meds to hold. Danettex.    Josue

## 2021-06-01 NOTE — PROGRESS NOTES
"Arrhythmia Clinic Note (Established patient)    DOS: 6/1/2021    Chief complaint/Reason for consult: PAF    Interval History:  Patient is a 67 yo F. Well known to us. History of symptomatic PAF s/p previous PV isolation in 2019. Did well post operatively and on ambulatory zio monitoring no detected sustained arrhythmias. Subsequently taken off IC agent and OAC. She then unfortunately suffered CVA, underwent TPA and no residual deficits. Back on OAC and s/p ILR. Showing still small but recurrent AF burden and high PAC/PVC ectopy burden for which she is highly symptomatic.    ROS (+ highlighted in red):  General--Negative for fatigue, weight loss or weight gain  Cardiovascular--Negative for CP, orthopnea, PND    Past Medical History:   Diagnosis Date   • Anxiety    • Arrhythmia     afib   • ASTHMA 4/9/21:  Patient denies   • Bowel habit changes     constipation   • Cataract 04/09/2001    \"Small, not affecting eyes\"   • Depression 04/09/2021    Patient denies   • GERD (gastroesophageal reflux disease)    • Hiatal hernia with GERD 11/5/2019   • Hiatus hernia syndrome    • High cholesterol    • History of esophageal stricture 11/5/2019   • Indigestion    • Osteoporosis    • Palpitations    • Palpitations    • Premature atrial contractions    • Stroke (HCC) 04/09/2021    1.5 month ago.  Blood clot in brain RT A-Fib       Past Surgical History:   Procedure Laterality Date   • COLONOSCOPY WITH POLYP  June 2008    benign polyp  Dr Paul    • CHOLECYSTECTOMY  January 2008    laparoscopic   • EGD WITH ASP/BX  September 2006    inflammation only ---Dr Paul   • CHOLECYSTECTOMY     • SINUSOTOMIES         Social History     Socioeconomic History   • Marital status:      Spouse name: Not on file   • Number of children: Not on file   • Years of education: Not on file   • Highest education level: Not on file   Occupational History   • Occupation: retired   Tobacco Use   • Smoking status: Never Smoker   • Smokeless tobacco: " Never Used   Vaping Use   • Vaping Use: Never used   Substance and Sexual Activity   • Alcohol use: No     Alcohol/week: 0.0 oz   • Drug use: No   • Sexual activity: Yes     Partners: Male     Birth control/protection: Post-Menopausal   Other Topics Concern   • Not on file   Social History Narrative   • Not on file     Social Determinants of Health     Financial Resource Strain:    • Difficulty of Paying Living Expenses:    Food Insecurity:    • Worried About Running Out of Food in the Last Year:    • Ran Out of Food in the Last Year:    Transportation Needs:    • Lack of Transportation (Medical):    • Lack of Transportation (Non-Medical):    Physical Activity:    • Days of Exercise per Week:    • Minutes of Exercise per Session:    Stress:    • Feeling of Stress :    Social Connections:    • Frequency of Communication with Friends and Family:    • Frequency of Social Gatherings with Friends and Family:    • Attends Congregation Services:    • Active Member of Clubs or Organizations:    • Attends Club or Organization Meetings:    • Marital Status:    Intimate Partner Violence:    • Fear of Current or Ex-Partner:    • Emotionally Abused:    • Physically Abused:    • Sexually Abused:        Family History   Problem Relation Age of Onset   • Cancer Father 70        cancer of unknown primary   • Heart Disease Mother    • Heart Disease Son 42        MI and coronary stent placement   • Diabetes Son    • Hypertension Son    • Diabetes Sister        Allergies   Allergen Reactions   • Amitriptyline      Excessive sedation --even at 10 mg dose   • Bactrim [Sulfamethoxazole W-Trimethoprim]      Shaking   • Levaquin      Nausea    • Lovastatin      Muscle aches  Muscle aches     • Trazodone      Irregular pulse       Current Outpatient Medications   Medication Sig Dispense Refill   • Fluticasone Propionate (FLONASE ALLERGY RELIEF NA) Administer  into affected nostril(S) as needed.     • oxybutynin (OXYTROL FOR WOMEN) 3.9 MG/24HR  "patch Place 1 Patch on the skin as needed.     • flecainide (TAMBOCOR) 50 MG tablet Take 1 tablet by mouth 2 times a day. (Patient taking differently: Take 50 mg by mouth 2 times a day. Patient reports she only takes it as needed.) 60 tablet 0   • rivaroxaban (XARELTO) 20 MG Tab tablet Take 1 tablet by mouth with dinner for 90 days. 90 tablet 3   • propranolol LA (INDERAL LA) 60 MG CAPSULE SR 24 HR Take 1 Cap by mouth every day. (Patient taking differently: Take 60 mg by mouth every day. Patient reports she only takes it as needed for feelings of \"Irregular heartbeat\") 30 Cap 11   • propranolol (INDERAL) 20 MG Tab Take 1 Tab by mouth 3 times a day as needed. 90 Tab 6   • esomeprazole (NEXIUM) 40 MG delayed-release capsule TAKE 1 CAPSULE BY MOUTH ONCE A DAY 30 MINUTES BEFORE BREAKFAST MEAL (Patient taking differently: as needed. TAKE 1 CAPSULE BY MOUTH ONCE A DAY 30 MINUTES BEFORE BREAKFAST MEAL) 30 Cap 6   • MYRBETRIQ 50 MG TABLET SR 24 HR      • acetaminophen (TYLENOL) 325 MG Tab Take 1 Tab by mouth 1 time daily as needed. Patient will not taken until follow up labs/results reviewed.       No current facility-administered medications for this visit.       Physical Exam:  Vitals:    06/01/21 1028   BP: 140/90   BP Location: Left arm   Patient Position: Sitting   BP Cuff Size: Adult   Pulse: 66   Resp: 18   SpO2: 95%   Weight: 55.8 kg (123 lb)   Height: 1.702 m (5' 7\")     General appearance: NAD, conversant  Eyes: anicteric sclerae, no lid-lag; PERRLA  HENT: Atraumatic; moist mucous membranes, no ulcerations  Neck: Trachea midline; FROM, supple, no thyromegaly  Lungs: CTA, with normal respiratory effort and no intercostal retractions  CV: RRR, no MRGs, no JVD  Abdomen: Soft, non-tender; normal bowel sounds, no HSM  Extremities: No peripheral edema. No clubbing or cyanosis.  Skin: Normal temperature, turgor and texture; no rash or ulcers  Psych: Appropriate affect, alert and oriented to person, place and " time    Data:  Labs reviewed    Prior echo/stress reviewed  Normal LV function    EKG interpreted by me:  Sinus, PAC/PVC    Impression/Plan:  1. PAF (paroxysmal atrial fibrillation) ([facility])  EKG    OVERNIGHT PULSE OXIMETRY    CL-EP ABLATION ATRIAL FIBRILLATION   2. Premature ventricular beats     3. Encounter for loop recorder check       -She is behaving like she has untreated sleep apnea though her body habitus is not suggestive  -I will get OPO to screen for this  -Resume IC agent  -Discussed checking prior PV isolation with repeat procedure given symptomatic recurrence and she and  are agreeable  -Will set a date for repeat AF ablation next available    Josue Moody MD

## 2021-06-01 NOTE — TELEPHONE ENCOUNTER
OPO order per SS sent to [facility] @ fax # [phone] demos, insurance card, ID and notes included, confirmation fax sent to scan.

## 2021-06-02 NOTE — TELEPHONE ENCOUNTER
Patient scheduled for afib ablation w/[facility] on 8-18-21 with Dr. Moody. Patient has been instructed to check in at 6:00am for 8:00 case time. Message sent to authorizations. Emailed [facility].

## 2021-06-03 ENCOUNTER — PATIENT MESSAGE (OUTPATIENT)
Dept: CARDIOLOGY | Facility: MEDICAL CENTER | Age: 66
End: 2021-06-03

## 2021-06-03 DIAGNOSIS — I48.0 PAROXYSMAL ATRIAL FIBRILLATION (HCC): ICD-10-CM

## 2021-06-07 RX ORDER — PROPAFENONE HYDROCHLORIDE 150 MG/1
150 TABLET, COATED ORAL EVERY 8 HOURS
Qty: 90 TABLET | Refills: 5 | Status: SHIPPED | OUTPATIENT
Start: 2021-06-07 | End: 2021-06-30 | Stop reason: SDUPTHER

## 2021-06-09 ENCOUNTER — TELEPHONE (OUTPATIENT)
Dept: CARDIOLOGY | Facility: MEDICAL CENTER | Age: 66
End: 2021-06-09

## 2021-06-09 NOTE — TELEPHONE ENCOUNTER
----- Message from Josue Moody M.D. sent at 6/9/2021  1:19 PM PDT -----  Yes okay to take  ----- Message -----  From: Ciara Noriega R.N.  Sent: 6/7/2021   8:23 AM PDT  To: Josue Moody M.D.    Ordered pt propafenone and this interaction with pt med came up. Ok to take still?    Pharmacologic effects of QT-Prolonging CYP2D6 Substrates may be increased by Mirabegron. Elevated plasma concentrations with toxicity (e.g. QT prolongation/Torsades de Pointes) may occur.       Recommended OBSERVATION and MONITORING for change.

## 2021-06-18 LAB — EKG IMPRESSION: NORMAL

## 2021-06-25 ENCOUNTER — NON-PROVIDER VISIT (OUTPATIENT)
Dept: CARDIOLOGY | Facility: MEDICAL CENTER | Age: 66
End: 2021-06-25
Payer: MEDICARE

## 2021-06-25 DIAGNOSIS — I63.9 CRYPTOGENIC STROKE (HCC): ICD-10-CM

## 2021-06-25 DIAGNOSIS — Z95.818 STATUS POST PLACEMENT OF IMPLANTABLE LOOP RECORDER: ICD-10-CM

## 2021-06-25 PROCEDURE — 93298 REM INTERROG DEV EVAL SCRMS: CPT | Performed by: INTERNAL MEDICINE

## 2021-06-30 ENCOUNTER — OFFICE VISIT (OUTPATIENT)
Dept: CARDIOLOGY | Facility: MEDICAL CENTER | Age: 66
End: 2021-06-30
Payer: MEDICARE

## 2021-06-30 VITALS
HEART RATE: 67 BPM | RESPIRATION RATE: 18 BRPM | WEIGHT: 123 LBS | DIASTOLIC BLOOD PRESSURE: 82 MMHG | BODY MASS INDEX: 18.64 KG/M2 | OXYGEN SATURATION: 97 % | SYSTOLIC BLOOD PRESSURE: 140 MMHG | HEIGHT: 68 IN

## 2021-06-30 DIAGNOSIS — I63.9 CRYPTOGENIC STROKE (HCC): ICD-10-CM

## 2021-06-30 DIAGNOSIS — I48.0 PAF (PAROXYSMAL ATRIAL FIBRILLATION) (HCC): ICD-10-CM

## 2021-06-30 DIAGNOSIS — Z45.09 ENCOUNTER FOR LOOP RECORDER CHECK: ICD-10-CM

## 2021-06-30 DIAGNOSIS — I49.3 PVC (PREMATURE VENTRICULAR CONTRACTION): ICD-10-CM

## 2021-06-30 DIAGNOSIS — G25.0 ESSENTIAL TREMOR: ICD-10-CM

## 2021-06-30 DIAGNOSIS — Z98.890 HISTORY OF LOOP RECORDER: ICD-10-CM

## 2021-06-30 PROCEDURE — 99214 OFFICE O/P EST MOD 30 MIN: CPT | Performed by: INTERNAL MEDICINE

## 2021-06-30 PROCEDURE — 93291 INTERROG DEV EVAL SCRMS IP: CPT | Performed by: INTERNAL MEDICINE

## 2021-06-30 RX ORDER — PROPRANOLOL HYDROCHLORIDE 20 MG/1
20 TABLET ORAL 2 TIMES DAILY
Qty: 60 TABLET | Refills: 2 | Status: SHIPPED | OUTPATIENT
Start: 2021-06-30 | End: 2021-07-30

## 2021-06-30 RX ORDER — PROPAFENONE HYDROCHLORIDE 150 MG/1
150 TABLET, COATED ORAL EVERY 8 HOURS
Qty: 90 TABLET | Refills: 5 | Status: ON HOLD | OUTPATIENT
Start: 2021-06-30 | End: 2021-08-19

## 2021-06-30 ASSESSMENT — FIBROSIS 4 INDEX: FIB4 SCORE: 1.49

## 2021-06-30 NOTE — PROGRESS NOTES
"Arrhythmia Clinic Note (Established patient)    DOS: 6/30/2021    Chief complaint/Reason for consult: F/u PAF    Interval History:  Patient is a 67 yo F. She has a history of prior PAF. S/p prior PV isolation. Post PVI she was doing well and OAC was stopped due to lower/borderline CHADSVasc. Unfortunately she had a CVA event and xarelto was restarted. She is doing okay on xarelto. Continues to have now higher burden PACs/PVCs quite symptomatic. Has ILR in place now. We tried IC agents but she said flecainide made her tremor worse (we think now this is because she stopped her propranolol). We put her instead on propafenone. OPO was done results are not yet back.    ROS (+in BOLD):  General -- fatigue, weight loss or weight gain  Cardiovascular -- CP, orthopnea, PND    Past Medical History:   Diagnosis Date   • Anxiety    • Arrhythmia     afib   • ASTHMA 4/9/21:  Patient denies   • Bowel habit changes     constipation   • Cataract 04/09/2001    \"Small, not affecting eyes\"   • Depression 04/09/2021    Patient denies   • GERD (gastroesophageal reflux disease)    • Hiatal hernia with GERD 11/5/2019   • Hiatus hernia syndrome    • High cholesterol    • History of esophageal stricture 11/5/2019   • Indigestion    • Osteoporosis    • Palpitations    • Palpitations    • Premature atrial contractions    • Stroke (HCC) 04/09/2021    1.5 month ago.  Blood clot in brain RT A-Fib       Past Surgical History:   Procedure Laterality Date   • COLONOSCOPY WITH POLYP  June 2008    benign polyp  Dr Paul    • CHOLECYSTECTOMY  January 2008    laparoscopic   • EGD WITH ASP/BX  September 2006    inflammation only ---Dr Paul   • CHOLECYSTECTOMY     • SINUSOTOMIES         Social History     Socioeconomic History   • Marital status:      Spouse name: Not on file   • Number of children: Not on file   • Years of education: Not on file   • Highest education level: Not on file   Occupational History   • Occupation: retired   Tobacco " Use   • Smoking status: Never Smoker   • Smokeless tobacco: Never Used   Vaping Use   • Vaping Use: Never used   Substance and Sexual Activity   • Alcohol use: No     Alcohol/week: 0.0 oz   • Drug use: No   • Sexual activity: Yes     Partners: Male     Birth control/protection: Post-Menopausal   Other Topics Concern   • Not on file   Social History Narrative   • Not on file     Social Determinants of Health     Financial Resource Strain:    • Difficulty of Paying Living Expenses:    Food Insecurity:    • Worried About Running Out of Food in the Last Year:    • Ran Out of Food in the Last Year:    Transportation Needs:    • Lack of Transportation (Medical):    • Lack of Transportation (Non-Medical):    Physical Activity:    • Days of Exercise per Week:    • Minutes of Exercise per Session:    Stress:    • Feeling of Stress :    Social Connections:    • Frequency of Communication with Friends and Family:    • Frequency of Social Gatherings with Friends and Family:    • Attends Adventism Services:    • Active Member of Clubs or Organizations:    • Attends Club or Organization Meetings:    • Marital Status:    Intimate Partner Violence:    • Fear of Current or Ex-Partner:    • Emotionally Abused:    • Physically Abused:    • Sexually Abused:        Family History   Problem Relation Age of Onset   • Cancer Father 70        cancer of unknown primary   • Heart Disease Mother    • Heart Disease Son 42        MI and coronary stent placement   • Diabetes Son    • Hypertension Son    • Diabetes Sister        Allergies   Allergen Reactions   • Amitriptyline      Excessive sedation --even at 10 mg dose   • Bactrim [Sulfamethoxazole W-Trimethoprim]      Shaking   • Levaquin      Nausea    • Lovastatin      Muscle aches  Muscle aches     • Trazodone      Irregular pulse       Current Outpatient Medications   Medication Sig Dispense Refill   • propranolol (INDERAL) 20 MG Tab Take 1 tablet by mouth 2 times a day. 60 tablet 2   •  "propafenone (RYTHMOL) 150 MG Tab Take 1 tablet by mouth every 8 hours. 90 tablet 5   • rivaroxaban (XARELTO) 20 MG Tab tablet Take 1 tablet by mouth with dinner. 90 tablet 3   • Fluticasone Propionate (FLONASE ALLERGY RELIEF NA) Administer  into affected nostril(S) as needed.     • esomeprazole (NEXIUM) 40 MG delayed-release capsule TAKE 1 CAPSULE BY MOUTH ONCE A DAY 30 MINUTES BEFORE BREAKFAST MEAL 30 Cap 6   • acetaminophen (TYLENOL) 325 MG Tab Take 1 Tab by mouth 1 time daily as needed. Patient will not taken until follow up labs/results reviewed.     • oxybutynin (OXYTROL FOR WOMEN) 3.9 MG/24HR patch Place 1 Patch on the skin as needed. (Patient not taking: Reported on 6/30/2021)     • MYRBETRIQ 50 MG TABLET SR 24 HR  (Patient not taking: Reported on 6/30/2021)       No current facility-administered medications for this visit.       Physical Exam:  Vitals:    06/30/21 1306   BP: 140/82   BP Location: Left arm   Patient Position: Sitting   BP Cuff Size: Adult   Pulse: 67   Resp: 18   SpO2: 97%   Weight: 55.8 kg (123 lb)   Height: 1.715 m (5' 7.5\")     General appearance: NAD, conversant  HEENT: PERRL, neck is supple with FROM  Lungs: Clear to auscultation, normal respiratory effort  CV: RRR, no murmurs/rubs/gallops, no JVD  Abdomen: Soft, non-tender with normal bowel sounds  Extremities: No peripheral edema, no clubbing or cyanosis  Skin: No rash, lesions, or ulcers  Psych: Alert and oriented to person, place and time    Data:  Labs reviewed    Prior echo/stress reviewed:  LVEF 65%    EKG interpreted by me:  Sinus, PAC/PVCs    Impression/Plan:  1. PAF (paroxysmal atrial fibrillation) (HCC)  EKG   2. Essential tremor     3. PVC (premature ventricular contraction)     4. History of loop recorder       -I am going to restart her propranolol at low dose to see if this may help with her symptoms  -Otherwise I would like to give the flec another shot if this does not help as I think her tremor issue was likely 2/2 to " discontinuation of the propranolol  -F/u in 4 weeks    Josue Moody MD

## 2021-07-26 ENCOUNTER — NON-PROVIDER VISIT (OUTPATIENT)
Dept: CARDIOLOGY | Facility: MEDICAL CENTER | Age: 66
End: 2021-07-26
Payer: MEDICARE

## 2021-07-26 DIAGNOSIS — Z95.818 STATUS POST PLACEMENT OF IMPLANTABLE LOOP RECORDER: ICD-10-CM

## 2021-07-26 DIAGNOSIS — I63.9 CRYPTOGENIC STROKE (HCC): ICD-10-CM

## 2021-07-26 PROCEDURE — 93298 REM INTERROG DEV EVAL SCRMS: CPT | Performed by: INTERNAL MEDICINE

## 2021-07-30 RX ORDER — PROPRANOLOL HYDROCHLORIDE 20 MG/1
TABLET ORAL
Qty: 180 TABLET | Refills: 1 | Status: SHIPPED | OUTPATIENT
Start: 2021-07-30 | End: 2022-08-12 | Stop reason: SDUPTHER

## 2021-08-11 ENCOUNTER — OFFICE VISIT (OUTPATIENT)
Dept: CARDIOLOGY | Facility: MEDICAL CENTER | Age: 66
End: 2021-08-11
Payer: MEDICARE

## 2021-08-11 VITALS
OXYGEN SATURATION: 98 % | HEART RATE: 58 BPM | SYSTOLIC BLOOD PRESSURE: 104 MMHG | BODY MASS INDEX: 19.15 KG/M2 | HEIGHT: 67 IN | WEIGHT: 122 LBS | DIASTOLIC BLOOD PRESSURE: 68 MMHG

## 2021-08-11 DIAGNOSIS — Z45.09 ENCOUNTER FOR LOOP RECORDER CHECK: ICD-10-CM

## 2021-08-11 DIAGNOSIS — I48.0 PAROXYSMAL ATRIAL FIBRILLATION (HCC): ICD-10-CM

## 2021-08-11 DIAGNOSIS — I48.0 PAF (PAROXYSMAL ATRIAL FIBRILLATION) (HCC): ICD-10-CM

## 2021-08-11 PROCEDURE — 93291 INTERROG DEV EVAL SCRMS IP: CPT | Performed by: INTERNAL MEDICINE

## 2021-08-11 PROCEDURE — 93000 ELECTROCARDIOGRAM COMPLETE: CPT | Mod: 59 | Performed by: INTERNAL MEDICINE

## 2021-08-11 PROCEDURE — 99214 OFFICE O/P EST MOD 30 MIN: CPT | Mod: 25 | Performed by: INTERNAL MEDICINE

## 2021-08-11 ASSESSMENT — FIBROSIS 4 INDEX: FIB4 SCORE: 1.49

## 2021-08-11 NOTE — PROGRESS NOTES
"Arrhythmia Clinic Note (Established patient)    DOS: 8/11/2021    Chief complaint/Reason for consult: F/u PAF/PVCs    Interval History:  Pt is a 67 yo F. Well known to me. History of symptomatic PAF. S/p remote PV isolation with me and did well post procedure we stopped OAC. Subsequently and unfortunately had a TIA. A loop recorder was implanted showing recurrence of PAF, albeit overall low burden, but still initially episode > 3 hours (since then x 2 four minute episodes). Loop also showing symptomatic PVCs at 4% burden. Repeat AF ablation coming up next week.    ROS (+in BOLD):  General-- fatigue, weight loss or weight gain  Cardiovascular--CP, orthopnea, PND    Past Medical History:   Diagnosis Date   • Anxiety    • Arrhythmia     afib   • ASTHMA 4/9/21:  Patient denies   • Bowel habit changes     constipation   • Cataract 04/09/2001    \"Small, not affecting eyes\"   • Depression 04/09/2021    Patient denies   • GERD (gastroesophageal reflux disease)    • Hiatal hernia with GERD 11/5/2019   • Hiatus hernia syndrome    • High cholesterol    • History of esophageal stricture 11/5/2019   • Indigestion    • Osteoporosis    • Palpitations    • Palpitations    • Premature atrial contractions    • Stroke (HCC) 04/09/2021    1.5 month ago.  Blood clot in brain RT A-Fib       Past Surgical History:   Procedure Laterality Date   • COLONOSCOPY WITH POLYP  June 2008    benign polyp  Dr Paul    • CHOLECYSTECTOMY  January 2008    laparoscopic   • EGD WITH ASP/BX  September 2006    inflammation only ---Dr Paul   • CHOLECYSTECTOMY     • SINUSOTOMIES         Social History     Socioeconomic History   • Marital status:      Spouse name: Not on file   • Number of children: Not on file   • Years of education: Not on file   • Highest education level: Not on file   Occupational History   • Occupation: retired   Tobacco Use   • Smoking status: Never Smoker   • Smokeless tobacco: Never Used   Vaping Use   • Vaping Use: Never " used   Substance and Sexual Activity   • Alcohol use: No     Alcohol/week: 0.0 oz   • Drug use: No   • Sexual activity: Yes     Partners: Male     Birth control/protection: Post-Menopausal   Other Topics Concern   • Not on file   Social History Narrative   • Not on file     Social Determinants of Health     Financial Resource Strain:    • Difficulty of Paying Living Expenses:    Food Insecurity:    • Worried About Running Out of Food in the Last Year:    • Ran Out of Food in the Last Year:    Transportation Needs:    • Lack of Transportation (Medical):    • Lack of Transportation (Non-Medical):    Physical Activity:    • Days of Exercise per Week:    • Minutes of Exercise per Session:    Stress:    • Feeling of Stress :    Social Connections:    • Frequency of Communication with Friends and Family:    • Frequency of Social Gatherings with Friends and Family:    • Attends Gnosticist Services:    • Active Member of Clubs or Organizations:    • Attends Club or Organization Meetings:    • Marital Status:    Intimate Partner Violence:    • Fear of Current or Ex-Partner:    • Emotionally Abused:    • Physically Abused:    • Sexually Abused:        Family History   Problem Relation Age of Onset   • Cancer Father 70        cancer of unknown primary   • Heart Disease Mother    • Heart Disease Son 42        MI and coronary stent placement   • Diabetes Son    • Hypertension Son    • Diabetes Sister        Allergies   Allergen Reactions   • Amitriptyline      Excessive sedation --even at 10 mg dose   • Bactrim [Sulfamethoxazole W-Trimethoprim]      Shaking   • Levaquin      Nausea    • Lovastatin      Muscle aches  Muscle aches     • Trazodone      Irregular pulse       Current Outpatient Medications   Medication Sig Dispense Refill   • propranolol (INDERAL) 20 MG Tab TAKE 1 TABLET BY MOUTH TWICE A  tablet 1   • propafenone (RYTHMOL) 150 MG Tab Take 1 tablet by mouth every 8 hours. 90 tablet 5   • rivaroxaban (XARELTO) 20  "MG Tab tablet Take 1 tablet by mouth with dinner. 90 tablet 3   • esomeprazole (NEXIUM) 40 MG delayed-release capsule TAKE 1 CAPSULE BY MOUTH ONCE A DAY 30 MINUTES BEFORE BREAKFAST MEAL (Patient taking differently: as needed. TAKE 1 CAPSULE BY MOUTH ONCE A DAY 30 MINUTES BEFORE BREAKFAST MEAL) 30 Cap 6   • acetaminophen (TYLENOL) 325 MG Tab Take 1 Tab by mouth 1 time daily as needed. Patient will not taken until follow up labs/results reviewed.       No current facility-administered medications for this visit.       Physical Exam:  Vitals:    08/11/21 1501   BP: 104/68   BP Location: Left arm   Patient Position: Sitting   BP Cuff Size: Adult   Pulse: (!) 58   SpO2: 98%   Weight: 55.3 kg (122 lb)   Height: 1.702 m (5' 7\")     General appearance: NAD, conversant  HEENT: PERRL, neck is supple with FROM  Lungs: Clear to auscultation, normal respiratory effort  CV: RRR, no murmurs/rubs/gallops, no JVD  Abdomen: Soft, non-tender with normal bowel sounds  Extremities: No peripheral edema, no clubbing or cyanosis  Skin: No rash, lesions, or ulcers  Psych: Alert and oriented to person, place and time    Data:  Labs reviewed    Prior echo/stress reviewed:  LVEF 65%    EKG interpreted by me:  Sinus, PVCs    Impression/Plan:  1. PAF (paroxysmal atrial fibrillation) ([facility])  EKG   2. Encounter for loop recorder check       -I told to her hold the propafenone  -If PVCs frequent enough to map during her ablation we can go after them  -Otherwise trial of mexitil afterwards  -Will see her at ablation next week    Josue Moody MD    "

## 2021-08-12 ENCOUNTER — TELEPHONE (OUTPATIENT)
Dept: CARDIOLOGY | Facility: MEDICAL CENTER | Age: 66
End: 2021-08-12

## 2021-08-12 LAB — EKG IMPRESSION: NORMAL

## 2021-08-12 NOTE — TELEPHONE ENCOUNTER
Spoke with pt, discussed symptoms and bb should be improving not worsening PVCs pr reports very sensitive to meds. Pt would like to stop medication. Per SS low dosing.   Pt stopping med.

## 2021-08-12 NOTE — TELEPHONE ENCOUNTER
SS    Patient called as she feels she is having a reaction to the Propanolol. She states she has been having PVC's after taking this and she has been SOB. She wants to know if she needs to ween off of this med or if she can stop it. Please call [phone].    Thank you,    Kurpa FORREST

## 2021-08-13 ENCOUNTER — PRE-ADMISSION TESTING (OUTPATIENT)
Dept: ADMISSIONS | Facility: MEDICAL CENTER | Age: 66
End: 2021-08-13
Attending: INTERNAL MEDICINE
Payer: MEDICARE

## 2021-08-13 DIAGNOSIS — Z01.812 PRE-OPERATIVE LABORATORY EXAMINATION: ICD-10-CM

## 2021-08-13 DIAGNOSIS — Z01.810 PRE-OPERATIVE CARDIOVASCULAR EXAMINATION: ICD-10-CM

## 2021-08-13 LAB
ALBUMIN SERPL BCP-MCNC: 4 G/DL (ref 3.2–4.9)
ALBUMIN/GLOB SERPL: 1.7 G/DL
ALP SERPL-CCNC: 86 U/L (ref 30–99)
ALT SERPL-CCNC: 19 U/L (ref 2–50)
ANION GAP SERPL CALC-SCNC: 9 MMOL/L (ref 7–16)
AST SERPL-CCNC: 17 U/L (ref 12–45)
BASOPHILS # BLD AUTO: 0.4 % (ref 0–1.8)
BASOPHILS # BLD: 0.02 K/UL (ref 0–0.12)
BILIRUB SERPL-MCNC: 0.4 MG/DL (ref 0.1–1.5)
BUN SERPL-MCNC: 21 MG/DL (ref 8–22)
CALCIUM SERPL-MCNC: 9.2 MG/DL (ref 8.5–10.5)
CHLORIDE SERPL-SCNC: 107 MMOL/L (ref 96–112)
CO2 SERPL-SCNC: 25 MMOL/L (ref 20–33)
CREAT SERPL-MCNC: 0.67 MG/DL (ref 0.5–1.4)
EKG IMPRESSION: NORMAL
EOSINOPHIL # BLD AUTO: 0.02 K/UL (ref 0–0.51)
EOSINOPHIL NFR BLD: 0.4 % (ref 0–6.9)
ERYTHROCYTE [DISTWIDTH] IN BLOOD BY AUTOMATED COUNT: 51.2 FL (ref 35.9–50)
GLOBULIN SER CALC-MCNC: 2.4 G/DL (ref 1.9–3.5)
GLUCOSE SERPL-MCNC: 73 MG/DL (ref 65–99)
HCT VFR BLD AUTO: 43.8 % (ref 37–47)
HGB BLD-MCNC: 13.4 G/DL (ref 12–16)
IMM GRANULOCYTES # BLD AUTO: 0.02 K/UL (ref 0–0.11)
IMM GRANULOCYTES NFR BLD AUTO: 0.4 % (ref 0–0.9)
INR PPP: 1.12 (ref 0.87–1.13)
LYMPHOCYTES # BLD AUTO: 1.41 K/UL (ref 1–4.8)
LYMPHOCYTES NFR BLD: 26.5 % (ref 22–41)
MCH RBC QN AUTO: 28.8 PG (ref 27–33)
MCHC RBC AUTO-ENTMCNC: 30.6 G/DL (ref 33.6–35)
MCV RBC AUTO: 94.2 FL (ref 81.4–97.8)
MONOCYTES # BLD AUTO: 0.49 K/UL (ref 0–0.85)
MONOCYTES NFR BLD AUTO: 9.2 % (ref 0–13.4)
NEUTROPHILS # BLD AUTO: 3.36 K/UL (ref 2–7.15)
NEUTROPHILS NFR BLD: 63.1 % (ref 44–72)
NRBC # BLD AUTO: 0 K/UL
NRBC BLD-RTO: 0 /100 WBC
PLATELET # BLD AUTO: 219 K/UL (ref 164–446)
PMV BLD AUTO: 11.5 FL (ref 9–12.9)
POTASSIUM SERPL-SCNC: 4.4 MMOL/L (ref 3.6–5.5)
PROT SERPL-MCNC: 6.4 G/DL (ref 6–8.2)
PROTHROMBIN TIME: 14.1 SEC (ref 12–14.6)
RBC # BLD AUTO: 4.65 M/UL (ref 4.2–5.4)
SODIUM SERPL-SCNC: 141 MMOL/L (ref 135–145)
WBC # BLD AUTO: 5.3 K/UL (ref 4.8–10.8)

## 2021-08-13 PROCEDURE — 80053 COMPREHEN METABOLIC PANEL: CPT

## 2021-08-13 PROCEDURE — 93010 ELECTROCARDIOGRAM REPORT: CPT | Performed by: INTERNAL MEDICINE

## 2021-08-13 PROCEDURE — 36415 COLL VENOUS BLD VENIPUNCTURE: CPT

## 2021-08-13 PROCEDURE — 85610 PROTHROMBIN TIME: CPT

## 2021-08-13 PROCEDURE — 93005 ELECTROCARDIOGRAM TRACING: CPT

## 2021-08-13 PROCEDURE — 85025 COMPLETE CBC W/AUTO DIFF WBC: CPT

## 2021-08-13 ASSESSMENT — FIBROSIS 4 INDEX: FIB4 SCORE: 1.49

## 2021-08-17 NOTE — OR NURSING
COVID-19 Pre-surgery screenin. Do you have an undiagnosed respiratory illness or symptoms such as coughing or sneezing? No  a. Onset of Sx No  b. Acute vs. chronic respiratory illness No    2. Do you have an unexplained fever greater than 100.4 degrees Fahrenheit or 38 degrees Celsius?     No     3. Have you had direct exposure to a patient who tested positive for Covid-19?    No     4. Have you had any loss of your sense of taste or smell, N/V or sore throat? No    Patient has been informed of visitor policy and asked to wear a mask upon entering the hospital. Yes

## 2021-08-18 ENCOUNTER — ANESTHESIA EVENT (OUTPATIENT)
Dept: CARDIOLOGY | Facility: MEDICAL CENTER | Age: 66
End: 2021-08-18
Payer: MEDICARE

## 2021-08-18 ENCOUNTER — APPOINTMENT (OUTPATIENT)
Dept: CARDIOLOGY | Facility: MEDICAL CENTER | Age: 66
End: 2021-08-18
Attending: INTERNAL MEDICINE
Payer: MEDICARE

## 2021-08-18 ENCOUNTER — HOSPITAL ENCOUNTER (OUTPATIENT)
Facility: MEDICAL CENTER | Age: 66
End: 2021-08-19
Attending: INTERNAL MEDICINE | Admitting: INTERNAL MEDICINE
Payer: MEDICARE

## 2021-08-18 ENCOUNTER — ANESTHESIA (OUTPATIENT)
Dept: CARDIOLOGY | Facility: MEDICAL CENTER | Age: 66
End: 2021-08-18
Payer: MEDICARE

## 2021-08-18 DIAGNOSIS — I48.0 PAF (PAROXYSMAL ATRIAL FIBRILLATION) (HCC): ICD-10-CM

## 2021-08-18 DIAGNOSIS — I48.0 PAROXYSMAL ATRIAL FIBRILLATION (HCC): ICD-10-CM

## 2021-08-18 LAB
ACT BLD: 213 SEC (ref 74–137)
ACT BLD: 235 SEC (ref 74–137)
ACT BLD: 241 SEC (ref 74–137)
EKG IMPRESSION: NORMAL

## 2021-08-18 PROCEDURE — 93312 ECHO TRANSESOPHAGEAL: CPT | Mod: 26 | Performed by: INTERNAL MEDICINE

## 2021-08-18 PROCEDURE — 700102 HCHG RX REV CODE 250 W/ 637 OVERRIDE(OP): Performed by: INTERNAL MEDICINE

## 2021-08-18 PROCEDURE — 700111 HCHG RX REV CODE 636 W/ 250 OVERRIDE (IP): Performed by: INTERNAL MEDICINE

## 2021-08-18 PROCEDURE — 93010 ELECTROCARDIOGRAM REPORT: CPT | Performed by: INTERNAL MEDICINE

## 2021-08-18 PROCEDURE — 700105 HCHG RX REV CODE 258: Performed by: ANESTHESIOLOGY

## 2021-08-18 PROCEDURE — 96374 THER/PROPH/DIAG INJ IV PUSH: CPT | Mod: XU

## 2021-08-18 PROCEDURE — 93613 INTRACARDIAC EPHYS 3D MAPG: CPT | Performed by: INTERNAL MEDICINE

## 2021-08-18 PROCEDURE — 700111 HCHG RX REV CODE 636 W/ 250 OVERRIDE (IP): Performed by: ANESTHESIOLOGY

## 2021-08-18 PROCEDURE — 93662 INTRACARDIAC ECG (ICE): CPT | Mod: 26 | Performed by: INTERNAL MEDICINE

## 2021-08-18 PROCEDURE — 93325 DOPPLER ECHO COLOR FLOW MAPG: CPT

## 2021-08-18 PROCEDURE — 93325 DOPPLER ECHO COLOR FLOW MAPG: CPT | Mod: 26 | Performed by: INTERNAL MEDICINE

## 2021-08-18 PROCEDURE — 700105 HCHG RX REV CODE 258: Performed by: INTERNAL MEDICINE

## 2021-08-18 PROCEDURE — G0378 HOSPITAL OBSERVATION PER HR: HCPCS

## 2021-08-18 PROCEDURE — 93662 INTRACARDIAC ECG (ICE): CPT

## 2021-08-18 PROCEDURE — 85347 COAGULATION TIME ACTIVATED: CPT | Mod: 91

## 2021-08-18 PROCEDURE — 93656 COMPRE EP EVAL ABLTJ ATR FIB: CPT | Performed by: INTERNAL MEDICINE

## 2021-08-18 PROCEDURE — A9270 NON-COVERED ITEM OR SERVICE: HCPCS | Performed by: INTERNAL MEDICINE

## 2021-08-18 PROCEDURE — 93655 ICAR CATH ABLTJ DSCRT ARRHYT: CPT | Performed by: INTERNAL MEDICINE

## 2021-08-18 PROCEDURE — 160002 HCHG RECOVERY MINUTES (STAT)

## 2021-08-18 PROCEDURE — 93657 TX L/R ATRIAL FIB ADDL: CPT | Performed by: INTERNAL MEDICINE

## 2021-08-18 PROCEDURE — 700111 HCHG RX REV CODE 636 W/ 250 OVERRIDE (IP)

## 2021-08-18 PROCEDURE — 93005 ELECTROCARDIOGRAM TRACING: CPT | Performed by: INTERNAL MEDICINE

## 2021-08-18 PROCEDURE — 700101 HCHG RX REV CODE 250: Performed by: ANESTHESIOLOGY

## 2021-08-18 RX ORDER — SUCRALFATE 1 G/1
1 TABLET ORAL
Status: DISCONTINUED | OUTPATIENT
Start: 2021-08-18 | End: 2021-08-19 | Stop reason: HOSPADM

## 2021-08-18 RX ORDER — LIDOCAINE HYDROCHLORIDE 40 MG/ML
SOLUTION TOPICAL PRN
Status: DISCONTINUED | OUTPATIENT
Start: 2021-08-18 | End: 2021-08-18 | Stop reason: SURG

## 2021-08-18 RX ORDER — SODIUM CHLORIDE, SODIUM LACTATE, POTASSIUM CHLORIDE, CALCIUM CHLORIDE 600; 310; 30; 20 MG/100ML; MG/100ML; MG/100ML; MG/100ML
INJECTION, SOLUTION INTRAVENOUS CONTINUOUS
Status: ACTIVE | OUTPATIENT
Start: 2021-08-18 | End: 2021-08-18

## 2021-08-18 RX ORDER — PROTAMINE SULFATE 10 MG/ML
INJECTION, SOLUTION INTRAVENOUS PRN
Status: DISCONTINUED | OUTPATIENT
Start: 2021-08-18 | End: 2021-08-18 | Stop reason: SURG

## 2021-08-18 RX ORDER — ONDANSETRON 2 MG/ML
4 INJECTION INTRAMUSCULAR; INTRAVENOUS EVERY 4 HOURS PRN
Status: DISCONTINUED | OUTPATIENT
Start: 2021-08-18 | End: 2021-08-19 | Stop reason: HOSPADM

## 2021-08-18 RX ORDER — ACETAMINOPHEN 325 MG/1
650 TABLET ORAL EVERY 4 HOURS PRN
Status: DISCONTINUED | OUTPATIENT
Start: 2021-08-18 | End: 2021-08-19 | Stop reason: HOSPADM

## 2021-08-18 RX ORDER — ONDANSETRON 2 MG/ML
4 INJECTION INTRAMUSCULAR; INTRAVENOUS
Status: COMPLETED | OUTPATIENT
Start: 2021-08-18 | End: 2021-08-18

## 2021-08-18 RX ORDER — HEPARIN SODIUM,PORCINE 1000/ML
VIAL (ML) INJECTION PRN
Status: DISCONTINUED | OUTPATIENT
Start: 2021-08-18 | End: 2021-08-18 | Stop reason: SURG

## 2021-08-18 RX ORDER — PROPRANOLOL HYDROCHLORIDE 10 MG/1
10 TABLET ORAL TWICE DAILY
Status: DISCONTINUED | OUTPATIENT
Start: 2021-08-18 | End: 2021-08-19 | Stop reason: HOSPADM

## 2021-08-18 RX ORDER — HYDROMORPHONE HYDROCHLORIDE 1 MG/ML
0.1 INJECTION, SOLUTION INTRAMUSCULAR; INTRAVENOUS; SUBCUTANEOUS
Status: DISCONTINUED | OUTPATIENT
Start: 2021-08-18 | End: 2021-08-18 | Stop reason: HOSPADM

## 2021-08-18 RX ORDER — HYDROMORPHONE HYDROCHLORIDE 1 MG/ML
0.4 INJECTION, SOLUTION INTRAMUSCULAR; INTRAVENOUS; SUBCUTANEOUS
Status: DISCONTINUED | OUTPATIENT
Start: 2021-08-18 | End: 2021-08-18 | Stop reason: HOSPADM

## 2021-08-18 RX ORDER — OMEPRAZOLE 20 MG/1
20 CAPSULE, DELAYED RELEASE ORAL
Status: DISCONTINUED | OUTPATIENT
Start: 2021-08-18 | End: 2021-08-19 | Stop reason: HOSPADM

## 2021-08-18 RX ORDER — LIDOCAINE HYDROCHLORIDE 40 MG/ML
SOLUTION TOPICAL
Status: COMPLETED
Start: 2021-08-18 | End: 2021-08-18

## 2021-08-18 RX ORDER — MEPERIDINE HYDROCHLORIDE 25 MG/ML
6.25 INJECTION INTRAMUSCULAR; INTRAVENOUS; SUBCUTANEOUS
Status: DISCONTINUED | OUTPATIENT
Start: 2021-08-18 | End: 2021-08-18 | Stop reason: HOSPADM

## 2021-08-18 RX ORDER — HYDROMORPHONE HYDROCHLORIDE 1 MG/ML
0.2 INJECTION, SOLUTION INTRAMUSCULAR; INTRAVENOUS; SUBCUTANEOUS
Status: DISCONTINUED | OUTPATIENT
Start: 2021-08-18 | End: 2021-08-18 | Stop reason: HOSPADM

## 2021-08-18 RX ORDER — ONDANSETRON 2 MG/ML
INJECTION INTRAMUSCULAR; INTRAVENOUS PRN
Status: DISCONTINUED | OUTPATIENT
Start: 2021-08-18 | End: 2021-08-18 | Stop reason: SURG

## 2021-08-18 RX ORDER — DEXAMETHASONE SODIUM PHOSPHATE 4 MG/ML
INJECTION, SOLUTION INTRA-ARTICULAR; INTRALESIONAL; INTRAMUSCULAR; INTRAVENOUS; SOFT TISSUE PRN
Status: DISCONTINUED | OUTPATIENT
Start: 2021-08-18 | End: 2021-08-18 | Stop reason: SURG

## 2021-08-18 RX ORDER — HALOPERIDOL 5 MG/ML
1 INJECTION INTRAMUSCULAR
Status: DISCONTINUED | OUTPATIENT
Start: 2021-08-18 | End: 2021-08-18 | Stop reason: HOSPADM

## 2021-08-18 RX ORDER — PROTAMINE SULFATE 10 MG/ML
INJECTION, SOLUTION INTRAVENOUS
Status: COMPLETED
Start: 2021-08-18 | End: 2021-08-18

## 2021-08-18 RX ORDER — HEPARIN SODIUM 200 [USP'U]/100ML
INJECTION, SOLUTION INTRAVENOUS
Status: COMPLETED
Start: 2021-08-18 | End: 2021-08-18

## 2021-08-18 RX ORDER — HEPARIN SODIUM 1000 [USP'U]/ML
INJECTION, SOLUTION INTRAVENOUS; SUBCUTANEOUS
Status: COMPLETED
Start: 2021-08-18 | End: 2021-08-18

## 2021-08-18 RX ADMIN — HEPARIN SODIUM: 200 INJECTION, SOLUTION INTRAVENOUS at 08:56

## 2021-08-18 RX ADMIN — HEPARIN SODIUM 2000 UNITS: 1000 INJECTION, SOLUTION INTRAVENOUS; SUBCUTANEOUS at 09:45

## 2021-08-18 RX ADMIN — SODIUM CHLORIDE, POTASSIUM CHLORIDE, SODIUM LACTATE AND CALCIUM CHLORIDE: 600; 310; 30; 20 INJECTION, SOLUTION INTRAVENOUS at 07:28

## 2021-08-18 RX ADMIN — ONDANSETRON 4 MG: 2 INJECTION INTRAMUSCULAR; INTRAVENOUS at 11:04

## 2021-08-18 RX ADMIN — SUFENTANIL CITRATE 0.3 MCG/KG/HR: 50 INJECTION EPIDURAL; INTRAVENOUS at 08:35

## 2021-08-18 RX ADMIN — ONDANSETRON 4 MG: 2 INJECTION INTRAMUSCULAR; INTRAVENOUS at 16:48

## 2021-08-18 RX ADMIN — MEPERIDINE HYDROCHLORIDE 6.25 MG: 25 INJECTION INTRAMUSCULAR; INTRAVENOUS; SUBCUTANEOUS at 13:13

## 2021-08-18 RX ADMIN — RIVAROXABAN 20 MG: 20 TABLET, FILM COATED ORAL at 17:05

## 2021-08-18 RX ADMIN — SUCRALFATE 1 G: 1 TABLET ORAL at 17:05

## 2021-08-18 RX ADMIN — ONDANSETRON 4 MG: 2 INJECTION INTRAMUSCULAR; INTRAVENOUS at 12:10

## 2021-08-18 RX ADMIN — ROCURONIUM BROMIDE 50 MG: 10 INJECTION, SOLUTION INTRAVENOUS at 08:44

## 2021-08-18 RX ADMIN — HALOPERIDOL LACTATE 1 MG: 5 INJECTION, SOLUTION INTRAMUSCULAR at 12:28

## 2021-08-18 RX ADMIN — SUCRALFATE 1 G: 1 TABLET ORAL at 20:46

## 2021-08-18 RX ADMIN — PROTAMINE SULFATE 30 MG: 10 INJECTION, SOLUTION INTRAVENOUS at 11:07

## 2021-08-18 RX ADMIN — DEXAMETHASONE SODIUM PHOSPHATE 4 MG: 4 INJECTION, SOLUTION INTRA-ARTICULAR; INTRALESIONAL; INTRAMUSCULAR; INTRAVENOUS; SOFT TISSUE at 08:50

## 2021-08-18 RX ADMIN — SODIUM CHLORIDE, POTASSIUM CHLORIDE, SODIUM LACTATE AND CALCIUM CHLORIDE: 600; 310; 30; 20 INJECTION, SOLUTION INTRAVENOUS at 10:15

## 2021-08-18 RX ADMIN — PROPRANOLOL HYDROCHLORIDE 10 MG: 10 TABLET ORAL at 17:05

## 2021-08-18 RX ADMIN — PHENYLEPHRINE HYDROCHLORIDE 40 MCG/MIN: 10 INJECTION INTRAVENOUS at 08:55

## 2021-08-18 RX ADMIN — PROPOFOL 120 MG: 10 INJECTION, EMULSION INTRAVENOUS at 08:44

## 2021-08-18 RX ADMIN — HEPARIN SODIUM 6000 UNITS: 1000 INJECTION, SOLUTION INTRAVENOUS; SUBCUTANEOUS at 09:16

## 2021-08-18 RX ADMIN — LIDOCAINE HYDROCHLORIDE 4 ML: 40 SOLUTION TOPICAL at 08:45

## 2021-08-18 RX ADMIN — HEPARIN SODIUM 2000 UNITS: 1000 INJECTION, SOLUTION INTRAVENOUS; SUBCUTANEOUS at 10:08

## 2021-08-18 ASSESSMENT — PAIN SCALES - GENERAL: PAIN_LEVEL: 0

## 2021-08-18 ASSESSMENT — CHA2DS2 SCORE
AGE 75 OR GREATER: NO
DIABETES: NO
PRIOR STROKE OR TIA OR THROMBOEMBOLISM: YES
AGE 65 TO 74: YES
HYPERTENSION: NO
CHA2DS2 VASC SCORE: 4
SEX: FEMALE
VASCULAR DISEASE: NO
CHF OR LEFT VENTRICULAR DYSFUNCTION: NO

## 2021-08-18 ASSESSMENT — COGNITIVE AND FUNCTIONAL STATUS - GENERAL
SUGGESTED CMS G CODE MODIFIER MOBILITY: CH
MOBILITY SCORE: 24
DAILY ACTIVITIY SCORE: 24
SUGGESTED CMS G CODE MODIFIER DAILY ACTIVITY: CH

## 2021-08-18 ASSESSMENT — LIFESTYLE VARIABLES
CONSUMPTION TOTAL: NEGATIVE
ALCOHOL_USE: NO
TOTAL SCORE: 0
HOW MANY TIMES IN THE PAST YEAR HAVE YOU HAD 5 OR MORE DRINKS IN A DAY: 0
EVER HAD A DRINK FIRST THING IN THE MORNING TO STEADY YOUR NERVES TO GET RID OF A HANGOVER: NO
TOTAL SCORE: 0
HAVE YOU EVER FELT YOU SHOULD CUT DOWN ON YOUR DRINKING: NO
ON A TYPICAL DAY WHEN YOU DRINK ALCOHOL HOW MANY DRINKS DO YOU HAVE: 0
EVER FELT BAD OR GUILTY ABOUT YOUR DRINKING: NO
HAVE PEOPLE ANNOYED YOU BY CRITICIZING YOUR DRINKING: NO
AVERAGE NUMBER OF DAYS PER WEEK YOU HAVE A DRINK CONTAINING ALCOHOL: 0
TOTAL SCORE: 0

## 2021-08-18 ASSESSMENT — FIBROSIS 4 INDEX: FIB4 SCORE: 1.21

## 2021-08-18 ASSESSMENT — PAIN DESCRIPTION - PAIN TYPE: TYPE: ACUTE PAIN

## 2021-08-18 NOTE — ANESTHESIA POSTPROCEDURE EVALUATION
Patient: Susy Szymanski    Procedure Summary     Date: 08/18/21 Room / Location: [facility] IMAGING - CATH LAB [facility]    Anesthesia Start: 0833 Anesthesia Stop: 1135    Procedure: CL-EP ABLATION ATRIAL FIBRILLATION Diagnosis:       PAF (paroxysmal atrial fibrillation) (HCC)      Paroxysmal atrial fibrillation      (See Associated Dx)    Scheduled Providers: Josue Moody M.D.; Denise Arrington M.D. Responsible Provider: Denise Arrington M.D.    Anesthesia Type: general ASA Status: 3          Final Anesthesia Type: general  Last vitals  BP   Blood Pressure : 138/74    Temp   36.1 °C (96.9 °F)    Pulse   60   Resp   17    SpO2   99 %      Anesthesia Post Evaluation    Patient location during evaluation: PACU  Patient participation: complete - patient participated  Level of consciousness: awake and alert  Pain score: 0    Airway patency: patent  Anesthetic complications: no  Cardiovascular status: hemodynamically stable  Respiratory status: acceptable  Hydration status: euvolemic    PONV: none          There were no known complications for this encounter.

## 2021-08-18 NOTE — ANESTHESIA PROCEDURE NOTES
Airway    Date/Time: 8/18/2021 8:45 AM  Performed by: Denise Arrington M.D.  Authorized by: Denise Arrington M.D.     Location:  OR  Urgency:  Elective  Difficult Airway: No    Indications for Airway Management:  Anesthesia      Spontaneous Ventilation: absent    Sedation Level:  Deep  Preoxygenated: Yes    Patient Position:  Sniffing  Mask Difficulty Assessment:  0 - not attempted  Final Airway Type:  Endotracheal airway  Final Endotracheal Airway:  ETT  Cuffed: Yes    Technique Used for Successful ETT Placement:  Direct laryngoscopy  Devices/Methods Used in Placement:  Intubating stylet    Insertion Site:  Oral  Blade Type:  Jenae  Laryngoscope Blade/Videolaryngoscope Blade Size:  3  ETT Size (mm):  6.5  Leak Pressue (cm H2O):  35  Measured from:  Teeth  ETT to Teeth (cm):  21  Placement Verified by: auscultation and capnometry    Cormack-Lehane Classification:  Grade IIa - partial view of glottis  Number of Attempts at Approach:  1

## 2021-08-18 NOTE — PROGRESS NOTES
Patient transported to floor from PACU via ACLS RN. Patient A&Ox4. Plan of care discussed with patient. Family at bedside. Patient oriented to floor and surroundings. Patient currently on 3L NC. VSS. Patient is not expressing any complaints of pain at this time. Tele box placed. Monitor room notified. 2 rn skin check performed. Patient educated to call for assistance before ambulating. Patient education regarding fall risk. Call light within reach. Fall precautions in place.

## 2021-08-18 NOTE — OR NURSING
Patient A+Ox4. Denies pain.  Some nausea now to medicate as ordered. Both Left and Right groin sites clean and dry.  No hematoma or swelling. Feet warm bilat. Laying flat as ordered.  Cont to monitor. To update family

## 2021-08-18 NOTE — ANESTHESIA PREPROCEDURE EVALUATION
"66F here for afib ablation    Allergies   Allergen Reactions   • Amitriptyline      Excessive sedation --even at 10 mg dose   • Bactrim [Sulfamethoxazole W-Trimethoprim]      Shaking   • Levaquin      Nausea    • Lovastatin      Muscle aches  Muscle aches     • Trazodone      Irregular pulse     Relevant Problems   ANESTHESIA (within normal limits)      PULMONARY   (positive) Asthma      NEURO   (positive) History of esophageal stricture   (positive) Stroke (HCC)      CARDIAC   (positive) Paroxysmal atrial fibrillation (HCC)      GI   (positive) Gastroesophageal reflux disease without esophagitis   (positive) Hiatal hernia with GERD     Past Medical History:   Diagnosis Date   • Anxiety    • Arrhythmia     afib   • ASTHMA 4/9/21:  Patient denies   • Bowel habit changes     constipation   • Cataract 04/09/2001    \"Small, not affecting eyes\"   • Depression 04/09/2021    Patient denies   • GERD (gastroesophageal reflux disease)    • Hiatal hernia with GERD 11/5/2019   • Hiatus hernia syndrome    • High cholesterol    • History of esophageal stricture 11/5/2019   • Indigestion    • Osteoporosis    • Palpitations    • Palpitations    • Premature atrial contractions    • Stroke (HCC) 04/09/2021 Jan. 2021.  Blood clot in brain RT A-Fib      No current facility-administered medications on file prior to encounter.     Current Outpatient Medications on File Prior to Encounter   Medication Sig Dispense Refill   • Capsaicin-Menthol (SALONPAS GEL-PATCH HOT EX) Apply 1 Patch topically 1 time a day as needed.     • rivaroxaban (XARELTO) 20 MG Tab tablet Take 1 tablet by mouth with dinner. 90 tablet 3   • esomeprazole (NEXIUM) 40 MG delayed-release capsule TAKE 1 CAPSULE BY MOUTH ONCE A DAY 30 MINUTES BEFORE BREAKFAST MEAL (Patient taking differently: Take 40 mg by mouth 1 time a day as needed.) 30 Cap 6      Past Surgical History:   Procedure Laterality Date   • COLONOSCOPY WITH POLYP  June 2008    benign polyp  Dr Paul    • " CHOLECYSTECTOMY  January 2008    laparoscopic   • EGD WITH ASP/BX  September 2006    inflammation only ---Dr Paul   • CHOLECYSTECTOMY     • OTHER CARDIAC SURGERY  Around 2000    Ablation   • SINUSOTOMIES        Vitals:    08/18/21 0604   BP: 149/73   Pulse: 61   Resp: 18   Temp: 36.6 °C (97.8 °F)   SpO2: 94%      Physical Exam    Airway   Mallampati: III  TM distance: <3 FB  Neck ROM: full       Cardiovascular - normal exam     Dental - normal exam           Pulmonary - normal exam     Abdominal    Neurological - normal exam                 Anesthesia Plan    ASA 3   ASA physical status 3 criteria: CVA or TIA - history (> 3 months)    Plan - general       Airway plan will be ETT        Plan Factors:   Patient was previously instructed to abstain from smoking on day of procedure.  Patient did not smoke on day of procedure.      Induction: intravenous    Postoperative Plan: Postoperative administration of opioids is intended.    Pertinent diagnostic labs and testing reviewed    Informed Consent:    Anesthetic plan and risks discussed with patient.

## 2021-08-18 NOTE — CARE PLAN
The patient is Stable - Low risk of patient condition declining or worsening    Shift Goals  Clinical Goals: Remain in SR, pain management  Patient Goals: Rest    Progress made toward(s) clinical / shift goals:        Problem: Knowledge Deficit - Standard  Goal: Patient and family/care givers will demonstrate understanding of plan of care, disease process/condition, diagnostic tests and medications  Outcome: Progressing     Problem: Pain - Standard  Goal: Alleviation of pain or a reduction in pain to the patient’s comfort goal  Outcome: Progressing

## 2021-08-18 NOTE — OP REPORT
[facility]  Electrophysiology Procedure Note    Procedure(s) Performed:   1) Atrial fibrillation ablation and EP study (extension of R sided PVI)  2) Ablation of additional atrial fibrillation  3) Ablation of additional mechanism of tachycardia (low septal RVOT PVC)  4) 3D mapping  5) ICE during diagnostics and intervention    Indication(s):  Paroxysmal atrial fibrillation  Symptomatic PVCs    Physician(s): Josue Moody M.D.     Resident/Assistant(s): None     Anesthesia: General endotracheal anesthetic with Dr. Arrington     Specimen(s) Removed: None     Estimated Blood Loss:  30cc     Complications:  None     Description of Procedure:   After informed written consent, the patient was brought to the EP lab in the fasting, non-sedated state. The patient was prepped and draped in the usual sterile fashion. Femoral venous access was obtained using the modified Seldinger technique. In the left femoral vein, 1 sheaths (7 Fr) were inserted over 0.035” guidewires. In the right femoral vein, 3 sheaths (8,8,10 Fr) were inserted over 0.035” guidewires. A deflectable decapolar catheter was advanced to the CS position. Baseline rhythm was sinus with PACs, with rapid atrial burst pacing down to atrial refractoriness we induced sustained AF. An intracardiac echo (ICE) catheter was advanced to the right atrium. ICE was used to identify the atrial septum, left atrial appendage, and pulmonary veins and stacie the anatomic structures to superimpose on our 3D electroanatomic map using CARTOSound. During the procedure, ICE was utilized to localize the ablation catheter, monitor for thrombus formation, and to exclude pericardial effusion. Intravenous heparin was administered to maintain -350 seconds. Double transseptal left heart catheterization was performed under intracardiac echo and hemodynamic guidance. A Melboss needle was inserted into the 8.5 Fr sheaths (Melboss Torflex) for transseptal puncture and placement of sheaths in  the left atrium. The initial trans-sepal was exchanged for a Biosense medium curved Vizigo and the Torflex re-used for a second trans-septal. Mapping of the pulmonary veins and left atrium was performed using CARTO3 FAM and a deflectable multipolar mapping catheter (modulR). This showed persistent electrical isolation of all four PVs, albeit the WACA line anterior to the R sided PVs was not quite antral. The WACA line was extended and we further aimed to isolate the posterior wall. After roof and floor lines were complete, the AF briefly organized for 2-3 seconds before terminating. We did not have complete posterior wall isolation. Additional ablation along the roof and debulking within the center wall with early voltage completely isolated the posterior wall. Additional attempts to induce AF induced nonsustained AF but spontaneously terminated. Ablation was performed using an 3.5 mm deflectable irrigated force contact catheter (Antengo ST SF) at primarily 30-40 W. At this point we noted the presence of intermittent PVCs that matched her clinical PVC morphology. PVC with inferior axis, LBBB pattern, negative AVL, late transition by V4. We withdrew the ablation catheter to the RA then advanced to the RVOT to map. Earliest signal mapped to very low septal RVOT. Here bipolar signals were sharp and ~30 msec pre-QRS. Unipolar signals showed Q waves. This area was ablated at up to 40W. No further PVCs in this area after 20 minutes waiting time. At the end of the procedure, heparin was reversed with protamine, the catheter and sheaths were removed, and hemostasis was achieved by manual compression and Vascade MVP closure device. Following recovery from anesthesia, the patient was transferred to the PPU in good condition.       Total ablation time: 882 seconds    Fluoroscopy time: 3.5 minutes     Electrophysiologic Findings:    1. Sinus 995 ms, AH 73 ms, HV 48 ms. AVBCL = 780 ms.    Impressions:    1.  Paroxysmal atrial fibrillation.    2. Successful repeat RF ablation pulmonary vein isolation procedure (extension of the anterior R sided PV isolation line more antrally)  3. Successful RF ablation of the LA posterior wall  4. Successful RF ablation of RVOT PVC     Recommendations:  1. Resume anticoagulation.  2. Start carafate x 2 weeks and daily PPI x 1 month.  3. Admit to monitored bedrest.

## 2021-08-18 NOTE — ANESTHESIA TIME REPORT
Anesthesia Start and Stop Event Times     Date Time Event    8/18/2021 0825 Ready for Procedure     0833 Anesthesia Start     1135 Anesthesia Stop        Responsible Staff  08/18/21    Name Role Begin End    Denise Arrington M.D. Anesth 0833 1130        Preop Diagnosis (Free Text):  Pre-op Diagnosis             Preop Diagnosis (Codes):    Post op Diagnosis  A-fib (HCC)      Premium Reason  Non-Premium    Comments:

## 2021-08-19 VITALS
BODY MASS INDEX: 19.52 KG/M2 | HEIGHT: 67 IN | HEART RATE: 68 BPM | SYSTOLIC BLOOD PRESSURE: 122 MMHG | DIASTOLIC BLOOD PRESSURE: 84 MMHG | WEIGHT: 124.34 LBS | OXYGEN SATURATION: 90 % | TEMPERATURE: 99.2 F | RESPIRATION RATE: 16 BRPM

## 2021-08-19 LAB — EKG IMPRESSION: NORMAL

## 2021-08-19 PROCEDURE — 96376 TX/PRO/DX INJ SAME DRUG ADON: CPT

## 2021-08-19 PROCEDURE — G0378 HOSPITAL OBSERVATION PER HR: HCPCS

## 2021-08-19 PROCEDURE — 700102 HCHG RX REV CODE 250 W/ 637 OVERRIDE(OP): Performed by: INTERNAL MEDICINE

## 2021-08-19 PROCEDURE — 99217 PR OBSERVATION CARE DISCHARGE: CPT | Performed by: NURSE PRACTITIONER

## 2021-08-19 PROCEDURE — 96375 TX/PRO/DX INJ NEW DRUG ADDON: CPT

## 2021-08-19 PROCEDURE — 700111 HCHG RX REV CODE 636 W/ 250 OVERRIDE (IP): Performed by: INTERNAL MEDICINE

## 2021-08-19 PROCEDURE — 93005 ELECTROCARDIOGRAM TRACING: CPT | Performed by: NURSE PRACTITIONER

## 2021-08-19 PROCEDURE — 700111 HCHG RX REV CODE 636 W/ 250 OVERRIDE (IP): Performed by: NURSE PRACTITIONER

## 2021-08-19 PROCEDURE — A9270 NON-COVERED ITEM OR SERVICE: HCPCS | Performed by: INTERNAL MEDICINE

## 2021-08-19 PROCEDURE — 93010 ELECTROCARDIOGRAM REPORT: CPT | Performed by: INTERNAL MEDICINE

## 2021-08-19 RX ORDER — PROCHLORPERAZINE EDISYLATE 5 MG/ML
5 INJECTION INTRAMUSCULAR; INTRAVENOUS EVERY 6 HOURS PRN
Status: DISCONTINUED | OUTPATIENT
Start: 2021-08-19 | End: 2021-08-19 | Stop reason: HOSPADM

## 2021-08-19 RX ORDER — SUCRALFATE 1 G/1
1 TABLET ORAL
Qty: 56 TABLET | Refills: 0 | Status: SHIPPED | OUTPATIENT
Start: 2021-08-19 | End: 2021-09-14

## 2021-08-19 RX ADMIN — PROCHLORPERAZINE EDISYLATE 5 MG: 5 INJECTION INTRAMUSCULAR; INTRAVENOUS at 09:54

## 2021-08-19 RX ADMIN — SUCRALFATE 1 G: 1 TABLET ORAL at 11:52

## 2021-08-19 RX ADMIN — ONDANSETRON 4 MG: 2 INJECTION INTRAMUSCULAR; INTRAVENOUS at 07:47

## 2021-08-19 ASSESSMENT — PAIN DESCRIPTION - PAIN TYPE: TYPE: ACUTE PAIN

## 2021-08-19 NOTE — CARE PLAN
The patient is Stable - Low risk of patient condition declining or worsening    Shift Goals  Clinical Goals: monitor cardiac rhythm  Patient Goals: rest    Progress made toward(s) clinical / shift goals:    Problem: Knowledge Deficit - Standard  Goal: Patient and family/care givers will demonstrate understanding of plan of care, disease process/condition, diagnostic tests and medications  Outcome: Progressing  Note: All questions answered at this time      Problem: Pain - Standard  Goal: Alleviation of pain or a reduction in pain to the patient’s comfort goal  Outcome: Progressing  Note: Patient no longer having pain, will continue to monitor       Patient is not progressing towards the following goals:

## 2021-08-19 NOTE — DISCHARGE INSTRUCTIONS
Cardiac Ablation, Care After  This sheet gives you information about how to care for yourself after your procedure. Your health care provider may also give you more specific instructions. If you have problems or questions, contact your health care provider.  What can I expect after the procedure?  After the procedure, it is common to have:  · Bruising around your puncture site.  · Tenderness around your puncture site.  · Skipped heartbeats.  · Tiredness (fatigue).  Follow these instructions at home:  Puncture site care    · Follow instructions from your health care provider about how to take care of your puncture site. Make sure you:  ? Wash your hands with soap and water before you change your bandage (dressing). If soap and water are not available, use hand .  ? Change your dressing as told by your health care provider.  ? Leave stitches (sutures), skin glue, or adhesive strips in place. These skin closures may need to stay in place for up to 2 weeks. If adhesive strip edges start to loosen and curl up, you may trim the loose edges. Do not remove adhesive strips completely unless your health care provider tells you to do that.  · Check your puncture site every day for signs of infection. Check for:  ? Redness, swelling, or pain.  ? Fluid or blood. If your puncture site starts to bleed, lie down on your back, apply firm pressure to the area, and contact your health care provider.  ? Warmth.  ? Pus or a bad smell.  Driving  · Ask your health care provider when it is safe for you to drive again after the procedure.  · Do not drive or use heavy machinery while taking prescription pain medicine.  · Do not drive for 24 hours if you were given a medicine to help you relax (sedative) during your procedure.  Activity  · Avoid activities that take a lot of effort for at least 3 days after your procedure.  · Do not lift anything that is heavier than 10 lb (4.5 kg), or the limit that you are told, until your health  care provider says that it is safe.  · Return to your normal activities as told by your health care provider. Ask your health care provider what activities are safe for you.  General instructions  · Take over-the-counter and prescription medicines only as told by your health care provider.  · Do not use any products that contain nicotine or tobacco, such as cigarettes and e-cigarettes. If you need help quitting, ask your health care provider.  · Do not take baths, swim, or use a hot tub until your health care provider approves.  · Do not drink alcohol for 24 hours after your procedure.  · Keep all follow-up visits as told by your health care provider. This is important.  Contact a health care provider if:  · You have redness, mild swelling, or pain around your puncture site.  · You have fluid or blood coming from your puncture site that stops after applying firm pressure to the area.  · Your puncture site feels warm to the touch.  · You have pus or a bad smell coming from your puncture site.  · You have a fever.  · You have chest pain or discomfort that spreads to your neck, jaw, or arm.  · You are sweating a lot.  · You feel nauseous.  · You have a fast or irregular heartbeat.  · You have shortness of breath.  · You are dizzy or light-headed and feel the need to lie down.  · You have pain or numbness in the arm or leg closest to your puncture site.  Get help right away if:  · Your puncture site suddenly swells.  · Your puncture site is bleeding and the bleeding does not stop after applying firm pressure to the area.  These symptoms may represent a serious problem that is an emergency. Do not wait to see if the symptoms will go away. Get medical help right away. Call your local emergency services (911 in the [place] Do not drive yourself to the hospital.  Summary  · After the procedure, it is normal to have bruising and tenderness at the puncture site in your groin, neck, or forearm.  · Check your puncture site every  day for signs of infection.  · Get help right away if your puncture site is bleeding and the bleeding does not stop after applying firm pressure to the area. This is a medical emergency.  This information is not intended to replace advice given to you by your health care provider. Make sure you discuss any questions you have with your health care provider.  Document Released: 03/29/2018 Document Revised: 11/30/2018 Document Reviewed: 03/29/2018  [employer] Patient Education © 2020 [employer]  Discharge Instructions    Discharged to home by car with relative. Discharged via wheelchair, hospital escort: Yes.  Special equipment needed: Not Applicable    Be sure to schedule a follow-up appointment with your primary care doctor or any specialists as instructed.     Discharge Plan:   Diet Plan: Discussed  Activity Level: Discussed  Confirmed Follow up Appointment: Patient to Call and Schedule Appointment  Confirmed Symptoms Management: Discussed  Medication Reconciliation Updated: Yes    I understand that a diet low in cholesterol, fat, and sodium is recommended for good health. Unless I have been given specific instructions below for another diet, I accept this instruction as my diet prescription.   Other diet: cardiac    Special Instructions: None    · Is patient discharged on Warfarin / Coumadin?   No     Depression / Suicide Risk    As you are discharged from this [facility] facility, it is important to learn how to keep safe from harming yourself.    Recognize the warning signs:  · Abrupt changes in personality, positive or negative- including increase in energy   · Giving away possessions  · Change in eating patterns- significant weight changes-  positive or negative  · Change in sleeping patterns- unable to sleep or sleeping all the time   · Unwillingness or inability to communicate  · Depression  · Unusual sadness, discouragement and loneliness  · Talk of wanting to die  · Neglect of personal  appearance   · Rebelliousness- reckless behavior  · Withdrawal from people/activities they love  · Confusion- inability to concentrate     If you or a loved one observes any of these behaviors or has concerns about self-harm, here's what you can do:  · Talk about it- your feelings and reasons for harming yourself  · Remove any means that you might use to hurt yourself (examples: pills, rope, extension cords, firearm)  · Get professional help from the community (Mental Health, Substance Abuse, psychological counseling)  · Do not be alone:Call your Safe Contact- someone whom you trust who will be there for you.  · Call your local CRISIS HOTLINE 021-1985 or 167-734-3484  · Call your local Children's Mobile Crisis Response Team [place] (763) 784-4224 or www.myPizza.com  · Call the toll free National Suicide Prevention Hotlines   · National Suicide Prevention Lifeline 928-250-SUVP (0111)  · National Hope Line Network 800-SUICIDE (823-3875)

## 2021-08-19 NOTE — PROGRESS NOTES
Assumed care this pm from day shift RN. Patient helped to the restroom with  at bedside. Patient AxO 4, O2 @ RA, VSS. Call bell and personal items within reach, bed locked in low position. Bed exit alarm on. Hourly rounding in place. Tele box in place, monitor room notified.

## 2021-08-19 NOTE — PROGRESS NOTES
Monitor S      [facility]  60-73    (F) PAC   (R/O)  PVC   (R)  Coup    .16/.09/.34

## 2021-08-19 NOTE — PROGRESS NOTES
Report received from Lasha Hathaway. Assumed pt care. Pt is resting in bed, c/o nausea, saltine crackers given per her request. Pt A&O x 4. Fall precautions in place, call light and belongings within reach, bed in lowest position. No signs of distress.

## 2021-08-19 NOTE — CARE PLAN
The patient is Stable - Low risk of patient condition declining or worsening    Shift Goals  Clinical Goals: monitor cardiac rhythm, decrease nausea   Patient Goals: decrease nausea, discharge home     Progress made toward(s) clinical / shift goals:     Problem: Knowledge Deficit - Standard  Goal: Patient and family/care givers will demonstrate understanding of plan of care, disease process/condition, diagnostic tests and medications  Outcome: Progressing     Problem: Gastrointestinal Irritability  Goal: Nausea and vomiting will be absent or improve  Outcome: Progressing

## 2021-08-19 NOTE — DISCHARGE SUMMARY
CHIEF COMPLAINT ON ADMISSION     CODE STATUS  Full     HPI & HOSPITAL COURSE  Ariane Szymanski is a very pleasant patient of Dr. Josue Moody admitted on 8/18/2021 for elective catheter ablation due to recurrent paroxysmal atrial fibrillation.  Patient underwent successful repeat RF PVI (extension of the anterior right sided PV isolation line more antrally), LA posterior wall and RVOT PVC ablation. Additional attempts to induce AF induced nonsustained AF but spontaneously terminated.      Other past medical history significant for HLD, CVA in February (right M2 stroke S/P TPA and thrombectomy), PAF S/P PVI with Dr. Moody in 2019.     The patient has been seen and examined in EP rounds this AM.  Her monitored rhythm is sinus with ventricular trigeminy.  Telemetry history, EKGs, labs, vital signs, and imaging have been reviewed and are stable. She did c/o nausea and vomiting that resolved with the use of Zofran and compazine. Patient denies any chest pain, dyspnea, dizziness, paraesthesias, or other complaints.  Her physical exam is without acute findings and CMS fully intact; specifically her  bilateral femoral access sites which are clean, dry, intact with tegaderm/gauze. Figure 8 stitch was removed from the right femoral access site, there was no evidence of active bleeding or hematoma afterwards.  Patient has been out of bed and ambulated without difficulty.      Discussed medication regimen with Dr. Moody who recommended continuing to hold her Propafenone and continue her Propranolol 20 mg BID.    Reviewed discharge instructions with patient related to lifting precautions for one week and the importance and rationale of daily PPI for 30 days and Carafate for 14 days post procedure.     Close cardiology follow up arranged as below:    Future Appointments   Date Time Provider Department Center   8/27/2021 10:45 AM REMOTE MONITORING - [facility] None   9/14/2021  7:45 AM [facility]. [facility] None   9/27/2021  9:15  AM REMOTE MONITORING - [facility] None   10/27/2021  8:15 AM REMOTE MONITORING - [facility] None        Medication List      START taking these medications      Instructions   sucralfate 1 GM Tabs  Commonly known as: CARAFATE   Take 1 Tablet by mouth 4 Times a Day,Before Meals and at Bedtime.  Dose: 1 g        CHANGE how you take these medications      Instructions   esomeprazole 40 MG delayed-release capsule  What changed:   · how much to take  · how to take this  · when to take this  · reasons to take this  · additional instructions  Commonly known as: NEXIUM   TAKE 1 CAPSULE BY MOUTH ONCE A DAY 30 MINUTES BEFORE BREAKFAST MEAL     propranolol 20 MG Tabs  What changed:   · how much to take  · how to take this  · when to take this  · additional instructions  Commonly known as: INDERAL   TAKE 1 TABLET BY MOUTH TWICE A DAY        CONTINUE taking these medications      Instructions   rivaroxaban 20 MG Tabs tablet  Commonly known as: Xarelto   Take 1 tablet by mouth with dinner.  Dose: 20 mg     SALONPAS GEL-PATCH HOT EX   Apply 1 Patch topically 1 time a day as needed.  Dose: 1 Patch        STOP taking these medications    propafenone 150 MG Tabs  Commonly known as: RYTHMOL            Thoroughly discussed the discharge instructions as below with patient, patient verbalized understanding and denied having any further questions.      [facility] Post Ablation Patient Instructions:  1. No lifting > 10 lbs x 1 week.       2. No soaking in baths, hot tubs, pools x 1 week.  May shower the day after discharge and take off groin dressings and leave uncovered.  Continue to monitor sites daily for warmth, redness, discolored drainage.  It is common to have a small lump in the area where the cather was (usually the size of a small marble); this will go away but takes approximately 6 weeks to normalize.      3.   Please take all medications as prescribed to you; please do not stop any  medications prescribed post ablation unless directed by your healthcare provider.       4.   Please do not miss any doses of your blood thinner (if you have been started on, or take chronic blood thinners).      5.   Please walk and take deep breaths after discharge.  After discharge, if  experiences neurological changes/signs of stroke, high fever, you should be seen in the emergency dept.      6.   It is possible you may experience some chest discomfort or chest tightness post ablation.  This is usually secondary to inflammation and irritation of the tissues at the area of the ablation.  If this occurs, it is advised to try 400 mg of Ibuprofen with food as needed up to three times a day for a maximum of two days.  This should help to decrease pain and tissue inflammation.          **Please notify the office ([phone]) if this occurs.         ** DO NOT TAKE Ibuprofen IF HISTORY OF SIGNIFICANT BLEEDING OR KIDNEY DISEASE WITHOUT DISCUSSING WITH YOUR CARDIOLOGY PROVIDER FIRST.          ** If pain becomes severe or you have additional symptoms you may need to be medically evaluated; please contact the cardiology office ([phone]) for further direction.      7.  Please contact call our office ([phone]) or message via [employer] if you have any questions or concerns post procedurally.     8.  You need to be seen for post ablation follow up 2-4 weeks post procedure.  If you do not have an appointment scheduled, please call [phone] to schedule your follow up appointment.           Patient was discharged home with family with no further questions/concerns, their outpatient follow up has been arranged by our office as below.

## 2021-08-19 NOTE — PROGRESS NOTES
Pt discharged to home with . Discharge instructions reviewed, and signed. IV and tele monitor removed. Pt escorted out with FABIENNE Gayle.

## 2021-08-20 ENCOUNTER — PATIENT MESSAGE (OUTPATIENT)
Dept: CARDIOLOGY | Facility: MEDICAL CENTER | Age: 66
End: 2021-08-20

## 2021-08-20 ENCOUNTER — HOSPITAL ENCOUNTER (OUTPATIENT)
Dept: RADIOLOGY | Facility: MEDICAL CENTER | Age: 66
End: 2021-08-20
Attending: INTERNAL MEDICINE
Payer: MEDICARE

## 2021-08-20 DIAGNOSIS — R07.89 OTHER CHEST PAIN: ICD-10-CM

## 2021-08-20 PROCEDURE — 700117 HCHG RX CONTRAST REV CODE 255: Performed by: INTERNAL MEDICINE

## 2021-08-20 PROCEDURE — 71260 CT THORAX DX C+: CPT | Mod: MG

## 2021-08-20 RX ADMIN — IOHEXOL 75 ML: 350 INJECTION, SOLUTION INTRAVENOUS at 19:30

## 2021-08-20 NOTE — PATIENT COMMUNICATION
Josue Moody M.D.  You 3 hours ago (12:31 PM)     Let's do 10 x 2. Let's get a stat CT with contrast for the chest pain.    Message text

## 2021-08-20 NOTE — TELEPHONE ENCOUNTER
From: Susy Szymanski  To: Physician Josue Moody  Sent: 8/20/2021 9:15 AM PDT  Subject: Pain    I had the ablation Wednesday. I'm currently experiencing significant mid sternum pain in my chest that raidiates thru to my back. Is Tylenol ok for me to take? My other question concerns my pulse rate and propranolol. I'm supposed to take 20 mg 2 x per day. My concern is when I'm laying down my pulse rate is in the low 40s and dips into the 30s. When I'm up and moving it stays around 70. Am I ok continuing the 20 mg 2 x? Should I cut it own to 10 mg 2 x per day?    Thank You,    Ariane Szymanski

## 2021-08-20 NOTE — PATIENT COMMUNICATION
Josue Moody M.D.  You 25 minutes ago (12:31 PM)     Let's do 10 x 2. Let's get a stat CT with contrast for the chest pain.

## 2021-08-23 ENCOUNTER — TELEPHONE (OUTPATIENT)
Dept: CARDIOLOGY | Facility: MEDICAL CENTER | Age: 66
End: 2021-08-23

## 2021-08-23 DIAGNOSIS — R93.89 ABNORMAL CHEST CT: ICD-10-CM

## 2021-08-23 NOTE — TELEPHONE ENCOUNTER
Pt states her CP is gettting much better,has not needed ibuprofen as much. Will follow up with pulm on lung finding

## 2021-08-23 NOTE — TELEPHONE ENCOUNTER
----- Message from Josue Moody M.D. sent at 8/23/2021  8:32 AM PDT -----  Regarding: FW: Stat CT  Nothing alarming, but the tree-in-bud opacity is incidental finding concerning for chronic infection. I would refer her to pulmonary. NSAIDs and can do colchicine if still experiencing CP.  ----- Message -----  From: Ciara Deutsch R.N.  Sent: 8/23/2021   8:13 AM PDT  To: Josue Moody M.D.  Subject: Stat CT                                          IMPRESSION:     1.  Ill-defined opacity in the left lower lobe with adjacent tree-in-bud nodularity is likely infectious. Short interval follow-up CT chest is recommended.  2.  Trace bilateral pleural effusions.  3.  Cardiomegaly.  4.  Prominence of the pancreatic duct is unchanged compared to 2018.      The stat CT from friday

## 2021-08-23 NOTE — TELEPHONE ENCOUNTER
Received GI Consultant medical clearance for EGD with sedation, dated TBD    S/p ablation 8/18/21. No procedure x3 months.     Clearance to be evaluated with EA 9/14

## 2021-08-27 ENCOUNTER — NON-PROVIDER VISIT (OUTPATIENT)
Dept: CARDIOLOGY | Facility: MEDICAL CENTER | Age: 66
End: 2021-08-27
Payer: MEDICARE

## 2021-08-27 DIAGNOSIS — Z95.818 STATUS POST PLACEMENT OF IMPLANTABLE LOOP RECORDER: ICD-10-CM

## 2021-08-27 DIAGNOSIS — I63.9 CRYPTOGENIC STROKE (HCC): ICD-10-CM

## 2021-08-27 PROCEDURE — 99999 PR NO CHARGE: CPT | Performed by: INTERNAL MEDICINE

## 2021-09-06 NOTE — PROGRESS NOTES
"Pulmonary Clinic- Initial Consult    Date of Service: 9/7/2021    Referring Physician: Josue Moody M.D.    Reason for Consult: Abnormal CT chest    Chief Complaint:   Chief Complaint   Patient presents with   • New Patient     Shortness of Breath   • Other     Adnormal CT 8/20/21     HPI:   Ariane Szymanski is a very pleasant 66 y.o. female never smoker who is followed by Josue Moody M.D. and is referred to the pulmonary clinic for abnormal CT chest.      Ariane recently underwent an ablation in 8/2021 by Dr. Josue Moody for paroxysmal atrial fibrillation symptomatic PVCs. Several days after the procedure she reported significant midsternal chest pain radiating to the back.  CT chest with contrast was performed showing no aortic dissection, however did note ill-defined left lower lobe opacity with tree-in-bud nodularity with mild right basilar and right middle lobe atelectasis as well as trace right greater than left pleural effusions.  CT abdomen/pelvis from 2018 personally reviewed showing no remarkable findings in the lung fields or pleural spaces.    Functionally, Ariane reports slowly improving shortness of breath since her ablation.  Chest pain has resolved.  Prior to her onset of atrial fibrillation, she was able to walk 10+ miles without limitations.  Denies any cough, fevers.  She does endorse severe GERD attributed to hiatal hernia for which she takes Carafate and Nexium, eats an early dinner, avoids alcohol and caffeine, and specialized bed to maintain the head of the bed elevated.    She has no prior PFTs  She has never been hospitalized for respiratory issues.     Past Medical History:   Diagnosis Date   • Anxiety    • Arrhythmia     afib   • ASTHMA 4/9/21:  Patient denies   • Atrial fibrillation (HCC)    • Bowel habit changes     constipation   • Cataract 04/09/2001    \"Small, not affecting eyes\"   • Chickenpox    • Constipation    • Depression 04/09/2021    Patient denies   • Frequent urination    • " GERD (gastroesophageal reflux disease)    • Heartburn    • Hiatal hernia with GERD 11/5/2019   • Hiatus hernia syndrome    • High cholesterol    • History of esophageal stricture 11/5/2019   • Indigestion    • Kidney stone    • Osteoporosis    • Painful joint    • Palpitations    • Palpitations    • Premature atrial contractions    • Rhinitis    • Ringing in ears    • Stroke (HCC) 04/09/2021 Jan. 2021.  Blood clot in brain RT A-Fib   • Tonsillitis    • Wears glasses        Past Surgical History:   Procedure Laterality Date   • COLONOSCOPY WITH POLYP  June 2008    benign polyp  Dr Paul    • CHOLECYSTECTOMY  January 2008    laparoscopic   • EGD WITH ASP/BX  September 2006    inflammation only ---Dr Paul   • CHOLECYSTECTOMY     • OTHER CARDIAC SURGERY  Around 2000    Ablation   • SINUSOTOMIES         Social History     Socioeconomic History   • Marital status:      Spouse name: Not on file   • Number of children: Not on file   • Years of education: Not on file   • Highest education level: Not on file   Occupational History   • Occupation: retired   Tobacco Use   • Smoking status: Never Smoker   • Smokeless tobacco: Never Used   Vaping Use   • Vaping Use: Never used   Substance and Sexual Activity   • Alcohol use: No     Alcohol/week: 0.0 oz   • Drug use: No   • Sexual activity: Yes     Partners: Male     Birth control/protection: Post-Menopausal   Other Topics Concern   • Not on file   Social History Narrative   • Not on file     Social Determinants of Health     Financial Resource Strain:    • Difficulty of Paying Living Expenses:    Food Insecurity:    • Worried About Running Out of Food in the Last Year:    • Ran Out of Food in the Last Year:    Transportation Needs:    • Lack of Transportation (Medical):    • Lack of Transportation (Non-Medical):    Physical Activity:    • Days of Exercise per Week:    • Minutes of Exercise per Session:    Stress:    • Feeling of Stress :    Social Connections:    •  Frequency of Communication with Friends and Family:    • Frequency of Social Gatherings with Friends and Family:    • Attends [employer] Services:    • Active Member of Clubs or Organizations:    • Attends Club or Organization Meetings:    • Marital Status:    Intimate Partner Violence:    • Fear of Current or Ex-Partner:    • Emotionally Abused:    • Physically Abused:    • Sexually Abused:           Family History   Problem Relation Age of Onset   • Cancer Father 70        cancer of unknown primary   • Heart Disease Mother    • Heart Disease Son 42        MI and coronary stent placement   • Diabetes Son    • Hypertension Son    • Diabetes Sister        Current Outpatient Medications on File Prior to Visit   Medication Sig Dispense Refill   • sucralfate (CARAFATE) 1 GM Tab Take 1 Tablet by mouth 4 Times a Day,Before Meals and at Bedtime. 56 Tablet 0   • Capsaicin-Menthol (SALONPAS GEL-PATCH HOT EX) Apply 1 Patch topically 1 time a day as needed.     • propranolol (INDERAL) 20 MG Tab TAKE 1 TABLET BY MOUTH TWICE A DAY (Patient taking differently: Take 10 mg by mouth 2 times a day.) 180 tablet 1   • rivaroxaban (XARELTO) 20 MG Tab tablet Take 1 tablet by mouth with dinner. 90 tablet 3   • esomeprazole (NEXIUM) 40 MG delayed-release capsule TAKE 1 CAPSULE BY MOUTH ONCE A DAY 30 MINUTES BEFORE BREAKFAST MEAL (Patient taking differently: Take 40 mg by mouth 1 time a day as needed.) 30 Cap 6     No current facility-administered medications on file prior to visit.     Allergies: Amitriptyline, Bactrim [sulfamethoxazole w-trimethoprim], Levaquin, Lovastatin, and Trazodone    ROS:   Review of Systems   Constitutional: Negative for chills, fever and weight loss.   HENT: Negative for congestion, sinus pain and sore throat.    Eyes: Negative for pain and discharge.   Respiratory: Positive for shortness of breath. Negative for cough, sputum production, wheezing and stridor.    Cardiovascular: Positive for palpitations. Negative  "for chest pain, orthopnea and leg swelling.   Gastrointestinal: Positive for heartburn. Negative for abdominal pain, diarrhea, nausea and vomiting.   Genitourinary: Negative for dysuria, frequency and urgency.   Musculoskeletal: Negative for myalgias.   Skin: Negative for rash.   Neurological: Negative for dizziness, sensory change, focal weakness, loss of consciousness and headaches.   Psychiatric/Behavioral: Negative.    All other systems reviewed and are negative.    Vitals:  /70 (BP Location: Right arm, Patient Position: Sitting, BP Cuff Size: Adult)   Pulse 60   Temp 36.7 °C (98 °F) (Temporal)   Resp 16   Ht 1.715 m (5' 7.5\")   Wt 54.9 kg (121 lb)   SpO2 97%     Physical Exam:  Physical Exam  Vitals and nursing note reviewed.   Constitutional:       General: She is not in acute distress.     Appearance: Normal appearance. She is well-developed. She is not diaphoretic.      Comments: Very pleasant  Accompanied by supportive    Eyes:      General: No scleral icterus.        Right eye: No discharge.         Left eye: No discharge.      Conjunctiva/sclera: Conjunctivae normal.      Pupils: Pupils are equal, round, and reactive to light.   Neck:      Thyroid: No thyromegaly.      Vascular: No JVD.   Cardiovascular:      Rate and Rhythm: Normal rate and regular rhythm.      Heart sounds: Normal heart sounds. No murmur heard.   No gallop.    Pulmonary:      Effort: Pulmonary effort is normal. No respiratory distress.      Breath sounds: Normal breath sounds. No wheezing or rales.   Abdominal:      General: There is no distension.      Palpations: Abdomen is soft.      Tenderness: There is no abdominal tenderness. There is no guarding.   Musculoskeletal:         General: No tenderness.   Lymphadenopathy:      Cervical: No cervical adenopathy.   Skin:     General: Skin is warm.      Capillary Refill: Capillary refill takes less than 2 seconds.      Findings: No erythema or rash.   Neurological:      " Mental Status: She is alert and oriented to person, place, and time.      Cranial Nerves: No cranial nerve deficit.      Sensory: No sensory deficit.      Motor: No abnormal muscle tone.   Psychiatric:         Behavior: Behavior normal.      Comments: anxious       Laboratory Data:    PFTs as reviewed by me personally show:  None for review at time of consultation    Imaging as reviewed by me personally show:    CT chest with contrast:  ill-defined left lower lobe opacity with tree-in-bud nodularity with mild right basilar and right middle lobe atelectasis as well as trace right greater than left pleural effusions.  CT abdomen/pelvis from 2018 personally reviewed showing no remarkable findings in the lung fields or pleural spaces.    Assessment/Plan:    Problem List Items Addressed This Visit     Abnormal CT scan, lung     Focal area of tree in bud nodularity in left lower lobe  Likely focal pneumonia 2/2 severe GERD, ? Aspiration  Very low suspicion for occult malignancy, low risk pt and no red flag symptoms  ----  Continue aggressive reflux regimen  Repeat CT chest 3 months  PFTs         Relevant Orders    CT-CHEST (THORAX) W/O    PULMONARY FUNCTION TESTS -Test requested: Complete Pulmonary Function Test; Include MIPS/MEPS? Yes    Hiatal hernia with GERD     Hx of hiatal hernia, esophageal stricture and GERD with ongoing severe GERD symptoms despite carafate and nexium  -- Suspect CT chest findings likely related to aspiration due to uncontrolled GERD symptoms  -- Aggressive reflux control per PCP/GI           Other Visit Diagnoses     Chronic obstructive pulmonary disease, unspecified COPD type (HCC)             Return in about 2 months (around 11/15/2021).     This note was generated using voice recognition software which has a chance of producing errors of grammar and possibly content.  I have made every reasonable attempt to find and correct any obvious errors, but it should be expected that some may not be  found prior to finalization of this note.    Time spent in record review prior to patient arrival, reviewing results, and in face-to-face encounter totaled 50 min, excluding any procedures if performed.  __________  Sharath Lindsay MD  Pulmonary and Critical Care Medicine  [facility]

## 2021-09-07 ENCOUNTER — SLEEP CENTER VISIT (OUTPATIENT)
Dept: SLEEP MEDICINE | Facility: MEDICAL CENTER | Age: 66
End: 2021-09-07
Payer: MEDICARE

## 2021-09-07 VITALS
HEIGHT: 68 IN | BODY MASS INDEX: 18.34 KG/M2 | OXYGEN SATURATION: 97 % | RESPIRATION RATE: 16 BRPM | TEMPERATURE: 98 F | DIASTOLIC BLOOD PRESSURE: 70 MMHG | WEIGHT: 121 LBS | HEART RATE: 60 BPM | SYSTOLIC BLOOD PRESSURE: 116 MMHG

## 2021-09-07 DIAGNOSIS — R91.8 ABNORMAL CT SCAN, LUNG: ICD-10-CM

## 2021-09-07 DIAGNOSIS — J44.9 CHRONIC OBSTRUCTIVE PULMONARY DISEASE, UNSPECIFIED COPD TYPE (HCC): ICD-10-CM

## 2021-09-07 DIAGNOSIS — K44.9 HIATAL HERNIA WITH GERD: ICD-10-CM

## 2021-09-07 DIAGNOSIS — K21.9 HIATAL HERNIA WITH GERD: ICD-10-CM

## 2021-09-07 PROCEDURE — 99204 OFFICE O/P NEW MOD 45 MIN: CPT | Performed by: INTERNAL MEDICINE

## 2021-09-07 ASSESSMENT — ENCOUNTER SYMPTOMS
HEARTBURN: 1
SHORTNESS OF BREATH: 1
PSYCHIATRIC NEGATIVE: 1
MYALGIAS: 0
STRIDOR: 0
SORE THROAT: 0
SENSORY CHANGE: 0
WHEEZING: 0
WEIGHT LOSS: 0
HEADACHES: 0
FOCAL WEAKNESS: 0
CHILLS: 0
FEVER: 0
ABDOMINAL PAIN: 0
LOSS OF CONSCIOUSNESS: 0
SPUTUM PRODUCTION: 0
VOMITING: 0
NAUSEA: 0
SINUS PAIN: 0
COUGH: 0
EYE PAIN: 0
EYE DISCHARGE: 0
DIARRHEA: 0
DIZZINESS: 0
ORTHOPNEA: 0
PALPITATIONS: 1

## 2021-09-07 ASSESSMENT — PAIN SCALES - GENERAL: PAINLEVEL: NO PAIN

## 2021-09-07 ASSESSMENT — FIBROSIS 4 INDEX: FIB4 SCORE: 1.21

## 2021-09-07 NOTE — ASSESSMENT & PLAN NOTE
Hx of hiatal hernia, esophageal stricture and GERD with ongoing severe GERD symptoms despite carafate and nexium  -- Suspect CT chest findings likely related to aspiration due to uncontrolled GERD symptoms  -- Aggressive reflux control per PCP/GI

## 2021-09-07 NOTE — ASSESSMENT & PLAN NOTE
Focal area of tree in bud nodularity in left lower lobe  Likely focal pneumonia 2/2 severe GERD, ? Aspiration  Very low suspicion for occult malignancy, low risk pt and no red flag symptoms  ----  Continue aggressive reflux regimen  Repeat CT chest 3 months  PFTs

## 2021-09-13 NOTE — PROGRESS NOTES
"Cardiology/Electrophysiology Follow-up Note      Subjective:   Chief Complaint:   Chief Complaint   Patient presents with   • Atrial Fibrillation     Fv Dx; PAF (paroxysmal atrial fibrillation) (HCC)       Susy Szymanski is a 66 y.o. female who presents today for follow up following ablation by Dr. Moody 8/18/21 for recurrent paroxysmal Afib with repeat PVI with extension of the right sided PV isolation line more antrally, PWI and RVOT PVC ablation.      She is followed by Dr. Moody.  Past medical history also significant for CVA in FEb 2021 S/P TPA and thrombectomy, Previous PVI in 2019, hyperlipidemia and chronic anticoagualtion.     Today in follow up she states that she has been doing well overall however recently has notice PVCs, mostly occurring at night with associated shotness of breath.  She denies any known atrial arrhythmias.  She denies chest pain, dizziness, palpitations, pre syncope or syncope,  PND, orthopnea, or lower extremity edema.        Patient endorses medication compliance        Past Medical History:   Diagnosis Date   • Anxiety    • Arrhythmia     afib   • ASTHMA 4/9/21:  Patient denies   • Atrial fibrillation (HCC)    • Bowel habit changes     constipation   • Cataract 04/09/2001    \"Small, not affecting eyes\"   • Chickenpox    • Constipation    • Depression 04/09/2021    Patient denies   • Frequent urination    • GERD (gastroesophageal reflux disease)    • Heartburn    • Hiatal hernia with GERD 11/5/2019   • Hiatus hernia syndrome    • High cholesterol    • History of esophageal stricture 11/5/2019   • Indigestion    • Kidney stone    • Osteoporosis    • Painful joint    • Palpitations    • Palpitations    • Premature atrial contractions    • Rhinitis    • Ringing in ears    • Stroke (HCC) 04/09/2021 Jan. 2021.  Blood clot in brain RT A-Fib   • Tonsillitis    • Wears glasses      Past Surgical History:   Procedure Laterality Date   • COLONOSCOPY WITH POLYP  June 2008    benign polyp  Dr" Beverly    • CHOLECYSTECTOMY  January 2008    laparoscopic   • EGD WITH ASP/BX  September 2006    inflammation only ---Dr Paul   • CHOLECYSTECTOMY     • OTHER CARDIAC SURGERY  Around 2000    Ablation   • SINUSOTOMIES       Family History   Problem Relation Age of Onset   • Cancer Father 70        cancer of unknown primary   • Heart Disease Mother    • Heart Disease Son 42        MI and coronary stent placement   • Diabetes Son    • Hypertension Son    • Diabetes Sister      Social History     Socioeconomic History   • Marital status:      Spouse name: Not on file   • Number of children: Not on file   • Years of education: Not on file   • Highest education level: Not on file   Occupational History   • Occupation: retired   Tobacco Use   • Smoking status: Never Smoker   • Smokeless tobacco: Never Used   Vaping Use   • Vaping Use: Never used   Substance and Sexual Activity   • Alcohol use: No     Alcohol/week: 0.0 oz   • Drug use: No   • Sexual activity: Yes     Partners: Male     Birth control/protection: Post-Menopausal   Other Topics Concern   • Not on file   Social History Narrative   • Not on file     Social Determinants of Health     Financial Resource Strain:    • Difficulty of Paying Living Expenses:    Food Insecurity:    • Worried About Running Out of Food in the Last Year:    • Ran Out of Food in the Last Year:    Transportation Needs:    • Lack of Transportation (Medical):    • Lack of Transportation (Non-Medical):    Physical Activity:    • Days of Exercise per Week:    • Minutes of Exercise per Session:    Stress:    • Feeling of Stress :    Social Connections:    • Frequency of Communication with Friends and Family:    • Frequency of Social Gatherings with Friends and Family:    • Attends Alev[employer] Services:    • Active Member of Clubs or Organizations:    • Attends Club or Organization Meetings:    • Marital Status:    Intimate Partner Violence:    • Fear of Current or Ex-Partner:    •  Emotionally Abused:    • Physically Abused:    • Sexually Abused:      Allergies   Allergen Reactions   • Amitriptyline      Excessive sedation --even at 10 mg dose   • Lovastatin      Muscle aches  Muscle aches     • Trazodone      Irregular pulse   • Bactrim [Sulfamethoxazole W-Trimethoprim]      Shaking   • Levaquin      Nausea        Current Outpatient Medications   Medication Sig Dispense Refill   • sucralfate (CARAFATE) 1 GM Tab Take 1 Tablet by mouth 4 Times a Day,Before Meals and at Bedtime. 56 Tablet 0   • Capsaicin-Menthol (SALONPAS GEL-PATCH HOT EX) Apply 1 Patch topically 1 time a day as needed.     • propranolol (INDERAL) 20 MG Tab TAKE 1 TABLET BY MOUTH TWICE A DAY (Patient taking differently: Take 10 mg by mouth 2 times a day.) 180 tablet 1   • rivaroxaban (XARELTO) 20 MG Tab tablet Take 1 tablet by mouth with dinner. 90 tablet 3   • esomeprazole (NEXIUM) 40 MG delayed-release capsule TAKE 1 CAPSULE BY MOUTH ONCE A DAY 30 MINUTES BEFORE BREAKFAST MEAL (Patient taking differently: Take 40 mg by mouth 1 time a day as needed.) 30 Cap 6     No current facility-administered medications for this visit.       Review of Systems   Constitutional: Negative for chills, fever, malaise/fatigue and weight loss.   HENT: Negative for congestion and tinnitus.    Eyes: Negative for blurred vision and double vision.   Respiratory: Negative for cough, hemoptysis, sputum production, shortness of breath (With palpitations ) and wheezing.    Cardiovascular: Positive for palpitations. Negative for chest pain, orthopnea, leg swelling and PND.   Gastrointestinal: Negative for blood in stool, diarrhea, heartburn, nausea and vomiting.   Skin: Negative for rash.   Neurological: Negative for dizziness, tingling, tremors, sensory change, speech change, focal weakness, loss of consciousness and headaches.     All others systems reviewed and negative.     Objective:     /66 (BP Location: Left arm, Patient Position: Sitting, BP  "Cuff Size: Adult)   Pulse 68   Resp 14   Ht 1.702 m (5' 7\")   Wt 56 kg (123 lb 6.4 oz)   SpO2 96%  Body mass index is 19.33 kg/m².    Weight/BMI: Body mass index is 19.33 kg/m².  Wt Readings from Last 4 Encounters:   09/07/21 54.9 kg (121 lb)   08/18/21 56.4 kg (124 lb 5.4 oz)   08/11/21 55.3 kg (122 lb)   06/30/21 55.8 kg (123 lb)         Physical Exam  HENT:      Head: Normocephalic and atraumatic.      Mouth/Throat:      Mouth: Mucous membranes are moist.      Pharynx: Oropharynx is clear.   Eyes:      Extraocular Movements: Extraocular movements intact.      Conjunctiva/sclera: Conjunctivae normal.      Pupils: Pupils are equal, round, and reactive to light.   Neck:      Vascular: No JVD.   Cardiovascular:      Rate and Rhythm: Normal rate and regular rhythm. Occasional extrasystoles are present.     Heart sounds: Normal heart sounds. No murmur heard.   No friction rub. No gallop.    Pulmonary:      Effort: Pulmonary effort is normal. No respiratory distress.      Breath sounds: Normal breath sounds. No wheezing or rales.   Chest:      Chest wall: No tenderness.   Musculoskeletal:         General: Normal range of motion.      Cervical back: Normal range of motion and neck supple.   Skin:     General: Skin is warm and dry.      Findings: No erythema or rash.   Neurological:      Mental Status: She is alert and oriented to person, place, and time.   Psychiatric:         Mood and Affect: Mood and affect normal.         Behavior: Behavior normal.         Thought Content: Thought content normal.         Cognition and Memory: Memory normal.         Judgment: Judgment normal.       Cardiac Imaging and Procedures Review:    EKG (9/14/21) reviewed by myself:   Sinus rhythm with PAC and PVC    Echo (2/24/21):   CONCLUSIONS  Normal left ventricular size, wall thickness, and systolic function.  Right ventricular free wall not well visualized; probably dilated right   ventricle.  Mild tricuspid regurgitation.  Estimated " right ventricular systolic pressure  is 35 mmHg; borderline.  No pericardial effusion seen.      EP Procedures: 8/18/21  Electrophysiology Procedure Note     Procedure(s) Performed:   1) Atrial fibrillation ablation and EP study (extension of R sided PVI)  2) Ablation of additional atrial fibrillation  3) Ablation of additional mechanism of tachycardia (low septal RVOT PVC)  4) 3D mapping  5) ICE during diagnostics and intervention  Indication(s):  Paroxysmal atrial fibrillation  Symptomatic PVCs  Complications:  None  Total ablation time: 882 seconds  Electrophysiologic Findings:    1. Sinus 995 ms, AH 73 ms, HV 48 ms. AVBCL = 780 ms.  Impressions:    1. Paroxysmal atrial fibrillation.    2. Successful repeat RF ablation pulmonary vein isolation procedure (extension of the anterior R sided PV isolation line more antrally)  3. Successful RF ablation of the LA posterior wall  4. Successful RF ablation of RVOT PVC    Labs (personally reviewed and notable for):   BMP  Lab Results   Component Value Date/Time    SODIUM 141 08/13/2021 12:03 PM    POTASSIUM 4.4 08/13/2021 12:03 PM    CHLORIDE 107 08/13/2021 12:03 PM    CO2 25 08/13/2021 12:03 PM    GLUCOSE 73 08/13/2021 12:03 PM    BUN 21 08/13/2021 12:03 PM    CREATININE 0.67 08/13/2021 12:03 PM      CBC W/O DIFF  Lab Results   Component Value Date/Time    WBC 5.3 08/13/2021 12:03 PM    RBC 4.65 08/13/2021 12:03 PM    HEMOGLOBIN 13.4 08/13/2021 12:03 PM    MCV 94.2 08/13/2021 12:03 PM    MCH 28.8 08/13/2021 12:03 PM    MCHC 30.6 (L) 08/13/2021 12:03 PM    RDW 51.2 (H) 08/13/2021 12:03 PM    MPV 11.5 08/13/2021 12:03 PM     Lipids:   Lab Results   Component Value Date/Time    CHOLSTRLTOT 167 02/26/2021 04:51 AM    TRIGLYCERIDE 62 02/26/2021 04:51 AM    HDL 63 02/26/2021 04:51 AM    LDL 92 02/26/2021 04:51 AM     TSH:   Lab Results   Component Value Date/Time    TSHULTRASEN 3.500 02/12/2021 0619     THYROXINE (T4):   No results found for: WILSON      Assessment:     1.  Paroxysmal atrial fibrillation (HCC)  EKG   2. PVC's (premature ventricular contractions)     3. Cerebrovascular accident (CVA) due to embolism of right middle cerebral artery (HCC)     4. H/O cardiac radiofrequency ablation 8/18/21 Dr Moody.  PVI (extension of the RPV), PWI and RVOT (PVCs)     5. Chronic anticoagulation         Medical Decision Making:  Today's Assessment / Status / Plan:   1. Paroxysmal Afib:  - S/P repeat ablation of atrial fibrillation with extension of RPV line, PWI.    - Clinically she has done well post ablation.  12 lead EKG today is sinus with few PVCs.   - Discussed ablation healing and time frames.  She will continue to take propranolol at this time.  She will continue Xarelto.     2. PVCs:  - S/P ablation of RVOT PVCs.  She reports feelings of some PVCs, especially at nighttime.  Will continue to monitor symptoms/PVC burden as she continues to heal from the ablation.   - Discussed to continue Propranolol, try to increase to 20 mg for PM dose to see if improved symptoms of palpitations/PVCs at that time.  She will contact office in 2 weeks with symptom report.     - Discussed to monitor BP with medication increase.     3. CVA:  - No new symptoms.  To remain on Xarelto.  Discussed no hold OAC for 3 months post ablation. Always check with cardiology office prior for needed procedures.     4. Anticoagulation:  - Reports tolerating Xarelto well without evidence of bleeding.       Plan reviewed in detail with the patient and she verbalizes understanding and is in agreement.   RTC 6 weeks, sooner if clinical condition changes.       Please note that this dictation was created using voice recognition software. I have made every reasonable attempt to correct obvious errors, but I expect that there are errors of grammar and possibly content that I did not discover before finalizing the note.    ROSA Duvall.

## 2021-09-14 ENCOUNTER — OFFICE VISIT (OUTPATIENT)
Dept: CARDIOLOGY | Facility: MEDICAL CENTER | Age: 66
End: 2021-09-14
Payer: MEDICARE

## 2021-09-14 VITALS
RESPIRATION RATE: 14 BRPM | HEART RATE: 68 BPM | DIASTOLIC BLOOD PRESSURE: 66 MMHG | SYSTOLIC BLOOD PRESSURE: 106 MMHG | WEIGHT: 123.4 LBS | BODY MASS INDEX: 19.37 KG/M2 | HEIGHT: 67 IN | OXYGEN SATURATION: 96 %

## 2021-09-14 DIAGNOSIS — Z98.890 H/O CARDIAC RADIOFREQUENCY ABLATION: ICD-10-CM

## 2021-09-14 DIAGNOSIS — I63.411 CEREBROVASCULAR ACCIDENT (CVA) DUE TO EMBOLISM OF RIGHT MIDDLE CEREBRAL ARTERY (HCC): ICD-10-CM

## 2021-09-14 DIAGNOSIS — I48.0 PAROXYSMAL ATRIAL FIBRILLATION (HCC): ICD-10-CM

## 2021-09-14 DIAGNOSIS — I49.3 PVC'S (PREMATURE VENTRICULAR CONTRACTIONS): ICD-10-CM

## 2021-09-14 DIAGNOSIS — Z79.01 CHRONIC ANTICOAGULATION: ICD-10-CM

## 2021-09-14 LAB — EKG IMPRESSION: NORMAL

## 2021-09-14 PROCEDURE — 93000 ELECTROCARDIOGRAM COMPLETE: CPT | Performed by: INTERNAL MEDICINE

## 2021-09-14 PROCEDURE — 99214 OFFICE O/P EST MOD 30 MIN: CPT | Performed by: NURSE PRACTITIONER

## 2021-09-14 ASSESSMENT — ENCOUNTER SYMPTOMS
HEADACHES: 0
WEIGHT LOSS: 0
DIZZINESS: 0
HEARTBURN: 0
DOUBLE VISION: 0
SPUTUM PRODUCTION: 0
SHORTNESS OF BREATH: 0
SENSORY CHANGE: 0
LOSS OF CONSCIOUSNESS: 0
HEMOPTYSIS: 0
PALPITATIONS: 1
TINGLING: 0
TREMORS: 0
CHILLS: 0
SPEECH CHANGE: 0
DIARRHEA: 0
COUGH: 0
FEVER: 0
ORTHOPNEA: 0
WHEEZING: 0
BLURRED VISION: 0
FOCAL WEAKNESS: 0
VOMITING: 0
BLOOD IN STOOL: 0
NAUSEA: 0
PND: 0

## 2021-09-14 ASSESSMENT — FIBROSIS 4 INDEX: FIB4 SCORE: 1.21

## 2021-09-14 NOTE — PATIENT INSTRUCTIONS
Try 10 mg Propranolol in the AM and 20 mg in the PM for symptoms of PVCs/palpitations.  Please continue to monitor your blood pressure while increasing your medication.      Please send a message in 2 weeks how you and your symptoms are.

## 2021-09-27 ENCOUNTER — NON-PROVIDER VISIT (OUTPATIENT)
Dept: CARDIOLOGY | Facility: MEDICAL CENTER | Age: 66
End: 2021-09-27
Payer: MEDICARE

## 2021-09-27 DIAGNOSIS — Z95.818 STATUS POST PLACEMENT OF IMPLANTABLE LOOP RECORDER: ICD-10-CM

## 2021-09-27 DIAGNOSIS — I63.9 CRYPTOGENIC STROKE (HCC): ICD-10-CM

## 2021-09-27 PROCEDURE — 99999 PR NO CHARGE: CPT | Performed by: INTERNAL MEDICINE

## 2021-10-27 ENCOUNTER — NON-PROVIDER VISIT (OUTPATIENT)
Dept: CARDIOLOGY | Facility: MEDICAL CENTER | Age: 66
End: 2021-10-27
Payer: MEDICARE

## 2021-10-27 DIAGNOSIS — Z95.818 STATUS POST PLACEMENT OF IMPLANTABLE LOOP RECORDER: ICD-10-CM

## 2021-10-27 DIAGNOSIS — I63.9 CRYPTOGENIC STROKE (HCC): ICD-10-CM

## 2021-11-02 PROCEDURE — 93298 REM INTERROG DEV EVAL SCRMS: CPT | Performed by: INTERNAL MEDICINE

## 2021-11-10 ENCOUNTER — OFFICE VISIT (OUTPATIENT)
Dept: CARDIOLOGY | Facility: MEDICAL CENTER | Age: 66
End: 2021-11-10
Payer: MEDICARE

## 2021-11-10 VITALS
HEIGHT: 67 IN | WEIGHT: 125 LBS | OXYGEN SATURATION: 96 % | BODY MASS INDEX: 19.62 KG/M2 | DIASTOLIC BLOOD PRESSURE: 68 MMHG | HEART RATE: 60 BPM | SYSTOLIC BLOOD PRESSURE: 106 MMHG

## 2021-11-10 DIAGNOSIS — I48.0 PAF (PAROXYSMAL ATRIAL FIBRILLATION) (HCC): ICD-10-CM

## 2021-11-10 DIAGNOSIS — I63.411 CEREBROVASCULAR ACCIDENT (CVA) DUE TO EMBOLISM OF RIGHT MIDDLE CEREBRAL ARTERY (HCC): ICD-10-CM

## 2021-11-10 DIAGNOSIS — Z95.818 STATUS POST PLACEMENT OF IMPLANTABLE LOOP RECORDER: ICD-10-CM

## 2021-11-10 DIAGNOSIS — Z45.09 ENCOUNTER FOR LOOP RECORDER CHECK: ICD-10-CM

## 2021-11-10 DIAGNOSIS — I49.3 PVC'S (PREMATURE VENTRICULAR CONTRACTIONS): ICD-10-CM

## 2021-11-10 PROCEDURE — 99214 OFFICE O/P EST MOD 30 MIN: CPT | Mod: 25 | Performed by: INTERNAL MEDICINE

## 2021-11-10 PROCEDURE — 93291 INTERROG DEV EVAL SCRMS IP: CPT | Performed by: INTERNAL MEDICINE

## 2021-11-10 ASSESSMENT — FIBROSIS 4 INDEX: FIB4 SCORE: 1.21

## 2021-11-10 NOTE — PROGRESS NOTES
"Arrhythmia Clinic Note (Established patient)    DOS: 11/10/2021    Chief complaint/Reason for consult: F/u PVCs    Interval History:  Pt is a 65 yo F. She has a history of PAF and prior stroke after discontinuation of OAC after initial AF ablation. Subsequently had ILR and found recurrent AF and underwent repeat ablation. Has not had recurrent AF but still with symptomatic PVCs. We attempted ablation of PVCs in the septal RVOT region during her AF case, and acutely successful but she remains with 4% burden on ILR. She is quite symptomatic. Says they mainly happen when she lays flat.    ROS (+ highlighted in red):  General--Negative for fatigue, weight loss or weight gain  Cardiovascular--Negative for CP, orthopnea, PND    Past Medical History:   Diagnosis Date   • Anxiety    • Arrhythmia     afib   • ASTHMA 4/9/21:  Patient denies   • Atrial fibrillation (HCC)    • Bowel habit changes     constipation   • Cataract 04/09/2001    \"Small, not affecting eyes\"   • Chickenpox    • Constipation    • Depression 04/09/2021    Patient denies   • Frequent urination    • GERD (gastroesophageal reflux disease)    • Heartburn    • Hiatal hernia with GERD 11/5/2019   • Hiatus hernia syndrome    • High cholesterol    • History of esophageal stricture 11/5/2019   • Indigestion    • Kidney stone    • Osteoporosis    • Painful joint    • Palpitations    • Palpitations    • Premature atrial contractions    • Rhinitis    • Ringing in ears    • Stroke (HCC) 04/09/2021 Jan. 2021.  Blood clot in brain RT A-Fib   • Tonsillitis    • Wears glasses        Past Surgical History:   Procedure Laterality Date   • COLONOSCOPY WITH POLYP  June 2008    benign polyp  Dr Paul    • CHOLECYSTECTOMY  January 2008    laparoscopic   • EGD WITH ASP/BX  September 2006    inflammation only ---Dr Paul   • CHOLECYSTECTOMY     • OTHER CARDIAC SURGERY  Around 2000    Ablation   • SINUSOTOMIES         Social History     Socioeconomic History   • Marital " status:      Spouse name: Not on file   • Number of children: Not on file   • Years of education: Not on file   • Highest education level: Not on file   Occupational History   • Occupation: retired   Tobacco Use   • Smoking status: Never Smoker   • Smokeless tobacco: Never Used   Vaping Use   • Vaping Use: Never used   Substance and Sexual Activity   • Alcohol use: No     Alcohol/week: 0.0 oz   • Drug use: No   • Sexual activity: Yes     Partners: Male     Birth control/protection: Post-Menopausal   Other Topics Concern   • Not on file   Social History Narrative   • Not on file     Social Determinants of Health     Financial Resource Strain:    • Difficulty of Paying Living Expenses: Not on file   Food Insecurity:    • Worried About Running Out of Food in the Last Year: Not on file   • Ran Out of Food in the Last Year: Not on file   Transportation Needs:    • Lack of Transportation (Medical): Not on file   • Lack of Transportation (Non-Medical): Not on file   Physical Activity:    • Days of Exercise per Week: Not on file   • Minutes of Exercise per Session: Not on file   Stress:    • Feeling of Stress : Not on file   Social Connections:    • Frequency of Communication with Friends and Family: Not on file   • Frequency of Social Gatherings with Friends and Family: Not on file   • Attends Pentecostal Services: Not on file   • Active Member of Clubs or Organizations: Not on file   • Attends Club or Organization Meetings: Not on file   • Marital Status: Not on file   Intimate Partner Violence:    • Fear of Current or Ex-Partner: Not on file   • Emotionally Abused: Not on file   • Physically Abused: Not on file   • Sexually Abused: Not on file   Housing Stability:    • Unable to Pay for Housing in the Last Year: Not on file   • Number of Places Lived in the Last Year: Not on file   • Unstable Housing in the Last Year: Not on file       Family History   Problem Relation Age of Onset   • Cancer Father 70        cancer  "of unknown primary   • Heart Disease Mother    • Heart Disease Son 42        MI and coronary stent placement   • Diabetes Son    • Hypertension Son    • Diabetes Sister        Allergies   Allergen Reactions   • Amitriptyline      Excessive sedation --even at 10 mg dose   • Lovastatin      Muscle aches  Muscle aches     • Trazodone      Irregular pulse   • Bactrim [Sulfamethoxazole W-Trimethoprim]      Shaking   • Levaquin      Nausea        Current Outpatient Medications   Medication Sig Dispense Refill   • Capsaicin-Menthol (SALONPAS GEL-PATCH HOT EX) Apply 1 Patch topically 1 time a day as needed.     • propranolol (INDERAL) 20 MG Tab TAKE 1 TABLET BY MOUTH TWICE A DAY (Patient taking differently: Take 10 mg by mouth 2 times a day.) 180 tablet 1   • rivaroxaban (XARELTO) 20 MG Tab tablet Take 1 tablet by mouth with dinner. 90 tablet 3   • esomeprazole (NEXIUM) 40 MG delayed-release capsule TAKE 1 CAPSULE BY MOUTH ONCE A DAY 30 MINUTES BEFORE BREAKFAST MEAL (Patient taking differently: Take 40 mg by mouth 1 time a day as needed.) 30 Cap 6     No current facility-administered medications for this visit.       Physical Exam:  Vitals:    11/10/21 1313   BP: 106/68   BP Location: Left arm   Patient Position: Sitting   BP Cuff Size: Adult   Pulse: 60   SpO2: 96%   Weight: 56.7 kg (125 lb)   Height: 1.702 m (5' 7\")     General appearance: NAD, conversant  HEENT: PERRL, neck is supple with FROM  Lungs: Clear to auscultation, normal respiratory effort  CV: RRR, no murmurs/rubs/gallops, no JVD  Abdomen: Soft, non-tender with normal bowel sounds  Extremities: No peripheral edema, no clubbing or cyanosis  Skin: No rash, lesions, or ulcers  Psych: Alert and oriented to person, place and time    Data:  Labs reviewed    Prior echo/stress reviewed:  LVEF 65%    EKG interpreted by me:  Sinus, PVC/PAC    Impression/Plan:  1. PAF (paroxysmal atrial fibrillation) (HCC)  EKG   2. PVC's (premature ventricular contractions)     3. " "Cerebrovascular accident (CVA) due to embolism of right middle cerebral artery (HCC)     4. Encounter for loop recorder check       -Loop recorder check reviewed, \"AF\" episodes were episodes of frequent PVCs  -No EGM consistent with true AF  -Nonetheless given prior stroke, continue xarelto as long as she is tolerating  -We are going to try some mag to see if we can help with her symptoms from remaining PVC burden    Josue Moody MD    "

## 2021-11-15 ENCOUNTER — NON-PROVIDER VISIT (OUTPATIENT)
Dept: SLEEP MEDICINE | Facility: MEDICAL CENTER | Age: 66
End: 2021-11-15
Attending: INTERNAL MEDICINE
Payer: MEDICARE

## 2021-11-15 VITALS — BODY MASS INDEX: 21.5 KG/M2 | HEIGHT: 67 IN | WEIGHT: 137 LBS

## 2021-11-15 DIAGNOSIS — R91.8 ABNORMAL CT SCAN, LUNG: ICD-10-CM

## 2021-11-15 PROCEDURE — 94726 PLETHYSMOGRAPHY LUNG VOLUMES: CPT | Performed by: INTERNAL MEDICINE

## 2021-11-15 PROCEDURE — 94729 DIFFUSING CAPACITY: CPT | Performed by: INTERNAL MEDICINE

## 2021-11-15 PROCEDURE — 94060 EVALUATION OF WHEEZING: CPT | Performed by: INTERNAL MEDICINE

## 2021-11-15 ASSESSMENT — PULMONARY FUNCTION TESTS
FEV1/FVC_PERCENT_PREDICTED: 85
FEV1/FVC: 66
FEV1/FVC_PERCENT_PREDICTED: 84
FVC: 2.83
FEV1/FVC: 66
FVC_PREDICTED: 3.23
FVC_PERCENT_PREDICTED: 87
FEV1_PREDICTED: 2.5
FEV1_PERCENT_PREDICTED: 74
FEV1: 1.87
FEV1/FVC_PERCENT_PREDICTED: 77
FEV1/FVC_PREDICTED: 78

## 2021-11-15 ASSESSMENT — FIBROSIS 4 INDEX: FIB4 SCORE: 1.21

## 2021-11-15 NOTE — PROCEDURES
Technician: JAMIE Bauman    Technician Comment:  Good patient effort & cooperation.  The results of this test meet the ATS/ERS standards for acceptability & reproducibility.  Test was performed on the [employer] Body Plethysmograph-Elite DX system.  Predicted values were GLI-2012 for spirometry, GLI-2017 for DLCO, ITS for Lung Volumes.  The DLCO was uncorrected for Hgb.  A bronchodilator of Xopenex HFA -2puffs via spacer administered.  DLCO performed during dilation period.    Interpretation:  Baseline spirometry shows airflow obstruction with FEV1/FVC ratio 66 and an FEV1 of 1.87 L or 74% predicted.  There is trend toward bronchodilator response but does not meet ATS criteria, postbronchodilator FEV1 of 2.04 L or 81% predicted.  Lung volumes are within normal limits.  Diffusion capacity is just above upper limits of normal at 121% predicted.  Pulmonary function testing shows airflow obstruction that is mild and reverses postbronchodilator although does not meet strict criteria for bronchodilator responsiveness.  These findings may be consistent with reactive airways disease.

## 2021-11-18 LAB — EKG IMPRESSION: NORMAL

## 2021-11-18 PROCEDURE — 93000 ELECTROCARDIOGRAM COMPLETE: CPT | Performed by: INTERNAL MEDICINE

## 2021-11-22 ENCOUNTER — HOSPITAL ENCOUNTER (OUTPATIENT)
Dept: RADIOLOGY | Facility: MEDICAL CENTER | Age: 66
End: 2021-11-22
Attending: INTERNAL MEDICINE
Payer: MEDICARE

## 2021-11-22 DIAGNOSIS — R91.8 ABNORMAL CT SCAN, LUNG: ICD-10-CM

## 2021-11-22 PROCEDURE — 71250 CT THORAX DX C-: CPT | Mod: ME

## 2021-11-28 DIAGNOSIS — K21.9 GASTROESOPHAGEAL REFLUX DISEASE WITHOUT ESOPHAGITIS: ICD-10-CM

## 2021-11-29 RX ORDER — ESOMEPRAZOLE MAGNESIUM 40 MG/1
CAPSULE, DELAYED RELEASE ORAL
Qty: 90 CAPSULE | Refills: 0 | Status: SHIPPED | OUTPATIENT
Start: 2021-11-29 | End: 2021-12-03 | Stop reason: SDUPTHER

## 2021-12-01 ENCOUNTER — NON-PROVIDER VISIT (OUTPATIENT)
Dept: CARDIOLOGY | Facility: MEDICAL CENTER | Age: 66
End: 2021-12-01
Payer: MEDICARE

## 2021-12-01 DIAGNOSIS — I48.0 PAROXYSMAL ATRIAL FIBRILLATION (HCC): ICD-10-CM

## 2021-12-01 DIAGNOSIS — Z95.818 STATUS POST PLACEMENT OF IMPLANTABLE LOOP RECORDER: ICD-10-CM

## 2021-12-01 PROCEDURE — 99999 PR NO CHARGE: CPT | Performed by: INTERNAL MEDICINE

## 2021-12-03 ENCOUNTER — OFFICE VISIT (OUTPATIENT)
Dept: MEDICAL GROUP | Facility: LAB | Age: 66
End: 2021-12-03
Payer: MEDICARE

## 2021-12-03 VITALS
DIASTOLIC BLOOD PRESSURE: 72 MMHG | SYSTOLIC BLOOD PRESSURE: 110 MMHG | TEMPERATURE: 98.2 F | HEART RATE: 64 BPM | HEIGHT: 67 IN | BODY MASS INDEX: 20.09 KG/M2 | RESPIRATION RATE: 12 BRPM | WEIGHT: 128 LBS | OXYGEN SATURATION: 98 %

## 2021-12-03 DIAGNOSIS — K21.9 GASTROESOPHAGEAL REFLUX DISEASE WITHOUT ESOPHAGITIS: ICD-10-CM

## 2021-12-03 PROCEDURE — 99213 OFFICE O/P EST LOW 20 MIN: CPT | Performed by: FAMILY MEDICINE

## 2021-12-03 RX ORDER — OMEPRAZOLE 20 MG/1
20 CAPSULE, DELAYED RELEASE ORAL 2 TIMES DAILY
Qty: 60 CAPSULE | Refills: 0 | Status: SHIPPED | OUTPATIENT
Start: 2021-12-03 | End: 2021-12-27

## 2021-12-03 RX ORDER — ESOMEPRAZOLE MAGNESIUM 40 MG/1
40 CAPSULE, DELAYED RELEASE ORAL
Qty: 90 CAPSULE | Refills: 0 | Status: SHIPPED | OUTPATIENT
Start: 2021-12-03 | End: 2022-02-07

## 2021-12-03 RX ORDER — PROPAFENONE HYDROCHLORIDE 150 MG/1
TABLET, COATED ORAL
COMMUNITY
Start: 2021-09-17 | End: 2022-09-02

## 2021-12-03 RX ORDER — SCOLOPAMINE TRANSDERMAL SYSTEM 1 MG/1
1 PATCH, EXTENDED RELEASE TRANSDERMAL
Qty: 4 PATCH | Refills: 3 | Status: CANCELLED | OUTPATIENT
Start: 2021-12-03

## 2021-12-03 ASSESSMENT — ENCOUNTER SYMPTOMS
VOMITING: 0
CHILLS: 0
FEVER: 0
NAUSEA: 0
CONSTIPATION: 0
BLOOD IN STOOL: 0
DIARRHEA: 0
ABDOMINAL PAIN: 0

## 2021-12-03 ASSESSMENT — FIBROSIS 4 INDEX: FIB4 SCORE: 1.21

## 2021-12-03 NOTE — PROGRESS NOTES
"Subjective:   Susy Szymanski is a 66 y.o. female here today for   Chief Complaint   Patient presents with   • Gastrophageal Reflux     talk about medication     #GERD:  -Based on her chronic longstanding history of GERD.  She states for last 20 years she has had symptoms of sore throat, \"acid sitting in her throat\" at night.  Is being followed by GI and recently started on Nexium.  She states has been compliant with Nexium; however, medication is not helping symptoms at all.  She continues work on appropriate diet and exercise regimen.  She is not taking any other over-the-counter antacids including Tums or Rolaids given her concerns about reactions with other medications, specifically Xarelto.  Patient have no other symptoms at this time, here today to discuss other possible treatment measures.      Allergies   Allergen Reactions   • Amitriptyline      Excessive sedation --even at 10 mg dose   • Lovastatin      Muscle aches  Muscle aches     • Trazodone      Irregular pulse   • Bactrim [Sulfamethoxazole W-Trimethoprim]      Shaking   • Levaquin      Nausea          Current medicines (including changes today)  Current Outpatient Medications   Medication Sig Dispense Refill   • omeprazole (PRILOSEC) 20 MG delayed-release capsule Take 1 Capsule by mouth 2 times a day for 30 days. 60 Capsule 0   • esomeprazole (NEXIUM) 40 MG delayed-release capsule Take 1 Capsule by mouth every morning before breakfast for 90 days. TAKE 1 CAPSULE BY MOUTH ONCE A DAY 30 MINUTES BEFORE BREAKFAST MEAL 90 Capsule 0   • propranolol (INDERAL) 20 MG Tab TAKE 1 TABLET BY MOUTH TWICE A DAY (Patient taking differently: Take 10 mg by mouth 2 times a day.) 180 tablet 1   • rivaroxaban (XARELTO) 20 MG Tab tablet Take 1 tablet by mouth with dinner. 90 tablet 3   • propafenone (RYTHMOL) 150 MG Tab        No current facility-administered medications for this visit.     She  has a past medical history of Anxiety, Arrhythmia, ASTHMA (4/9/21:  " "Patient denies), Atrial fibrillation (HCC), Bowel habit changes, Cataract (04/09/2001), Chickenpox, Constipation, Depression (04/09/2021), Frequent urination, GERD (gastroesophageal reflux disease), Heartburn, Hiatal hernia with GERD (11/5/2019), Hiatus hernia syndrome, High cholesterol, History of esophageal stricture (11/5/2019), Indigestion, Kidney stone, Osteoporosis, Painful joint, Palpitations, Palpitations, Premature atrial contractions, Rhinitis, Ringing in ears, Stroke (HCC) (04/09/2021), Tonsillitis, and Wears glasses.    ROS   Review of Systems   Constitutional: Negative for chills and fever.   Gastrointestinal: Negative for abdominal pain, blood in stool, constipation, diarrhea, melena, nausea and vomiting.      Objective:     Physical Exam:  /72   Pulse 64   Temp 36.8 °C (98.2 °F)   Resp 12   Ht 1.702 m (5' 7\")   Wt 58.1 kg (128 lb)   SpO2 98%  Body mass index is 20.05 kg/m².   Constitutional: Alert, no distress.  Skin: Warm, dry, good turgor, no rashes in visible areas.  Eye: Equal, round and reactive, conjunctiva clear, lids normal.  Respiratory: Unlabored respiratory effort, lungs clear to auscultation, no wheezes, no rhonchi.  Cardiovascular: Normal S1, S2, no murmur, no edema.  Abdomen: Soft, non-tender, no masses, no hepatosplenomegaly.  Psych: Alert and oriented x3, normal affect and mood.    Assessment and Plan:     1. Gastroesophageal reflux disease without esophagitis  -At this time we will switch to Prilosec twice a day.  This could help given medication shorter half-life when taken at night.  Also discussed continue with conservative treatment measures including hydration, eating small meals.  I did discuss with patient the safety of using Tums while on Xarelto.  Will refill the Nexium at this time.  If after a few weeks of using the Prilosec were not seeing any improvement then she can switch back to the Nexium at that time.  -Encourage follow-up and continue testing with " gastroenterologist.  - omeprazole (PRILOSEC) 20 MG delayed-release capsule; Take 1 Capsule by mouth 2 times a day for 30 days.  Dispense: 60 Capsule; Refill: 0  - esomeprazole (NEXIUM) 40 MG delayed-release capsule; Take 1 Capsule by mouth every morning before breakfast for 90 days. TAKE 1 CAPSULE BY MOUTH ONCE A DAY 30 MINUTES BEFORE BREAKFAST MEAL  Dispense: 90 Capsule; Refill: 0      Followup: Return if symptoms worsen or fail to improve.         PLEASE NOTE: This dictation was created using voice recognition software. I have made every reasonable attempt to correct obvious errors, but I expect that there are errors of grammar and possibly content that I did not discover before finalizing the note.

## 2021-12-21 ENCOUNTER — APPOINTMENT (OUTPATIENT)
Dept: RADIOLOGY | Facility: MEDICAL CENTER | Age: 66
End: 2021-12-21
Attending: INTERNAL MEDICINE
Payer: MEDICARE

## 2021-12-25 DIAGNOSIS — K21.9 GASTROESOPHAGEAL REFLUX DISEASE WITHOUT ESOPHAGITIS: ICD-10-CM

## 2021-12-27 RX ORDER — OMEPRAZOLE 20 MG/1
20 CAPSULE, DELAYED RELEASE ORAL 2 TIMES DAILY
Qty: 180 CAPSULE | Refills: 1 | Status: SHIPPED | OUTPATIENT
Start: 2021-12-27 | End: 2022-01-26

## 2021-12-29 ENCOUNTER — APPOINTMENT (OUTPATIENT)
Dept: SLEEP MEDICINE | Facility: MEDICAL CENTER | Age: 66
End: 2021-12-29
Payer: MEDICARE

## 2021-12-30 ENCOUNTER — PATIENT MESSAGE (OUTPATIENT)
Dept: MEDICAL GROUP | Facility: LAB | Age: 66
End: 2021-12-30

## 2021-12-30 DIAGNOSIS — R25.1 TREMOR: ICD-10-CM

## 2022-01-03 ENCOUNTER — NON-PROVIDER VISIT (OUTPATIENT)
Dept: CARDIOLOGY | Facility: MEDICAL CENTER | Age: 67
End: 2022-01-03
Payer: MEDICARE

## 2022-01-03 PROCEDURE — 99999 PR NO CHARGE: CPT | Performed by: INTERNAL MEDICINE

## 2022-01-15 ENCOUNTER — NON-PROVIDER VISIT (OUTPATIENT)
Dept: CARDIOLOGY | Facility: MEDICAL CENTER | Age: 67
End: 2022-01-15
Payer: MEDICARE

## 2022-01-15 PROCEDURE — 99999 PR NO CHARGE: CPT | Performed by: INTERNAL MEDICINE

## 2022-01-18 ENCOUNTER — HOSPITAL ENCOUNTER (OUTPATIENT)
Dept: RADIOLOGY | Facility: MEDICAL CENTER | Age: 67
End: 2022-01-18
Attending: INTERNAL MEDICINE
Payer: MEDICARE

## 2022-01-18 DIAGNOSIS — F41.9 ANXIETY HYPERVENTILATION: ICD-10-CM

## 2022-01-18 DIAGNOSIS — F45.8 ANXIETY HYPERVENTILATION: ICD-10-CM

## 2022-01-18 DIAGNOSIS — Z79.01 LONG TERM (CURRENT) USE OF ANTICOAGULANTS: ICD-10-CM

## 2022-01-18 DIAGNOSIS — K21.9 GASTROESOPHAGEAL REFLUX DISEASE, UNSPECIFIED WHETHER ESOPHAGITIS PRESENT: ICD-10-CM

## 2022-01-18 DIAGNOSIS — R68.81 EARLY SATIETY: ICD-10-CM

## 2022-01-18 PROCEDURE — A9541 TC99M SULFUR COLLOID: HCPCS

## 2022-01-24 ENCOUNTER — TELEPHONE (OUTPATIENT)
Dept: CARDIOLOGY | Facility: MEDICAL CENTER | Age: 67
End: 2022-01-24

## 2022-01-24 NOTE — TELEPHONE ENCOUNTER
Remote Transmission 1/21/2022:    1-AF episode (marked as Symptom episode by pt) 1/20/2022@16:32pm lasting 1 hr and 8 mins.    Full report scanned into media.

## 2022-02-07 ENCOUNTER — OFFICE VISIT (OUTPATIENT)
Dept: MEDICAL GROUP | Facility: LAB | Age: 67
End: 2022-02-07
Payer: MEDICARE

## 2022-02-07 VITALS
DIASTOLIC BLOOD PRESSURE: 80 MMHG | SYSTOLIC BLOOD PRESSURE: 120 MMHG | HEIGHT: 67 IN | OXYGEN SATURATION: 98 % | RESPIRATION RATE: 14 BRPM | TEMPERATURE: 97.9 F | HEART RATE: 62 BPM | WEIGHT: 125 LBS | BODY MASS INDEX: 19.62 KG/M2

## 2022-02-07 DIAGNOSIS — K21.9 GASTROESOPHAGEAL REFLUX DISEASE WITHOUT ESOPHAGITIS: ICD-10-CM

## 2022-02-07 DIAGNOSIS — T75.3XXA MOTION SICKNESS, INITIAL ENCOUNTER: ICD-10-CM

## 2022-02-07 PROCEDURE — 99214 OFFICE O/P EST MOD 30 MIN: CPT | Performed by: FAMILY MEDICINE

## 2022-02-07 RX ORDER — SCOLOPAMINE TRANSDERMAL SYSTEM 1 MG/1
1 PATCH, EXTENDED RELEASE TRANSDERMAL
Qty: 4 PATCH | Refills: 3 | Status: SHIPPED | OUTPATIENT
Start: 2022-02-07 | End: 2022-04-25

## 2022-02-07 RX ORDER — MECLIZINE HCL 12.5 MG/1
12.5 TABLET ORAL 3 TIMES DAILY PRN
Qty: 30 TABLET | Refills: 0 | Status: SHIPPED | OUTPATIENT
Start: 2022-02-07 | End: 2022-04-25

## 2022-02-07 RX ORDER — OMEPRAZOLE 20 MG/1
20 CAPSULE, DELAYED RELEASE ORAL 2 TIMES DAILY
Qty: 60 CAPSULE | Refills: 6 | Status: SHIPPED | OUTPATIENT
Start: 2022-02-07 | End: 2022-03-09

## 2022-02-07 ASSESSMENT — ENCOUNTER SYMPTOMS
CHILLS: 0
ABDOMINAL PAIN: 0
DIZZINESS: 0
VOMITING: 0
HEADACHES: 0
FEVER: 0
NAUSEA: 0
PALPITATIONS: 0
DEPRESSION: 0

## 2022-02-07 ASSESSMENT — PATIENT HEALTH QUESTIONNAIRE - PHQ9: CLINICAL INTERPRETATION OF PHQ2 SCORE: 0

## 2022-02-07 ASSESSMENT — FIBROSIS 4 INDEX: FIB4 SCORE: 1.21

## 2022-02-08 NOTE — PROGRESS NOTES
Subjective:   Susy Szymanski is a 66 y.o. female here today for   Chief Complaint   Patient presents with   • Nausea     Requesting Rx for motin sickness while traveling.        #Motion sickness:  -Recent cruise that patient took she found herself returned to [place] on a cruise ship and extremely rough weather.  She states that while she normally does experiencing motion sickness, this episode caused severe dizziness, nausea.  She normally treats motion sickness especially on a cruise boat with acupuncture but this time she found no such relief from acupuncture.  Patient does have a history of atrial fibrillation currently on propafenone, Xarelto.  Patient did sustain stroke secondary to atrial fibrillation back in February 2021.  At the time she states that she was told by neurologist not to use any Benadryl.  -Currently patient states that she is feeling well is asymptomatic.  Here today to discuss possible medications to be used for motion sickness.      Allergies   Allergen Reactions   • Amitriptyline      Excessive sedation --even at 10 mg dose   • Lovastatin      Muscle aches  Muscle aches     • Trazodone      Irregular pulse   • Bactrim [Sulfamethoxazole W-Trimethoprim]      Shaking   • Levaquin      Nausea          Current medicines (including changes today)  Current Outpatient Medications   Medication Sig Dispense Refill   • esomeprazole (NEXIUM) 40 MG delayed-release capsule Take 1 Capsule by mouth every morning before breakfast for 90 days. TAKE 1 CAPSULE BY MOUTH ONCE A DAY 30 MINUTES BEFORE BREAKFAST MEAL 90 Capsule 0   • propafenone (RYTHMOL) 150 MG Tab      • propranolol (INDERAL) 20 MG Tab TAKE 1 TABLET BY MOUTH TWICE A DAY (Patient taking differently: Take 10 mg by mouth 2 times a day.) 180 tablet 1   • rivaroxaban (XARELTO) 20 MG Tab tablet Take 1 tablet by mouth with dinner. 90 tablet 3     No current facility-administered medications for this visit.     She  has a past medical history of  "Anxiety, Arrhythmia, ASTHMA (4/9/21:  Patient denies), Atrial fibrillation (HCC), Bowel habit changes, Cataract (04/09/2001), Chickenpox, Constipation, Depression (04/09/2021), Frequent urination, GERD (gastroesophageal reflux disease), Heartburn, Hiatal hernia with GERD (11/5/2019), Hiatus hernia syndrome, High cholesterol, History of esophageal stricture (11/5/2019), Indigestion, Kidney stone, Osteoporosis, Painful joint, Palpitations, Palpitations, Premature atrial contractions, Rhinitis, Ringing in ears, Stroke (HCC) (04/09/2021), Tonsillitis, and Wears glasses.    ROS   Review of Systems   Constitutional: Negative for chills and fever.   Cardiovascular: Negative for chest pain and palpitations.   Gastrointestinal: Negative for abdominal pain, nausea and vomiting.   Neurological: Negative for dizziness and headaches.   Psychiatric/Behavioral: Negative for depression.        Objective:     Physical Exam:  /80   Pulse 62   Temp 36.6 °C (97.9 °F) (Temporal)   Resp 14   Ht 1.702 m (5' 7.01\")   Wt 56.7 kg (125 lb)   SpO2 98%  Body mass index is 19.57 kg/m².   Constitutional: Alert, no distress.  Skin: Warm, dry, good turgor, no rashes in visible areas.  Eye: Equal, round and reactive, conjunctiva clear, lids normal.  Respiratory: Unlabored respiratory effort, lungs clear to auscultation, no wheezes, no rhonchi.  Cardiovascular: Normal S1, S2, no murmur, no edema.  Abdomen: Soft, non-tender, no masses, no hepatosplenomegaly.  Psych: Alert and oriented x3, normal affect and mood.    Assessment and Plan:     1. Motion sickness, initial encounter  -Upon further discussion with patient she states that she has tried scopolamine patches before; however, she also states that she had cycling patches on for 2 weeks straight and suffered severe \"withdrawals\" when she was off the medication.  Because of this she is very leery of trying scopolamine again.  We will attempt meclizine; however, concerned about possible " side effects including drowsiness.  We will prescribe both meclizine and scopolamine at this time.  She will first attempt meclizine as needed for vertigo/motion sickness.  If this does not help then she can attempt further treatment with scopolamine.  Patient was told to only use this medication for 72 hours before taking the patch off.  She will follow-up with me if needed.  - meclizine (ANTIVERT) 12.5 MG Tab; Take 1 Tablet by mouth 3 times a day as needed (for vertigo).  Dispense: 30 Tablet; Refill: 0  - scopolamine (TRANSDERM-SCOP, 1.5 MG,) 1 mg/72hr PATCH 72 HR; Place 1 Patch on the skin every 72 hours.  Dispense: 4 Patch; Refill: 3    2. Gastroesophageal reflux disease without esophagitis  -Chronic condition currently treating with omeprazole.  Patient is stable, requesting refill of medication at this time.  - omeprazole (PRILOSEC) 20 MG delayed-release capsule; Take 1 Capsule by mouth 2 times a day for 30 days.  Dispense: 60 Capsule; Refill: 6        Followup: Return if symptoms worsen or fail to improve.         PLEASE NOTE: This dictation was created using voice recognition software. I have made every reasonable attempt to correct obvious errors, but I expect that there are errors of grammar and possibly content that I did not discover before finalizing the note.

## 2022-02-11 ENCOUNTER — OFFICE VISIT (OUTPATIENT)
Dept: NEUROLOGY | Facility: MEDICAL CENTER | Age: 67
End: 2022-02-11
Attending: PSYCHIATRY & NEUROLOGY
Payer: MEDICARE

## 2022-02-11 VITALS
OXYGEN SATURATION: 97 % | HEIGHT: 67 IN | DIASTOLIC BLOOD PRESSURE: 70 MMHG | BODY MASS INDEX: 19.27 KG/M2 | TEMPERATURE: 98.3 F | HEART RATE: 62 BPM | WEIGHT: 122.8 LBS | SYSTOLIC BLOOD PRESSURE: 112 MMHG

## 2022-02-11 DIAGNOSIS — I48.0 PAROXYSMAL ATRIAL FIBRILLATION (HCC): ICD-10-CM

## 2022-02-11 DIAGNOSIS — G25.0 ESSENTIAL TREMOR: ICD-10-CM

## 2022-02-11 PROCEDURE — 99204 OFFICE O/P NEW MOD 45 MIN: CPT | Performed by: PSYCHIATRY & NEUROLOGY

## 2022-02-11 PROCEDURE — 99212 OFFICE O/P EST SF 10 MIN: CPT | Performed by: PSYCHIATRY & NEUROLOGY

## 2022-02-11 ASSESSMENT — FIBROSIS 4 INDEX: FIB4 SCORE: 1.21

## 2022-02-11 NOTE — PROGRESS NOTES
"Chief Complaint   Patient presents with   • New Patient     Tremor       History of present illness:  Susy Szymanski 66 y.o. female with A-fib on rivaroxaban with worsening essential tremor.  She is on propranolol 20 mg twice daily. This dose used to help but is not as effective now.   She has a hard time brushing her teeth and eating with utensils. She is unable to write.   She has had tremor since youth, progressively worsening.   Her mother and her aunt and son all have tremor.   She was prescribed primidone but this was 'flagged' by [facility] due to the interaction with rivaroxaban.     Past medical history:   Past Medical History:   Diagnosis Date   • Anxiety    • Arrhythmia     afib   • ASTHMA 4/9/21:  Patient denies   • Atrial fibrillation (HCC)    • Bowel habit changes     constipation   • Cataract 04/09/2001    \"Small, not affecting eyes\"   • Chickenpox    • Constipation    • Depression 04/09/2021    Patient denies   • Frequent urination    • GERD (gastroesophageal reflux disease)    • Heartburn    • Hiatal hernia with GERD 11/5/2019   • Hiatus hernia syndrome    • High cholesterol    • History of esophageal stricture 11/5/2019   • Indigestion    • Kidney stone    • Osteoporosis    • Painful joint    • Palpitations    • Palpitations    • Premature atrial contractions    • Rhinitis    • Ringing in ears    • Stroke (HCC) 04/09/2021 Jan. 2021.  Blood clot in brain RT A-Fib   • Tonsillitis    • Wears glasses        Past surgical history:   Past Surgical History:   Procedure Laterality Date   • COLONOSCOPY WITH POLYP  June 2008    benign polyp  Dr Paul    • CHOLECYSTECTOMY  January 2008    laparoscopic   • EGD WITH ASP/BX  September 2006    inflammation only ---Dr Paul   • CHOLECYSTECTOMY     • OTHER CARDIAC SURGERY  Around 2000    Ablation   • SINUSOTOMIES         Family history:   Family History   Problem Relation Age of Onset   • Cancer Father 70        cancer of unknown primary   • Heart Disease Mother  "   • Heart Disease Son 42        MI and coronary stent placement   • Diabetes Son    • Hypertension Son    • Diabetes Sister        Social history:   Social History     Socioeconomic History   • Marital status:      Spouse name: Not on file   • Number of children: Not on file   • Years of education: Not on file   • Highest education level: Not on file   Occupational History   • Occupation: retired   Tobacco Use   • Smoking status: Never Smoker   • Smokeless tobacco: Never Used   Vaping Use   • Vaping Use: Never used   Substance and Sexual Activity   • Alcohol use: No     Alcohol/week: 0.0 oz   • Drug use: No   • Sexual activity: Yes     Partners: Male     Birth control/protection: Post-Menopausal   Other Topics Concern   • Not on file   Social History Narrative   • Not on file     Social Determinants of Health     Financial Resource Strain:    • Difficulty of Paying Living Expenses: Not on file   Food Insecurity:    • Worried About Running Out of Food in the Last Year: Not on file   • Ran Out of Food in the Last Year: Not on file   Transportation Needs:    • Lack of Transportation (Medical): Not on file   • Lack of Transportation (Non-Medical): Not on file   Physical Activity:    • Days of Exercise per Week: Not on file   • Minutes of Exercise per Session: Not on file   Stress:    • Feeling of Stress : Not on file   Social Connections:    • Frequency of Communication with Friends and Family: Not on file   • Frequency of Social Gatherings with Friends and Family: Not on file   • Attends [employer] Services: Not on file   • Active Member of Clubs or Organizations: Not on file   • Attends Club or Organization Meetings: Not on file   • Marital Status: Not on file   Intimate Partner Violence:    • Fear of Current or Ex-Partner: Not on file   • Emotionally Abused: Not on file   • Physically Abused: Not on file   • Sexually Abused: Not on file   Housing Stability:    • Unable to Pay for Housing in the Last Year: Not  "on file   • Number of Places Lived in the Last Year: Not on file   • Unstable Housing in the Last Year: Not on file       Current medications:   Current Outpatient Medications   Medication   • meclizine (ANTIVERT) 12.5 MG Tab   • scopolamine (TRANSDERM-SCOP, 1.5 MG,) 1 mg/72hr PATCH 72 HR   • omeprazole (PRILOSEC) 20 MG delayed-release capsule   • propranolol (INDERAL) 20 MG Tab   • rivaroxaban (XARELTO) 20 MG Tab tablet   • propafenone (RYTHMOL) 150 MG Tab     No current facility-administered medications for this visit.       Medication Allergy:  Allergies   Allergen Reactions   • Amitriptyline      Excessive sedation --even at 10 mg dose   • Lovastatin      Muscle aches  Muscle aches     • Trazodone      Irregular pulse   • Bactrim [Sulfamethoxazole W-Trimethoprim]      Shaking   • Levaquin      Nausea      Physical examination:   Vitals:    02/11/22 0751   BP: 112/70   BP Location: Right arm   Patient Position: Sitting   BP Cuff Size: Adult   Pulse: 62   Temp: 36.8 °C (98.3 °F)   TempSrc: Temporal   SpO2: 97%   Weight: 55.7 kg (122 lb 12.8 oz)   Height: 1.702 m (5' 7\")     Neurological Exam    Motor    There is a side to side head tremor.  She has left greater than right moderate to severe action tremor of the upper extremities.  Her spiral drawing is markedly impaired with the right hand.  Her handwriting is large but very tremulous.  There is no bradykinesia..    Gait  Casual gait is normal including stance, stride, and arm swing. Normal tandem gait.      Labs:  I reviewed the following labs personally:  None    Imaging:   None    ASSESSMENT AND PLAN:  Problem List Items Addressed This Visit     Paroxysmal atrial fibrillation (HCC)    Essential tremor          1. Essential tremor    2. Paroxysmal atrial fibrillation (HCC)    66-year-old female with bothersome essential tremor, currently on propranolol 20 mg once daily, with progressively worsening tremor.  She desires greater tremor relief.  Given that she is " on rivaroxaban for treatment of atrial fibrillation, I will not use primidone, and I provided instructions for her to increase her propranolol dosage.  She will increase now to 40 mg daily of propranolol, followed by 60 mg daily after 2 weeks if well tolerated.  Propranolol is not well tolerated or ineffective at higher doses, topiramate would be the next option.    FOLLOW-UP:   Return in about 3 months (around 5/11/2022).    Total time spent for the day for this patient unrelated to procedure time is: 20 minutes. I spent 15 minutes in face to face time and I spent 1 minutes pre-charting and 4 minutes in post-visit documentation.      Dr. Gelacio Mota D.O.  [facility]

## 2022-02-11 NOTE — PATIENT INSTRUCTIONS
Increase propranolol to 20mg in the morning and 20mg in the afternoon   After 2 weeks if there is insufficient benefit, change to the 60mg extended release.

## 2022-02-15 ENCOUNTER — NON-PROVIDER VISIT (OUTPATIENT)
Dept: CARDIOLOGY | Facility: MEDICAL CENTER | Age: 67
End: 2022-02-15
Payer: MEDICARE

## 2022-02-15 DIAGNOSIS — I48.0 PAROXYSMAL ATRIAL FIBRILLATION (HCC): ICD-10-CM

## 2022-02-15 DIAGNOSIS — Z95.818 STATUS POST PLACEMENT OF IMPLANTABLE LOOP RECORDER: ICD-10-CM

## 2022-02-15 PROCEDURE — 93298 REM INTERROG DEV EVAL SCRMS: CPT | Performed by: INTERNAL MEDICINE

## 2022-02-24 DIAGNOSIS — K21.9 GASTROESOPHAGEAL REFLUX DISEASE WITHOUT ESOPHAGITIS: ICD-10-CM

## 2022-02-24 RX ORDER — ESOMEPRAZOLE MAGNESIUM 40 MG/1
CAPSULE, DELAYED RELEASE ORAL
Qty: 90 CAPSULE | Refills: 0 | Status: SHIPPED | OUTPATIENT
Start: 2022-02-24 | End: 2022-08-12 | Stop reason: SDUPTHER

## 2022-02-24 NOTE — TELEPHONE ENCOUNTER
Received request via: Pharmacy    Was the patient seen in the last year in this department? Yes  LOV 02/07/2022  Does the patient have an active prescription (recently filled or refills available) for medication(s) requested? No     Patient would like to restart this medication.

## 2022-03-18 ENCOUNTER — NON-PROVIDER VISIT (OUTPATIENT)
Dept: CARDIOLOGY | Facility: MEDICAL CENTER | Age: 67
End: 2022-03-18
Payer: MEDICARE

## 2022-03-18 DIAGNOSIS — Z95.818 STATUS POST PLACEMENT OF IMPLANTABLE LOOP RECORDER: ICD-10-CM

## 2022-03-18 DIAGNOSIS — I63.9 CRYPTOGENIC STROKE (HCC): ICD-10-CM

## 2022-03-18 PROCEDURE — 93298 REM INTERROG DEV EVAL SCRMS: CPT | Performed by: INTERNAL MEDICINE

## 2022-04-18 ENCOUNTER — NON-PROVIDER VISIT (OUTPATIENT)
Dept: CARDIOLOGY | Facility: MEDICAL CENTER | Age: 67
End: 2022-04-18
Payer: MEDICARE

## 2022-04-18 DIAGNOSIS — I63.9 CRYPTOGENIC STROKE (HCC): ICD-10-CM

## 2022-04-18 DIAGNOSIS — Z95.818 STATUS POST PLACEMENT OF IMPLANTABLE LOOP RECORDER: ICD-10-CM

## 2022-04-18 PROCEDURE — 93298 REM INTERROG DEV EVAL SCRMS: CPT | Performed by: INTERNAL MEDICINE

## 2022-04-25 ENCOUNTER — OFFICE VISIT (OUTPATIENT)
Dept: SLEEP MEDICINE | Facility: MEDICAL CENTER | Age: 67
End: 2022-04-25
Payer: MEDICARE

## 2022-04-25 VITALS
BODY MASS INDEX: 20.12 KG/M2 | SYSTOLIC BLOOD PRESSURE: 102 MMHG | HEIGHT: 67 IN | DIASTOLIC BLOOD PRESSURE: 66 MMHG | HEART RATE: 62 BPM | WEIGHT: 128.19 LBS | OXYGEN SATURATION: 98 %

## 2022-04-25 DIAGNOSIS — R91.8 PULMONARY NODULES: ICD-10-CM

## 2022-04-25 DIAGNOSIS — I48.0 PAROXYSMAL ATRIAL FIBRILLATION (HCC): ICD-10-CM

## 2022-04-25 DIAGNOSIS — R93.89 ABNORMAL CT OF THE CHEST: ICD-10-CM

## 2022-04-25 PROCEDURE — 99212 OFFICE O/P EST SF 10 MIN: CPT | Performed by: INTERNAL MEDICINE

## 2022-04-25 ASSESSMENT — FIBROSIS 4 INDEX: FIB4 SCORE: 1.23

## 2022-04-25 NOTE — PATIENT INSTRUCTIONS
Thanks for coming to the office today.  Please bring all of your medication bottles to the next office visit, specially inhalers.  If requested, please obtain the prior records from your lung doctor.  If you have been prescribed inhalers, please use them as indicated and please rinse your mouth after using them each time.  GERD recommendations: keep head of the bed elevated at 30 degrees, avoid cafeinated drinks when possible even during the day, avoid eating anything 2 hours prior to going to bed  Call if any questions!                    Return To Clinic:  6 months. Please do not forget to come at least 20 minutes prior to your appointment.  It has been a pleasure seeing you today.    Delio Campos MD  Pulmonary/Critical Care

## 2022-04-25 NOTE — PROGRESS NOTES
"Pulmonary Clinic Office Note    Date of Visit: 4/25/2022 PCP: Tano Clay M.D.    Reason for visit: \"follow up\"    Chart reviewed prior to patient interview.    Susy Szymanski is a 67 y.o. year old female, with a PMHx of abnormal CT chest   who presented to the Pulmonary Clinic for a regular follow up.    Background:  She was seen by Dr Lindsay on 9/7/21, per his last note: \"very pleasant 66 y.o. female never smoker who is followed by Josue Moody M.D. and is referred to the pulmonary clinic for abnormal CT chest.    Ariane recently underwent an ablation in 8/2021 by Dr. Josue Mooyd for paroxysmal atrial fibrillation symptomatic PVCs. Several days after the procedure she reported significant midsternal chest pain radiating to the back.  CT chest with contrast was performed showing no aortic dissection, however did note ill-defined left lower lobe opacity with tree-in-bud nodularity with mild right basilar and right middle lobe atelectasis as well as trace right greater than left pleural effusions.  CT abdomen/pelvis from 2018 personally reviewed showing no remarkable findings in the lung fields or pleural spaces.  Functionally, Ariane reports slowly improving shortness of breath since her ablation.  Chest pain has resolved.  Prior to her onset of atrial fibrillation, she was able to walk 10+ miles without limitations.  Denies any cough, fevers.  She does endorse severe GERD attributed to hiatal hernia for which she takes Carafate and Nexium, eats an early dinner, avoids alcohol and caffeine, and specialized bed to maintain the head of the bed elevated\"    Today:  Reports no respiratory problems after the second ablation  She is concern about the results of the CT chest  No GERD symptoms  No history of respiratory failure requiring mechanical " "ventilation    ----------------------------------------------------------------------------------------------------------------------------------------------------------------------------------------------------------------------------------------------------------------  Past Medical History:   Diagnosis Date   • Anxiety    • Arrhythmia     afib   • ASTHMA 4/9/21:  Patient denies   • Atrial fibrillation ([facility])    • Bowel habit changes     constipation   • Cataract 04/09/2001    \"Small, not affecting eyes\"   • Chickenpox    • Constipation    • Depression 04/09/2021    Patient denies   • Frequent urination    • GERD (gastroesophageal reflux disease)    • Heartburn    • Hiatal hernia with GERD 11/5/2019   • Hiatus hernia syndrome    • High cholesterol    • History of esophageal stricture 11/5/2019   • Indigestion    • Kidney stone    • Osteoporosis    • Painful joint    • Palpitations    • Palpitations    • Premature atrial contractions    • Rhinitis    • Ringing in ears    • Stroke (HCC) 04/09/2021 Jan. 2021.  Blood clot in brain RT A-Fib   • Tonsillitis    • Wears glasses      Allergies   Allergen Reactions   • Amitriptyline      Excessive sedation --even at 10 mg dose   • Lovastatin      Muscle aches  Muscle aches     • Trazodone      Irregular pulse   • Bactrim [Sulfamethoxazole W-Trimethoprim]      Shaking   • Levaquin      Nausea      Family History   Problem Relation Age of Onset   • Cancer Father 70        cancer of unknown primary   • Heart Disease Mother    • Heart Disease Son 42        MI and coronary stent placement   • Diabetes Son    • Hypertension Son    • Diabetes Sister      Past Surgical History:   Procedure Laterality Date   • COLONOSCOPY WITH POLYP  June 2008    benign polyp  Dr Paul    • CHOLECYSTECTOMY  January 2008    laparoscopic   • EGD WITH ASP/BX  September 2006    inflammation only ---Dr Paul   • CHOLECYSTECTOMY     • OTHER CARDIAC SURGERY  Around 2000    Ablation   • SINUSOTOMIES   " "    Social History     Tobacco Use   • Smoking status: Never Smoker   • Smokeless tobacco: Never Used   Vaping Use   • Vaping Use: Never used   Substance Use Topics   • Alcohol use: No     Alcohol/week: 0.0 oz   • Drug use: No         Review of Systems (Positive in Bold, otherwise negative)    Constitutional: fever, chills, night sweats, weightloss  HEENT: headaches, migraines, vision changes, blurred vision, dry eyes,  changes in hearing, rhinorrhea, sinus congestion, dysphagia  Hoarseness of voice, choking  CV: chest pain, DELACRUZ, orthopnea, edema, palpitations  Resp: SOB, cough, hemoptysis, asthma, repeated infections, sputum production, wheezing  GI: changes in appetite, nausea, vomiting, diarrhea, constipation, pain, GERD  : Dysuria, hematuria, nocturia  Lymph: swollen glands  MSK: Muscle weakness, wasting, arthralgia, myalgia  Neuro/Psych: Sensory disturbances, seizures, syncope, anxiety, depression    Objective:  Vitals: /66 (BP Location: Right arm, Patient Position: Sitting, BP Cuff Size: Adult)   Pulse 62   Ht 1.702 m (5' 7\")   Wt 58.1 kg (128 lb 3 oz)   LMP 01/01/2004   SpO2 98%   BMI 20.08 kg/m²     Wt Readings from Last 3 Encounters:   04/25/22 58.1 kg (128 lb 3 oz)   02/11/22 55.7 kg (122 lb 12.8 oz)   02/07/22 56.7 kg (125 lb)     Gen: AAO x 3, appears of stated age, well-nourished and in NAD  Eyes: sclerae anicteric, conjunctivae appear normal, PEERLA, EOM in tact  ENT: EAC appears normal w/o erythema/exudate.  Overall oral hygiene is good  Mallampati Score: 2  Neck: Supple w/o evidence of LAD, thyroid normal and size and w/o nodularity  CV: RRR w/o murmurs, rubs, gallops. Normal S1/S2. No peripheral edema orJVD.  Lungs: CTAB w/o wheezing, rales, rhonchi. Good air entry, normal chest excursion.  GI: Soft and non-tender, + BS x 4. No rebound or guarding.  Extremities: +2/4 bilateral pedal pulses, cool and dry, no edema.  MS: +5/5 bilateral UE/LE muscle strength testing, no obvious joint " deformities, swelling noted, good overall muscle tone  Neuro: No focal neurological deficits    ----------------------------------------------------------------------------------------------------------------------------------------------------------------------------------------------------------------------------------------------------------------  Labs, Imaging & Scoring Systems:    Lab results since patient's previous encounter were reviewed with the patient.  Pertinent results discussed below or included within the note.    PFTs (Date: 11/15/21)-  Baseline spirometry shows airflow obstruction with FEV1/FVC ratio 66 and an FEV1 of 1.87 L or 74% predicted. There is trend toward bronchodilator response but does not meet ATS criteria, postbronchodilator FEV1 of 2.04 L or 81% predicted. Lung volumes are within normal limits.  Diffusion capacity is just above upper limits of normal at 121% predicted.  Pulmonary function testing shows airflow obstruction that is mild and reverses postbronchodilator although does not meet strict criteria for bronchodilator responsiveness.  These findings may be consistent with reactive airways disease.    [facility]  IMPRESSION:  [facility].    CT Chest: (Date: 11/22/21)-  IMPRESSION:   1.  6 mm groundglass opacity left lower lobe unchanged. Partial clearing adjacent tree-in-bud opacities.   2.  2.4 mm subpleural nodule right middle lobe and adjacent tiny nodules/tree-in-bud opacities that are new likely inflammatory/possible atypical infection.     CT chest 8/20/21  1.  Ill-defined opacity in the left lower lobe with adjacent tree-in-bud nodularity is likely infectious. Short interval follow-up CT chest is recommended.  2.  Trace bilateral pleural effusions.  3.  Cardiomegaly.  4.  Prominence of the pancreatic duct is unchanged compared to 2018.    ECHO, date: 2/24/21  CONCLUSIONS  Normal left ventricular size, wall thickness, and  systolic function.  Right ventricular free wall not well visualized; probably dilated right   ventricle.  Mild tricuspid regurgitation.  Estimated right ventricular systolic pressure  is 35 mmHg; borderline.  No pericardial effusion seen.  ----------------------------------------------------------------------------------------------------------------------------------------------------------------------------------------------------------------------------------------------------------------      Impression:  1. Abnormal CT chest with pulmonary nodules and tree in bud  2. PFTs with obstructive pattern with no symptoms  3. A fib with respiratory manifestations under controlled s/p 2nd ablation.      Discussion:    Susy Szymanski is a 67 y.o. who was seen by Dr Lindsay for abnormal CT chest with pulmonary nodules and tree in bud. At that time she was presenting with pulmonary problems but at same time diagnosed with A fib and ended up going under 2 ablation episodes which eventually controlled her symptoms. Also some degree of GERD and suspected micro aspirations, (abnormal CT chest as above) but also a prominent esophagus seen in the last few CTs.   Discussed that not all gastric content is acidic and PPIs only work for acidic content.    Plan:    1. GERD recommendations: keep head of the bed elevated at 30 degrees, avoid cafeinated drinks when possible even during the day, avoid eating anything 2 hours prior to going to bed   2. She has no respiratory symptoms at this point and will hold on offering any inhalers, will discuss further in next OV. She does have an obstructive pattern in last PFTs.  3. We reviewed the images together of the last CT chest  4. Regarding findings of last CT, seems pulmonary nodules are stable and largest is 6mm. Minor tree in bud present but overall would say has resolved, recommend to obtain a new CT 1 year from prior and follow up with results of that scan.      * Patient Education    "                      - Educated the patient on his/her disease processes                         - Answered all questions to the patients satisfaction.                         - Reminded the patient to bring all of his/her medication bottles to the next office visit.                           * Return To Clinic:  6 months or PRN with results of new scan.      The patient voiced understanding of the above treatment approach and agreed with the direction of care. The patient was educated about their conditions and all questions were addressed during this encounter. I have also reminded the patient to bring all of their medications to the next office visit.  Susy Szymanski was instructed to call the office if any questions or concerns arise prior to their next appointment.      Delio Campos MD FACP  Pulmonary/Critical Care   4/25/2022 9:23 AM    This note reflects my clinical thought processes and is intended primarily for the exchange of information between healthcare providers.  It is not written primarily to communicate with the patient or family directly, which takes place in-person in clinic, by phone/virtual visits or the bedside.  This note might contain sensitive information, including substance use, mental health and consideration of sensitive, serious or other \"do-not-miss\" diagnoses, which is further discussed at the bedside.    "

## 2022-05-10 ENCOUNTER — OFFICE VISIT (OUTPATIENT)
Dept: CARDIOLOGY | Facility: MEDICAL CENTER | Age: 67
End: 2022-05-10
Payer: MEDICARE

## 2022-05-10 VITALS
HEART RATE: 54 BPM | BODY MASS INDEX: 19.93 KG/M2 | OXYGEN SATURATION: 99 % | RESPIRATION RATE: 16 BRPM | SYSTOLIC BLOOD PRESSURE: 122 MMHG | HEIGHT: 67 IN | WEIGHT: 127 LBS | DIASTOLIC BLOOD PRESSURE: 70 MMHG

## 2022-05-10 DIAGNOSIS — I48.0 PAROXYSMAL ATRIAL FIBRILLATION (HCC): ICD-10-CM

## 2022-05-10 DIAGNOSIS — I49.3 PVC'S (PREMATURE VENTRICULAR CONTRACTIONS): ICD-10-CM

## 2022-05-10 DIAGNOSIS — Z98.890 HISTORY OF LOOP RECORDER: ICD-10-CM

## 2022-05-10 DIAGNOSIS — G25.0 ESSENTIAL TREMOR: ICD-10-CM

## 2022-05-10 PROCEDURE — 99214 OFFICE O/P EST MOD 30 MIN: CPT | Performed by: INTERNAL MEDICINE

## 2022-05-10 PROCEDURE — 93291 INTERROG DEV EVAL SCRMS IP: CPT | Performed by: INTERNAL MEDICINE

## 2022-05-10 PROCEDURE — 93000 ELECTROCARDIOGRAM COMPLETE: CPT | Mod: 59 | Performed by: INTERNAL MEDICINE

## 2022-05-10 ASSESSMENT — FIBROSIS 4 INDEX: FIB4 SCORE: 1.23

## 2022-05-10 NOTE — PROGRESS NOTES
"Arrhythmia Clinic Note (Established patient)    DOS: 5/10/2022    Chief complaint/Reason for consult: F/u AF    Interval History:  Pt is a 68 yo F. She has a history of PAF s/p prior PV isolation off OAC had stroke, underwent ILR finding recurrent AF at significant burden. 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    Subsequently has done well symptomatically. One significant episode of AF back in Jan over > 1 hour, but overall burden remains <0.1%. PVC burden <1% on ILR. Her symptoms greatly improved but main complaint is tremor that really affects her quality of life. Titration of propranolol has been limited by bradycardia and also less than desired efficacy, and primidone use limited by interactions with her OAC.    ROS (+ highlighted in red):  General--Negative for fatigue, weight loss or weight gain  Cardiovascular--Negative for CP, orthopnea, PND    Past Medical History:   Diagnosis Date   • Anxiety    • Arrhythmia     afib   • ASTHMA 4/9/21:  Patient denies   • Atrial fibrillation (HCC)    • Bowel habit changes     constipation   • Cataract 04/09/2001    \"Small, not affecting eyes\"   • Chickenpox    • Constipation    • Depression 04/09/2021    Patient denies   • Frequent urination    • GERD (gastroesophageal reflux disease)    • Heartburn    • Hiatal hernia with GERD 11/5/2019   • Hiatus hernia syndrome    • High cholesterol    • History of esophageal stricture 11/5/2019   • Indigestion    • Kidney stone    • Osteoporosis    • Painful joint    • Palpitations    • Palpitations    • Premature atrial contractions    • Rhinitis    • Ringing in ears    • Stroke (HCC) 04/09/2021 Jan. 2021.  Blood clot in brain RT A-Fib   • Tonsillitis    • Wears glasses        Past Surgical History:   Procedure Laterality Date   • COLONOSCOPY WITH POLYP  June 2008    benign polyp  Dr Paul    • " CHOLECYSTECTOMY  January 2008    laparoscopic   • EGD WITH ASP/BX  September 2006    inflammation only ---Dr Paul   • CHOLECYSTECTOMY     • OTHER CARDIAC SURGERY  Around 2000    Ablation   • SINUSOTOMIES         Social History     Socioeconomic History   • Marital status:      Spouse name: Not on file   • Number of children: Not on file   • Years of education: Not on file   • Highest education level: Not on file   Occupational History   • Occupation: retired   Tobacco Use   • Smoking status: Never Smoker   • Smokeless tobacco: Never Used   Vaping Use   • Vaping Use: Never used   Substance and Sexual Activity   • Alcohol use: No     Alcohol/week: 0.0 oz   • Drug use: No   • Sexual activity: Yes     Partners: Male     Birth control/protection: Post-Menopausal   Other Topics Concern   • Not on file   Social History Narrative   • Not on file     Social Determinants of Health     Financial Resource Strain: Not on file   Food Insecurity: Not on file   Transportation Needs: Not on file   Physical Activity: Not on file   Stress: Not on file   Social Connections: Not on file   Intimate Partner Violence: Not on file   Housing Stability: Not on file       Family History   Problem Relation Age of Onset   • Cancer Father 70        cancer of unknown primary   • Heart Disease Mother    • Heart Disease Son 42        MI and coronary stent placement   • Diabetes Son    • Hypertension Son    • Diabetes Sister        Allergies   Allergen Reactions   • Amitriptyline      Excessive sedation --even at 10 mg dose   • Lovastatin      Muscle aches  Muscle aches     • Trazodone      Irregular pulse   • Bactrim [Sulfamethoxazole W-Trimethoprim]      Shaking   • Levaquin      Nausea        Current Outpatient Medications   Medication Sig Dispense Refill   • esomeprazole (NEXIUM) 40 MG delayed-release capsule TAKE 1 CAPSULE BY MOUTH ONCE A DAY 30 MINUTES BEFORE BREAKFAST MEAL 90 Capsule 0   • propafenone (RYTHMOL) 150 MG Tab      •  "propranolol (INDERAL) 20 MG Tab TAKE 1 TABLET BY MOUTH TWICE A DAY (Patient taking differently: Take 20 mg by mouth every day.) 180 tablet 1   • rivaroxaban (XARELTO) 20 MG Tab tablet Take 1 tablet by mouth with dinner. 90 tablet 3     No current facility-administered medications for this visit.       Physical Exam:  Vitals:    05/10/22 1309   BP: 122/70   BP Location: Left arm   Patient Position: Sitting   BP Cuff Size: Adult   Pulse: (!) 54   Resp: 16   SpO2: 99%   Weight: 57.6 kg (127 lb)   Height: 1.702 m (5' 7\")     General appearance: NAD, conversant  HEENT: PERRL, neck is supple with FROM  Lungs: Clear to auscultation, normal respiratory effort  CV: RRR, no murmurs/rubs/gallops, no JVD  Abdomen: Soft, non-tender with normal bowel sounds  Extremities: No peripheral edema, no clubbing or cyanosis  Skin: No rash, lesions, or ulcers  Psych: Alert and oriented to person, place and time    Data:  Labs reviewed    Prior echo/stress reviewed:  Normal systolic function    EKG interpreted by me:  NSR    Impression/Plan:  1. Paroxysmal atrial fibrillation (HCC)  EKG   2. Essential tremor     3. PVC's (premature ventricular contractions)     4. History of loop recorder       -Loop data showing very minimal arrhythmia burden  -Symptomatically doing well and I do not think we need to change our antiarrhythmic management  -As far as the interaction with primidone goes, I discussed with her I did not want to potentially decrease effectiveness of OAC as she did have history of prior CVA and she is at higher risk  -I discussed options including switching to warfarin and even appendage closure  -They will discuss with neurology also  -F/u in 6 mo    Josue Moody MD    "

## 2022-05-11 ENCOUNTER — OFFICE VISIT (OUTPATIENT)
Dept: NEUROLOGY | Facility: MEDICAL CENTER | Age: 67
End: 2022-05-11
Attending: PSYCHIATRY & NEUROLOGY
Payer: MEDICARE

## 2022-05-11 VITALS
DIASTOLIC BLOOD PRESSURE: 66 MMHG | RESPIRATION RATE: 12 BRPM | SYSTOLIC BLOOD PRESSURE: 118 MMHG | HEIGHT: 67 IN | WEIGHT: 127 LBS | HEART RATE: 55 BPM | OXYGEN SATURATION: 99 % | BODY MASS INDEX: 19.93 KG/M2 | TEMPERATURE: 98 F

## 2022-05-11 DIAGNOSIS — G25.0 ESSENTIAL TREMOR: ICD-10-CM

## 2022-05-11 PROCEDURE — 99211 OFF/OP EST MAY X REQ PHY/QHP: CPT | Performed by: PSYCHIATRY & NEUROLOGY

## 2022-05-11 PROCEDURE — 99214 OFFICE O/P EST MOD 30 MIN: CPT | Performed by: PSYCHIATRY & NEUROLOGY

## 2022-05-11 RX ORDER — TOPIRAMATE 25 MG/1
TABLET ORAL
Qty: 240 TABLET | Refills: 2 | Status: SHIPPED | OUTPATIENT
Start: 2022-05-11 | End: 2022-07-04

## 2022-05-11 ASSESSMENT — FIBROSIS 4 INDEX: FIB4 SCORE: 1.23

## 2022-05-11 NOTE — PROGRESS NOTES
"Chief Complaint   Patient presents with   • Tremors     Follow up       History of present illness:  Susy Szymanski 67 y.o. female with bothersome essential tremor, currently on propranolol 20 mg once daily, with progressively worsening tremor.  She desires greater tremor relief.  Given that she is on rivaroxaban for treatment of atrial fibrillation, I will not use primidone, and I provided instructions for her to increase her propranolol dosage.  She will increase now to 40 mg daily of propranolol, followed by 60 mg daily after 2 weeks if well tolerated.  Propranolol is not well tolerated or ineffective at higher doses, topiramate would be the next option.  She still has problems eating and cannot write well.   Her heart rate has been in the 40's on propranolol 20mg twice daily. Propranolol has been helpful but the tremor is still bothersome.       Past medical history:   Past Medical History:   Diagnosis Date   • Anxiety    • Arrhythmia     afib   • ASTHMA 4/9/21:  Patient denies   • Atrial fibrillation (HCC)    • Bowel habit changes     constipation   • Cataract 04/09/2001    \"Small, not affecting eyes\"   • Chickenpox    • Constipation    • Depression 04/09/2021    Patient denies   • Frequent urination    • GERD (gastroesophageal reflux disease)    • Heartburn    • Hiatal hernia with GERD 11/5/2019   • Hiatus hernia syndrome    • High cholesterol    • History of esophageal stricture 11/5/2019   • Indigestion    • Kidney stone    • Osteoporosis    • Painful joint    • Palpitations    • Palpitations    • Premature atrial contractions    • Rhinitis    • Ringing in ears    • Stroke (HCC) 04/09/2021 Jan. 2021.  Blood clot in brain RT A-Fib   • Tonsillitis    • Wears glasses        Past surgical history:   Past Surgical History:   Procedure Laterality Date   • COLONOSCOPY WITH POLYP  June 2008    benign polyp  Dr Paul    • CHOLECYSTECTOMY  January 2008    laparoscopic   • EGD WITH ASP/BX  September 2006    " inflammation only ---Dr Paul   • CHOLECYSTECTOMY     • OTHER CARDIAC SURGERY  Around 2000    Ablation   • SINUSOTOMIES         Family history:   Family History   Problem Relation Age of Onset   • Cancer Father 70        cancer of unknown primary   • Heart Disease Mother    • Heart Disease Son 42        MI and coronary stent placement   • Diabetes Son    • Hypertension Son    • Diabetes Sister        Social history:   Social History     Socioeconomic History   • Marital status:      Spouse name: Not on file   • Number of children: Not on file   • Years of education: Not on file   • Highest education level: Not on file   Occupational History   • Occupation: retired   Tobacco Use   • Smoking status: Never Smoker   • Smokeless tobacco: Never Used   Vaping Use   • Vaping Use: Never used   Substance and Sexual Activity   • Alcohol use: No     Alcohol/week: 0.0 oz   • Drug use: No   • Sexual activity: Yes     Partners: Male     Birth control/protection: Post-Menopausal   Other Topics Concern   • Not on file   Social History Narrative   • Not on file     Social Determinants of Health     Financial Resource Strain: Not on file   Food Insecurity: Not on file   Transportation Needs: Not on file   Physical Activity: Not on file   Stress: Not on file   Social Connections: Not on file   Intimate Partner Violence: Not on file   Housing Stability: Not on file       Current medications:   Current Outpatient Medications   Medication   • topiramate (TOPAMAX) 25 MG Tab   • esomeprazole (NEXIUM) 40 MG delayed-release capsule   • propranolol (INDERAL) 20 MG Tab   • rivaroxaban (XARELTO) 20 MG Tab tablet   • propafenone (RYTHMOL) 150 MG Tab     No current facility-administered medications for this visit.       Medication Allergy:  Allergies   Allergen Reactions   • Amitriptyline      Excessive sedation --even at 10 mg dose   • Lovastatin      Muscle aches  Muscle aches     • Trazodone      Irregular pulse   • Bactrim  "[Sulfamethoxazole W-Trimethoprim]      Shaking   • Levaquin      Nausea        Physical examination:   Vitals:    05/11/22 1258   BP: 118/66   BP Location: Right arm   Patient Position: Sitting   BP Cuff Size: Adult   Pulse: (!) 55   Resp: 12   Temp: 36.7 °C (98 °F)   TempSrc: Temporal   SpO2: 99%   Weight: 57.6 kg (127 lb)   Height: 1.702 m (5' 7\")     Neurological Exam  Mental Status  Awake and alert. Speech is normal. Language is fluent with no aphasia.    Motor   The following abnormal movements were seen: Left greater than right action tremor.  The tremor is mild to moderate.  Most noticeable when she is holding a pen close to a target.  Finger to nose is not markedly impaired..        Gait  Casual gait is normal including stance, stride, and arm swing. Normal tandem gait.       Labs:  I reviewed the following labs personally:  None    Imaging:   None     ASSESSMENT AND PLAN:  Problem List Items Addressed This Visit     Essential tremor    Relevant Medications    topiramate (TOPAMAX) 25 MG Tab          1. Essential tremor  - topiramate (TOPAMAX) 25 MG Tab; Week 1: 1 pill daily Week 2: 1 pill twice daily Week 3: 2 pills in the morning and 1 pill in the evening Week 4: 2 pills twice daily  Dispense: 240 Tablet; Refill: 2    Her essential tremor is bothersome, but on my evaluation is not severe. She is not having sufficient benefit with propranolol 20 mg twice daily.  Higher doses were not tolerated due to bradycardia.  I am unable to use primidone due to current use of rivaroxaban for atrial fibrillation.  I have prescribed topiramate, with instructions to increase gradually to 50 mg twice daily as needed.    FOLLOW-UP:   Return in about 4 months (around 9/11/2022).    Total time spent for the day for this patient unrelated to procedure time is: 27 minutes. I spent 16 minutes in face to face time and I spent 1 minutes pre-charting and 10 minutes in post-visit documentation.      Dr. Gelacio Mota D.O.  [facility] " Neurology

## 2022-05-11 NOTE — PATIENT INSTRUCTIONS
Start topiramate 25mg     Week 1: 1 pill daily   Week 2: 1 pill twice daily   Week 3: 2 pills in the morning and 1 pill in the evening   Week 4: 2 pills twice daily

## 2022-05-13 ENCOUNTER — HOSPITAL ENCOUNTER (OUTPATIENT)
Dept: LAB | Facility: MEDICAL CENTER | Age: 67
End: 2022-05-13
Payer: MEDICARE

## 2022-05-13 DIAGNOSIS — I48.0 PAROXYSMAL ATRIAL FIBRILLATION (HCC): ICD-10-CM

## 2022-05-13 PROCEDURE — 36415 COLL VENOUS BLD VENIPUNCTURE: CPT | Mod: GA

## 2022-05-13 PROCEDURE — 82306 VITAMIN D 25 HYDROXY: CPT | Mod: GA

## 2022-05-14 LAB — 25(OH)D3 SERPL-MCNC: 34 NG/ML (ref 30–100)

## 2022-05-14 PROCEDURE — 36415 COLL VENOUS BLD VENIPUNCTURE: CPT | Mod: GA

## 2022-05-14 PROCEDURE — 82306 VITAMIN D 25 HYDROXY: CPT | Mod: GA

## 2022-05-16 RX ORDER — RIVAROXABAN 20 MG/1
TABLET, FILM COATED ORAL
Qty: 90 TABLET | Refills: 3 | Status: SHIPPED | OUTPATIENT
Start: 2022-05-16 | End: 2023-06-20 | Stop reason: SDUPTHER

## 2022-05-19 ENCOUNTER — NON-PROVIDER VISIT (OUTPATIENT)
Dept: CARDIOLOGY | Facility: MEDICAL CENTER | Age: 67
End: 2022-05-19
Payer: MEDICARE

## 2022-05-19 PROCEDURE — 93298 REM INTERROG DEV EVAL SCRMS: CPT | Performed by: INTERNAL MEDICINE

## 2022-05-21 NOTE — CARDIAC REMOTE MONITOR - SCAN
Device transmission reviewed. Device demonstrated appropriate function.       Electronically Signed by: Roberto Anglin M.D.    5/23/2022  5:49 PM

## 2022-06-17 LAB — EKG IMPRESSION: NORMAL

## 2022-06-19 ENCOUNTER — NON-PROVIDER VISIT (OUTPATIENT)
Dept: CARDIOLOGY | Facility: MEDICAL CENTER | Age: 67
End: 2022-06-19
Payer: MEDICARE

## 2022-06-19 PROCEDURE — 93298 REM INTERROG DEV EVAL SCRMS: CPT | Performed by: INTERNAL MEDICINE

## 2022-07-19 NOTE — CARDIAC REMOTE MONITOR - SCAN
Device transmission reviewed. Device demonstrated appropriate function.       Electronically Signed by: Roberto Anglin M.D.    7/23/2022  3:39 PM

## 2022-07-20 ENCOUNTER — NON-PROVIDER VISIT (OUTPATIENT)
Dept: CARDIOLOGY | Facility: MEDICAL CENTER | Age: 67
End: 2022-07-20
Payer: MEDICARE

## 2022-07-20 PROCEDURE — 93298 REM INTERROG DEV EVAL SCRMS: CPT | Performed by: INTERNAL MEDICINE

## 2022-07-22 NOTE — CARDIAC REMOTE MONITOR - SCAN
Device transmission reviewed. Device demonstrated appropriate function.       Electronically Signed by: Lazaro Hirsch M.D.    7/27/2022  9:29 AM

## 2022-08-12 DIAGNOSIS — K21.9 GASTROESOPHAGEAL REFLUX DISEASE WITHOUT ESOPHAGITIS: ICD-10-CM

## 2022-08-12 DIAGNOSIS — I48.0 PAROXYSMAL ATRIAL FIBRILLATION (HCC): Primary | ICD-10-CM

## 2022-08-12 RX ORDER — ESOMEPRAZOLE MAGNESIUM 40 MG/1
40 CAPSULE, DELAYED RELEASE ORAL
Qty: 90 CAPSULE | Refills: 0 | Status: SHIPPED | OUTPATIENT
Start: 2022-08-12

## 2022-08-12 NOTE — TELEPHONE ENCOUNTER
Is the patient due for a refill? Yes    Was the patient seen the past year? Yes    Date of last office visit: 05/10/22    Does the patient have an upcoming appointment?  Yes   If yes, When? 11/07/22    Provider to refill:SS    Does the patients insurance require a 100 day supply?  No

## 2022-08-12 NOTE — TELEPHONE ENCOUNTER
Received request via: Patient    Was the patient seen in the last year in this department? Yes  LOV 02/07/2022  Does the patient have an active prescription (recently filled or refills available) for medication(s) requested? No

## 2022-08-15 RX ORDER — PROPRANOLOL HYDROCHLORIDE 20 MG/1
20 TABLET ORAL 2 TIMES DAILY
Qty: 180 TABLET | Refills: 2 | Status: SHIPPED | OUTPATIENT
Start: 2022-08-15 | End: 2023-05-17

## 2022-08-20 ENCOUNTER — NON-PROVIDER VISIT (OUTPATIENT)
Dept: CARDIOLOGY | Facility: MEDICAL CENTER | Age: 67
End: 2022-08-20
Payer: MEDICARE

## 2022-08-20 PROCEDURE — 93298 REM INTERROG DEV EVAL SCRMS: CPT | Performed by: INTERNAL MEDICINE

## 2022-08-22 NOTE — CARDIAC REMOTE MONITOR - SCAN
Device transmission reviewed. Device demonstrated appropriate function.       Electronically Signed by: Josue Moody M.D.    8/30/2022  10:51 AM

## 2022-08-25 ENCOUNTER — PATIENT MESSAGE (OUTPATIENT)
Dept: CARDIOLOGY | Facility: MEDICAL CENTER | Age: 67
End: 2022-08-25
Payer: MEDICARE

## 2022-09-02 ENCOUNTER — TELEPHONE (OUTPATIENT)
Dept: CARDIOLOGY | Facility: MEDICAL CENTER | Age: 67
End: 2022-09-02
Payer: MEDICARE

## 2022-09-02 RX ORDER — FLECAINIDE ACETATE 50 MG/1
50 TABLET ORAL
Qty: 15 TABLET | Refills: 0 | Status: SHIPPED | OUTPATIENT
Start: 2022-09-02 | End: 2023-08-22

## 2022-09-02 NOTE — TELEPHONE ENCOUNTER
SS    Caller: - Jesús (spouse)    Medication Name and Dosage:    Flecainide.    Did patient contact pharmacy (If no, please call pharmacy first): ye    Medication amount left: almost out    Preferred Pharmacy: [facility] _ [place]    Other questions (Topic): Needs new script to be able to take through customs. (Please see MyChart Message)    Callback Number (Will only call for issues):  [phone]    Thank you,   Denise LOMBARDI

## 2022-09-03 NOTE — TELEPHONE ENCOUNTER
"Pt sent Txt4 message 8/28 asking for flecainide refill and still waiting ofr Dr. Moody's response.      S/w pt and apologized for delay. She is heading to [place] on Sunday. She has not needed flecainide in a long time but likes having on hand as a \"pill in pocket\" for palpitations.      She is taking propanolol daily and Xarelto. Discussed lifestyle modifications for reducing occurrence on PVCs. Reviewed last device transmission.     Flecainide refilled for 15 tablets to use PRN for palpitations. Pt advised to discuss continuing PRN flecainide with Dr. Moody at next office visit in November.      FYI to Dr. Moody  "

## 2022-09-20 ENCOUNTER — NON-PROVIDER VISIT (OUTPATIENT)
Dept: CARDIOLOGY | Facility: MEDICAL CENTER | Age: 67
End: 2022-09-20
Payer: MEDICARE

## 2022-09-20 PROCEDURE — 93298 REM INTERROG DEV EVAL SCRMS: CPT | Performed by: INTERNAL MEDICINE

## 2022-09-21 NOTE — CARDIAC REMOTE MONITOR - SCAN
Device transmission reviewed. Device demonstrated appropriate function.       Electronically Signed by: Roberto Anglin M.D.    9/23/2022  6:42 PM

## 2022-09-23 ENCOUNTER — TELEMEDICINE (OUTPATIENT)
Dept: TELEHEALTH | Facility: TELEMEDICINE | Age: 67
End: 2022-09-23
Payer: MEDICARE

## 2022-09-23 ENCOUNTER — APPOINTMENT (OUTPATIENT)
Dept: NEUROLOGY | Facility: MEDICAL CENTER | Age: 67
End: 2022-09-23
Attending: PSYCHIATRY & NEUROLOGY
Payer: COMMERCIAL

## 2022-09-23 VITALS — OXYGEN SATURATION: 96 % | BODY MASS INDEX: 19.89 KG/M2 | HEIGHT: 67 IN | TEMPERATURE: 100.1 F | HEART RATE: 72 BPM

## 2022-09-23 DIAGNOSIS — U07.1 COVID-19: ICD-10-CM

## 2022-09-23 PROCEDURE — 99213 OFFICE O/P EST LOW 20 MIN: CPT | Mod: 95 | Performed by: NURSE PRACTITIONER

## 2022-09-23 NOTE — PATIENT INSTRUCTIONS

## 2022-09-23 NOTE — PROGRESS NOTES
Virtual Visit: Established Patient   This visit was conducted via Zoom using secure and encrypted videoconferencing technology.   The patient was in their home in the state of [place].    The patient's identity was confirmed and verbal consent was obtained for this virtual visit.     Subjective:   CC:   Chief Complaint   Patient presents with    Coronavirus Screening     Sx's started 9/21/22. Tested positive 9/23/22       Susy Szymanski is a 67 y.o. female presenting for evaluation and management of:    Started yesterday, with allergy like symptoms. Tested positive for COVID today. Body aches, chills, fever 100.1. No N/V/D. Intermittent cough. Sore throat. Has not taken medications for symptoms.      ROS   No SOB or wheezing. Intermittent hx of PVC. Environmental allergies.     Current medicines (including changes today)  Current Outpatient Medications   Medication Sig Dispense Refill    molnupiravir 200 MG capsule Take 4 Capsules by mouth 2 times a day for 5 days. 40 Capsule 0    flecainide (TAMBOCOR) 50 MG tablet Take 1 Tablet by mouth 1 time a day as needed (Palpitations, PVCs). 15 Tablet 0    propranolol (INDERAL) 20 MG Tab Take 1 Tablet by mouth 2 times a day. 180 Tablet 2    esomeprazole (NEXIUM) 40 MG delayed-release capsule Take 1 Capsule by mouth every morning before breakfast. 90 Capsule 0    topiramate (TOPAMAX) 25 MG Tab TAKE 1 TABLET EVERY DAY FOR THE FIRST WEEK.TAKE 1 TABLET TWICE A DAY FOR THE SECOND WEEK. TAKE 2 TABLETS IN THE MORNING AND 1 TABLET IN THE EVENING FOR WEEK 3. TAKE 2 TABLETS TWICE A DAY FOR WEEK 4. 720 Tablet 0    XARELTO 20 MG Tab tablet TAKE 1 TABLET BY MOUTH EVERY DAY WITH DINNER 90 Tablet 3     No current facility-administered medications for this visit.       Patient Active Problem List    Diagnosis Date Noted    PVC's (premature ventricular contractions) 09/14/2021    H/O cardiac radiofrequency ablation 8/18/21 Dr Moody.  PVI (extension of the RPV), PWI and RVOT (PVCs) 09/14/2021  "   Abnormal CT scan, lung 09/07/2021    Late effect of stroke 04/12/2021    Stroke (HCC) 02/24/2021    Essential tremor 02/24/2021    Hiatal hernia with GERD 11/05/2019    History of esophageal stricture 11/05/2019    Paroxysmal atrial fibrillation (HCC) 07/11/2019    Chronic hip pain, bilateral 10/18/2018    Atrial premature complexes 05/31/2013    OSTEOPOROSIS 08/27/2012    Asthma 09/09/2011    Social anxiety disorder 09/09/2011    LDL (low density lipoprotein receptor disorder) 09/09/2011    Gastroesophageal reflux disease without esophagitis 10/08/2009        Objective:   Pulse 72   Temp 37.8 °C (100.1 °F) (Temporal)   Ht 1.702 m (5' 7\")   LMP 01/01/2004   SpO2 96%   BMI 19.89 kg/m²   RR 16     Physical Exam:  Constitutional: Alert, no distress, well-groomed.Sitting up in bed.  Skin: No rashes in visible areas.  Eye: Round. Conjunctiva clear.  ENMT: Lips pink without lesions, moist mucous membranes. Phonation normal. Nasal congestion.  Respiratory: Unlabored respiratory effort, no audible wheeze  Psych: Alert and oriented x3, normal affect and mood.     Assessment and Plan:   The following treatment plan was discussed:     1. COVID-19  - molnupiravir 200 MG capsule; Take 4 Capsules by mouth 2 times a day for 5 days.  Dispense: 40 Capsule; Refill: 0  Symptomatic care.  -Oral hydration and rest.   -Cough control: nonpharmacologic options for cough relief such as throat lozenges, hot tea, honey.  -Over the counter expectorant as directed; Guaifenesin (Mucinex).  -Tylenol for pain and fever as directed.   -Warm salt water gargles.  -OTC Throat lozenges or spray (Cepacol).    Seek emergency medical care immediately for: Trouble breathing, persistent pain or pressure in the chest, confusion, inability to wake or stay awake, bluish lips or face, persistent tachycardia (fast heart rate), prolonged dizziness, persistent high grade fevers. Follow up for prolonged cough, persistent wheezing, persistent throat pain, " difficulty swallowing, persistent fevers, leg swelling, or any other concerns. Follow up with your Primary Care Provider.     -Discussed viral etiology. COVID S&S, and self isolation guidelines. S&S of PNA with follow up. Contraindicated medication interaction with paxlovid and Tambocor and xarelto. Discussed alternative antiviral therapy. Benefits, and common adverse effects.     Follow-up: Return if symptoms worsen or fail to improve.

## 2022-10-21 ENCOUNTER — NON-PROVIDER VISIT (OUTPATIENT)
Dept: CARDIOLOGY | Facility: MEDICAL CENTER | Age: 67
End: 2022-10-21
Payer: MEDICARE

## 2022-10-21 PROCEDURE — 93298 REM INTERROG DEV EVAL SCRMS: CPT | Performed by: INTERNAL MEDICINE

## 2022-10-24 NOTE — CARDIAC REMOTE MONITOR - SCAN
Device transmission reviewed. Device demonstrated appropriate function.       Electronically Signed by: Josue Moody M.D.    10/31/2022  9:02 AM

## 2022-11-01 ENCOUNTER — HOSPITAL ENCOUNTER (OUTPATIENT)
Dept: RADIOLOGY | Facility: MEDICAL CENTER | Age: 67
End: 2022-11-01
Attending: INTERNAL MEDICINE
Payer: MEDICARE

## 2022-11-01 DIAGNOSIS — I48.0 PAROXYSMAL ATRIAL FIBRILLATION (HCC): ICD-10-CM

## 2022-11-01 DIAGNOSIS — R91.8 PULMONARY NODULES: ICD-10-CM

## 2022-11-01 DIAGNOSIS — R93.89 ABNORMAL CT OF THE CHEST: ICD-10-CM

## 2022-11-01 PROCEDURE — 71250 CT THORAX DX C-: CPT

## 2022-11-03 ENCOUNTER — PATIENT MESSAGE (OUTPATIENT)
Dept: HEALTH INFORMATION MANAGEMENT | Facility: OTHER | Age: 67
End: 2022-11-03

## 2022-11-07 ENCOUNTER — OFFICE VISIT (OUTPATIENT)
Dept: CARDIOLOGY | Facility: MEDICAL CENTER | Age: 67
End: 2022-11-07
Payer: MEDICARE

## 2022-11-07 VITALS
HEIGHT: 67 IN | SYSTOLIC BLOOD PRESSURE: 122 MMHG | HEART RATE: 54 BPM | BODY MASS INDEX: 20.09 KG/M2 | OXYGEN SATURATION: 98 % | WEIGHT: 128 LBS | RESPIRATION RATE: 14 BRPM | DIASTOLIC BLOOD PRESSURE: 82 MMHG

## 2022-11-07 DIAGNOSIS — I48.0 PAROXYSMAL ATRIAL FIBRILLATION (HCC): ICD-10-CM

## 2022-11-07 DIAGNOSIS — Z45.09 ENCOUNTER FOR LOOP RECORDER CHECK: ICD-10-CM

## 2022-11-07 DIAGNOSIS — G25.0 ESSENTIAL TREMOR: ICD-10-CM

## 2022-11-07 DIAGNOSIS — Z95.818 STATUS POST PLACEMENT OF IMPLANTABLE LOOP RECORDER: ICD-10-CM

## 2022-11-07 PROCEDURE — 93291 INTERROG DEV EVAL SCRMS IP: CPT | Performed by: INTERNAL MEDICINE

## 2022-11-07 PROCEDURE — 93000 ELECTROCARDIOGRAM COMPLETE: CPT | Mod: 59 | Performed by: INTERNAL MEDICINE

## 2022-11-07 PROCEDURE — 99214 OFFICE O/P EST MOD 30 MIN: CPT | Performed by: INTERNAL MEDICINE

## 2022-11-07 ASSESSMENT — FIBROSIS 4 INDEX: FIB4 SCORE: 1.23

## 2022-11-07 NOTE — PROGRESS NOTES
"Arrhythmia Clinic Note (Established patient)    DOS: 11/7/2022    Chief complaint/Reason for consult: AF    Interval History:  Pt is a 68 yo F. History of AF. S/p initial ablation then off OAC had CVA event. Underwent ILR found to have significant recurrence. Repeat ablation was done also targeting PVCs at the time. Post ablation has been essentially arrhythmia free. Here for device check.    One ~4 minute episode of AF looks to be sinus with PACs.    ROS (+ highlighted in red):  General--Negative for fatigue, weight loss or weight gain  Cardiovascular--Negative for CP, orthopnea, PND    Past Medical History:   Diagnosis Date    Anxiety     Arrhythmia     afib    ASTHMA 4/9/21:  Patient denies    Atrial fibrillation (HCC)     Bowel habit changes     constipation    Cataract 04/09/2001    \"Small, not affecting eyes\"    Chickenpox     Constipation     Depression 04/09/2021    Patient denies    Frequent urination     GERD (gastroesophageal reflux disease)     Heartburn     Hiatal hernia with GERD 11/5/2019    Hiatus hernia syndrome     High cholesterol     History of esophageal stricture 11/5/2019    Indigestion     Kidney stone     Osteoporosis     Painful joint     Palpitations     Palpitations     Premature atrial contractions     Rhinitis     Ringing in ears     Stroke (HCC) 04/09/2021 Jan. 2021.  Blood clot in brain RT A-Fib    Tonsillitis     Wears glasses        Past Surgical History:   Procedure Laterality Date    COLONOSCOPY WITH POLYP  June 2008    benign polyp  Dr Paul     CHOLECYSTECTOMY  January 2008    laparoscopic    EGD WITH ASP/BX  September 2006    inflammation only ---Dr Paul    CHOLECYSTECTOMY      OTHER CARDIAC SURGERY  Around 2000    Ablation    SINUSOTOMIES         Social History     Socioeconomic History    Marital status:      Spouse name: Not on file    Number of children: Not on file    Years of education: Not on file    Highest education level: Not on file   Occupational " History    Occupation: retired   Tobacco Use    Smoking status: Never    Smokeless tobacco: Never   Vaping Use    Vaping Use: Never used   Substance and Sexual Activity    Alcohol use: No     Alcohol/week: 0.0 oz    Drug use: No    Sexual activity: Yes     Partners: Male     Birth control/protection: Post-Menopausal   Other Topics Concern    Not on file   Social History Narrative    Not on file     Social Determinants of Health     Financial Resource Strain: Not on file   Food Insecurity: Not on file   Transportation Needs: Not on file   Physical Activity: Not on file   Stress: Not on file   Social Connections: Not on file   Intimate Partner Violence: Not on file   Housing Stability: Not on file       Family History   Problem Relation Age of Onset    Cancer Father 70        cancer of unknown primary    Heart Disease Mother     Heart Disease Son 42        MI and coronary stent placement    Diabetes Son     Hypertension Son     Diabetes Sister        Allergies   Allergen Reactions    Amitriptyline      Excessive sedation --even at 10 mg dose    Lovastatin      Muscle aches  Muscle aches      Trazodone      Irregular pulse    Bactrim [Sulfamethoxazole W-Trimethoprim]      Shaking    Levaquin      Nausea        Current Outpatient Medications   Medication Sig Dispense Refill    flecainide (TAMBOCOR) 50 MG tablet Take 1 Tablet by mouth 1 time a day as needed (Palpitations, PVCs). 15 Tablet 0    propranolol (INDERAL) 20 MG Tab Take 1 Tablet by mouth 2 times a day. 180 Tablet 2    esomeprazole (NEXIUM) 40 MG delayed-release capsule Take 1 Capsule by mouth every morning before breakfast. 90 Capsule 0    topiramate (TOPAMAX) 25 MG Tab TAKE 1 TABLET EVERY DAY FOR THE FIRST WEEK.TAKE 1 TABLET TWICE A DAY FOR THE SECOND WEEK. TAKE 2 TABLETS IN THE MORNING AND 1 TABLET IN THE EVENING FOR WEEK 3. TAKE 2 TABLETS TWICE A DAY FOR WEEK 4. 720 Tablet 0    XARELTO 20 MG Tab tablet TAKE 1 TABLET BY MOUTH EVERY DAY WITH DINNER 90 Tablet 3  "    No current facility-administered medications for this visit.       Physical Exam:  Vitals:    11/07/22 1250   BP: 122/82   BP Location: Left arm   Patient Position: Sitting   BP Cuff Size: Adult   Pulse: (!) 54   Resp: 14   SpO2: 98%   Weight: 58.1 kg (128 lb)   Height: 1.702 m (5' 7\")     General appearance: NAD, conversant  HEENT: PERRL, neck is supple with FROM  Lungs: Clear to auscultation, normal respiratory effort  CV: RRR, no murmurs/rubs/gallops, no JVD  Abdomen: Soft, non-tender with normal bowel sounds  Extremities: No peripheral edema, no clubbing or cyanosis  Skin: No rash, lesions, or ulcers  Psych: Alert and oriented to person, place and time    Data:  Labs reviewed    Prior echo/stress reviewed:  Normal LV function    EKG interpreted by me:  Sinus    Impression/Plan:  1. Paroxysmal atrial fibrillation (HCC)  EKG      2. Encounter for loop recorder check        3. Essential tremor          -Rhythm wise doing well  -The tremor is still bothering her  -I discussed that she can always try different medical therapy and if is effective and interacting with her DOAC, and their are not alternative DOAC options that do not interact, and the quality of life improvement worth it, then EMILY closure reasonable  -F/u in 6 mo    Josue Moody MD   "

## 2022-11-10 ENCOUNTER — OFFICE VISIT (OUTPATIENT)
Dept: MEDICAL GROUP | Facility: LAB | Age: 67
End: 2022-11-10
Payer: MEDICARE

## 2022-11-10 VITALS
SYSTOLIC BLOOD PRESSURE: 112 MMHG | WEIGHT: 128 LBS | DIASTOLIC BLOOD PRESSURE: 70 MMHG | BODY MASS INDEX: 20.09 KG/M2 | HEART RATE: 57 BPM | HEIGHT: 67 IN | TEMPERATURE: 98.8 F | OXYGEN SATURATION: 96 % | RESPIRATION RATE: 14 BRPM

## 2022-11-10 DIAGNOSIS — Z12.31 ENCOUNTER FOR SCREENING MAMMOGRAM FOR BREAST CANCER: ICD-10-CM

## 2022-11-10 DIAGNOSIS — E04.1 THYROID NODULE: ICD-10-CM

## 2022-11-10 DIAGNOSIS — Z78.0 MENOPAUSE: ICD-10-CM

## 2022-11-10 PROCEDURE — 99214 OFFICE O/P EST MOD 30 MIN: CPT | Performed by: FAMILY MEDICINE

## 2022-11-10 RX ORDER — PREDNISOLONE ACETATE 10 MG/ML
SUSPENSION/ DROPS OPHTHALMIC
COMMUNITY
Start: 2022-11-09 | End: 2023-09-21

## 2022-11-10 ASSESSMENT — FIBROSIS 4 INDEX: FIB4 SCORE: 1.23

## 2022-11-10 NOTE — PROGRESS NOTES
Subjective:   Susy Szymanski is a 67 y.o. female here today for   Chief Complaint   Patient presents with    Results     #Thyroid nodule:  -Patient here to follow-up after chest CT was completed this month by pulmonologist.  She is having some breathing difficulty and has been having yearly CT results.  This last CT result did show a 1.5 cm right inferior thyroid lesion.  Previous CT scans did not show any lesions.  Patient states that she is feeling well.  Denies any fevers, chills, night sweats, difficulty with sleep, tremors, changes to bowel movements.  He is here today to discuss these findings and to discuss further evaluation.    #Health maintenance:  -Patient due for mammogram.  -Patient due for bone density study.  -Due for vaccines including zoster and hepatitis B for which patient will follow-up at pharmacy.    Allergies   Allergen Reactions    Amitriptyline      Excessive sedation --even at 10 mg dose    Lovastatin      Muscle aches  Muscle aches      Trazodone      Irregular pulse    Bactrim [Sulfamethoxazole W-Trimethoprim]      Shaking    Levaquin      Nausea          Current medicines (including changes today)  Current Outpatient Medications   Medication Sig Dispense Refill    prednisoLONE acetate (PRED FORTE) 1 % Suspension       flecainide (TAMBOCOR) 50 MG tablet Take 1 Tablet by mouth 1 time a day as needed (Palpitations, PVCs). 15 Tablet 0    propranolol (INDERAL) 20 MG Tab Take 1 Tablet by mouth 2 times a day. 180 Tablet 2    esomeprazole (NEXIUM) 40 MG delayed-release capsule Take 1 Capsule by mouth every morning before breakfast. 90 Capsule 0    topiramate (TOPAMAX) 25 MG Tab TAKE 1 TABLET EVERY DAY FOR THE FIRST WEEK.TAKE 1 TABLET TWICE A DAY FOR THE SECOND WEEK. TAKE 2 TABLETS IN THE MORNING AND 1 TABLET IN THE EVENING FOR WEEK 3. TAKE 2 TABLETS TWICE A DAY FOR WEEK 4. 720 Tablet 0    XARELTO 20 MG Tab tablet TAKE 1 TABLET BY MOUTH EVERY DAY WITH DINNER 90 Tablet 3     No current  "facility-administered medications for this visit.     She  has a past medical history of Anxiety, Arrhythmia, ASTHMA (4/9/21:  Patient denies), Atrial fibrillation (HCC), Bowel habit changes, Cataract (04/09/2001), Chickenpox, Constipation, Depression (04/09/2021), Frequent urination, GERD (gastroesophageal reflux disease), Heartburn, Hiatal hernia with GERD (11/5/2019), Hiatus hernia syndrome, High cholesterol, History of esophageal stricture (11/5/2019), Indigestion, Kidney stone, Osteoporosis, Painful joint, Palpitations, Palpitations, Premature atrial contractions, Rhinitis, Ringing in ears, Stroke (HCC) (04/09/2021), Tonsillitis, and Wears glasses.    ROS   -See HPI       Objective:     Physical Exam:  /70   Pulse (!) 57   Temp 37.1 °C (98.8 °F) (Temporal)   Resp 14   Ht 1.702 m (5' 7.01\")   Wt 58.1 kg (128 lb)   SpO2 96%  Body mass index is 20.04 kg/m².   Constitutional: Alert, no distress.  Skin: Warm, dry, good turgor, no rashes in visible areas.  Eye: Equal, round and reactive, conjunctiva clear, lids normal.  ENMT: TM's clear bilaterally, lips without lesions, good dentition, oropharynx clear.  Neck: Trachea midline, no masses, no thyromegaly. No cervical or supraclavicular lymphadenopathy.  Respiratory: Unlabored respiratory effort, lungs clear to auscultation, no wheezes, no rhonchi.  Psych: Alert and oriented x3, normal affect and mood.    Imaging:  CT scan completed 11/1/2022:  1.  Similar size of a 6 mm groundglass opacity, now with increased density in the left lower lobe. Follow-up recommendations below.  2.  Mildly increased prominence of a 4 mm peripheral right middle lobe nodule with adjacent tiny nodules. Attention on follow-up.  3.  1.5 cm inferior right thyroid lesion. Recommend nonemergent thyroid ultrasound if this has not been previously performed.  4.  Atherosclerotic changes.  Assessment and Plan:     1. Thyroid nodule  -New problem with uncertain prognosis.  Reviewed CT scan " showing the 1.5 inferior right thyroid lesion.  Further evaluation is needed and will complete thyroid ultrasound as recommended by radiology.  Patient is asymptomatic; however, we will check thyroid function with TSH.  We will follow-up with patient with results.  - US-THYROID; Future  - TSH WITH REFLEX TO FT4; Future    2. Encounter for screening mammogram for breast cancer  - MA-SCREENING MAMMO BILAT W/TOMOSYNTHESIS W/CAD; Future    3. Menopause  - DS-BONE DENSITY STUDY (DEXA); Future      Followup: No follow-ups on file.         PLEASE NOTE: This dictation was created using voice recognition software. I have made every reasonable attempt to correct obvious errors, but I expect that there are errors of grammar and possibly content that I did not discover before finalizing the note.

## 2022-11-17 LAB — EKG IMPRESSION: NORMAL

## 2022-11-21 ENCOUNTER — NON-PROVIDER VISIT (OUTPATIENT)
Dept: CARDIOLOGY | Facility: MEDICAL CENTER | Age: 67
End: 2022-11-21
Payer: MEDICARE

## 2022-11-21 ENCOUNTER — HOSPITAL ENCOUNTER (OUTPATIENT)
Dept: LAB | Facility: MEDICAL CENTER | Age: 67
End: 2022-11-21
Attending: FAMILY MEDICINE
Payer: MEDICARE

## 2022-11-21 DIAGNOSIS — E04.1 THYROID NODULE: ICD-10-CM

## 2022-11-21 LAB — TSH SERPL DL<=0.005 MIU/L-ACNC: 1.67 UIU/ML (ref 0.38–5.33)

## 2022-11-21 PROCEDURE — 84443 ASSAY THYROID STIM HORMONE: CPT

## 2022-11-21 PROCEDURE — 36415 COLL VENOUS BLD VENIPUNCTURE: CPT

## 2022-11-21 PROCEDURE — 93298 REM INTERROG DEV EVAL SCRMS: CPT | Performed by: INTERNAL MEDICINE

## 2022-11-22 ENCOUNTER — HOSPITAL ENCOUNTER (OUTPATIENT)
Dept: RADIOLOGY | Facility: MEDICAL CENTER | Age: 67
End: 2022-11-22
Attending: FAMILY MEDICINE
Payer: MEDICARE

## 2022-11-22 DIAGNOSIS — E04.1 THYROID NODULE: ICD-10-CM

## 2022-11-22 PROCEDURE — 76536 US EXAM OF HEAD AND NECK: CPT

## 2022-11-22 NOTE — CARDIAC REMOTE MONITOR - SCAN
Device transmission reviewed. Device demonstrated appropriate function.       Electronically Signed by: Roberto Anglin M.D.    11/23/2022  10:10 AM

## 2022-11-30 ENCOUNTER — OFFICE VISIT (OUTPATIENT)
Dept: SLEEP MEDICINE | Facility: MEDICAL CENTER | Age: 67
End: 2022-11-30
Payer: MEDICARE

## 2022-11-30 VITALS
HEIGHT: 67 IN | HEART RATE: 55 BPM | SYSTOLIC BLOOD PRESSURE: 110 MMHG | BODY MASS INDEX: 20.25 KG/M2 | DIASTOLIC BLOOD PRESSURE: 72 MMHG | OXYGEN SATURATION: 95 % | WEIGHT: 129 LBS

## 2022-11-30 DIAGNOSIS — R91.8 ABNORMAL CT SCAN, LUNG: ICD-10-CM

## 2022-11-30 PROCEDURE — 99213 OFFICE O/P EST LOW 20 MIN: CPT | Performed by: INTERNAL MEDICINE

## 2022-11-30 RX ORDER — OMEPRAZOLE 20 MG/1
20 CAPSULE, DELAYED RELEASE ORAL DAILY
COMMUNITY
End: 2024-01-02

## 2022-11-30 ASSESSMENT — FIBROSIS 4 INDEX: FIB4 SCORE: 1.23

## 2022-11-30 NOTE — PROGRESS NOTES
Pulmonary Clinic follow up    Date of Service: 11/30/2022    Reason for follow up:  Follow-Up (Abnormal CT of the chest. Last seen 04/25/22) and Results (CT-Chest 11/01/22)      Problem List Items Addressed This Visit       Abnormal CT scan, lung     LLL nodule now more solid   Pt is a never smoker  Low risk of malignancy but need to follow as now the 6 mm is more solid  Reviewed images with both patient and her   Repeat CT in 6 months - they are headed to [place] for the summer so it will be beginning of May and follow up.  Initial CT done 9/2021         Relevant Orders    CT-CHEST (THORAX) W/O         History of Present Illness: Susy Szymanski is a 67 y.o. female with a past medical history paroxymal afib s/p ablation * 2  With the work up  of SOB incidental nodule was seen  Last seen by   She was being followed up for a CT chest as well as her PFTS which showed FEV1/FVC ratio leaa than 70% but she had a bronchodilator response and DLCO is normal  Never smoker  She has no symptoms of SOB    CT scan done 11/1/2022 lll nodule 6 mm and then there is rUL 4 mm nodule  Below is the 6 mm nodule in LLL with now less GG and more solid           Review of Systems   Constitutional: Negative.    HENT: Negative.     Eyes: Negative.    Respiratory: Negative.     Cardiovascular: Negative.    Gastrointestinal: Negative.    Genitourinary: Negative.    Musculoskeletal: Negative.    Skin: Negative.    Neurological: Negative.    Endo/Heme/Allergies: Negative.    Psychiatric/Behavioral: Negative.       Current Outpatient Medications on File Prior to Visit   Medication Sig Dispense Refill    omeprazole (PRILOSEC) 20 MG delayed-release capsule Take 20 mg by mouth every day. Alternating with Nexium      prednisoLONE acetate (PRED FORTE) 1 % Suspension       flecainide (TAMBOCOR) 50 MG tablet Take 1 Tablet by mouth 1 time a day as needed (Palpitations, PVCs). 15 Tablet 0    propranolol (INDERAL) 20 MG Tab Take 1 Tablet  "by mouth 2 times a day. 180 Tablet 2    esomeprazole (NEXIUM) 40 MG delayed-release capsule Take 1 Capsule by mouth every morning before breakfast. 90 Capsule 0    topiramate (TOPAMAX) 25 MG Tab TAKE 1 TABLET EVERY DAY FOR THE FIRST WEEK.TAKE 1 TABLET TWICE A DAY FOR THE SECOND WEEK. TAKE 2 TABLETS IN THE MORNING AND 1 TABLET IN THE EVENING FOR WEEK 3. TAKE 2 TABLETS TWICE A DAY FOR WEEK 4. 720 Tablet 0    XARELTO 20 MG Tab tablet TAKE 1 TABLET BY MOUTH EVERY DAY WITH DINNER 90 Tablet 3     No current facility-administered medications on file prior to visit.       Social History     Tobacco Use    Smoking status: Never    Smokeless tobacco: Never   Vaping Use    Vaping Use: Never used   Substance Use Topics    Alcohol use: No     Alcohol/week: 0.0 oz    Drug use: No        Past Medical History:   Diagnosis Date    Anxiety     Arrhythmia     afib    ASTHMA 4/9/21:  Patient denies    Atrial fibrillation (HCC)     Bowel habit changes     constipation    Cataract 04/09/2001    \"Small, not affecting eyes\"    Chickenpox     Constipation     Depression 04/09/2021    Patient denies    Frequent urination     GERD (gastroesophageal reflux disease)     Heartburn     Hiatal hernia with GERD 11/5/2019    Hiatus hernia syndrome     High cholesterol     History of esophageal stricture 11/5/2019    Indigestion     Kidney stone     Osteoporosis     Painful joint     Palpitations     Palpitations     Premature atrial contractions     Rhinitis     Ringing in ears     Stroke (HCC) 04/09/2021 Jan. 2021.  Blood clot in brain RT A-Fib    Tonsillitis     Wears glasses        Past Surgical History:   Procedure Laterality Date    COLONOSCOPY WITH POLYP  June 2008    benign polyp  Dr Paul     CHOLECYSTECTOMY  January 2008    laparoscopic    EGD WITH ASP/BX  September 2006    inflammation only ---Dr Paul    CHOLECYSTECTOMY      OTHER CARDIAC SURGERY  Around 2000    Ablation    SINUSOTOMIES         Allergies: Amitriptyline, Lovastatin, " "Trazodone, Bactrim [sulfamethoxazole w-trimethoprim], and Levaquin    Family History   Problem Relation Age of Onset    Cancer Father 70        cancer of unknown primary    Heart Disease Mother     Heart Disease Son 42        MI and coronary stent placement    Diabetes Son     Hypertension Son     Diabetes Sister        Vitals:    11/30/22 1319   Height: 1.702 m (5' 7\")   Weight: 58.5 kg (129 lb)   Weight % change since last entry.: 0 %   BP: 110/72   Pulse: (!) 55   BMI (Calculated): 20.2       Physical Examination  Physical Exam  Constitutional:       General: She is not in acute distress.     Appearance: Normal appearance. She is not diaphoretic.   HENT:      Head: Normocephalic and atraumatic.   Eyes:      Conjunctiva/sclera: Conjunctivae normal.      Pupils: Pupils are equal, round, and reactive to light.   Neck:      Thyroid: No thyromegaly.      Vascular: No JVD.      Trachea: No tracheal deviation.   Cardiovascular:      Rate and Rhythm: Normal rate and regular rhythm.      Heart sounds: No murmur heard.    No friction rub. No gallop.   Pulmonary:      Effort: No respiratory distress.      Breath sounds: No stridor. No wheezing or rales.   Chest:      Chest wall: No tenderness.   Abdominal:      Palpations: Abdomen is soft.   Musculoskeletal:         General: No tenderness or deformity.      Cervical back: Normal range of motion and neck supple.   Lymphadenopathy:      Cervical: No cervical adenopathy.   Skin:     General: Skin is warm and dry.      Findings: No rash.   Neurological:      General: No focal deficit present.      Mental Status: She is alert and oriented to person, place, and time.      Gait: Gait is intact.   Psychiatric:         Mood and Affect: Mood and affect normal.         Behavior: Behavior normal.         Thought Content: Thought content normal.         Judgment: Judgment normal.            Christin Chavez M.D., MD [facility]  [facility]  "

## 2022-12-02 ENCOUNTER — HOSPITAL ENCOUNTER (OUTPATIENT)
Dept: RADIOLOGY | Facility: MEDICAL CENTER | Age: 67
End: 2022-12-02
Attending: FAMILY MEDICINE
Payer: MEDICARE

## 2022-12-02 DIAGNOSIS — Z78.0 MENOPAUSE: ICD-10-CM

## 2022-12-02 PROCEDURE — 77080 DXA BONE DENSITY AXIAL: CPT

## 2022-12-03 ASSESSMENT — ENCOUNTER SYMPTOMS
CONSTITUTIONAL NEGATIVE: 1
PSYCHIATRIC NEGATIVE: 1
GASTROINTESTINAL NEGATIVE: 1
MUSCULOSKELETAL NEGATIVE: 1
NEUROLOGICAL NEGATIVE: 1
CARDIOVASCULAR NEGATIVE: 1
RESPIRATORY NEGATIVE: 1
EYES NEGATIVE: 1

## 2022-12-03 NOTE — ASSESSMENT & PLAN NOTE
LLL nodule now more solid   Pt is a never smoker  Low risk of malignancy but need to follow as now the 6 mm is more solid  Reviewed images with both patient and her   Repeat CT in 6 months - they are headed to [place] for the summer so it will be beginning of May and follow up.  Initial CT done 9/2021   [facility] Counseling:  I discussed with the patient the risks of imiquimod including but not limited to erythema, scaling, itching, weeping, crusting, and pain.  Patient understands that the inflammatory response to imiquimod is variable from person to person and was educated regarded proper titration schedule.  If flu-like symptoms develop, patient knows to discontinue the medication and contact us.

## 2022-12-04 ENCOUNTER — PATIENT MESSAGE (OUTPATIENT)
Dept: MEDICAL GROUP | Facility: LAB | Age: 67
End: 2022-12-04
Payer: MEDICARE

## 2022-12-04 DIAGNOSIS — T75.3XXA MOTION SICKNESS, INITIAL ENCOUNTER: ICD-10-CM

## 2022-12-05 RX ORDER — SCOLOPAMINE TRANSDERMAL SYSTEM 1 MG/1
1 PATCH, EXTENDED RELEASE TRANSDERMAL
Qty: 4 PATCH | Refills: 3 | Status: SHIPPED | OUTPATIENT
Start: 2022-12-05

## 2022-12-05 NOTE — PATIENT COMMUNICATION
Received request via: Patient    Was the patient seen in the last year in this department? Yes    Does the patient have an active prescription (recently filled or refills available) for medication(s) requested? No    Does the patient have correction Plus and need 100 day supply (blood pressure, diabetes and cholesterol meds only)? Patient does not have SCP

## 2022-12-22 ENCOUNTER — NON-PROVIDER VISIT (OUTPATIENT)
Dept: CARDIOLOGY | Facility: MEDICAL CENTER | Age: 67
End: 2022-12-22
Payer: MEDICARE

## 2022-12-22 PROCEDURE — 93298 REM INTERROG DEV EVAL SCRMS: CPT | Performed by: INTERNAL MEDICINE

## 2022-12-23 NOTE — CARDIAC REMOTE MONITOR - SCAN
Device transmission reviewed. Device demonstrated appropriate function.       Electronically Signed by: Josue Moody M.D.    12/29/2022  3:08 PM

## 2023-01-13 ENCOUNTER — OFFICE VISIT (OUTPATIENT)
Dept: MEDICAL GROUP | Facility: LAB | Age: 68
End: 2023-01-13
Payer: MEDICARE

## 2023-01-13 ENCOUNTER — HOSPITAL ENCOUNTER (OUTPATIENT)
Dept: LAB | Facility: MEDICAL CENTER | Age: 68
End: 2023-01-13
Attending: FAMILY MEDICINE
Payer: MEDICARE

## 2023-01-13 ENCOUNTER — PATIENT MESSAGE (OUTPATIENT)
Dept: MEDICAL GROUP | Facility: LAB | Age: 68
End: 2023-01-13

## 2023-01-13 VITALS
BODY MASS INDEX: 20.25 KG/M2 | TEMPERATURE: 97.5 F | HEART RATE: 54 BPM | SYSTOLIC BLOOD PRESSURE: 104 MMHG | WEIGHT: 129 LBS | RESPIRATION RATE: 14 BRPM | OXYGEN SATURATION: 97 % | HEIGHT: 67 IN | DIASTOLIC BLOOD PRESSURE: 62 MMHG

## 2023-01-13 DIAGNOSIS — E21.3 HYPERPARATHYROIDISM (HCC): ICD-10-CM

## 2023-01-13 DIAGNOSIS — M81.0 OSTEOPOROSIS WITHOUT CURRENT PATHOLOGICAL FRACTURE, UNSPECIFIED OSTEOPOROSIS TYPE: ICD-10-CM

## 2023-01-13 LAB
ALBUMIN SERPL BCP-MCNC: 4.3 G/DL (ref 3.2–4.9)
ALBUMIN/GLOB SERPL: 1.9 G/DL
ALP SERPL-CCNC: 94 U/L (ref 30–99)
ALT SERPL-CCNC: 73 U/L (ref 2–50)
ANION GAP SERPL CALC-SCNC: 9 MMOL/L (ref 7–16)
AST SERPL-CCNC: 66 U/L (ref 12–45)
BILIRUB SERPL-MCNC: 0.4 MG/DL (ref 0.1–1.5)
BUN SERPL-MCNC: 17 MG/DL (ref 8–22)
CALCIUM ALBUM COR SERPL-MCNC: 9.4 MG/DL (ref 8.5–10.5)
CALCIUM SERPL-MCNC: 9.6 MG/DL (ref 8.5–10.5)
CHLORIDE SERPL-SCNC: 105 MMOL/L (ref 96–112)
CO2 SERPL-SCNC: 28 MMOL/L (ref 20–33)
CREAT SERPL-MCNC: 0.69 MG/DL (ref 0.5–1.4)
GFR SERPLBLD CREATININE-BSD FMLA CKD-EPI: 93 ML/MIN/1.73 M 2
GLOBULIN SER CALC-MCNC: 2.3 G/DL (ref 1.9–3.5)
GLUCOSE SERPL-MCNC: 73 MG/DL (ref 65–99)
POTASSIUM SERPL-SCNC: 4.3 MMOL/L (ref 3.6–5.5)
PROT SERPL-MCNC: 6.6 G/DL (ref 6–8.2)
PTH-INTACT SERPL-MCNC: 80.7 PG/ML (ref 14–72)
SODIUM SERPL-SCNC: 142 MMOL/L (ref 135–145)

## 2023-01-13 PROCEDURE — 83970 ASSAY OF PARATHORMONE: CPT

## 2023-01-13 PROCEDURE — 99214 OFFICE O/P EST MOD 30 MIN: CPT | Performed by: FAMILY MEDICINE

## 2023-01-13 PROCEDURE — 36415 COLL VENOUS BLD VENIPUNCTURE: CPT

## 2023-01-13 PROCEDURE — 80053 COMPREHEN METABOLIC PANEL: CPT

## 2023-01-13 RX ORDER — ZOLEDRONIC ACID 5 MG/100ML
5 INJECTION, SOLUTION INTRAVENOUS
Qty: 100 ML | Refills: 6 | Status: SHIPPED
Start: 2023-01-13

## 2023-01-13 ASSESSMENT — ENCOUNTER SYMPTOMS
CHILLS: 0
WHEEZING: 0
SHORTNESS OF BREATH: 0
ABDOMINAL PAIN: 0
VOMITING: 0
NAUSEA: 0
CONSTIPATION: 0
FEVER: 0
DIARRHEA: 0
PALPITATIONS: 0
BLOOD IN STOOL: 0

## 2023-01-13 ASSESSMENT — FIBROSIS 4 INDEX: FIB4 SCORE: 1.23

## 2023-01-13 ASSESSMENT — PATIENT HEALTH QUESTIONNAIRE - PHQ9: CLINICAL INTERPRETATION OF PHQ2 SCORE: 0

## 2023-01-13 NOTE — PROGRESS NOTES
Subjective:   Susy Szymanski is a 67 y.o. female here today for   No chief complaint on file.      #Osteoporosis:  -Patient here to follow-up on DEXA scan showing osteoporosis with a significant low T score in lumbar spine.  Patient states he is feeling well.  Is asymptomatic.  She denies any devious bone fracture, denies any previous diagnosis or treatment of osteoporosis.  -Does have a significant medical history of chronic GERD for which she is on omeprazole.  Symptoms controlled at this time but does have occasional breakthrough GERD at times.  -Patient states she has had significant work done in her mouth recently including placement of posts.      Allergies   Allergen Reactions    Amitriptyline      Excessive sedation --even at 10 mg dose    Lovastatin      Muscle aches  Muscle aches      Trazodone      Irregular pulse    Bactrim [Sulfamethoxazole W-Trimethoprim]      Shaking    Levaquin      Nausea          Current medicines (including changes today)  Current Outpatient Medications   Medication Sig Dispense Refill    scopolamine (TRANSDERM-SCOP, 1.5 MG,) 1 mg/72hr PATCH 72 HR Place 1 Patch on the skin every 72 hours. 4 Patch 3    omeprazole (PRILOSEC) 20 MG delayed-release capsule Take 20 mg by mouth every day. Alternating with Nexium      prednisoLONE acetate (PRED FORTE) 1 % Suspension       flecainide (TAMBOCOR) 50 MG tablet Take 1 Tablet by mouth 1 time a day as needed (Palpitations, PVCs). 15 Tablet 0    propranolol (INDERAL) 20 MG Tab Take 1 Tablet by mouth 2 times a day. 180 Tablet 2    esomeprazole (NEXIUM) 40 MG delayed-release capsule Take 1 Capsule by mouth every morning before breakfast. 90 Capsule 0    topiramate (TOPAMAX) 25 MG Tab TAKE 1 TABLET EVERY DAY FOR THE FIRST WEEK.TAKE 1 TABLET TWICE A DAY FOR THE SECOND WEEK. TAKE 2 TABLETS IN THE MORNING AND 1 TABLET IN THE EVENING FOR WEEK 3. TAKE 2 TABLETS TWICE A DAY FOR WEEK 4. 720 Tablet 0    XARELTO 20 MG Tab tablet TAKE 1 TABLET BY MOUTH  "EVERY DAY WITH DINNER 90 Tablet 3     No current facility-administered medications for this visit.     She  has a past medical history of Anxiety, Arrhythmia, ASTHMA (4/9/21:  Patient denies), Atrial fibrillation (HCC), Bowel habit changes, Cataract (04/09/2001), Chickenpox, Constipation, Depression (04/09/2021), Frequent urination, GERD (gastroesophageal reflux disease), Heartburn, Hiatal hernia with GERD (11/5/2019), Hiatus hernia syndrome, High cholesterol, History of esophageal stricture (11/5/2019), Indigestion, Kidney stone, Osteoporosis, Painful joint, Palpitations, Palpitations, Premature atrial contractions, Rhinitis, Ringing in ears, Stroke (HCC) (04/09/2021), Tonsillitis, and Wears glasses.    ROS   Review of Systems   Constitutional:  Negative for chills and fever.   Respiratory:  Negative for shortness of breath and wheezing.    Cardiovascular:  Negative for chest pain and palpitations.   Gastrointestinal:  Negative for abdominal pain, blood in stool, constipation, diarrhea, nausea and vomiting.        Objective:     Physical Exam:  /62   Pulse (!) 54   Temp 36.4 °C (97.5 °F) (Temporal)   Resp 14   Ht 1.702 m (5' 7.01\")   Wt 58.5 kg (129 lb)   SpO2 97%  Body mass index is 20.2 kg/m².   Constitutional: Alert, no distress.  Skin: Warm, dry, good turgor, no rashes in visible areas.  Eye: Equal, round and reactive, conjunctiva clear, lids normal.\  Respiratory: Unlabored respiratory effort, lungs clear to auscultation, no wheezes, no rhonchi.  Cardiovascular: Normal S1, S2, no murmur, no edema.  Abdomen: Soft, non-tender, no masses, no hepatosplenomegaly.  Psych: Alert and oriented x3, normal affect and mood.    IMAGING:   DEXA scan completed 12/2/2022:    FINDINGS:  The lumbar spine has a mean bone mineral density of 0.684 g/cm2, with a T score of -4.1 and a Z score of -2.2.     The proximal left femur has a mean bone mineral density of 0.711 g/cm2, with a T score of -2.4 and a Z score of " -0.7.     When compared with the most recent study dated 8/27/2012, there has been a 18.4% decrease in the bone mineral density of the lumbar spine.    IMPRESSION:     According to the World Health Organization classification, bone mineral density of this patient is osteoporotic in the lumbar spine, osteopenic in the left femur and there has been significant worsening density in the spine.     10-year Probability of Fracture:  Major Osteoporotic     16.9%  Hip     3.6%  Population      [place] ()     Based on left femur neck BMD  Assessment and Plan:     1. Osteoporosis without current pathological fracture, unspecified osteoporosis type  -New diagnosis.  Given findings of T score of -4.1 with a FRAX hip score of 3.6% further treatment is needed.  We discussed use of bisphosphonate; however, oral bisphosphonates are contraindicated in patient due to significant history of GERD as well as previous dental work completed.  Because this we will begin with injections of the Reclast on a yearly basis.  Discussed potential side effects, discussed risks, benefits, alternatives.  Patient like to go ahead with Reclast injection at this time.  -Will check labs for secondary causes of osteoporosis as below.  -Discussed the importance of appropriate nutrition, weightbearing exercises.  We will begin treatment at this time and recheck DEXA scan in 2 years.  - zoledronic Acid (RECLAST) 5 MG/100ML Solution IVPB premix; Infuse 100 mL into a venous catheter Once Every 12 Months for 6 doses.  Dispense: 100 mL; Refill: 6  - Comp Metabolic Panel; Future  - PTH WITH CALCIUM; Future      Followup: No follow-ups on file.         PLEASE NOTE: This dictation was created using voice recognition software. I have made every reasonable attempt to correct obvious errors, but I expect that there are errors of grammar and possibly content that I did not discover before finalizing the note.

## 2023-01-17 DIAGNOSIS — M81.0 OSTEOPOROSIS WITHOUT CURRENT PATHOLOGICAL FRACTURE, UNSPECIFIED OSTEOPOROSIS TYPE: ICD-10-CM

## 2023-01-17 RX ORDER — ZOLEDRONIC ACID 5 MG/100ML
5 INJECTION, SOLUTION INTRAVENOUS ONCE
OUTPATIENT
Start: 2023-01-18 | End: 2023-01-18

## 2023-01-22 ENCOUNTER — NON-PROVIDER VISIT (OUTPATIENT)
Dept: CARDIOLOGY | Facility: MEDICAL CENTER | Age: 68
End: 2023-01-22
Payer: MEDICARE

## 2023-01-22 PROCEDURE — 93298 REM INTERROG DEV EVAL SCRMS: CPT | Performed by: INTERNAL MEDICINE

## 2023-01-23 ENCOUNTER — TELEPHONE (OUTPATIENT)
Dept: CARDIOLOGY | Facility: MEDICAL CENTER | Age: 68
End: 2023-01-23
Payer: MEDICARE

## 2023-01-23 NOTE — TELEPHONE ENCOUNTER
FYI:  Remote Transmission 1/22/2023:    1-AF episode-12/29/2022@4:44 am lasting 3 hrs and 18 mins.  1-Symptom episode--Appears as same AF episode 12/29/2022@6:57am.     Full report scanned into media.

## 2023-01-23 NOTE — CARDIAC REMOTE MONITOR - SCAN
Device transmission reviewed. Device demonstrated appropriate function.       Electronically Signed by: Roberto Anglin M.D.    1/23/2023  4:35 PM

## 2023-01-25 ENCOUNTER — OFFICE VISIT (OUTPATIENT)
Dept: ENDOCRINOLOGY | Facility: MEDICAL CENTER | Age: 68
End: 2023-01-25
Payer: MEDICARE

## 2023-01-25 ENCOUNTER — APPOINTMENT (OUTPATIENT)
Dept: ENDOCRINOLOGY | Facility: MEDICAL CENTER | Age: 68
End: 2023-01-25
Payer: MEDICARE

## 2023-01-25 VITALS
HEIGHT: 67 IN | BODY MASS INDEX: 20.72 KG/M2 | OXYGEN SATURATION: 97 % | DIASTOLIC BLOOD PRESSURE: 68 MMHG | HEART RATE: 60 BPM | WEIGHT: 132 LBS | SYSTOLIC BLOOD PRESSURE: 112 MMHG

## 2023-01-25 DIAGNOSIS — E04.1 THYROID NODULE: ICD-10-CM

## 2023-01-25 DIAGNOSIS — E21.3 HYPERPARATHYROIDISM (HCC): ICD-10-CM

## 2023-01-25 DIAGNOSIS — E55.9 VITAMIN D DEFICIENCY: ICD-10-CM

## 2023-01-25 DIAGNOSIS — M81.0 OSTEOPOROSIS WITHOUT CURRENT PATHOLOGICAL FRACTURE, UNSPECIFIED OSTEOPOROSIS TYPE: ICD-10-CM

## 2023-01-25 PROCEDURE — 99211 OFF/OP EST MAY X REQ PHY/QHP: CPT

## 2023-01-25 PROCEDURE — 99204 OFFICE O/P NEW MOD 45 MIN: CPT

## 2023-01-25 ASSESSMENT — FIBROSIS 4 INDEX: FIB4 SCORE: 2.43

## 2023-01-25 NOTE — PROGRESS NOTES
New Patient Consult Note for Endocrinology  Referred by: Tano Clay M.D.    Reason for consult:     HPI:  Susy Szymanski is a 67 y.o. old patient who is seeing us today for care.  This is a pleasant patient and I appreciate the opportunity to participate in the care of this patient.  This is a new patient with me today.      There are no diagnoses linked to this encounter.  This is a new patient with me on 1/25/23  Normocalcemic Hyperparathyroidism  Denies:nephrolithiasis, memory difficulties, constipation, and abdominal pain  Reports: bone pain, osteoporosis, weakness, and fatigue  Denies: HTN, diabetes, bariatric surgery, diuretics, CKD, use of bisphosphonates     Latest Reference Range & Units 01/13/23 13:30   Calcium 8.5 - 10.5 mg/dL 9.6      Latest Reference Range & Units 01/13/23 13:30   Albumin 3.2 - 4.9 g/dL 4.3      Latest Reference Range & Units 01/13/23 13:30   Pth, Intact 14.0 - 72.0 pg/mL 80.7 (H)     2. Vitamin D deficiency  Currently not taking supplements   Latest Reference Range & Units 05/13/22 11:46   25-Hydroxy   Vitamin D 25 30 - 100 ng/mL 34     3. Thyroid nodule  Denies/reports: SOB, difficulty swallowing, neck swelling, pain in neck     US of thyroid on 11/22/2022  Nodule #1  Location:  Right  mid  Size:  0.8 x 0.5 x 0.4 cm  Composition:  Solid-2  Echogenicity:  Isoechoic-1  Shape:  Wider than tall-0  Margins:  Smooth-0  Echogenic Foci:  None-0     ACR TIRADS points/category:  3 - TR3 - Mildly Suspicious     Nodule #2  Location:  Right  lower  Size:  2.26 x 1.34 x 1.02 cm  Composition:  Solid-2  Echogenicity:  Isoechoic-1  Shape:  Wider than tall-0  Margins:  Smooth-0  Echogenic Foci:  None-0     ACR TIRADS points/category:  3 - TR3 - Mildly Suspicious    IMPRESSION:     1.  Right lobe thyroid nodules as described above, mildly suspicious.     ACR TI-RADS Recommendations  TR3 (1.5 -2.4cm) - follow up ultrasound in 1, 3, and 5 years.    4. Osteoporosis  Denies/reports recent  falls, trauma or injury  Mendel has not started therapy.   Followed by pcp.  Dexa scan on 12/2/22  lumbar spine has a mean bone mineral density of 0.684 g/cm2, with a T score of -4.1 and a Z score of -2.2.     The proximal left femur has a mean bone mineral density of 0.711 g/cm2, with a T score of -2.4 and a Z score of -0.7.     When compared with the most recent study dated 8/27/2012, there has been a 18.4% decrease in the bone mineral density of the lumbar spine.     IMPRESSION:     According to the World Health Organization classification, bone mineral density of this patient is osteoporotic in the lumbar spine, osteopenic in the left femur and there has been significant worsening density in the spine.     10-year Probability of Fracture:  Major Osteoporotic     16.9%  Hip     3.6%  Population      [place] ()     Based on left femur neck BMD      ROS:   Constitutional: No change in weight , No fatigue, No night sweats.  HEENT: No Headache.  Eyes:  No blurred vision, No visual changes.  Cardiac: No chest pain, No palpitations.  Resp: No shortness of breath, No cough,   Gastro: No nausea or vomiting, No diarrhea.  Neuro: Denies numbness or tinging in bilateral feet or hands, and no loss of sensation.  Endo: No heat or cold intolerance.  : No polyuria, No polydipsia, No chronic UTI's.  Lower extremities: No lower leg edema bilateral.  All other systems were reviewed and were negative.    Past Medical History:  Patient Active Problem List    Diagnosis Date Noted    Osteoporosis without current pathological fracture 01/17/2023    Encounter for loop recorder check 11/07/2022    PVC's (premature ventricular contractions) 09/14/2021    H/O cardiac radiofrequency ablation 8/18/21 Dr Moody.  PVI (extension of the RPV), PWI and RVOT (PVCs) 09/14/2021    Abnormal CT scan, lung 09/07/2021    Late effect of stroke 04/12/2021    Stroke (HCC) 02/24/2021    Essential tremor 02/24/2021    Hiatal hernia with GERD 11/05/2019     History of esophageal stricture 11/05/2019    Paroxysmal atrial fibrillation (HCC) 07/11/2019    Chronic hip pain, bilateral 10/18/2018    Atrial premature complexes 05/31/2013    OSTEOPOROSIS 08/27/2012    Asthma 09/09/2011    Social anxiety disorder 09/09/2011    LDL (low density lipoprotein receptor disorder) 09/09/2011    Gastroesophageal reflux disease without esophagitis 10/08/2009       Past Surgical History:  Past Surgical History:   Procedure Laterality Date    COLONOSCOPY WITH POLYP  June 2008    benign polyp  Dr Paul     CHOLECYSTECTOMY  January 2008    laparoscopic    EGD WITH ASP/BX  September 2006    inflammation only ---Dr Paul    CHOLECYSTECTOMY      OTHER CARDIAC SURGERY  Around 2000    Ablation    SINUSOTOMIES         Allergies:  Amitriptyline, Lovastatin, Trazodone, Bactrim [sulfamethoxazole w-trimethoprim], and Levaquin    Social History:  Social History     Socioeconomic History    Marital status:      Spouse name: Not on file    Number of children: Not on file    Years of education: Not on file    Highest education level: Not on file   Occupational History    Occupation: retired   Tobacco Use    Smoking status: Never    Smokeless tobacco: Never   Vaping Use    Vaping Use: Never used   Substance and Sexual Activity    Alcohol use: No     Alcohol/week: 0.0 oz    Drug use: No    Sexual activity: Yes     Partners: Male     Birth control/protection: Post-Menopausal   Other Topics Concern    Not on file   Social History Narrative    Not on file     Social Determinants of Health     Financial Resource Strain: Not on file   Food Insecurity: Not on file   Transportation Needs: Not on file   Physical Activity: Not on file   Stress: Not on file   Social Connections: Not on file   Intimate Partner Violence: Not on file   Housing Stability: Not on file       Family History:  Family History   Problem Relation Age of Onset    Cancer Father 70        cancer of unknown primary    Heart Disease  "Mother     Heart Disease Son 42        MI and coronary stent placement    Diabetes Son     Hypertension Son     Diabetes Sister        Medications:    Current Outpatient Medications:     zoledronic Acid (RECLAST) 5 MG/100ML Solution IVPB premix, Infuse 100 mL into a venous catheter Once Every 12 Months for 6 doses., Disp: 100 mL, Rfl: 6    scopolamine (TRANSDERM-SCOP, 1.5 MG,) 1 mg/72hr PATCH 72 HR, Place 1 Patch on the skin every 72 hours., Disp: 4 Patch, Rfl: 3    omeprazole (PRILOSEC) 20 MG delayed-release capsule, Take 20 mg by mouth every day. Alternating with Nexium, Disp: , Rfl:     prednisoLONE acetate (PRED FORTE) 1 % Suspension, , Disp: , Rfl:     flecainide (TAMBOCOR) 50 MG tablet, Take 1 Tablet by mouth 1 time a day as needed (Palpitations, PVCs)., Disp: 15 Tablet, Rfl: 0    propranolol (INDERAL) 20 MG Tab, Take 1 Tablet by mouth 2 times a day., Disp: 180 Tablet, Rfl: 2    esomeprazole (NEXIUM) 40 MG delayed-release capsule, Take 1 Capsule by mouth every morning before breakfast., Disp: 90 Capsule, Rfl: 0    topiramate (TOPAMAX) 25 MG Tab, TAKE 1 TABLET EVERY DAY FOR THE FIRST WEEK.TAKE 1 TABLET TWICE A DAY FOR THE SECOND WEEK. TAKE 2 TABLETS IN THE MORNING AND 1 TABLET IN THE EVENING FOR WEEK 3. TAKE 2 TABLETS TWICE A DAY FOR WEEK 4., Disp: 720 Tablet, Rfl: 0    XARELTO 20 MG Tab tablet, TAKE 1 TABLET BY MOUTH EVERY DAY WITH DINNER, Disp: 90 Tablet, Rfl: 3      Physical Examination:   Vital signs: /68 (BP Location: Left arm, Patient Position: Sitting, BP Cuff Size: Adult)   Pulse 60   Ht 1.702 m (5' 7\")   Wt 59.9 kg (132 lb)   LMP 01/01/2004   SpO2 97%   BMI 20.67 kg/m²   General: No distress, cooperative, well dressed and well nourished.   Eyes: No scleral icterus or discharge, No hyposphagma  ENMT: Normal on external inspection of nose, lips, No nasal drainage   Neck: No abnormal masses on inspection  Resp: Normal effort, Bilateral clear to auscultation, No wheezing, No rales  CVS: Regular " rate and rhythm, S1 S2 normal, No murmur. No gallop  Extremities: No edema bilateral extremities  Neuro: Alert and oriented  Skin: No rash, No Ulcers  Psych: Normal mood and affect    Assessment and Plan:    1. Hyperparathyroidism (HCC)  Unstable  Discussed the causes of hyperparathyroidism, however patient's calcium levels are WNL, therefore a secondary cause was discussed. The increase in PTH is most likely due to vitamin d deficiency, however we will recheck blood work with additional tests in 4 weeks for further evaluation.   - ALBUMIN; Future  - PHOSPHORUS; Future  - PTH INTACT (PTH ONLY); Future  - IONIZED CALCIUM; Future  - MAGNESIUM; Future  - VITAMIN 1,25 DIHYDROXY (CALCIUM METABOLISM); Future  - HEMOGLOBIN A1C; Future    2. Vitamin D deficiency  Unstable  Recommend patient to start Vitamin D3 2000IUs daily  - VITAMIN D,25 HYDROXY (DEFICIENCY); Future    3. Thyroid nodule  Unstable  Discussed the US results with patient. Nodule #2 is 2.26 cm TR 3 and mildly suspcious, however the patient and her  would like to have it biopsied. FNA biopsy ordered.   - TSH; Future  - FREE THYROXINE; Future  - US-FNA BIOPSY W/ US GUIDANCE; Future    4. Osteoporosis without current pathological fracture, unspecified osteoporosis type  Unstable  Patient states she would like to wait the follow up apt prior to scheduling the infusion for the reclast.   This is followed by her pcp.      Return in about 4 weeks (around 2/22/2023). Patient will have her blood work completed 1 week prior to her next apt in 4 weeks.     Thank you kindly for allowing me to participate in the diabetes care plan for this patient.    Eyad Blunt, APRN   01/25/23    CC:   Tano Clay M.D.

## 2023-02-03 ENCOUNTER — HOSPITAL ENCOUNTER (OUTPATIENT)
Dept: RADIOLOGY | Facility: MEDICAL CENTER | Age: 68
End: 2023-02-03
Payer: MEDICARE

## 2023-02-03 DIAGNOSIS — E04.1 THYROID NODULE: ICD-10-CM

## 2023-02-03 LAB — CYTOLOGY REG CYTOL: NORMAL

## 2023-02-03 PROCEDURE — 10005 FNA BX W/US GDN 1ST LES: CPT

## 2023-02-03 PROCEDURE — 88173 CYTOPATH EVAL FNA REPORT: CPT

## 2023-02-03 NOTE — PROGRESS NOTES
OPIR     Right Lower Thyroid Fine Needle Aspiration done by Dr. Álvarez, with no sedation, local only. No H&P required. Right anterior aspect of neck access site; X1 jar of cytolyt and X1 of Afirma obtained and sent to pathology.  Patient tolerated the procedure well.    All questions and concerns answered prior to being dc'd; pt provided with appropriate education for procedure, pt discharge to home.

## 2023-02-09 DIAGNOSIS — T75.3XXA MOTION SICKNESS, INITIAL ENCOUNTER: ICD-10-CM

## 2023-02-09 RX ORDER — MECLIZINE HCL 12.5 MG/1
12.5 TABLET ORAL 3 TIMES DAILY PRN
Qty: 30 TABLET | Refills: 0 | Status: SHIPPED | OUTPATIENT
Start: 2023-02-09 | End: 2023-09-07 | Stop reason: SDUPTHER

## 2023-02-09 NOTE — TELEPHONE ENCOUNTER
Received request via: Pharmacy    Was the patient seen in the last year in this department? Yes  1/13/23  Does the patient have an active prescription (recently filled or refills available) for medication(s) requested? No    Does the patient have [facility] and need 100 day supply (blood pressure, diabetes and cholesterol meds only)? Medication is not for cholesterol, blood pressure or diabetes

## 2023-02-15 ENCOUNTER — HOSPITAL ENCOUNTER (OUTPATIENT)
Dept: LAB | Facility: MEDICAL CENTER | Age: 68
End: 2023-02-15
Payer: MEDICARE

## 2023-02-15 DIAGNOSIS — E55.9 VITAMIN D DEFICIENCY: ICD-10-CM

## 2023-02-15 DIAGNOSIS — E04.1 THYROID NODULE: ICD-10-CM

## 2023-02-15 DIAGNOSIS — E21.3 HYPERPARATHYROIDISM (HCC): ICD-10-CM

## 2023-02-15 LAB
25(OH)D3 SERPL-MCNC: 37 NG/ML (ref 30–100)
ALBUMIN SERPL BCP-MCNC: 4.1 G/DL (ref 3.2–4.9)
CA-I SERPL-SCNC: 1.2 MMOL/L (ref 1.1–1.3)
MAGNESIUM SERPL-MCNC: 1.8 MG/DL (ref 1.5–2.5)
PHOSPHATE SERPL-MCNC: 4 MG/DL (ref 2.5–4.5)
PTH-INTACT SERPL-MCNC: 71.6 PG/ML (ref 14–72)
T4 FREE SERPL-MCNC: 1.41 NG/DL (ref 0.93–1.7)
TSH SERPL DL<=0.005 MIU/L-ACNC: 1.56 UIU/ML (ref 0.38–5.33)

## 2023-02-15 PROCEDURE — 36415 COLL VENOUS BLD VENIPUNCTURE: CPT | Mod: GA

## 2023-02-15 PROCEDURE — 83970 ASSAY OF PARATHORMONE: CPT

## 2023-02-15 PROCEDURE — 82652 VIT D 1 25-DIHYDROXY: CPT

## 2023-02-15 PROCEDURE — 82040 ASSAY OF SERUM ALBUMIN: CPT

## 2023-02-15 PROCEDURE — 82330 ASSAY OF CALCIUM: CPT

## 2023-02-15 PROCEDURE — 84443 ASSAY THYROID STIM HORMONE: CPT

## 2023-02-15 PROCEDURE — 82306 VITAMIN D 25 HYDROXY: CPT

## 2023-02-15 PROCEDURE — 83036 HEMOGLOBIN GLYCOSYLATED A1C: CPT | Mod: GA

## 2023-02-15 PROCEDURE — 84439 ASSAY OF FREE THYROXINE: CPT

## 2023-02-15 PROCEDURE — 83735 ASSAY OF MAGNESIUM: CPT | Mod: GA

## 2023-02-15 PROCEDURE — 84100 ASSAY OF PHOSPHORUS: CPT

## 2023-02-16 LAB
EST. AVERAGE GLUCOSE BLD GHB EST-MCNC: 105 MG/DL
HBA1C MFR BLD: 5.3 % (ref 4–5.6)

## 2023-02-19 LAB — 1,25(OH)2D3 SERPL-MCNC: 69.9 PG/ML (ref 19.9–79.3)

## 2023-02-21 ENCOUNTER — OFFICE VISIT (OUTPATIENT)
Dept: MEDICAL GROUP | Facility: LAB | Age: 68
End: 2023-02-21
Payer: MEDICARE

## 2023-02-21 ENCOUNTER — HOSPITAL ENCOUNTER (OUTPATIENT)
Facility: MEDICAL CENTER | Age: 68
End: 2023-02-21
Attending: PHYSICIAN ASSISTANT
Payer: MEDICARE

## 2023-02-21 VITALS
OXYGEN SATURATION: 95 % | DIASTOLIC BLOOD PRESSURE: 70 MMHG | BODY MASS INDEX: 20.4 KG/M2 | HEART RATE: 60 BPM | WEIGHT: 130 LBS | HEIGHT: 67 IN | TEMPERATURE: 97.9 F | RESPIRATION RATE: 16 BRPM | SYSTOLIC BLOOD PRESSURE: 110 MMHG

## 2023-02-21 DIAGNOSIS — R39.9 UTI SYMPTOMS: ICD-10-CM

## 2023-02-21 LAB
APPEARANCE UR: CLEAR
BILIRUB UR STRIP-MCNC: NEGATIVE MG/DL
COLOR UR AUTO: YELLOW
GLUCOSE UR STRIP.AUTO-MCNC: NEGATIVE MG/DL
KETONES UR STRIP.AUTO-MCNC: NEGATIVE MG/DL
LEUKOCYTE ESTERASE UR QL STRIP.AUTO: NEGATIVE
NITRITE UR QL STRIP.AUTO: NEGATIVE
PH UR STRIP.AUTO: 5.5 [PH] (ref 5–8)
PROT UR QL STRIP: NEGATIVE MG/DL
RBC UR QL AUTO: NEGATIVE
SP GR UR STRIP.AUTO: 1.02
UROBILINOGEN UR STRIP-MCNC: 0.2 MG/DL

## 2023-02-21 PROCEDURE — 81002 URINALYSIS NONAUTO W/O SCOPE: CPT | Performed by: PHYSICIAN ASSISTANT

## 2023-02-21 PROCEDURE — 81003 URINALYSIS AUTO W/O SCOPE: CPT

## 2023-02-21 PROCEDURE — 99213 OFFICE O/P EST LOW 20 MIN: CPT | Performed by: PHYSICIAN ASSISTANT

## 2023-02-21 RX ORDER — NITROFURANTOIN 25; 75 MG/1; MG/1
100 CAPSULE ORAL EVERY 12 HOURS
Qty: 10 CAPSULE | Refills: 0 | Status: SHIPPED | OUTPATIENT
Start: 2023-02-21 | End: 2023-02-26

## 2023-02-21 ASSESSMENT — FIBROSIS 4 INDEX: FIB4 SCORE: 2.43

## 2023-02-21 NOTE — PROGRESS NOTES
"Chief Complaint   Patient presents with    UTI       HPI:  Symptom onset: 2 days ago   Current symptoms: urgent, frequent voids. No blood noted in urine.  Since onset symptoms are: Unchanged  Treatments tried: OTC antispasm med.  Associated symptoms: Negative for fever, flank pain, nausea and vomiting, vaginal discharge, pelvic pain.  History is negative for frequent UTI. Hx of overactive bladder    Home UTI test was positive for leukocyte esterase    Pt leaves for a cruise on Friday.    ROS:  Denies fever, chills, vomiting or abdominal pain.     OBJECTIVE:  /70   Pulse 60   Temp 36.6 °C (97.9 °F)   Resp 16   Ht 1.702 m (5' 7\")   Wt 59 kg (130 lb)   SpO2 95%   Gen: Alert, NAD.  Chest: Lungs clear to auscultation, CV RRR.  Abdomen: Soft, tender in suprapubic region. No CVAT. Normal bowel sounds.          ASSESSMENT/PLAN:     1. UTI symptoms          1.  Urine dipstick negative.  We will order culture and urinalysis.  Rx for antibiotics was sent for patient to  prior to travel in case culture is positive as she will have difficulty filling them when she is on the cruise  2. Provided education to drink plenty of fluids, wipe front to back every void and bowel movement.   3. Return to clinic if symptoms not improving within 3-4 days or in case of vomiting, fever, increasing pain.    "

## 2023-02-22 ENCOUNTER — NON-PROVIDER VISIT (OUTPATIENT)
Dept: CARDIOLOGY | Facility: MEDICAL CENTER | Age: 68
End: 2023-02-22

## 2023-02-22 ENCOUNTER — TELEMEDICINE (OUTPATIENT)
Dept: ENDOCRINOLOGY | Facility: MEDICAL CENTER | Age: 68
End: 2023-02-22
Payer: MEDICARE

## 2023-02-22 VITALS — HEIGHT: 67 IN | BODY MASS INDEX: 20.4 KG/M2 | WEIGHT: 130 LBS

## 2023-02-22 DIAGNOSIS — E04.1 THYROID NODULE: ICD-10-CM

## 2023-02-22 DIAGNOSIS — E21.3 HYPERPARATHYROIDISM (HCC): ICD-10-CM

## 2023-02-22 DIAGNOSIS — M81.0 OSTEOPOROSIS WITHOUT CURRENT PATHOLOGICAL FRACTURE, UNSPECIFIED OSTEOPOROSIS TYPE: ICD-10-CM

## 2023-02-22 DIAGNOSIS — E55.9 VITAMIN D DEFICIENCY: ICD-10-CM

## 2023-02-22 LAB
APPEARANCE UR: CLEAR
BILIRUB UR QL STRIP.AUTO: NEGATIVE
COLOR UR: YELLOW
GLUCOSE UR STRIP.AUTO-MCNC: NEGATIVE MG/DL
KETONES UR STRIP.AUTO-MCNC: NEGATIVE MG/DL
LEUKOCYTE ESTERASE UR QL STRIP.AUTO: NEGATIVE
MICRO URNS: NORMAL
NITRITE UR QL STRIP.AUTO: NEGATIVE
PH UR STRIP.AUTO: 5.5 [PH] (ref 5–8)
PROT UR QL STRIP: NEGATIVE MG/DL
RBC UR QL AUTO: NEGATIVE
SP GR UR STRIP.AUTO: 1.01
UROBILINOGEN UR STRIP.AUTO-MCNC: 0.2 MG/DL

## 2023-02-22 PROCEDURE — 99214 OFFICE O/P EST MOD 30 MIN: CPT | Mod: 95

## 2023-02-22 PROCEDURE — 93298 REM INTERROG DEV EVAL SCRMS: CPT | Performed by: INTERNAL MEDICINE

## 2023-02-22 ASSESSMENT — FIBROSIS 4 INDEX: FIB4 SCORE: 2.43

## 2023-02-22 NOTE — PROGRESS NOTES
Chief Complaint: Follow up for Hyperparathyroidism  Patient was presented for a telehealth consultation via secure and encrypted videoconferencing technology. This encounter was conducted via Zoom . Verbal consent was obtained. Patient's identity was verified.   HPI:     Susy Szymanski is a 67 y.o. female here for follow up of Hypercalcemia .  Since last visit patient reports feeling good.      1.Hyperparathyroidism     She reports bone pain, weakness, and osteoporosis   She denies interval falls or fractures  She denies interval kidney stones  She currently denies asymptomatic elevation of the serum and calcium and parathormone, elevation of the serum calcium and parathormone, history of nephrolithiasis, depression, polydypsia , abdominal pain, constipation, lethargy, and memory difficulties denies fatigue, weight changes, heat/cold intolerance, bowel/skin changes or CVS symptoms.         Latest Reference Range & Units 02/15/23 13:05   Phosphorus 2.5 - 4.5 mg/dL 4.0   Magnesium 1.5 - 2.5 mg/dL 1.8      Latest Reference Range & Units 02/15/23 13:05   Albumin 3.2 - 4.9 g/dL 4.1      Latest Reference Range & Units 02/15/23 13:04   Ionized Calcium 1.1 - 1.3 mmol/L 1.2      Latest Reference Range & Units 02/15/23 13:05   Pth, Intact 14.0 - 72.0 pg/mL 71.6         2. Vitamin D deficiency  Currently taking Vitamin D3 1000IU daily   Latest Reference Range & Units 02/15/23 13:05   25-Hydroxy   Vitamin D 25 30 - 100 ng/mL 37   Vitamin D-1, 25-Dihydroxy 19.9 - 79.3 pg/mL 69.9     3. Thyroid Nodule   Latest Reference Range & Units 02/15/23 13:05   TSH 0.380 - 5.330 uIU/mL 1.560   Free T-4 0.93 - 1.70 ng/dL 1.41     FNA on 2/3/23 showed:  Right lower thyroid nodule fine needle aspiration:          [place] Category I: Non-diagnostic due to hypocellularity.          The smears demonstrate a bloody specimen with rare minute           colloid, scattered ultrasound gel and cluster of adipose cells.          Negative for any  cytologic changes to suggest papillary           carcinoma.       Patient's medications, allergies, and social histories were reviewed and updated as appropriate.      ROS:       CONS:     No fever, no chills   EYES:     No diplopia, no blurry vision   CV:           No chest pain, no palpitations   PULM:     No SOB, no cough, no hemoptysis.   GI:            No nausea, no vomiting, no diarrhea, no constipation   ENDO:     No polyuria, no polydipsia, no heat intolerance, no cold intolerance     Past Medical History:  Problem List:  2023-01: Thyroid nodule  2023-01: Vitamin D deficiency  2023-01: Hyperparathyroidism (HCC)  2023-01: Osteoporosis without current pathological fracture  2022-11: Encounter for loop recorder check  2021-09: PVC's (premature ventricular contractions)  2021-09: H/O cardiac radiofrequency ablation 8/18/21 Dr Moody.  PVI   (extension of the RPV), PWI and RVOT (PVCs)  2021-09: Abnormal CT scan, lung  2021-04: Late effect of stroke  2021-02: Stroke ([facility])  2021-02: Essential tremor  2019-11: Hiatal hernia with GERD  2019-11: History of esophageal stricture  2019-08: Chronic anticoagulation  2019-07: Paroxysmal atrial fibrillation ([facility])  2019-07: Atrial fibrillation with RVR ([facility])  2019-07: Dehydration  2019-06: Shortness of breath  2019-04: Palpitations  2019-04: Weight gain  2019-04: Muscle cramping  2018-10: Abnormal CT of the abdomen  2018-10: Chronic hip pain, bilateral  2017-04: Chronic fatigue  2017-04: Scalp irritation  2017-04: Preventative health care  2013-05: Atrial premature complexes  2012-08: OSTEOPOROSIS  2012-08: History of colonic polyps  2011-09: Kidney stones  2011-09: Asthma  2011-09: HEMORRHOIDS  2011-09: Social anxiety disorder  2011-09: LDL (low density lipoprotein receptor disorder)  2009-10: Anxiety  2009-10: Gastroesophageal reflux disease without esophagitis      Past Surgical History:  Past Surgical History:   Procedure Laterality Date    COLONOSCOPY WITH POLYP  June 2008  "   benign polyp  Dr Paul     CHOLECYSTECTOMY  January 2008    laparoscopic    EGD WITH ASP/BX  September 2006    inflammation only ---Dr Paul    CHOLECYSTECTOMY      OTHER CARDIAC SURGERY  Around 2000    Ablation    SINUSOTOMIES          Allergies:  Amitriptyline, Lovastatin, Trazodone, Bactrim [sulfamethoxazole w-trimethoprim], and Levaquin     Social History:  Social History     Tobacco Use    Smoking status: Never    Smokeless tobacco: Never   Vaping Use    Vaping Use: Never used   Substance Use Topics    Alcohol use: No     Alcohol/week: 0.0 oz    Drug use: No        Family History:   family history includes Cancer (age of onset: 70) in her father; Diabetes in her sister and son; Heart Disease in her mother; Heart Disease (age of onset: 42) in her son; Hypertension in her son.      PHYSICAL EXAM:   Vital signs: Ht 1.702 m (5' 7\")   Wt 59 kg (130 lb)   LMP 01/01/2004   BMI 20.36 kg/m²   GENERAL: Well-developed, well-nourished in no apparent distress.   EYE:  No ocular asymmetry, PERRLA  HENT: Pink, moist mucous membranes.    NECK: No thyromegaly.       Labs:  Lab Results   Component Value Date/Time    WBC 5.3 08/13/2021 12:03 PM    RBC 4.65 08/13/2021 12:03 PM    HEMOGLOBIN 13.4 08/13/2021 12:03 PM    MCV 94.2 08/13/2021 12:03 PM    MCH 28.8 08/13/2021 12:03 PM    MCHC 30.6 (L) 08/13/2021 12:03 PM    RDW 51.2 (H) 08/13/2021 12:03 PM    MPV 11.5 08/13/2021 12:03 PM       Lab Results   Component Value Date/Time    SODIUM 142 01/13/2023 01:30 PM    POTASSIUM 4.3 01/13/2023 01:30 PM    CHLORIDE 105 01/13/2023 01:30 PM    CO2 28 01/13/2023 01:30 PM    ANION 9.0 01/13/2023 01:30 PM    GLUCOSE 73 01/13/2023 01:30 PM    BUN 17 01/13/2023 01:30 PM    CREATININE 0.69 01/13/2023 01:30 PM    CREATININE 0.8 10/20/2008 11:59 AM    CALCIUM 9.6 01/13/2023 01:30 PM    ASTSGOT 66 (H) 01/13/2023 01:30 PM    ALTSGPT 73 (H) 01/13/2023 01:30 PM    TBILIRUBIN 0.4 01/13/2023 01:30 PM    ALBUMIN 4.1 02/15/2023 01:05 PM    " TOTPROTEIN 6.6 01/13/2023 01:30 PM    GLOBULIN 2.3 01/13/2023 01:30 PM    AGRATIO 1.9 01/13/2023 01:30 PM       Lab Results   Component Value Date/Time    TSHULTRASEN 1.560 02/15/2023 1305     No results found for: FREEDIR  No results found for: FREET3  No results found for: THYSTIMIG      Imaging:    ASSESSMENT/PLAN:     1. Hyperparathyroidism (HCC)  Stable  Blood work shows PTH, ionized calcium, phosphorous wnl. Patient's PTH from 1/15/2023 was elevated most likely due to low vitamin d levels.   We will continue to monitor.   - IONIZED CALCIUM; Future  - PTH INTACT (PTH ONLY); Future  - Comp Metabolic Panel; Future  - PHOSPHORUS; Future    2. Vitamin D deficiency  Unstable  Recommend Vitamin D3 2000IU daily  - VITAMIN D,25 HYDROXY (DEFICIENCY); Future    3. Thyroid nodule  Unstable  Reviewed biopsy results with patient. Biopsy showed   Right lower thyroid nodule: [place] Category I: Non-diagnostic due to hypocellularity.          The smears demonstrate a bloody specimen with rare minute           colloid, scattered ultrasound gel and cluster of adipose cells.          Negative for any cytologic changes to suggest papillary           carcinoma.   We will repeat biopsy in 6 months.   - US-FNA BIOPSY W/ US GUIDANCE; Future    4. Osteoporosis without current pathological fracture, unspecified osteoporosis type  Unstable  Patient will call and setup time for medication administration. Reviewed side effects of Reclast with patient.   Followed by PCP.      Return in about 6 months (around 8/22/2023). Patient will have blood work completed 1 week prior to next apt in 6 months.       Thank you kindly for allowing me to participate in the endocrine care plan for this patient.    Eyad Blnut, APRN   02/22/23    CC:   Tano Clay M.D.

## 2023-02-22 NOTE — PROGRESS NOTES
Chief Complaint: Follow up for Hyperparathyroidism  HPI:     Susy Szymanski is a 67 y.o. female here for follow up of Hypercalcemia .  Since last visit patient reports feeling {GOOD:51040}.      1.Hyperparathyroidism     She {DENIES:08316} {MGCASX:26427}   She {DENIES:60602} interval falls or fractures  She {DENIES:99969} interval kidney stones  She currently {DENIES:59901} {MGCASX:97274} {THYROID SIGNS AND SYMPTOMS:17630}.      {HIS/HER:92373} last thyroid ultrasound or parathyroid scan on {MONTH:10108} {YEAR:15070}  was compatible with a {MGPARADX:94693}   Latest Reference Range & Units 02/15/23 13:05   Phosphorus 2.5 - 4.5 mg/dL 4.0   Magnesium 1.5 - 2.5 mg/dL 1.8      Latest Reference Range & Units 02/15/23 13:05   Albumin 3.2 - 4.9 g/dL 4.1      Latest Reference Range & Units 02/15/23 13:04   Ionized Calcium 1.1 - 1.3 mmol/L 1.2      Latest Reference Range & Units 02/15/23 13:05   Pth, Intact 14.0 - 72.0 pg/mL 71.6         2. Vitamin D deficiency  Currently taking   Latest Reference Range & Units 02/15/23 13:05   25-Hydroxy   Vitamin D 25 30 - 100 ng/mL 37   Vitamin D-1, 25-Dihydroxy 19.9 - 79.3 pg/mL 69.9     3. Thyroid Nodule   Latest Reference Range & Units 02/15/23 13:05   TSH 0.380 - 5.330 uIU/mL 1.560   Free T-4 0.93 - 1.70 ng/dL 1.41     FNA on 2/3/23 showed:  Right lower thyroid nodule fine needle aspiration:          [place] Category I: Non-diagnostic due to hypocellularity.          The smears demonstrate a bloody specimen with rare minute           colloid, scattered ultrasound gel and cluster of adipose cells.          Negative for any cytologic changes to suggest papillary           carcinoma.       Patient's medications, allergies, and social histories were reviewed and updated as appropriate.      ROS:       CONS:     No fever, no chills   EYES:     No diplopia, no blurry vision   CV:           No chest pain, no palpitations   PULM:     No SOB, no cough, no hemoptysis.   GI:            No  nausea, no vomiting, no diarrhea, no constipation   ENDO:     No polyuria, no polydipsia, no heat intolerance, no cold intolerance     Past Medical History:  Problem List:  2023-01: Thyroid nodule  2023-01: Vitamin D deficiency  2023-01: Hyperparathyroidism (HCC)  2023-01: Osteoporosis without current pathological fracture  2022-11: Encounter for loop recorder check  2021-09: PVC's (premature ventricular contractions)  2021-09: H/O cardiac radiofrequency ablation 8/18/21 Dr Moody.  PVI   (extension of the RPV), PWI and RVOT (PVCs)  2021-09: Abnormal CT scan, lung  2021-04: Late effect of stroke  2021-02: Stroke (HCC)  2021-02: Essential tremor  2019-11: Hiatal hernia with GERD  2019-11: History of esophageal stricture  2019-08: Chronic anticoagulation  2019-07: Paroxysmal atrial fibrillation ([facility])  2019-07: Atrial fibrillation with RVR ([facility])  2019-07: Dehydration  2019-06: Shortness of breath  2019-04: Palpitations  2019-04: Weight gain  2019-04: Muscle cramping  2018-10: Abnormal CT of the abdomen  2018-10: Chronic hip pain, bilateral  2017-04: Chronic fatigue  2017-04: Scalp irritation  2017-04: Preventative health care  2013-05: Atrial premature complexes  2012-08: OSTEOPOROSIS  2012-08: History of colonic polyps  2011-09: Kidney stones  2011-09: Asthma  2011-09: HEMORRHOIDS  2011-09: Social anxiety disorder  2011-09: LDL (low density lipoprotein receptor disorder)  2009-10: Anxiety  2009-10: Gastroesophageal reflux disease without esophagitis      Past Surgical History:  Past Surgical History:   Procedure Laterality Date    COLONOSCOPY WITH POLYP  June 2008    benign polyp  Dr Paul     CHOLECYSTECTOMY  January 2008    laparoscopic    EGD WITH ASP/BX  September 2006    inflammation only ---Dr Paul    CHOLECYSTECTOMY      OTHER CARDIAC SURGERY  Around 2000    Ablation    SINUSOTOMIES          Allergies:  Amitriptyline, Lovastatin, Trazodone, Bactrim [sulfamethoxazole w-trimethoprim], and Levaquin     Social  History:  Social History     Tobacco Use    Smoking status: Never    Smokeless tobacco: Never   Vaping Use    Vaping Use: Never used   Substance Use Topics    Alcohol use: No     Alcohol/week: 0.0 oz    Drug use: No        Family History:   family history includes Cancer (age of onset: 70) in her father; Diabetes in her sister and son; Heart Disease in her mother; Heart Disease (age of onset: 42) in her son; Hypertension in her son.      PHYSICAL EXAM:   Vital signs: LMP 01/01/2004   GENERAL: Well-developed, well-nourished in no apparent distress.   EYE:  No ocular asymmetry, PERRLA  HENT: Pink, moist mucous membranes.    NECK: No thyromegaly.   CARDIOVASCULAR:  No murmurs  LUNGS: Clear breath sounds  ABDOMEN: Soft, nontender   EXTREMITIES: No clubbing, cyanosis, or edema.   NEUROLOGICAL: No gross focal motor abnormalities   LYMPH: No cervical adenopathy palpated.   SKIN: No rashes, lesions.       Labs:  Lab Results   Component Value Date/Time    WBC 5.3 08/13/2021 12:03 PM    RBC 4.65 08/13/2021 12:03 PM    HEMOGLOBIN 13.4 08/13/2021 12:03 PM    MCV 94.2 08/13/2021 12:03 PM    MCH 28.8 08/13/2021 12:03 PM    MCHC 30.6 (L) 08/13/2021 12:03 PM    RDW 51.2 (H) 08/13/2021 12:03 PM    MPV 11.5 08/13/2021 12:03 PM       Lab Results   Component Value Date/Time    SODIUM 142 01/13/2023 01:30 PM    POTASSIUM 4.3 01/13/2023 01:30 PM    CHLORIDE 105 01/13/2023 01:30 PM    CO2 28 01/13/2023 01:30 PM    ANION 9.0 01/13/2023 01:30 PM    GLUCOSE 73 01/13/2023 01:30 PM    BUN 17 01/13/2023 01:30 PM    CREATININE 0.69 01/13/2023 01:30 PM    CREATININE 0.8 10/20/2008 11:59 AM    CALCIUM 9.6 01/13/2023 01:30 PM    ASTSGOT 66 (H) 01/13/2023 01:30 PM    ALTSGPT 73 (H) 01/13/2023 01:30 PM    TBILIRUBIN 0.4 01/13/2023 01:30 PM    ALBUMIN 4.1 02/15/2023 01:05 PM    TOTPROTEIN 6.6 01/13/2023 01:30 PM    GLOBULIN 2.3 01/13/2023 01:30 PM    AGRATIO 1.9 01/13/2023 01:30 PM       Lab Results   Component Value Date/Time    TSHULTRASEN 1.560  02/15/2023 1305     No results found for: FREEDIR  No results found for: FREET3  No results found for: THYSTIMIG      Imaging:    ASSESSMENT/PLAN:       There are no diagnoses linked to this encounter.    No follow-ups on file.      This patient during there office visit today was started on a new medication.  Side effects of the new medication were discussed with the patient today in the office.     Thank you kindly for allowing me to participate in the endocrine care plan for this patient.    Chris Elliott MD, [facility], [facility]  02/22/23    CC:   Tano Clay M.D.

## 2023-02-23 NOTE — CARDIAC REMOTE MONITOR - SCAN
Device transmission reviewed. Device demonstrated appropriate function. Occasional TWO      Electronically Signed by: Lazaro Hirsch M.D.    3/6/2023  10:20 AM

## 2023-03-16 ENCOUNTER — APPOINTMENT (OUTPATIENT)
Dept: ONCOLOGY | Facility: MEDICAL CENTER | Age: 68
End: 2023-03-16
Attending: FAMILY MEDICINE
Payer: MEDICARE

## 2023-03-25 ENCOUNTER — NON-PROVIDER VISIT (OUTPATIENT)
Dept: CARDIOLOGY | Facility: MEDICAL CENTER | Age: 68
End: 2023-03-25
Payer: MEDICARE

## 2023-03-25 PROCEDURE — 93298 REM INTERROG DEV EVAL SCRMS: CPT | Performed by: INTERNAL MEDICINE

## 2023-03-27 NOTE — CARDIAC REMOTE MONITOR - SCAN
Device transmission reviewed. Device demonstrated appropriate function.       Electronically Signed by: Josue Moody M.D.    3/30/2023  8:23 PM

## 2023-04-13 ENCOUNTER — TELEPHONE (OUTPATIENT)
Dept: HEALTH INFORMATION MANAGEMENT | Facility: OTHER | Age: 68
End: 2023-04-13

## 2023-04-25 ENCOUNTER — NON-PROVIDER VISIT (OUTPATIENT)
Dept: CARDIOLOGY | Facility: MEDICAL CENTER | Age: 68
End: 2023-04-25
Payer: MEDICARE

## 2023-04-25 PROCEDURE — 93298 REM INTERROG DEV EVAL SCRMS: CPT | Performed by: INTERNAL MEDICINE

## 2023-04-26 NOTE — CARDIAC REMOTE MONITOR - SCAN
Device transmission reviewed. Device demonstrated appropriate function.       Electronically Signed by: Roberto Anglin M.D.    4/26/2023  4:02 PM

## 2023-05-17 DIAGNOSIS — I48.0 PAROXYSMAL ATRIAL FIBRILLATION (HCC): ICD-10-CM

## 2023-05-17 RX ORDER — PROPRANOLOL HYDROCHLORIDE 20 MG/1
TABLET ORAL
Qty: 180 TABLET | Refills: 2 | Status: SHIPPED | OUTPATIENT
Start: 2023-05-17 | End: 2024-01-02 | Stop reason: SDUPTHER

## 2023-05-26 ENCOUNTER — NON-PROVIDER VISIT (OUTPATIENT)
Dept: CARDIOLOGY | Facility: MEDICAL CENTER | Age: 68
End: 2023-05-26
Payer: MEDICARE

## 2023-05-26 PROCEDURE — 93298 REM INTERROG DEV EVAL SCRMS: CPT | Performed by: INTERNAL MEDICINE

## 2023-05-30 ENCOUNTER — APPOINTMENT (OUTPATIENT)
Dept: RADIOLOGY | Facility: MEDICAL CENTER | Age: 68
End: 2023-05-30
Attending: INTERNAL MEDICINE
Payer: MEDICARE

## 2023-05-30 NOTE — CARDIAC REMOTE MONITOR - SCAN
Device transmission reviewed. Device demonstrated appropriate function.       Electronically Signed by: Josue Moody M.D.    6/10/2023  9:26 AM

## 2023-06-12 ENCOUNTER — HOSPITAL ENCOUNTER (OUTPATIENT)
Dept: RADIOLOGY | Facility: MEDICAL CENTER | Age: 68
End: 2023-06-12
Attending: INTERNAL MEDICINE
Payer: MEDICARE

## 2023-06-12 DIAGNOSIS — R91.8 ABNORMAL CT SCAN, LUNG: ICD-10-CM

## 2023-06-12 PROCEDURE — 71250 CT THORAX DX C-: CPT

## 2023-06-19 ENCOUNTER — OFFICE VISIT (OUTPATIENT)
Dept: SLEEP MEDICINE | Facility: MEDICAL CENTER | Age: 68
End: 2023-06-19
Attending: INTERNAL MEDICINE
Payer: MEDICARE

## 2023-06-19 VITALS
DIASTOLIC BLOOD PRESSURE: 76 MMHG | HEIGHT: 67 IN | OXYGEN SATURATION: 94 % | WEIGHT: 134 LBS | SYSTOLIC BLOOD PRESSURE: 106 MMHG | HEART RATE: 59 BPM | BODY MASS INDEX: 21.03 KG/M2

## 2023-06-19 DIAGNOSIS — I48.0 PAROXYSMAL ATRIAL FIBRILLATION (HCC): ICD-10-CM

## 2023-06-19 DIAGNOSIS — R91.8 ABNORMAL CT SCAN, LUNG: ICD-10-CM

## 2023-06-19 PROCEDURE — 99212 OFFICE O/P EST SF 10 MIN: CPT | Performed by: INTERNAL MEDICINE

## 2023-06-19 PROCEDURE — 99214 OFFICE O/P EST MOD 30 MIN: CPT | Performed by: INTERNAL MEDICINE

## 2023-06-19 PROCEDURE — 3078F DIAST BP <80 MM HG: CPT | Performed by: INTERNAL MEDICINE

## 2023-06-19 PROCEDURE — 3074F SYST BP LT 130 MM HG: CPT | Performed by: INTERNAL MEDICINE

## 2023-06-19 ASSESSMENT — FIBROSIS 4 INDEX: FIB4 SCORE: 2.47

## 2023-06-19 ASSESSMENT — ENCOUNTER SYMPTOMS
CONSTITUTIONAL NEGATIVE: 1
CARDIOVASCULAR NEGATIVE: 1
NEUROLOGICAL NEGATIVE: 1
PSYCHIATRIC NEGATIVE: 1
GASTROINTESTINAL NEGATIVE: 1
MUSCULOSKELETAL NEGATIVE: 1
RESPIRATORY NEGATIVE: 1
EYES NEGATIVE: 1

## 2023-06-19 NOTE — ASSESSMENT & PLAN NOTE
Left lower lobe nodule stable on repeat imaging  Never smoker  Repeat CT chest annually x 5 years (until 9/2026)  Counseled to monitor symptoms and followup sooner if any develop in interval period  Follow-up in 1 year in nodule clinic

## 2023-06-19 NOTE — PROGRESS NOTES
Pulmonary Clinic follow up    Date of Service: 11/30/2022    Reason for follow up:  Follow-Up (Abnormal CT Scan, Lung.  Last seen 11/30/22) and Results (Ct-chest 06/12/23)      Problem List Items Addressed This Visit       Abnormal CT scan, lung     Left lower lobe nodule stable on repeat imaging  Never smoker  Repeat CT chest annually x 5 years (until 9/2026)  Counseled to monitor symptoms and followup sooner if any develop in interval period  Follow-up in 1 year in nodule clinic         Relevant Orders    CT-CHEST (THORAX) W/O       History of Present Illness:     Ariane Szymanski is a 68 y.o. female with a past medical history paroxymal afib s/p ablation x 2  With the work up of SOB incidental nodule was seen focal area of tree-in-bud nodularity in the left lower lobe on initial CT 9/2021  Last seen by Dr. Lim  At that time CT chest 11/2022 demonstrated left lower lobe nodule 6 mm now more solid and a new right upper lobe nodule and a new right upper lobe nodule 4 mm in size.    PFTs which showed FEV1/FVC ratio less than 70% but she had a bronchodilator response and DLCO is normal  Never smoker  She has no symptoms of SOB    Interval events since last visit with Dr. Lim in 11/2022:  No interval change in her respiratory symptoms, still essentially asymptomatic    Repeat CT chest performed 6/2023 demonstrates 4 mm nodule and adjacent tiny nodules in the right middle lobe have resolved.  The 6 mm groundglass opacity in the left lower lobe is stable.  Punctate nodule in right lower lobe.     Review of Systems   Constitutional: Negative.    HENT: Negative.     Eyes: Negative.    Respiratory: Negative.     Cardiovascular: Negative.    Gastrointestinal: Negative.    Genitourinary: Negative.    Musculoskeletal: Negative.    Skin: Negative.    Neurological: Negative.    Endo/Heme/Allergies: Negative.    Psychiatric/Behavioral: Negative.         Current Outpatient Medications on File Prior to Visit   Medication Sig  "Dispense Refill    propranolol (INDERAL) 20 MG Tab TAKE 1 TABLET BY MOUTH TWICE A  Tablet 2    meclizine (ANTIVERT) 12.5 MG Tab TAKE 1 TABLET BY MOUTH 3 TIMES A DAY AS NEEDED (FOR VERTIGO). 30 Tablet 0    scopolamine (TRANSDERM-SCOP, 1.5 MG,) 1 mg/72hr PATCH 72 HR Place 1 Patch on the skin every 72 hours. 4 Patch 3    omeprazole (PRILOSEC) 20 MG delayed-release capsule Take 20 mg by mouth every day. Alternating with Nexium      flecainide (TAMBOCOR) 50 MG tablet Take 1 Tablet by mouth 1 time a day as needed (Palpitations, PVCs). 15 Tablet 0    esomeprazole (NEXIUM) 40 MG delayed-release capsule Take 1 Capsule by mouth every morning before breakfast. 90 Capsule 0    XARELTO 20 MG Tab tablet TAKE 1 TABLET BY MOUTH EVERY DAY WITH DINNER 90 Tablet 3    zoledronic Acid (RECLAST) 5 MG/100ML Solution IVPB premix Infuse 100 mL into a venous catheter Once Every 12 Months for 6 doses. 100 mL 6    prednisoLONE acetate (PRED FORTE) 1 % Suspension  (Patient not taking: Reported on 6/19/2023)      topiramate (TOPAMAX) 25 MG Tab TAKE 1 TABLET EVERY DAY FOR THE FIRST WEEK.TAKE 1 TABLET TWICE A DAY FOR THE SECOND WEEK. TAKE 2 TABLETS IN THE MORNING AND 1 TABLET IN THE EVENING FOR WEEK 3. TAKE 2 TABLETS TWICE A DAY FOR WEEK 4. (Patient not taking: Reported on 6/19/2023) 720 Tablet 0     No current facility-administered medications on file prior to visit.       Social History     Tobacco Use    Smoking status: Never    Smokeless tobacco: Never   Vaping Use    Vaping Use: Never used   Substance Use Topics    Alcohol use: No     Alcohol/week: 0.0 oz    Drug use: No        Past Medical History:   Diagnosis Date    Anxiety     Arrhythmia     afib    ASTHMA 4/9/21:  Patient denies    Atrial fibrillation (HCC)     Bowel habit changes     constipation    Cataract 04/09/2001    \"Small, not affecting eyes\"    Chickenpox     Constipation     Depression 04/09/2021    Patient denies    Frequent urination     GERD (gastroesophageal reflux " "disease)     Heartburn     Hiatal hernia with GERD 11/5/2019    Hiatus hernia syndrome     High cholesterol     History of esophageal stricture 11/5/2019    Indigestion     Kidney stone     Osteoporosis     Painful joint     Palpitations     Palpitations     Premature atrial contractions     Rhinitis     Ringing in ears     Stroke (HCC) 04/09/2021 Jan. 2021.  Blood clot in brain RT A-Fib    Tonsillitis     Wears glasses        Past Surgical History:   Procedure Laterality Date    COLONOSCOPY WITH POLYP  June 2008    benign polyp  Dr Paul     CHOLECYSTECTOMY  January 2008    laparoscopic    EGD WITH ASP/BX  September 2006    inflammation only ---Dr Paul    CHOLECYSTECTOMY      OTHER CARDIAC SURGERY  Around 2000    Ablation    SINUSOTOMIES         Allergies: Amitriptyline, Lovastatin, Trazodone, Bactrim [sulfamethoxazole w-trimethoprim], and Levaquin    Family History   Problem Relation Age of Onset    Cancer Father 70        cancer of unknown primary    Heart Disease Mother     Heart Disease Son 42        MI and coronary stent placement    Diabetes Son     Hypertension Son     Diabetes Sister        Vitals:    06/19/23 1313   Height: 1.702 m (5' 7\")   Weight: 60.8 kg (134 lb)   Weight % change since last entry.: 0 %   BP: 106/76   Pulse: (!) 59   BMI (Calculated): 20.99       Physical Examination  Physical Exam  Vitals and nursing note reviewed.   Constitutional:       General: She is not in acute distress.     Appearance: Normal appearance. She is not diaphoretic.      Comments: Accompanied by supportive spouse   HENT:      Head: Normocephalic and atraumatic.   Eyes:      Conjunctiva/sclera: Conjunctivae normal.      Pupils: Pupils are equal, round, and reactive to light.   Neck:      Thyroid: No thyromegaly.      Vascular: No JVD.      Trachea: No tracheal deviation.   Cardiovascular:      Rate and Rhythm: Normal rate and regular rhythm.      Heart sounds: No murmur heard.     No friction rub. No gallop. "   Pulmonary:      Effort: No respiratory distress.      Breath sounds: No stridor. No wheezing or rales.   Chest:      Chest wall: No tenderness.   Abdominal:      Palpations: Abdomen is soft.   Musculoskeletal:         General: No tenderness or deformity.      Cervical back: Normal range of motion and neck supple.   Lymphadenopathy:      Cervical: No cervical adenopathy.   Skin:     General: Skin is warm and dry.      Findings: No rash.   Neurological:      General: No focal deficit present.      Mental Status: She is alert and oriented to person, place, and time.      Gait: Gait is intact.   Psychiatric:         Mood and Affect: Mood and affect normal.         Behavior: Behavior normal.         Thought Content: Thought content normal.         Judgment: Judgment normal.       Time spent in record review prior to patient arrival, reviewing results, and in face-to-face encounter totaled 35 min, excluding any procedures if performed.    __________  Sharath Lindsay MD  Pulmonary and Critical Care Medicine  [facility]

## 2023-06-20 ENCOUNTER — OFFICE VISIT (OUTPATIENT)
Dept: CARDIOLOGY | Facility: MEDICAL CENTER | Age: 68
End: 2023-06-20
Attending: INTERNAL MEDICINE
Payer: MEDICARE

## 2023-06-20 VITALS
RESPIRATION RATE: 14 BRPM | HEART RATE: 54 BPM | DIASTOLIC BLOOD PRESSURE: 76 MMHG | OXYGEN SATURATION: 95 % | BODY MASS INDEX: 20.88 KG/M2 | HEIGHT: 67 IN | WEIGHT: 133 LBS | SYSTOLIC BLOOD PRESSURE: 118 MMHG

## 2023-06-20 DIAGNOSIS — Z98.890 H/O CARDIAC RADIOFREQUENCY ABLATION: ICD-10-CM

## 2023-06-20 DIAGNOSIS — I48.0 PAROXYSMAL ATRIAL FIBRILLATION (HCC): ICD-10-CM

## 2023-06-20 DIAGNOSIS — Z45.09 ENCOUNTER FOR LOOP RECORDER CHECK: ICD-10-CM

## 2023-06-20 PROCEDURE — 3074F SYST BP LT 130 MM HG: CPT | Performed by: INTERNAL MEDICINE

## 2023-06-20 PROCEDURE — 99214 OFFICE O/P EST MOD 30 MIN: CPT | Performed by: INTERNAL MEDICINE

## 2023-06-20 PROCEDURE — 99212 OFFICE O/P EST SF 10 MIN: CPT | Performed by: INTERNAL MEDICINE

## 2023-06-20 PROCEDURE — 3078F DIAST BP <80 MM HG: CPT | Performed by: INTERNAL MEDICINE

## 2023-06-20 PROCEDURE — 93285 PRGRMG DEV EVAL SCRMS IP: CPT | Mod: 26 | Performed by: INTERNAL MEDICINE

## 2023-06-20 PROCEDURE — 93005 ELECTROCARDIOGRAM TRACING: CPT | Performed by: INTERNAL MEDICINE

## 2023-06-20 PROCEDURE — 93285 PRGRMG DEV EVAL SCRMS IP: CPT | Performed by: INTERNAL MEDICINE

## 2023-06-20 PROCEDURE — 99213 OFFICE O/P EST LOW 20 MIN: CPT | Performed by: INTERNAL MEDICINE

## 2023-06-20 RX ORDER — DRONEDARONE 400 MG/1
400 TABLET, FILM COATED ORAL 2 TIMES DAILY WITH MEALS
Qty: 60 TABLET | Refills: 2 | Status: SHIPPED | OUTPATIENT
Start: 2023-06-20 | End: 2023-07-18

## 2023-06-20 ASSESSMENT — FIBROSIS 4 INDEX: FIB4 SCORE: 2.47

## 2023-06-20 NOTE — PROGRESS NOTES
"Arrhythmia Clinic Note (Established patient)    DOS: 6/20/2023    Chief complaint/Reason for consult: F/u AF    Interval History:  Pt is a 69 yo F. She has a history of AF s/p initial ablation, then cessation of OAC then recurrence with stroke unfortunately, back on OAC. She has undergone ILR and has a still a low burden of PAF more so recently with ~5 hour episode. She is symptomatic with this but did not take her pill in the pocket flec as she did not realize it at the time.    ROS (+ highlighted in red):  General--Negative for fatigue, weight loss or weight gain  Cardiovascular--Negative for CP, orthopnea, PND    Past Medical History:   Diagnosis Date    Anxiety     Arrhythmia     afib    ASTHMA 4/9/21:  Patient denies    Atrial fibrillation (HCC)     Bowel habit changes     constipation    Cataract 04/09/2001    \"Small, not affecting eyes\"    Chickenpox     Constipation     Depression 04/09/2021    Patient denies    Frequent urination     GERD (gastroesophageal reflux disease)     Heartburn     Hiatal hernia with GERD 11/5/2019    Hiatus hernia syndrome     High cholesterol     History of esophageal stricture 11/5/2019    Indigestion     Kidney stone     Osteoporosis     Painful joint     Palpitations     Palpitations     Premature atrial contractions     Rhinitis     Ringing in ears     Stroke (HCC) 04/09/2021 Jan. 2021.  Blood clot in brain RT A-Fib    Tonsillitis     Wears glasses        Past Surgical History:   Procedure Laterality Date    COLONOSCOPY WITH POLYP  June 2008    benign polyp  Dr Paul     CHOLECYSTECTOMY  January 2008    laparoscopic    EGD WITH ASP/BX  September 2006    inflammation only ---Dr Paul    CHOLECYSTECTOMY      OTHER CARDIAC SURGERY  Around 2000    Ablation    SINUSOTOMIES         Social History     Socioeconomic History    Marital status:      Spouse name: Not on file    Number of children: Not on file    Years of education: Not on file    Highest education level: Not " on file   Occupational History    Occupation: retired   Tobacco Use    Smoking status: Never    Smokeless tobacco: Never   Vaping Use    Vaping Use: Never used   Substance and Sexual Activity    Alcohol use: No     Alcohol/week: 0.0 oz    Drug use: No    Sexual activity: Yes     Partners: Male     Birth control/protection: Post-Menopausal   Other Topics Concern    Not on file   Social History Narrative    Not on file     Social Determinants of Health     Financial Resource Strain: Not on file   Food Insecurity: Not on file   Transportation Needs: Not on file   Physical Activity: Not on file   Stress: Not on file   Social Connections: Not on file   Intimate Partner Violence: Not on file   Housing Stability: Not on file       Family History   Problem Relation Age of Onset    Cancer Father 70        cancer of unknown primary    Heart Disease Mother     Heart Disease Son 42        MI and coronary stent placement    Diabetes Son     Hypertension Son     Diabetes Sister        Allergies   Allergen Reactions    Amitriptyline      Excessive sedation --even at 10 mg dose    Lovastatin      Muscle aches  Muscle aches      Trazodone      Irregular pulse    Bactrim [Sulfamethoxazole W-Trimethoprim]      Shaking    Levaquin      Nausea        Current Outpatient Medications   Medication Sig Dispense Refill    dronedarone (MULTAQ) 400 MG Tab Take 1 Tablet by mouth 2 times a day with meals. 60 Tablet 2    rivaroxaban (XARELTO) 20 MG Tab tablet Take 1 Tablet by mouth with dinner. 90 Tablet 3    propranolol (INDERAL) 20 MG Tab TAKE 1 TABLET BY MOUTH TWICE A  Tablet 2    meclizine (ANTIVERT) 12.5 MG Tab TAKE 1 TABLET BY MOUTH 3 TIMES A DAY AS NEEDED (FOR VERTIGO). 30 Tablet 0    scopolamine (TRANSDERM-SCOP, 1.5 MG,) 1 mg/72hr PATCH 72 HR Place 1 Patch on the skin every 72 hours. 4 Patch 3    omeprazole (PRILOSEC) 20 MG delayed-release capsule Take 20 mg by mouth every day. Alternating with Nexium      flecainide (TAMBOCOR) 50  "MG tablet Take 1 Tablet by mouth 1 time a day as needed (Palpitations, PVCs). 15 Tablet 0    esomeprazole (NEXIUM) 40 MG delayed-release capsule Take 1 Capsule by mouth every morning before breakfast. 90 Capsule 0    topiramate (TOPAMAX) 25 MG Tab TAKE 1 TABLET EVERY DAY FOR THE FIRST WEEK.TAKE 1 TABLET TWICE A DAY FOR THE SECOND WEEK. TAKE 2 TABLETS IN THE MORNING AND 1 TABLET IN THE EVENING FOR WEEK 3. TAKE 2 TABLETS TWICE A DAY FOR WEEK 4. 720 Tablet 0    zoledronic Acid (RECLAST) 5 MG/100ML Solution IVPB premix Infuse 100 mL into a venous catheter Once Every 12 Months for 6 doses. (Patient not taking: Reported on 6/20/2023) 100 mL 6    prednisoLONE acetate (PRED FORTE) 1 % Suspension  (Patient not taking: Reported on 6/19/2023)       No current facility-administered medications for this visit.       Physical Exam:  Vitals:    06/20/23 1557   BP: 118/76   BP Location: Left arm   Patient Position: Sitting   BP Cuff Size: Adult   Pulse: (!) 54   Resp: 14   SpO2: 95%   Weight: 60.3 kg (133 lb)   Height: 1.702 m (5' 7\")     General appearance: NAD, conversant  HEENT: PERRL, neck is supple with FROM  Lungs: Clear to auscultation, normal respiratory effort  CV: RRR, no murmurs/rubs/gallops, no JVD  Abdomen: Soft, non-tender with normal bowel sounds  Extremities: No peripheral edema, no clubbing or cyanosis  Skin: No rash, lesions, or ulcers  Psych: Alert and oriented to person, place and time    Data:  Labs reviewed    Prior echo/stress reviewed:  LVEF 65%    EKG interpreted by me:  NSR    Impression/Plan:  1. Paroxysmal atrial fibrillation (HCC)  EKG    dronedarone (MULTAQ) 400 MG Tab    rivaroxaban (XARELTO) 20 MG Tab tablet      2. Encounter for loop recorder check        3. H/O cardiac radiofrequency ablation 8/18/21 Dr Moody.  PVI (extension of the RPV), PWI and RVOT (PVCs)          -I do not recommend holding her OAC unless absolutely necessary for dental procedures and even then no more than 2 days prior  -Let's " trial Multaq to see if she can tolerate and if effective  -F/u in 6 mo    Josue Moody MD

## 2023-06-21 RX ORDER — RIVAROXABAN 20 MG/1
TABLET, FILM COATED ORAL
Qty: 90 TABLET | Refills: 3 | OUTPATIENT
Start: 2023-06-21

## 2023-06-21 NOTE — TELEPHONE ENCOUNTER
The original prescription was reordered on 6/20/2023 by Josue Moody M.D.. Renewing this prescription may not be appropriate.

## 2023-06-26 ENCOUNTER — NON-PROVIDER VISIT (OUTPATIENT)
Dept: CARDIOLOGY | Facility: MEDICAL CENTER | Age: 68
End: 2023-06-26
Payer: MEDICARE

## 2023-06-26 PROCEDURE — 93298 REM INTERROG DEV EVAL SCRMS: CPT | Performed by: INTERNAL MEDICINE

## 2023-06-27 NOTE — CARDIAC REMOTE MONITOR - SCAN
Device transmission reviewed. Device demonstrated appropriate function.       Electronically Signed by: Josue Moody M.D.    7/6/2023  12:58 PM

## 2023-07-07 ENCOUNTER — TELEPHONE (OUTPATIENT)
Dept: CARDIOLOGY | Facility: MEDICAL CENTER | Age: 68
End: 2023-07-07
Payer: MEDICARE

## 2023-07-07 NOTE — TELEPHONE ENCOUNTER
Remote Transmission 7/6/2023:    1-Symptom episode 7/6/2023@9:38 am c/w SR with PACs and bigeminal PVCs.    Full report scanned into media.

## 2023-07-13 DIAGNOSIS — I48.0 PAROXYSMAL ATRIAL FIBRILLATION (HCC): ICD-10-CM

## 2023-07-14 NOTE — TELEPHONE ENCOUNTER
Is the patient due for a refill? Yes    Was the patient seen the past year? Yes    Date of last office visit: 6/20/2023    Does the patient have an upcoming appointment?  Yes   If yes, When? 12/12/23    Provider to refill:SS    Does the patients insurance require a 100 day supply?  No

## 2023-07-18 RX ORDER — DRONEDARONE 400 MG/1
TABLET, FILM COATED ORAL
Qty: 180 TABLET | Refills: 1 | Status: SHIPPED | OUTPATIENT
Start: 2023-07-18 | End: 2023-08-22 | Stop reason: SINTOL

## 2023-07-27 ENCOUNTER — NON-PROVIDER VISIT (OUTPATIENT)
Dept: CARDIOLOGY | Facility: MEDICAL CENTER | Age: 68
End: 2023-07-27
Payer: MEDICARE

## 2023-07-27 PROCEDURE — 93298 REM INTERROG DEV EVAL SCRMS: CPT | Performed by: INTERNAL MEDICINE

## 2023-07-28 NOTE — CARDIAC REMOTE MONITOR - SCAN
Device transmission reviewed. Device demonstrated appropriate function.       Electronically Signed by: Josue Moody M.D.    8/3/2023  3:32 AM

## 2023-08-18 ENCOUNTER — TELEPHONE (OUTPATIENT)
Dept: CARDIOLOGY | Facility: MEDICAL CENTER | Age: 68
End: 2023-08-18
Payer: MEDICARE

## 2023-08-21 LAB — EKG IMPRESSION: NORMAL

## 2023-08-21 PROCEDURE — 93010 ELECTROCARDIOGRAM REPORT: CPT | Performed by: INTERNAL MEDICINE

## 2023-08-21 NOTE — PROGRESS NOTES
"Chief Complaint   Patient presents with    Atrial Fibrillation     F/v Dx:Paroxysmal atrial fibrillation       Subjective     Ariane Szymanski is a 68 y.o. female who presents today for follow up atrial fibrillation and Loop recorder in situ.    She is followed by Dr. Moody for EP.  Additional medical history significant for prior ablation 8/2021 with Dr. Moody (repeat PVI, PWI, RVOT PVC), prior PVI in 2019, prior CVA (2/2021) with tPA and thrombectomy, HLD, anticoagulation.    Last office visit with Dr. Moody was trial on Multaq to see if could get improved AF suppression after long AF episode in which she was not aware of (previously taking flecainide as PIP).     Today in follow-up she is accompanied by her .  Reports that she feels like increased what she believes are PVCs on Multaq so she stopped.  States that has not recurred since stopping Multaq.  She is questioning whether or not she may be able to go back to low-dose flecainide.    Otherwise feeling well without complaints.  Being in 1 month to travel to [place] and [place] for approximately 4 weeks.    Past Medical History:   Diagnosis Date    Anxiety     Arrhythmia     afib    ASTHMA 4/9/21:  Patient denies    Atrial fibrillation (HCC)     Bowel habit changes     constipation    Cataract 04/09/2001    \"Small, not affecting eyes\"    Chickenpox     Constipation     Depression 04/09/2021    Patient denies    Frequent urination     GERD (gastroesophageal reflux disease)     Heartburn     Hiatal hernia with GERD 11/5/2019    Hiatus hernia syndrome     High cholesterol     History of esophageal stricture 11/5/2019    Indigestion     Kidney stone     Osteoporosis     Painful joint     Palpitations     Palpitations     Premature atrial contractions     Rhinitis     Ringing in ears     Stroke (HCC) 04/09/2021 Jan. 2021.  Blood clot in brain RT A-Fib    Tonsillitis     Wears glasses      Past Surgical History:   Procedure Laterality Date    COLONOSCOPY WITH " POLYP  June 2008    benign polyp  Dr Paul     CHOLECYSTECTOMY  January 2008    laparoscopic    EGD WITH ASP/BX  September 2006    inflammation only ---Dr Paul    CHOLECYSTECTOMY      OTHER CARDIAC SURGERY  Around 2000    Ablation    SINUSOTOMIES       Family History   Problem Relation Age of Onset    Cancer Father 70        cancer of unknown primary    Heart Disease Mother     Heart Disease Son 42        MI and coronary stent placement    Diabetes Son     Hypertension Son     Diabetes Sister      Social History     Socioeconomic History    Marital status:      Spouse name: Not on file    Number of children: Not on file    Years of education: Not on file    Highest education level: Not on file   Occupational History    Occupation: retired   Tobacco Use    Smoking status: Never    Smokeless tobacco: Never   Vaping Use    Vaping Use: Never used   Substance and Sexual Activity    Alcohol use: No     Alcohol/week: 0.0 oz    Drug use: No    Sexual activity: Yes     Partners: Male     Birth control/protection: Post-Menopausal   Other Topics Concern    Not on file   Social History Narrative    Not on file     Social Determinants of Health     Financial Resource Strain: Not on file   Food Insecurity: Not on file   Transportation Needs: Not on file   Physical Activity: Not on file   Stress: Not on file   Social Connections: Not on file   Intimate Partner Violence: Not on file   Housing Stability: Not on file     Allergies   Allergen Reactions    Amitriptyline      Excessive sedation --even at 10 mg dose    Lovastatin      Muscle aches  Muscle aches      Trazodone      Irregular pulse    Bactrim [Sulfamethoxazole W-Trimethoprim]      Shaking    Levaquin      Nausea      Outpatient Encounter Medications as of 8/22/2023   Medication Sig Dispense Refill    MULTAQ 400 MG Tab TAKE 1 TABLET BY MOUTH TWICE A DAY WITH FOOD 180 Tablet 1    rivaroxaban (XARELTO) 20 MG Tab tablet Take 1 Tablet by mouth with dinner. 90 Tablet 3     propranolol (INDERAL) 20 MG Tab TAKE 1 TABLET BY MOUTH TWICE A  Tablet 2    meclizine (ANTIVERT) 12.5 MG Tab TAKE 1 TABLET BY MOUTH 3 TIMES A DAY AS NEEDED (FOR VERTIGO). 30 Tablet 0    zoledronic Acid (RECLAST) 5 MG/100ML Solution IVPB premix Infuse 100 mL into a venous catheter Once Every 12 Months for 6 doses. (Patient not taking: Reported on 6/20/2023) 100 mL 6    scopolamine (TRANSDERM-SCOP, 1.5 MG,) 1 mg/72hr PATCH 72 HR Place 1 Patch on the skin every 72 hours. 4 Patch 3    omeprazole (PRILOSEC) 20 MG delayed-release capsule Take 20 mg by mouth every day. Alternating with Nexium      prednisoLONE acetate (PRED FORTE) 1 % Suspension  (Patient not taking: Reported on 6/19/2023)      flecainide (TAMBOCOR) 50 MG tablet Take 1 Tablet by mouth 1 time a day as needed (Palpitations, PVCs). 15 Tablet 0    esomeprazole (NEXIUM) 40 MG delayed-release capsule Take 1 Capsule by mouth every morning before breakfast. 90 Capsule 0    topiramate (TOPAMAX) 25 MG Tab TAKE 1 TABLET EVERY DAY FOR THE FIRST WEEK.TAKE 1 TABLET TWICE A DAY FOR THE SECOND WEEK. TAKE 2 TABLETS IN THE MORNING AND 1 TABLET IN THE EVENING FOR WEEK 3. TAKE 2 TABLETS TWICE A DAY FOR WEEK 4. 720 Tablet 0     No facility-administered encounter medications on file as of 8/22/2023.     Review of Systems   Constitutional:  Negative for chills, fever, malaise/fatigue and weight loss.   HENT:  Negative for congestion and tinnitus.    Respiratory:  Negative for cough, hemoptysis, sputum production, shortness of breath and wheezing.    Cardiovascular:  Negative for chest pain, palpitations, orthopnea, leg swelling and PND.   Gastrointestinal:  Negative for abdominal pain, blood in stool, heartburn, nausea and vomiting.   Genitourinary:  Negative for hematuria.   Neurological:  Negative for dizziness, tingling, tremors, sensory change, speech change, focal weakness, loss of consciousness and headaches.   All other systems reviewed and are negative.        "      Objective     /72 (BP Location: Left arm, Patient Position: Sitting, BP Cuff Size: Adult)   Pulse (!) 55   Resp 16   Ht 1.702 m (5' 7\")   Wt 59 kg (130 lb)   LMP 01/01/2004   SpO2 96%   BMI 20.36 kg/m²     Physical Exam  Constitutional:       Appearance: Normal appearance.   HENT:      Head: Normocephalic and atraumatic.      Mouth/Throat:      Mouth: Mucous membranes are moist.      Pharynx: Oropharynx is clear.   Eyes:      Extraocular Movements: Extraocular movements intact.      Conjunctiva/sclera: Conjunctivae normal.      Pupils: Pupils are equal, round, and reactive to light.   Cardiovascular:      Rate and Rhythm: Normal rate and regular rhythm.      Pulses: Normal pulses.   Pulmonary:      Effort: Pulmonary effort is normal.      Breath sounds: Normal breath sounds. No wheezing, rhonchi or rales.   Musculoskeletal:      Cervical back: Normal range of motion and neck supple.      Right lower leg: No edema.      Left lower leg: No edema.   Skin:     General: Skin is warm and dry.      Capillary Refill: Capillary refill takes 2 to 3 seconds.   Neurological:      Mental Status: She is alert and oriented to person, place, and time.   Psychiatric:         Mood and Affect: Mood normal.         Behavior: Behavior normal.         Thought Content: Thought content normal.         Judgment: Judgment normal.                Assessment & Plan     1. Paroxysmal atrial fibrillation (HCC)  EKG    flecainide (TAMBOCOR) 50 MG tablet      2. Cerebrovascular accident (CVA) due to embolism of right middle cerebral artery (HCC)        3. H/O cardiac radiofrequency ablation 8/18/21 Dr Moody.  PVI (extension of the RPV), PWI and RVOT (PVCs)        4. Encounter for loop recorder check        5. PVC's (premature ventricular contractions)            Medical Decision Making: Today's Assessment/Status/Plan:   1.  Paroxysmal atrial fibrillation  2.  History of ablation  - Overall A-fib burden remains low at less than " 1%.  She is currently sinus rhythm today.  -Prior ablation 821 with PVI PWI and RVOT PVCs  - ILR interrogated in office today.  Symptom episodes appear to be consistent with bigeminal/frequent PACs and not PVCs.  We discussed these.  1 short logged AF episode appears similar.  -We discussed flecainide versus Multaq.  She prefers to try very low-dose flecainide stating extremely sensitive to antiarrhythmics.  She will start 25 mg p.o. twice daily with propanolol.  Ok to cut propranolol in half if needed.  Discussed if tolerates, goal would be to work to 50 mg Flecainide BID.  -  We discussed appropriate heart rate parameters today.  -  Follow-up ECG in office 1 week.  Prior coronary calcium score of 0 and preserved EF on last echocardiogram.  Was previously tolerated okay but made her feel increased fatigue at higher dose.    2.  History of CVA  - ILR in situ  - Anticoagulation lifelong.    3.  PVCs  - No definite clinical recurrence.    4.  Anticoagulation  - Reports tolerating warfarin well per Coumadin clinic.  To continue.    Would like her to follow-up with PCP in regards to anxiety.    Turn to clinic in 2 to 3 months for review, sooner if clinical condition changes.  She will contact her office via [employer] or telephone call should questions or concerns arise.    PLEASE NOTE: This Note was created using voice recognition Software. I have made every reasonable attempt to correct obvious errors, but I expect that there are errors of grammar and possibly content that I did not discover before finalizing the note

## 2023-08-22 ENCOUNTER — OFFICE VISIT (OUTPATIENT)
Dept: CARDIOLOGY | Facility: MEDICAL CENTER | Age: 68
End: 2023-08-22
Attending: NURSE PRACTITIONER
Payer: MEDICARE

## 2023-08-22 ENCOUNTER — APPOINTMENT (OUTPATIENT)
Dept: ENDOCRINOLOGY | Facility: MEDICAL CENTER | Age: 68
End: 2023-08-22
Payer: MEDICARE

## 2023-08-22 VITALS
HEART RATE: 55 BPM | SYSTOLIC BLOOD PRESSURE: 118 MMHG | HEIGHT: 67 IN | RESPIRATION RATE: 16 BRPM | WEIGHT: 130 LBS | OXYGEN SATURATION: 96 % | DIASTOLIC BLOOD PRESSURE: 72 MMHG | BODY MASS INDEX: 20.4 KG/M2

## 2023-08-22 DIAGNOSIS — Z45.09 ENCOUNTER FOR LOOP RECORDER CHECK: ICD-10-CM

## 2023-08-22 DIAGNOSIS — I49.3 PVC'S (PREMATURE VENTRICULAR CONTRACTIONS): ICD-10-CM

## 2023-08-22 DIAGNOSIS — Z98.890 H/O CARDIAC RADIOFREQUENCY ABLATION: ICD-10-CM

## 2023-08-22 DIAGNOSIS — I48.0 PAROXYSMAL ATRIAL FIBRILLATION (HCC): ICD-10-CM

## 2023-08-22 DIAGNOSIS — I63.411 CEREBROVASCULAR ACCIDENT (CVA) DUE TO EMBOLISM OF RIGHT MIDDLE CEREBRAL ARTERY (HCC): ICD-10-CM

## 2023-08-22 PROCEDURE — 93010 ELECTROCARDIOGRAM REPORT: CPT | Performed by: NURSE PRACTITIONER

## 2023-08-22 PROCEDURE — 3078F DIAST BP <80 MM HG: CPT | Performed by: NURSE PRACTITIONER

## 2023-08-22 PROCEDURE — 3074F SYST BP LT 130 MM HG: CPT | Performed by: NURSE PRACTITIONER

## 2023-08-22 PROCEDURE — 99212 OFFICE O/P EST SF 10 MIN: CPT | Performed by: NURSE PRACTITIONER

## 2023-08-22 PROCEDURE — 93005 ELECTROCARDIOGRAM TRACING: CPT | Performed by: NURSE PRACTITIONER

## 2023-08-22 PROCEDURE — 99214 OFFICE O/P EST MOD 30 MIN: CPT | Mod: 25 | Performed by: NURSE PRACTITIONER

## 2023-08-22 RX ORDER — FLECAINIDE ACETATE 50 MG/1
25 TABLET ORAL 2 TIMES DAILY
Qty: 90 TABLET | Refills: 1 | Status: SHIPPED | OUTPATIENT
Start: 2023-08-22 | End: 2023-12-20

## 2023-08-22 ASSESSMENT — ENCOUNTER SYMPTOMS
LOSS OF CONSCIOUSNESS: 0
WEIGHT LOSS: 0
SPUTUM PRODUCTION: 0
NAUSEA: 0
HEADACHES: 0
PALPITATIONS: 0
SHORTNESS OF BREATH: 0
HEMOPTYSIS: 0
TREMORS: 0
HEARTBURN: 0
FOCAL WEAKNESS: 0
SPEECH CHANGE: 0
SENSORY CHANGE: 0
PND: 0
ABDOMINAL PAIN: 0
WHEEZING: 0
COUGH: 0
BLOOD IN STOOL: 0
ORTHOPNEA: 0
TINGLING: 0
FEVER: 0
CHILLS: 0
VOMITING: 0
DIZZINESS: 0

## 2023-08-27 ENCOUNTER — NON-PROVIDER VISIT (OUTPATIENT)
Dept: CARDIOLOGY | Facility: MEDICAL CENTER | Age: 68
End: 2023-08-27
Payer: MEDICARE

## 2023-08-27 PROCEDURE — 93298 REM INTERROG DEV EVAL SCRMS: CPT | Performed by: INTERNAL MEDICINE

## 2023-08-28 NOTE — CARDIAC REMOTE MONITOR - SCAN
Device transmission reviewed. Device demonstrated appropriate function.       Electronically Signed by: Josue Moody M.D.    9/11/2023  3:38 PM

## 2023-08-29 ENCOUNTER — NON-PROVIDER VISIT (OUTPATIENT)
Dept: CARDIOLOGY | Facility: MEDICAL CENTER | Age: 68
End: 2023-08-29
Attending: NURSE PRACTITIONER
Payer: MEDICARE

## 2023-08-29 DIAGNOSIS — I48.0 PAROXYSMAL ATRIAL FIBRILLATION (HCC): ICD-10-CM

## 2023-08-29 PROCEDURE — 93005 ELECTROCARDIOGRAM TRACING: CPT

## 2023-08-29 PROCEDURE — 93010 ELECTROCARDIOGRAM REPORT: CPT | Performed by: NURSE PRACTITIONER

## 2023-08-29 NOTE — PROGRESS NOTES
Patient was here today for EKG per EA.     Patient reports no active symptoms at this time.     EKG performed and transferred into patients chart. Paper copy of EKG given to Kerry ISABEL for EA. Patient has been informed that Katty ISABEL, will call if there are concerns about the EKG.

## 2023-08-31 LAB — EKG IMPRESSION: NORMAL

## 2023-09-07 DIAGNOSIS — T75.3XXA MOTION SICKNESS, INITIAL ENCOUNTER: ICD-10-CM

## 2023-09-07 RX ORDER — MECLIZINE HCL 12.5 MG/1
12.5 TABLET ORAL 3 TIMES DAILY PRN
Qty: 30 TABLET | Refills: 0 | Status: SHIPPED | OUTPATIENT
Start: 2023-09-07

## 2023-09-07 NOTE — TELEPHONE ENCOUNTER
Received request via: Patient    Was the patient seen in the last year in this department? Yes    Does the patient have an active prescription (recently filled or refills available) for medication(s) requested? No    Does the patient have skilled nursing Plus and need 100 day supply (blood pressure, diabetes and cholesterol meds only)? Patient does not have SCP      meclizine (ANTIVERT) 12.5 MG Tab        Will be on a cruise for 28 days

## 2023-09-21 ENCOUNTER — OFFICE VISIT (OUTPATIENT)
Dept: CARDIOLOGY | Facility: MEDICAL CENTER | Age: 68
End: 2023-09-21
Attending: NURSE PRACTITIONER
Payer: MEDICARE

## 2023-09-21 VITALS
HEART RATE: 58 BPM | DIASTOLIC BLOOD PRESSURE: 66 MMHG | BODY MASS INDEX: 20.72 KG/M2 | OXYGEN SATURATION: 99 % | SYSTOLIC BLOOD PRESSURE: 114 MMHG | WEIGHT: 132 LBS | RESPIRATION RATE: 14 BRPM | HEIGHT: 67 IN

## 2023-09-21 DIAGNOSIS — D68.318 CIRCULATING ANTICOAGULANTS (HCC): ICD-10-CM

## 2023-09-21 DIAGNOSIS — I48.0 PAROXYSMAL ATRIAL FIBRILLATION (HCC): ICD-10-CM

## 2023-09-21 DIAGNOSIS — I49.3 PVC'S (PREMATURE VENTRICULAR CONTRACTIONS): ICD-10-CM

## 2023-09-21 DIAGNOSIS — I49.1 ATRIAL PREMATURE COMPLEXES: ICD-10-CM

## 2023-09-21 DIAGNOSIS — I63.411 CEREBROVASCULAR ACCIDENT (CVA) DUE TO EMBOLISM OF RIGHT MIDDLE CEREBRAL ARTERY (HCC): ICD-10-CM

## 2023-09-21 DIAGNOSIS — Z45.09 ENCOUNTER FOR LOOP RECORDER CHECK: ICD-10-CM

## 2023-09-21 DIAGNOSIS — Z98.890 H/O CARDIAC RADIOFREQUENCY ABLATION: ICD-10-CM

## 2023-09-21 PROCEDURE — 99214 OFFICE O/P EST MOD 30 MIN: CPT | Performed by: NURSE PRACTITIONER

## 2023-09-21 PROCEDURE — 3074F SYST BP LT 130 MM HG: CPT | Performed by: NURSE PRACTITIONER

## 2023-09-21 PROCEDURE — 3078F DIAST BP <80 MM HG: CPT | Performed by: NURSE PRACTITIONER

## 2023-09-21 PROCEDURE — 99212 OFFICE O/P EST SF 10 MIN: CPT | Performed by: NURSE PRACTITIONER

## 2023-09-21 PROCEDURE — 93005 ELECTROCARDIOGRAM TRACING: CPT | Performed by: NURSE PRACTITIONER

## 2023-09-21 ASSESSMENT — ENCOUNTER SYMPTOMS
LOSS OF CONSCIOUSNESS: 0
TINGLING: 0
TREMORS: 0
CHILLS: 0
HEARTBURN: 0
COUGH: 0
SPUTUM PRODUCTION: 0
WEIGHT LOSS: 0
WHEEZING: 0
HEMOPTYSIS: 0
ORTHOPNEA: 0
PALPITATIONS: 0
SHORTNESS OF BREATH: 0
SPEECH CHANGE: 0
SENSORY CHANGE: 0
VOMITING: 0
HEADACHES: 0
DIZZINESS: 0
FOCAL WEAKNESS: 0
FEVER: 0
NAUSEA: 0
PND: 0

## 2023-09-21 NOTE — PROGRESS NOTES
"Chief Complaint   Patient presents with    Atrial Fibrillation     F/V Dx: Paroxysmal atrial fibrillation (HCC)       Subjective     Ariane Szymanski is a 68 y.o. female who presents today for follow-up paroxysmal atrial fibrillation.    She is followed by Dr. Moody for EP.  Additional medical history significant for ablation of atrial fibrillation x2 (2021: Repeat PVI, PWI, RVOT PVC, 2019: PVI), CVA in 2021 with tPA and thrombectomy, hyperlipidemia.  Indefinite anticoagulation.    Last seen with myself 8/22/2023.  At that time we decided to retrial very low-dose flecainide as she did not like her symptoms and arrhythmia on Multaq.    Today in follow-up she is accompanied by her  Jesús.  States that she has done well so far on low-dose flecainide.  Still occasionally getting PACs/PVCs in the a.m.  She does not feel well when she has these.  Otherwise states that she has not had known episodes of atrial arrhythmia.    Past Medical History:   Diagnosis Date    Anxiety     Arrhythmia     afib    ASTHMA 4/9/21:  Patient denies    Atrial fibrillation (HCC)     Bowel habit changes     constipation    Cataract 04/09/2001    \"Small, not affecting eyes\"    Chickenpox     Constipation     Depression 04/09/2021    Patient denies    Frequent urination     GERD (gastroesophageal reflux disease)     Heartburn     Hiatal hernia with GERD 11/5/2019    Hiatus hernia syndrome     High cholesterol     History of esophageal stricture 11/5/2019    Indigestion     Kidney stone     Osteoporosis     Painful joint     Palpitations     Palpitations     Premature atrial contractions     Rhinitis     Ringing in ears     Stroke (HCC) 04/09/2021 Jan. 2021.  Blood clot in brain RT A-Fib    Tonsillitis     Wears glasses      Past Surgical History:   Procedure Laterality Date    COLONOSCOPY WITH POLYP  June 2008    benign polyp  Dr Paul     CHOLECYSTECTOMY  January 2008    laparoscopic    EGD WITH ASP/BX  September 2006    inflammation " only ---Dr Paul    CHOLECYSTECTOMY      OTHER CARDIAC SURGERY  Around 2000    Ablation    SINUSOTOMIES       Family History   Problem Relation Age of Onset    Cancer Father 70        cancer of unknown primary    Heart Disease Mother     Heart Disease Son 42        MI and coronary stent placement    Diabetes Son     Hypertension Son     Diabetes Sister      Social History     Socioeconomic History    Marital status:      Spouse name: Not on file    Number of children: Not on file    Years of education: Not on file    Highest education level: Not on file   Occupational History    Occupation: retired   Tobacco Use    Smoking status: Never    Smokeless tobacco: Never   Vaping Use    Vaping Use: Never used   Substance and Sexual Activity    Alcohol use: No     Alcohol/week: 0.0 oz    Drug use: No    Sexual activity: Yes     Partners: Male     Birth control/protection: Post-Menopausal   Other Topics Concern    Not on file   Social History Narrative    Not on file     Social Determinants of Health     Financial Resource Strain: Not on file   Food Insecurity: Not on file   Transportation Needs: Not on file   Physical Activity: Not on file   Stress: Not on file   Social Connections: Not on file   Intimate Partner Violence: Not on file   Housing Stability: Not on file     Allergies   Allergen Reactions    Amitriptyline      Excessive sedation --even at 10 mg dose    Lovastatin      Muscle aches  Muscle aches      Trazodone      Irregular pulse    Bactrim [Sulfamethoxazole W-Trimethoprim]      Shaking    Levaquin      Nausea      Outpatient Encounter Medications as of 9/21/2023   Medication Sig Dispense Refill    meclizine (ANTIVERT) 12.5 MG Tab Take 1 Tablet by mouth 3 times a day as needed (for vertigo). 30 Tablet 0    flecainide (TAMBOCOR) 50 MG tablet Take 0.5 Tablets by mouth 2 times a day. 90 Tablet 1    rivaroxaban (XARELTO) 20 MG Tab tablet Take 1 Tablet by mouth with dinner. 90 Tablet 3    propranolol  "(INDERAL) 20 MG Tab TAKE 1 TABLET BY MOUTH TWICE A  Tablet 2    zoledronic Acid (RECLAST) 5 MG/100ML Solution IVPB premix Infuse 100 mL into a venous catheter Once Every 12 Months for 6 doses. (Patient not taking: Reported on 6/20/2023) 100 mL 6    scopolamine (TRANSDERM-SCOP, 1.5 MG,) 1 mg/72hr PATCH 72 HR Place 1 Patch on the skin every 72 hours. 4 Patch 3    omeprazole (PRILOSEC) 20 MG delayed-release capsule Take 20 mg by mouth every day. Alternating with Nexium      prednisoLONE acetate (PRED FORTE) 1 % Suspension  (Patient not taking: Reported on 6/19/2023)      esomeprazole (NEXIUM) 40 MG delayed-release capsule Take 1 Capsule by mouth every morning before breakfast. 90 Capsule 0    topiramate (TOPAMAX) 25 MG Tab TAKE 1 TABLET EVERY DAY FOR THE FIRST WEEK.TAKE 1 TABLET TWICE A DAY FOR THE SECOND WEEK. TAKE 2 TABLETS IN THE MORNING AND 1 TABLET IN THE EVENING FOR WEEK 3. TAKE 2 TABLETS TWICE A DAY FOR WEEK 4. (Patient not taking: Reported on 8/22/2023) 720 Tablet 0     No facility-administered encounter medications on file as of 9/21/2023.     Review of Systems   Constitutional:  Negative for chills, fever, malaise/fatigue and weight loss.   HENT:  Negative for congestion and tinnitus.    Respiratory:  Negative for cough, hemoptysis, sputum production, shortness of breath and wheezing.    Cardiovascular:  Negative for chest pain, palpitations, orthopnea, leg swelling and PND.   Gastrointestinal:  Negative for heartburn, nausea and vomiting.   Neurological:  Negative for dizziness, tingling, tremors, sensory change, speech change, focal weakness, loss of consciousness and headaches.   All other systems reviewed and are negative.         Objective     /66 (BP Location: Left arm, Patient Position: Sitting, BP Cuff Size: Adult)   Pulse (!) 58   Resp 14   Ht 1.702 m (5' 7\")   Wt 59.9 kg (132 lb)   LMP 01/01/2004   SpO2 99%   BMI 20.67 kg/m²     Physical Exam  Vitals reviewed.   Constitutional:  "      Appearance: Normal appearance.   HENT:      Head: Normocephalic and atraumatic.      Mouth/Throat:      Mouth: Mucous membranes are moist.      Pharynx: Oropharynx is clear.   Eyes:      Extraocular Movements: Extraocular movements intact.      Conjunctiva/sclera: Conjunctivae normal.      Pupils: Pupils are equal, round, and reactive to light.   Cardiovascular:      Rate and Rhythm: Normal rate and regular rhythm.      Pulses: Normal pulses.   Pulmonary:      Effort: Pulmonary effort is normal.      Breath sounds: Normal breath sounds. No wheezing, rhonchi or rales.   Musculoskeletal:      Cervical back: Normal range of motion and neck supple.      Right lower leg: No edema.      Left lower leg: No edema.   Skin:     General: Skin is warm and dry.      Capillary Refill: Capillary refill takes 2 to 3 seconds.   Neurological:      Mental Status: She is alert and oriented to person, place, and time.   Psychiatric:         Mood and Affect: Mood normal.         Behavior: Behavior normal.         Thought Content: Thought content normal.         Judgment: Judgment normal.            Assessment & Plan     1. Paroxysmal atrial fibrillation (HCC)  EKG      2. Atrial premature complexes        3. PVC's (premature ventricular contractions)        4. H/O cardiac radiofrequency ablation 8/18/21 Dr Moody.  PVI (extension of the RPV), PWI and RVOT (PVCs)        5. Cerebrovascular accident (CVA) due to embolism of right middle cerebral artery (HCC)        6. Encounter for loop recorder check        7. Circulating anticoagulants (HCC)            Medical Decision Making: Today's Assessment/Status/Plan:   1.  Paroxysmal atrial fibrillation  2.  PACs and PVCs  3.  Status post ablation  - Rhythm overall remains well controlled with low burden of atrial fibrillation on ILR interrogation today.  - She does have symptomatic PACs and PVCs.  - Last office visit we transitioned her to very low-dose flecainide she is very sensitive to  medication.  She reports that she is tolerating this with her propranolol well.  Discussed if significant episodes could take an additional half tab to equal 50 mg of flecainide.  -Prior history of ablation x2 with Dr. Moody.    4.  ILR  - No symptom or episodes recorded.  1 triggered AF event lasting approximately 2 minutes device EGM appears most consistent with frequent PACs and PVCs.    5.  Anticoagulation  6.  History of CVA  - Continue indefinite Xarelto.  She is tolerating well without episodes of bleeding.    To clinic as previously scheduled with Dr. Moody, sooner if clinical condition changes.    PLEASE NOTE: This Note was created using voice recognition Software. I have made every reasonable attempt to correct obvious errors, but I expect that there are errors of grammar and possibly content that I did not discover before finalizing the note

## 2023-09-22 LAB — EKG IMPRESSION: NORMAL

## 2023-09-22 PROCEDURE — 93010 ELECTROCARDIOGRAM REPORT: CPT | Performed by: INTERNAL MEDICINE

## 2023-09-27 ENCOUNTER — NON-PROVIDER VISIT (OUTPATIENT)
Dept: CARDIOLOGY | Facility: MEDICAL CENTER | Age: 68
End: 2023-09-27
Payer: MEDICARE

## 2023-09-27 PROCEDURE — 93298 REM INTERROG DEV EVAL SCRMS: CPT | Performed by: INTERNAL MEDICINE

## 2023-09-28 NOTE — CARDIAC REMOTE MONITOR - SCAN
Device transmission reviewed. Device demonstrated appropriate function.       Electronically Signed by: Josue Moody M.D.    10/5/2023  10:05 AM

## 2023-10-28 ENCOUNTER — NON-PROVIDER VISIT (OUTPATIENT)
Dept: CARDIOLOGY | Facility: MEDICAL CENTER | Age: 68
End: 2023-10-28
Payer: MEDICARE

## 2023-10-28 PROCEDURE — 93298 REM INTERROG DEV EVAL SCRMS: CPT | Performed by: INTERNAL MEDICINE

## 2023-10-30 NOTE — CARDIAC REMOTE MONITOR - SCAN
Device transmission reviewed. Device demonstrated appropriate function.       Electronically Signed by: Roberto Anglin M.D.    11/8/2023  9:57 PM

## 2023-11-02 ENCOUNTER — PATIENT MESSAGE (OUTPATIENT)
Dept: CARDIOLOGY | Facility: MEDICAL CENTER | Age: 68
End: 2023-11-02
Payer: MEDICARE

## 2023-11-02 NOTE — TELEPHONE ENCOUNTER
EA- Please advise, patient has stated since she decrease flecainide does she has noticed increase in PACs. Would you like to increase dosage again?

## 2023-11-03 NOTE — PATIENT COMMUNICATION
Would like to see  ILR report [place] see if AF vs PVCs/PACs.     Thanks  Sandra

## 2023-11-06 ENCOUNTER — APPOINTMENT (OUTPATIENT)
Dept: URGENT CARE | Facility: CLINIC | Age: 68
End: 2023-11-06
Payer: MEDICARE

## 2023-11-07 ENCOUNTER — TELEPHONE (OUTPATIENT)
Dept: CARDIOLOGY | Facility: MEDICAL CENTER | Age: 68
End: 2023-11-07
Payer: MEDICARE

## 2023-11-07 NOTE — PATIENT COMMUNICATION
Remote transmission received 11/7/2023, report scanned into [employer]. No episodes, below is a snip of the most recent symptom episode reported by the pt on 11/2/2023 @ 4:50 pm.

## 2023-11-13 NOTE — PROGRESS NOTES
Option to leave doses as is now, knowing that there are not AF episodes.  However, if ongoing  symptoms of the PVCs/PACs I would recommend either trying a full dose of propranolol at night or checking with pcp/neurology to see if alternative for tremor and trying 50 mg BID of the Flecainide.        Sandra

## 2023-11-28 ENCOUNTER — NON-PROVIDER VISIT (OUTPATIENT)
Dept: CARDIOLOGY | Facility: MEDICAL CENTER | Age: 68
End: 2023-11-28
Payer: MEDICARE

## 2023-11-28 PROCEDURE — 93298 REM INTERROG DEV EVAL SCRMS: CPT | Performed by: INTERNAL MEDICINE

## 2023-11-29 NOTE — CARDIAC REMOTE MONITOR - SCAN
Device transmission reviewed. Device demonstrated appropriate function.       Electronically Signed by: Josue Moody M.D.    12/19/2023  9:21 AM

## 2023-12-04 ENCOUNTER — TELEPHONE (OUTPATIENT)
Dept: CARDIOLOGY | Facility: MEDICAL CENTER | Age: 68
End: 2023-12-04
Payer: MEDICARE

## 2023-12-04 NOTE — LETTER
PROCEDURE/SURGERY CLEARANCE FORM    Date: 12/4/2023   Patient Name: Susy Szymanski    Dear Surgeon or Proceduralist,      Thank you for your request for cardiac stratification of our mutual patient Susy Szymanski 1955. We have reviewed their [facility] records; and to the best of our understanding this patient has not had stenting, ablation, cardiothoracic surgery or hospitalization for cardiovascular reasons in the past 6 months.  Susy Szymanski has been seen within the past 18 months and is considered to have non-modifiable cardiac risk for this low-risk procedure/surgery (EGD with Deep (propofol) Sedation). They may proceed from a cardiovascular standpoint and may hold their antiplatelet/anticoagulation as briefly as possible. Please have patient resume this medication when hemodynamically stable to do so.     Aspirin or Prasugrel   - hold 7 days prior to procedure/surgery, resume when hemodynamically stable      Clopidrogrel or Ticagrelor  - hold 7 days for all neurological procedures, hold 5 days prior to all other procedure/surgery,  resume when hemodynamically stable     Warfarin - hold 7 days for all neurological procedures, hold 5 days prior to all other procedure/surgery and coordinate with [facility] ([phone]) INR testing and dose management.      Pradaxa/Xarelto/Eliquis/Savesya - hold 1 day prior to procedure for low bleeding risk procedure, 2 days for high bleeding risk procedure, or consider holding 3 days or longer for patients with reduced kidney function (CrCl <30mL/min) or spinal/cranial surgeries/procedures.      If they have a mechanical heart valve, please coordinate with [facility] ([phone]) the proper management of their anticoagulant in the periprocedural or perioperative period.      Some patients have higher risk for cardiovascular complications or holding medication. If our patient has had prior complications of holding  antiplatelet or anticoagulants in the past and we have seen them after these events, we have addressed these concerns with the patient. They are at an unknown degree of increased risk for recurrent complication.  You may hold anticoagulation/antiplatelets for the procedure or surgery if the benefits of the procedure or surgery outweigh this nonmodifiable risk.      If Susy Szymanski 1955 has new symptoms of heart failure decompensation, unstable arrythmia, or angina please reach out and we will assess the patient.      If you have other patient-specific concerns, please feel free to reach out to the patient's cardiologist directly at [phone].     Thank you,       [facility]

## 2023-12-04 NOTE — TELEPHONE ENCOUNTER
"Last OV: 9.21.2023  Proposed Surgery: EGD with Deep (propofol) Sedation   Surgery Date: 2.26.2024  Requesting Office Name: [facility]   Fax Number: [phone]  Preference of Location (default is surgery center unless specified by Cardiologist or [facility])  Prior Clearance Addressed: No      Anticoags/Antiplatelets: Apixaban   Outstanding Cardiac Imaging : No  Stent, Cardiac Devices, or Catheterization: No  Ablation, TAVR/Valve (including open heart), Cardioversion: No  Recent Cardiac Hospitalization: No            When: Greater than 6 months since hospitalization.   History (cardiac history):   Past Medical History:   Diagnosis Date    Anxiety     Arrhythmia     afib    ASTHMA 4/9/21:  Patient denies    Atrial fibrillation (HCC)     Bowel habit changes     constipation    Cataract 04/09/2001    \"Small, not affecting eyes\"    Chickenpox     Constipation     Depression 04/09/2021    Patient denies    Frequent urination     GERD (gastroesophageal reflux disease)     Heartburn     Hiatal hernia with GERD 11/5/2019    Hiatus hernia syndrome     High cholesterol     History of esophageal stricture 11/5/2019    Indigestion     Kidney stone     Osteoporosis     Painful joint     Palpitations     Palpitations     Premature atrial contractions     Rhinitis     Ringing in ears     Stroke (HCC) 04/09/2021 Jan. 2021.  Blood clot in brain RT A-Fib    Tonsillitis     Wears glasses              Surgical Clearance Letter Sent: YES   **Scan clearance request letter into [facility].**    "

## 2023-12-07 ENCOUNTER — TELEPHONE (OUTPATIENT)
Dept: CARDIOLOGY | Facility: MEDICAL CENTER | Age: 68
End: 2023-12-07
Payer: MEDICARE

## 2023-12-07 NOTE — TELEPHONE ENCOUNTER
Refaxed surgical clearance letter to [facility], scanned confirmation letter into [employer].

## 2023-12-20 ENCOUNTER — PATIENT MESSAGE (OUTPATIENT)
Dept: CARDIOLOGY | Facility: MEDICAL CENTER | Age: 68
End: 2023-12-20
Payer: MEDICARE

## 2023-12-20 DIAGNOSIS — I48.0 PAROXYSMAL ATRIAL FIBRILLATION (HCC): ICD-10-CM

## 2023-12-20 RX ORDER — DRONEDARONE 400 MG/1
400 TABLET, FILM COATED ORAL 2 TIMES DAILY WITH MEALS
Qty: 60 TABLET | Refills: 0 | Status: SHIPPED | OUTPATIENT
Start: 2023-12-20 | End: 2024-01-02 | Stop reason: SDUPTHER

## 2023-12-20 NOTE — TELEPHONE ENCOUNTER
EA- Please advise on medication regimen.   Patient decided to stop flec without consulting with anyone and go back to Multaq on December 13th.   Patient wanting a new RX for multaq. Currently reports taking 400mg BID.   Do you want to bring patient in for f/u appt to discuss this change?

## 2023-12-20 NOTE — PATIENT COMMUNICATION
She is scheduled with Dr Moody in 2 weeks, I think that is ok.  We can fill the prescription 400 mg BID.  Please convey that she needs to let us know if she wants to switch medications moving forward as we need to make sure that it is done safely with allowing for appropriate drug washout time.    Thank you  Sandra

## 2023-12-22 ENCOUNTER — APPOINTMENT (OUTPATIENT)
Dept: CARDIOLOGY | Facility: MEDICAL CENTER | Age: 68
End: 2023-12-22
Attending: INTERNAL MEDICINE
Payer: MEDICARE

## 2023-12-29 ENCOUNTER — NON-PROVIDER VISIT (OUTPATIENT)
Dept: CARDIOLOGY | Facility: MEDICAL CENTER | Age: 68
End: 2023-12-29
Payer: MEDICARE

## 2023-12-29 PROCEDURE — 93298 REM INTERROG DEV EVAL SCRMS: CPT | Performed by: INTERNAL MEDICINE

## 2024-01-01 NOTE — DISCHARGE INSTRUCTIONS
Discharge Instructions    Discharged to home by car with relative. Discharged via wheelchair, hospital escort: Yes.  Special equipment needed: Not Applicable    Be sure to schedule a follow-up appointment with your primary care doctor or any specialists as instructed.     Discharge Plan:   Diet Plan: Discussed  Activity Level: Discussed  Confirmed Follow up Appointment: Appointment Scheduled  Confirmed Symptoms Management: Discussed  Medication Reconciliation Updated: Yes  Influenza Vaccine Indication: Not indicated: Previously immunized this influenza season and > 8 years of age    I understand that a diet low in cholesterol, fat, and sodium is recommended for good health. Unless I have been given specific instructions below for another diet, I accept this instruction as my diet prescription.   Other diet: Regular    Special Instructions: None    · Is patient discharged on Warfarin / Coumadin?   No     Depression / Suicide Risk    As you are discharged from this [facility] facility, it is important to learn how to keep safe from harming yourself.    Recognize the warning signs:  · Abrupt changes in personality, positive or negative- including increase in energy   · Giving away possessions  · Change in eating patterns- significant weight changes-  positive or negative  · Change in sleeping patterns- unable to sleep or sleeping all the time   · Unwillingness or inability to communicate  · Depression  · Unusual sadness, discouragement and loneliness  · Talk of wanting to die  · Neglect of personal appearance   · Rebelliousness- reckless behavior  · Withdrawal from people/activities they love  · Confusion- inability to concentrate     If you or a loved one observes any of these behaviors or has concerns about self-harm, here's what you can do:  · Talk about it- your feelings and reasons for harming yourself  · Remove any means that you might use to hurt yourself (examples: pills, rope, extension cords,  [place] History & Physical    Gender: male BW: 8 lb 6.4 oz (3810 g)   Age: 31 hours OB:    Gestational Age at Birth: Gestational Age: 39w2d Pediatrician:       Maternal Information:              Maternal Prenatal Labs -- transcribed from office records:   ABO Type   Date Value Ref Range Status   2024 O  Final   2023 O  Final     RH type   Date Value Ref Range Status   2024 Positive  Final     Rh Factor   Date Value Ref Range Status   2023 Positive  Final     Comment:     Please note: Prior records for this patient's ABO / Rh type are not  available for additional verification.       Antibody Screen   Date Value Ref Range Status   2024 Negative  Final   2023 Negative Negative Final     Gonococcus by MELISSA   Date Value Ref Range Status   2023 Negative Negative Final     Chlamydia trachomatis, MELISSA   Date Value Ref Range Status   2023 Negative Negative Final     RPR   Date Value Ref Range Status   2023 Non Reactive Non Reactive Final     Rubella Antibodies, IgG   Date Value Ref Range Status   2023 2.14 Immune >0.99 index Final     Comment:                                     Non-immune       <0.90                                  Equivocal  0.90 - 0.99                                  Immune           >0.99        Hepatitis B Surface Ag   Date Value Ref Range Status   2023 Negative Negative Final     HIV Screen 4th Gen w/RFX (Reference)   Date Value Ref Range Status   2023 Non Reactive Non Reactive Final     Comment:     HIV Negative  HIV-1/HIV-2 antibodies and HIV-1 p24 antigen were NOT detected.  There is no laboratory evidence of HIV infection.       Hep C Virus Ab   Date Value Ref Range Status   2023 Non Reactive Non Reactive Final     Comment:     HCV antibody alone does not differentiate between previously  resolved infection and active infection. Equivocal and Reactive  HCV antibody results should be followed up with an HCV RNA test  to  firearm)  · Get professional help from the community (Mental Health, Substance Abuse, psychological counseling)  · Do not be alone:Call your Safe Contact- someone whom you trust who will be there for you.  · Call your local CRISIS HOTLINE 520-9134 or 405-580-2024  · Call your local Children's Mobile Crisis Response Team [place] (136) 093-9113 or www.[employer]  · Call the toll free National Suicide Prevention Hotlines   · National Suicide Prevention Lifeline 623-226-YHGD (4220)  · HelpAround Line Network 800-SUICIDE (734-9389)      Atrial Fibrillation  Introduction  Atrial fibrillation is a type of heartbeat that is irregular or fast (rapid). If you have this condition, your heart keeps quivering in a weird (chaotic) way. This condition can make it so your heart cannot pump blood normally. Having this condition gives a person more risk for stroke, heart failure, and other heart problems. There are different types of atrial fibrillation. Talk with your doctor to learn about the type that you have.  Follow these instructions at home:  · Take over-the-counter and prescription medicines only as told by your doctor.  · If your doctor prescribed a blood-thinning medicine, take it exactly as told. Taking too much of it can cause bleeding. If you do not take enough of it, you will not have the protection that you need against stroke and other problems.  · Do not use any tobacco products. These include cigarettes, chewing tobacco, and e-cigarettes. If you need help quitting, ask your doctor.  · If you have apnea (obstructive sleep apnea), manage it as told by your doctor.  · Do not drink alcohol.  · Do not drink beverages that have caffeine. These include coffee, soda, and tea.  · Maintain a healthy weight. Do not use diet pills unless your doctor says they are safe for you. Diet pills may make heart problems worse.  · Follow diet instructions as told by your doctor.  · Exercise regularly as told by your  support the diagnosis of active HCV infection.       Strep Gp B Culture   Date Value Ref Range Status   2024 Negative Negative Final     Comment:     Centers for Disease Control and Prevention (CDC) and American Congress  of Obstetricians and Gynecologists (ACOG) guidelines for prevention of   group B streptococcal (GBS) disease specify co-collection of  a vaginal and rectal swab specimen to maximize sensitivity of GBS  detection. Per the CDC and ACOG, swabbing both the lower vagina and  rectum substantially increases the yield of detection compared with  sampling the vagina alone.  Penicillin G, ampicillin, or cefazolin are indicated for intrapartum  prophylaxis of  GBS colonization. Reflex susceptibility  testing should be performed prior to use of clindamycin only on GBS  isolates from penicillin-allergic women who are considered a high risk  for anaphylaxis. Treatment with vancomycin without additional testing  is warranted if resistance to clindamycin is noted.        Amphetamine Screen, Urine   Date Value Ref Range Status   2024 Negative Negative Final     Barbiturates Screen, Urine   Date Value Ref Range Status   2024 Negative Negative Final     Benzodiazepine Screen, Urine   Date Value Ref Range Status   2024 Negative Negative Final     Methadone Screen, Urine   Date Value Ref Range Status   2024 Negative Negative Final     Phencyclidine (PCP), Urine   Date Value Ref Range Status   2024 Negative Negative Final     Opiate Screen   Date Value Ref Range Status   2024 Negative Negative Final     THC, Screen, Urine   Date Value Ref Range Status   2024 Negative Negative Final     Propoxyphene Screen   Date Value Ref Range Status   2023 Negative Negative Final     Buprenorphine, Screen, Urine   Date Value Ref Range Status   2024 Negative Negative Final     Oxycodone Screen, Urine   Date Value Ref Range Status   2024 Negative  "Negative Final     Tricyclic Antidepressants Screen   Date Value Ref Range Status   2024 Negative Negative Final       Maternal Labs for Treponemal AB Total and RPR current Admission  Treponemal AB Total   Date Value Ref Range Status   2024 Non-Reactive Non-Reactive Final    No results found for: \"RPR\"      Patient Active Problem List   Diagnosis    Scoliosis- referred to PT    Bipolar 1 disorder- not on meds; referred to Behavioral Therapy    Encounter for supervision of normal pregnancy in teen primigravida, antepartum    Depression affecting pregnancy    Maternal varicella, non-immune    Marijuana use during pregnancy    Gastroesophageal reflux during pregnancy, antepartum    Nausea/vomiting in pregnancy    Carpal tunnel syndrome during pregnancy    Pregnancy    Gallbladder disease affecting pregnancy in third trimester    Prenatal care in third trimester    Excessive fetal growth affecting management of mother in third trimester, antepartum    Pregnant         Mother's Past Medical History:      Maternal /Para:    Maternal PMH:    Past Medical History:   Diagnosis Date    ADHD     Bipolar 1 disorder, manic, mild     Depression     Migraine     Scoliosis       Maternal Social History:    Social History     Socioeconomic History    Marital status: Single   Tobacco Use    Smoking status: Former     Types: Cigarettes   Vaping Use    Vaping Use: Former    Substances: Nicotine   Substance and Sexual Activity    Alcohol use: Not Currently    Drug use: Not Currently    Sexual activity: Yes     Partners: Male        Mother's Current Medications   docusate sodium, 100 mg, Oral, BID  famotidine, 20 mg, Intravenous, Q12H   Or  famotidine, 20 mg, Oral, Q12H  ferrous sulfate, 324 mg, Oral, BID With Meals  hydrocortisone, 1 Application, Topical, BID       Labor Information:      Labor Events      labor: No Induction:       Steroids?  None Reason for Induction:      Rupture date:  " doctor.  · Keep all follow-up visits as told by your doctor. This is important.  Contact a doctor if:  · You notice a change in the speed, rhythm, or strength of your heartbeat.  · You are taking a blood-thinning medicine and you notice more bruising.  · You get tired more easily when you move or exercise.  Get help right away if:  · You have pain in your chest or your belly (abdomen).  · You have sweating or weakness.  · You feel sick to your stomach (nauseous).  · You notice blood in your throw up (vomit), poop (stool), or pee (urine).  · You are short of breath.  · You suddenly have swollen feet and ankles.  · You feel dizzy.  · Your suddenly get weak or numb in your face, arms, or legs, especially if it happens on one side of your body.  · You have trouble talking, trouble understanding, or both.  · Your face or your eyelid droops on one side.  These symptoms may be an emergency. Do not wait to see if the symptoms will go away. Get medical help right away. Call your local emergency services (911 in the [place] Do not drive yourself to the hospital.   This information is not intended to replace advice given to you by your health care provider. Make sure you discuss any questions you have with your health care provider.  Document Released: 09/26/2009 Document Revised: 05/25/2017 Document Reviewed: 04/13/2016  © 2017 [employer]    Aspirin, ASA oral tablets  What is this medicine?  ASPIRIN (AS pir in) is a pain reliever. It is used to treat mild pain and fever. This medicine is also used as directed by a doctor to prevent and to treat heart attacks, to prevent strokes, and to treat arthritis or inflammation.  This medicine may be used for other purposes; ask your health care provider or pharmacist if you have questions.  COMMON BRAND NAME(S): Aspir-Low, Aspir-Tracy, Aspirtab, Jim Advanced Aspirin, Jim Aspirin, Jim Aspirin Extra Strength, Jim Aspirin Plus, Jmi Extra Strength, Jim Extra Strength Plus, Jim  "2024 Complications:    Labor complications:  Postpartum Hemorrhage  Additional complications:     Rupture time:  11:40 AM    Rupture type:  spontaneous rupture of membranes;Intact    Fluid Color:  Normal;Clear    Antibiotics during Labor?  No           Anesthesia     Method: Epidural     Analgesics:          Delivery Information for Bandar Saleh     YOB: 2026 Delivery Clinician:     Time of birth:  1:46 AM Delivery type:  Vaginal, Spontaneous   Forceps:     Vacuum:     Breech:      Presentation/position:          Observed Anomalies:   Delivery Complications:          APGAR SCORES             APGARS  One minute Five minutes Ten minutes Fifteen minutes Twenty minutes   Skin color: 1   1             Heart rate: 2   2             Grimace: 2   2              Muscle tone: 1   1              Breathin   2              Totals: 7   8                Resuscitation     Suction: bulb syringe   Catheter size:     Suction below cords:     Intensive:       Objective      Information     Vital Signs Temp:  [98 °F (36.7 °C)-98.8 °F (37.1 °C)] 98.8 °F (37.1 °C)  Heart Rate:  [120-135] 125  Resp:  [32-52] 40  BP: (61-63)/(42-47) 63/47   Admission Vital Signs: Vitals  Temp: (!) 99.5 °F (37.5 °C)  Temp src: Axillary  Heart Rate: 176  Heart Rate Source: Apical  Resp: 34  Resp Rate Source: Stethoscope  BP: 61/42  BP Location: Right arm  BP Method: Automatic  Patient Position: Lying   Birth Weight: 3810 g (8 lb 6.4 oz)   Birth Length: 20.25   Birth Head circumference: Head Circumference: 14\" (35.6 cm)   Current Weight: Weight: 3725 g (8 lb 3.4 oz)   Change in weight since birth: -2%         Physical Exam     General appearance Normal Term male   Skin  No rashes.  No jaundice   Head AFSF.  No caput. No cephalohematoma. No nuchal folds   Eyes  + RR bilaterally   Ears, Nose, Throat  Normal ears.  No ear pits. No ear tags.  Palate intact.   Thorax  Normal   Lungs BSBE - CTA. No distress.   Heart  Normal rate and " Genuine Aspirin, Jim Womens Aspirin, Bufferin, Bufferin Extra Strength, Bufferin Low Dose  What should I tell my health care provider before I take this medicine?  They need to know if you have any of these conditions:  -anemia  -asthma  -bleeding problems  -child with chickenpox, the flu, or other viral infection  -diabetes  -gout  -if you frequently drink alcohol containing drinks  -kidney disease  -liver disease  -low level of vitamin K  -lupus  -smoke tobacco  -stomach ulcers or other problems  -an unusual or allergic reaction to aspirin, tartrazine dye, other medicines, dyes, or preservatives  -pregnant or trying to get pregnant  -breast-feeding  How should I use this medicine?  Take this medicine by mouth with a glass of water. Follow the directions on the package or prescription label. You can take this medicine with or without food. If it upsets your stomach, take it with food. Do not take your medicine more often than directed.  Talk to your pediatrician regarding the use of this medicine in children. While this drug may be prescribed for children as young as 12 years of age for selected conditions, precautions do apply. Children and teenagers should not use this medicine to treat chicken pox or flu symptoms unless directed by a doctor.  Patients over 65 years old may have a stronger reaction and need a smaller dose.  Overdosage: If you think you have taken too much of this medicine contact a poison control center or emergency room at once.  NOTE: This medicine is only for you. Do not share this medicine with others.  What if I miss a dose?  If you are taking this medicine on a regular schedule and miss a dose, take it as soon as you can. If it is almost time for your next dose, take only that dose. Do not take double or extra doses.  What may interact with this medicine?  Do not take this medicine with any of the following medications:  -cidofovir  -ketorolac  -probenecid  This medicine may also interact  rhythm.  No murmurs, no gallops. Peripheral pulses strong and equal in all 4 extremities.   Abdomen + BS.  Soft. NT. ND.  No mass/HSM   Genitalia  Normal male as best I could see, but genitalia mostly covered by wee bag   Anus Anus patent   Trunk and Spine Spine intact.  No sacral dimples.   Extremities  Clavicles intact.  No hip clicks/clunks.   Neuro + Bailey, grasp, suck.  Normal Tone       Intake and Output     Feeding: bottle feed    Urine: 2  Stool: 3      Labs and Radiology     Prenatal labs:  reviewed    Baby's Blood type:   ABO Type   Date Value Ref Range Status   2024 A  Final     RH type   Date Value Ref Range Status   2024 Positive  Final        Labs:   Recent Results (from the past 96 hour(s))   Cord Blood Evaluation    Collection Time: 02/26/24  4:55 AM    Specimen: Umbilical Cord; Cord Blood   Result Value Ref Range    ABO Type A     RH type Positive     RICHA IgG Negative    Urine Drug Screen - Urine, Clean Catch    Collection Time: 02/26/24  5:50 PM    Specimen: Urine, Clean Catch   Result Value Ref Range    THC, Screen, Urine Negative Negative    Phencyclidine (PCP), Urine Negative Negative    Cocaine Screen, Urine Negative Negative    Methamphetamine, Ur Negative Negative    Opiate Screen Negative Negative    Amphetamine Screen, Urine Negative Negative    Benzodiazepine Screen, Urine Negative Negative    Tricyclic Antidepressants Screen Negative Negative    Methadone Screen, Urine Negative Negative    Barbiturates Screen, Urine Negative Negative    Oxycodone Screen, Urine Negative Negative    Buprenorphine, Screen, Urine Negative Negative       TCI:       Xrays:  No orders to display         Assessment & Plan     Discharge planning     Congenital Heart Disease Screen:  Blood Pressure/O2 Saturation/Weights   Vitals (last 7 days)       Date/Time BP BP Location SpO2 Weight    02/27/24 0325 -- -- -- 3725 g (8 lb 3.4 oz)    02/27/24 0321 63/47 Left leg -- --    02/27/24 0320 61/42 Right arm --  with the following medications:  -alcohol  -alendronate  -bismuth subsalicylate  -flavocoxid  -herbal supplements like feverfew, garlic, ama, ginkgo biloba, horse chestnut  -medicines for diabetes or glaucoma like acetazolamide, methazolamide  -medicines for gout  -medicines that treat or prevent blood clots like enoxaparin, heparin, ticlopidine, warfarin  -other aspirin and aspirin-like medicines  -NSAIDs, medicines for pain and inflammation, like ibuprofen or naproxen  -pemetrexed  -sulfinpyrazone  -varicella live vaccine  This list may not describe all possible interactions. Give your health care provider a list of all the medicines, herbs, non-prescription drugs, or dietary supplements you use. Also tell them if you smoke, drink alcohol, or use illegal drugs. Some items may interact with your medicine.  What should I watch for while using this medicine?  If you are treating yourself for pain, tell your doctor or health care professional if the pain lasts more than 10 days, if it gets worse, or if there is a new or different kind of pain. Tell your doctor if you see redness or swelling. Also, check with your doctor if you have a fever that lasts for more than 3 days. Only take this medicine to prevent heart attacks or blood clotting if prescribed by your doctor or health care professional.  Do not take aspirin or aspirin-like medicines with this medicine. Too much aspirin can be dangerous. Always read the labels carefully.  This medicine can irritate your stomach or cause bleeding problems. Do not smoke cigarettes or drink alcohol while taking this medicine. Do not lie down for 30 minutes after taking this medicine to prevent irritation to your throat.  If you are scheduled for any medical or dental procedure, tell your healthcare provider that you are taking this medicine. You may need to stop taking this medicine before the procedure.  This medicine may be used to treat migraines. If you take migraine  --    24 -- -- -- 3810 g (8 lb 6.4 oz)     Weight: Filed from Delivery Summary at 24              Testing  CCHD Critical Congen Heart Defect Test Result: pass (24 0309)   Car Seat Challenge Test     Hearing Screen Hearing Screen, Left Ear: ABR (auditory brainstem response), passed (24 024)  Hearing Screen, Right Ear: ABR (auditory brainstem response), passed (24)  Hearing Screen, Right Ear: ABR (auditory brainstem response), passed (24)  Hearing Screen, Left Ear: ABR (auditory brainstem response), passed (24)     Screen         Immunization History   Administered Date(s) Administered    Hep B, Adolescent or Pediatric 2024       Assessment and Plan     Principal Problem:    [place]  Active Problems:    Teen parent    Health 39.2 week  male born to 18 yo  Mom with PNC c/b anemia (10.9 hemoglobin on admission), PUPPs, scoliosis, possible GB disease.  Pre- labs negative including negative GBS.   BBT A+ and RICHA IgG negative.  MBT O+.  Urine tox for baby negative. Screening done due to 1x THC positive UDS in Mom in .  All other UDS negative on Mom.       Nahomi Marsh MD  2024  09:34 EST     medicines for 10 or more days a month, your migraines may get worse. Keep a diary of headache days and medicine use. Contact your healthcare professional if your migraine attacks occur more frequently.  What side effects may I notice from receiving this medicine?  Side effects that you should report to your doctor or health care professional as soon as possible:  -allergic reactions like skin rash, itching or hives, swelling of the face, lips, or tongue  -breathing problems  -changes in hearing, ringing in the ears  -confusion  -general ill feeling or flu-like symptoms  -pain on swallowing  -redness, blistering, peeling or loosening of the skin, including inside the mouth or nose  -signs and symptoms of bleeding such as bloody or black, tarry stools; red or dark-brown urine; spitting up blood or brown material that looks like coffee grounds; red spots on the skin; unusual bruising or bleeding from the eye, gums, or nose  -trouble passing urine or change in the amount of urine  -unusually weak or tired  -yellowing of the eyes or skin  Side effects that usually do not require medical attention (report to your doctor or health care professional if they continue or are bothersome):  -diarrhea or constipation  -headache  -nausea, vomiting  -stomach gas, heartburn  This list may not describe all possible side effects. Call your doctor for medical advice about side effects. You may report side effects to FDA at 4-705-FDA-2807.  Where should I keep my medicine?  Keep out of the reach of children.  Store at room temperature between 15 and 30 degrees C (59 and 86 degrees F). Protect from heat and moisture. Do not use this medicine if it has a strong vinegar smell. Throw away any unused medicine after the expiration date.  NOTE: This sheet is a summary. It may not cover all possible information. If you have questions about this medicine, talk to your doctor, pharmacist, or health care provider.  © 2018 [employer]  (2014-08-19 11:30:31)

## 2024-01-02 ENCOUNTER — OFFICE VISIT (OUTPATIENT)
Dept: CARDIOLOGY | Facility: MEDICAL CENTER | Age: 69
End: 2024-01-02
Attending: INTERNAL MEDICINE
Payer: MEDICARE

## 2024-01-02 VITALS
DIASTOLIC BLOOD PRESSURE: 64 MMHG | RESPIRATION RATE: 16 BRPM | OXYGEN SATURATION: 96 % | WEIGHT: 132 LBS | HEART RATE: 55 BPM | HEIGHT: 67 IN | SYSTOLIC BLOOD PRESSURE: 108 MMHG | BODY MASS INDEX: 20.72 KG/M2

## 2024-01-02 DIAGNOSIS — Z98.890 H/O CARDIAC RADIOFREQUENCY ABLATION: ICD-10-CM

## 2024-01-02 DIAGNOSIS — I49.3 PVC'S (PREMATURE VENTRICULAR CONTRACTIONS): ICD-10-CM

## 2024-01-02 DIAGNOSIS — G25.0 ESSENTIAL TREMOR: ICD-10-CM

## 2024-01-02 DIAGNOSIS — I48.0 PAROXYSMAL ATRIAL FIBRILLATION (HCC): ICD-10-CM

## 2024-01-02 PROCEDURE — 3078F DIAST BP <80 MM HG: CPT | Performed by: INTERNAL MEDICINE

## 2024-01-02 PROCEDURE — 99213 OFFICE O/P EST LOW 20 MIN: CPT | Performed by: INTERNAL MEDICINE

## 2024-01-02 PROCEDURE — 93005 ELECTROCARDIOGRAM TRACING: CPT | Performed by: INTERNAL MEDICINE

## 2024-01-02 PROCEDURE — 99214 OFFICE O/P EST MOD 30 MIN: CPT | Performed by: INTERNAL MEDICINE

## 2024-01-02 PROCEDURE — 3074F SYST BP LT 130 MM HG: CPT | Performed by: INTERNAL MEDICINE

## 2024-01-02 RX ORDER — DRONEDARONE 400 MG/1
400 TABLET, FILM COATED ORAL 2 TIMES DAILY WITH MEALS
Qty: 60 TABLET | Refills: 3 | Status: SHIPPED | OUTPATIENT
Start: 2024-01-02 | End: 2024-02-02 | Stop reason: SDUPTHER

## 2024-01-02 RX ORDER — PANTOPRAZOLE SODIUM 40 MG/1
40 TABLET, DELAYED RELEASE ORAL DAILY
COMMUNITY
Start: 2023-11-21

## 2024-01-02 RX ORDER — MULTIVITAMIN WITH IRON
250 TABLET ORAL DAILY
COMMUNITY

## 2024-01-02 RX ORDER — PROPRANOLOL HYDROCHLORIDE 20 MG/1
20 TABLET ORAL DAILY
Qty: 90 TABLET | Refills: 3 | Status: SHIPPED | OUTPATIENT
Start: 2024-01-02

## 2024-01-02 NOTE — CARDIAC REMOTE MONITOR - SCAN
Device transmission reviewed. Device demonstrated appropriate function.       Electronically Signed by: Josue Moody M.D.    1/2/2024  9:56 AM

## 2024-01-02 NOTE — PROGRESS NOTES
"Arrhythmia Clinic Note (Established patient)    DOS: 1/2/2024    Chief complaint/Reason for consult: F/u PAF    Interval History:  Pt is a 67 yo F. Well known to me. History of AF. S/p initial ablation that was apparently successful, came off OAC then had recurrence and unfortunately associated CVA. Repeat ablation with ILR was implanted. Still with low burden of AF, though on EGMs available episodes look to be PACs/PVCs.    Fairly symptomatic to this. Had her try low dose flec but difficult to tolerate due to sinus robert with this. Especially with propranolol that she requires for an essential tremor. Back on Multaq now.    ROS (+ highlighted in red):  General--Negative for fatigue, weight loss or weight gain  Cardiovascular--Negative for CP, orthopnea, PND    Past Medical History:   Diagnosis Date    Anxiety     Arrhythmia     afib    ASTHMA 4/9/21:  Patient denies    Atrial fibrillation (HCC)     Bowel habit changes     constipation    Cataract 04/09/2001    \"Small, not affecting eyes\"    Chickenpox     Constipation     Depression 04/09/2021    Patient denies    Frequent urination     GERD (gastroesophageal reflux disease)     Heartburn     Hiatal hernia with GERD 11/5/2019    Hiatus hernia syndrome     High cholesterol     History of esophageal stricture 11/5/2019    Indigestion     Kidney stone     Osteoporosis     Painful joint     Palpitations     Palpitations     Premature atrial contractions     Rhinitis     Ringing in ears     Stroke (HCC) 04/09/2021 Jan. 2021.  Blood clot in brain RT A-Fib    Tonsillitis     Wears glasses        Past Surgical History:   Procedure Laterality Date    COLONOSCOPY WITH POLYP  June 2008    benign polyp  Dr Paul     CHOLECYSTECTOMY  January 2008    laparoscopic    EGD WITH ASP/BX  September 2006    inflammation only ---Dr Paul    CHOLECYSTECTOMY      OTHER CARDIAC SURGERY  Around 2000    Ablation    SINUSOTOMIES         Social History     Socioeconomic History    Marital " status:      Spouse name: Not on file    Number of children: Not on file    Years of education: Not on file    Highest education level: Not on file   Occupational History    Occupation: retired   Tobacco Use    Smoking status: Never    Smokeless tobacco: Never   Vaping Use    Vaping Use: Never used   Substance and Sexual Activity    Alcohol use: No     Alcohol/week: 0.0 oz    Drug use: No    Sexual activity: Yes     Partners: Male     Birth control/protection: Post-Menopausal   Other Topics Concern    Not on file   Social History Narrative    Not on file     Social Determinants of Health     Financial Resource Strain: Not on file   Food Insecurity: Not on file   Transportation Needs: Not on file   Physical Activity: Not on file   Stress: Not on file   Social Connections: Not on file   Intimate Partner Violence: Not on file   Housing Stability: Not on file       Family History   Problem Relation Age of Onset    Cancer Father 70        cancer of unknown primary    Heart Disease Mother     Heart Disease Son 42        MI and coronary stent placement    Diabetes Son     Hypertension Son     Diabetes Sister        Allergies   Allergen Reactions    Amitriptyline      Excessive sedation --even at 10 mg dose    Lovastatin      Muscle aches  Muscle aches      Trazodone      Irregular pulse    Bactrim [Sulfamethoxazole W-Trimethoprim]      Shaking    Levaquin      Nausea        Current Outpatient Medications   Medication Sig Dispense Refill    pantoprazole (PROTONIX) 40 MG Tablet Delayed Response Take 40 mg by mouth every day.      Magnesium 250 MG Tab Take 250 mg by mouth every day at 6 PM.      Cholecalciferol (VITAMIN D-3 PO) Take  by mouth.      propranolol (INDERAL) 20 MG Tab Take 1 Tablet by mouth every day at 6 PM. 90 Tablet 3    dronedarone (MULTAQ) 400 MG Tab Take 1 Tablet by mouth 2 times a day with meals. 60 Tablet 3    meclizine (ANTIVERT) 12.5 MG Tab Take 1 Tablet by mouth 3 times a day as needed (for  "vertigo). 30 Tablet 0    rivaroxaban (XARELTO) 20 MG Tab tablet Take 1 Tablet by mouth with dinner. 90 Tablet 3    scopolamine (TRANSDERM-SCOP, 1.5 MG,) 1 mg/72hr PATCH 72 HR Place 1 Patch on the skin every 72 hours. 4 Patch 3    zoledronic Acid (RECLAST) 5 MG/100ML Solution IVPB premix Infuse 100 mL into a venous catheter Once Every 12 Months for 6 doses. (Patient not taking: Reported on 1/2/2024) 100 mL 6    esomeprazole (NEXIUM) 40 MG delayed-release capsule Take 1 Capsule by mouth every morning before breakfast. (Patient not taking: Reported on 1/2/2024) 90 Capsule 0    topiramate (TOPAMAX) 25 MG Tab TAKE 1 TABLET EVERY DAY FOR THE FIRST WEEK.TAKE 1 TABLET TWICE A DAY FOR THE SECOND WEEK. TAKE 2 TABLETS IN THE MORNING AND 1 TABLET IN THE EVENING FOR WEEK 3. TAKE 2 TABLETS TWICE A DAY FOR WEEK 4. (Patient not taking: Reported on 1/2/2024) 720 Tablet 0     No current facility-administered medications for this visit.       Physical Exam:  Vitals:    01/02/24 1359   BP: 108/64   BP Location: Left arm   Patient Position: Sitting   BP Cuff Size: Adult   Pulse: (!) 55   Resp: 16   SpO2: 96%   Weight: 59.9 kg (132 lb)   Height: 1.702 m (5' 7\")     General appearance: NAD, conversant  HEENT: PERRL, neck is supple with FROM  Lungs: Clear to auscultation, normal respiratory effort  CV: RRR, no murmurs/rubs/gallops, no JVD  Abdomen: Soft, non-tender with normal bowel sounds  Extremities: No peripheral edema, no clubbing or cyanosis  Skin: No rash, lesions, or ulcers  Psych: Alert and oriented to person, place and time    Data:  Labs reviewed    Prior echo/stress reviewed:  LVEF 65%    EKG interpreted by me:  Sinus, PVCs    Impression/Plan:  1. Paroxysmal atrial fibrillation (HCC)  EKG    propranolol (INDERAL) 20 MG Tab    dronedarone (MULTAQ) 400 MG Tab      2. PVC's (premature ventricular contractions)        3. H/O cardiac radiofrequency ablation 8/18/21 Dr Moody.  PVI (extension of the RPV), PWI and RVOT (PVCs)        4. " Essential tremor        -Discussed options with her  -Including PPM and removal of loop and then back on higher dose flec if ectopy is bothersome enough  -After weighing options will stay on Multaq for now  -Very mild elevation in LFTs from a year ago before initiation of multaq, recheck in 3-4 mo on the Multaq next visit    Josue Moody MD

## 2024-01-03 LAB — EKG IMPRESSION: NORMAL

## 2024-01-03 PROCEDURE — 93010 ELECTROCARDIOGRAM REPORT: CPT | Performed by: INTERNAL MEDICINE

## 2024-01-29 ENCOUNTER — NON-PROVIDER VISIT (OUTPATIENT)
Dept: CARDIOLOGY | Facility: MEDICAL CENTER | Age: 69
End: 2024-01-29
Payer: MEDICARE

## 2024-01-29 PROCEDURE — 93298 REM INTERROG DEV EVAL SCRMS: CPT | Mod: 26 | Performed by: INTERNAL MEDICINE

## 2024-01-30 NOTE — CARDIAC REMOTE MONITOR - SCAN
Device transmission reviewed. Device demonstrated appropriate function.       Electronically Signed by: Roberto Anglin M.D.    2/7/2024  4:28 PM

## 2024-02-02 DIAGNOSIS — I48.0 PAROXYSMAL ATRIAL FIBRILLATION (HCC): ICD-10-CM

## 2024-02-02 RX ORDER — DRONEDARONE 400 MG/1
400 TABLET, FILM COATED ORAL 2 TIMES DAILY WITH MEALS
Qty: 180 TABLET | Refills: 0 | Status: SHIPPED | OUTPATIENT
Start: 2024-02-02

## 2024-02-29 ENCOUNTER — NON-PROVIDER VISIT (OUTPATIENT)
Dept: CARDIOLOGY | Facility: MEDICAL CENTER | Age: 69
End: 2024-02-29
Payer: MEDICARE

## 2024-02-29 PROCEDURE — 93298 REM INTERROG DEV EVAL SCRMS: CPT | Mod: 26 | Performed by: INTERNAL MEDICINE

## 2024-03-01 NOTE — CARDIAC REMOTE MONITOR - SCAN
Device transmission reviewed. Device demonstrated appropriate function.       Electronically Signed by: Lazaro Hirsch M.D.    3/1/2024  9:00 AM

## 2024-03-04 ENCOUNTER — HOSPITAL ENCOUNTER (OUTPATIENT)
Dept: RADIOLOGY | Facility: MEDICAL CENTER | Age: 69
End: 2024-03-04
Attending: INTERNAL MEDICINE
Payer: MEDICARE

## 2024-03-04 DIAGNOSIS — K30 DYSPEPSIA DUE TO DYSMOTILITY: ICD-10-CM

## 2024-03-04 PROCEDURE — 74220 X-RAY XM ESOPHAGUS 1CNTRST: CPT

## 2024-03-04 PROCEDURE — 700117 HCHG RX CONTRAST REV CODE 255: Performed by: INTERNAL MEDICINE

## 2024-03-04 RX ADMIN — BARIUM SULFATE 700 MG: 700 TABLET ORAL at 13:27

## 2024-03-10 ENCOUNTER — PATIENT MESSAGE (OUTPATIENT)
Dept: CARDIOLOGY | Facility: MEDICAL CENTER | Age: 69
End: 2024-03-10
Payer: MEDICARE

## 2024-03-12 ENCOUNTER — OFFICE VISIT (OUTPATIENT)
Dept: NEUROLOGY | Facility: MEDICAL CENTER | Age: 69
End: 2024-03-12
Attending: INTERNAL MEDICINE
Payer: MEDICARE

## 2024-03-12 VITALS
SYSTOLIC BLOOD PRESSURE: 110 MMHG | HEIGHT: 67 IN | RESPIRATION RATE: 16 BRPM | HEART RATE: 63 BPM | WEIGHT: 143.74 LBS | OXYGEN SATURATION: 95 % | DIASTOLIC BLOOD PRESSURE: 64 MMHG | TEMPERATURE: 98.4 F | BODY MASS INDEX: 22.56 KG/M2

## 2024-03-12 DIAGNOSIS — G25.0 ESSENTIAL TREMOR: Primary | ICD-10-CM

## 2024-03-12 PROBLEM — I63.411 CEREBROVASCULAR ACCIDENT (CVA) DUE TO EMBOLISM OF RIGHT MIDDLE CEREBRAL ARTERY (HCC): Status: ACTIVE | Noted: 2021-02-24

## 2024-03-12 PROBLEM — I69.30 LATE EFFECT OF STROKE: Status: RESOLVED | Noted: 2021-04-12 | Resolved: 2024-03-12

## 2024-03-12 PROCEDURE — 99212 OFFICE O/P EST SF 10 MIN: CPT | Performed by: INTERNAL MEDICINE

## 2024-03-12 PROCEDURE — 99215 OFFICE O/P EST HI 40 MIN: CPT | Performed by: INTERNAL MEDICINE

## 2024-03-12 RX ORDER — GABAPENTIN 100 MG/1
100 CAPSULE ORAL 3 TIMES DAILY
Qty: 270 CAPSULE | Refills: 1 | Status: SHIPPED | OUTPATIENT
Start: 2024-03-12 | End: 2024-09-08

## 2024-03-12 NOTE — PROGRESS NOTES
"[facility]  [address], [address]. [place] [address]  Phone: [phone] Patient Name: Susy Szymanski  : 1955  MRN: [id number]     ASSESSMENT / PLAN       Susy Szymanski is a 69 y.o. RHD female presenting for tremors    Essential tremor  Onset since her teenage years, but worsened in the past 5 years.  Exam notable for bilateral intention and postural hand tremors, with mild generalized tremors as well.  No bradykinesia or rigidity noted.  This is consistent with essential tremor.    Xarelto does not allow for primidone use. History of renal stone makes topiramate and zonisamide not a good fit (per patient preference as well). Propranolol at higher dose may cause low HR and BP, limited by 10-20mg daily.   Donedranone doesn't appear to worsen tremors as much as amiodarone.     - continue propranolol 20mg qAM  - START gabapentin 100mg capsule  Week 1: 1 capsule at night  Week 2: 1 capsule twice a day  Thereafter: can increase up to 3 capsules twice a day as tolerated  - Can consider low dose clonazepam, Nimo kIQ, focused ultrasound, DBS next.   - article provided.       - Return in about 3 months (around 2024).    Joao Carroll DO  Neurology, Movement Disorders Specialist    BILLING DOCUMENTATION:   I spent 40 minutes reviewing the medical record, interviewing and examining the patient, discussing diagnosis and treatment, and coordinating care.          HISTORY OF PRESENT ILLNESS      Susy Szymanski is a 69 y.o. RHD female presenting for tremors    history of atrial fibrillation status post ablation on Xarelto, depression, GERD, osteoporosis.   2021: acute left side weakness, received TPA and had thrombectomy for right M2 occlusion.    Initial HPI 24    Dr. Mota 2022:  \"essential tremor, currently on propranolol 20 mg once daily, with progressively worsening tremor.   She still has problems eating and cannot write well.   Her heart rate has been in the 40's on " "propranolol 20mg twice daily. Propranolol has been helpful but the tremor is still bothersome.\"    Onset approx since teens. Worsened in the past 5 years. Hand writing, drinking, eating. Socially bothersome. Worse with anxiety.   Family members: mother, mild tremors    Propranolol: has atrial fibrillation, but has lower HR at times. Takes it in AM, 20mg daily. 10mg BID was helpful too. No longer taking 20mg BID due to low HR.   Efficacy: used to be 70%, now 30%.      Topiramate: didn't try it, concerned about osteoporosis, kidney stones in past.           1/13/2023     1:00 PM 2/7/2022     5:00 PM 2/17/2021     5:00 PM 4/9/2019     3:00 PM 4/20/2017    10:00 AM   PHQ-9 Screening   Little interest or pleasure in doing things 0 - not at all 0 - not at all 0 - not at all 0 - not at all 0 - not at all   Feeling down, depressed, or hopeless 0 - not at all 0 - not at all 0 - not at all 0 - not at all 0 - not at all   PHQ-2 Total Score 0 0 0 0 0       Interpretation of PHQ-9 Total Score   Score Severity   1-4 No Depression   5-9 Mild Depression   10-14 Moderate Depression   15-19 Moderately Severe Depression   20-27 Severe Depression     Past Medical History:   Diagnosis Date    Anxiety     Arrhythmia     afib    ASTHMA 4/9/21:  Patient denies    Atrial fibrillation (HCC)     Bowel habit changes     constipation    Cataract 04/09/2001    \"Small, not affecting eyes\"    Chickenpox     Constipation     Depression 04/09/2021    Patient denies    Frequent urination     GERD (gastroesophageal reflux disease)     Heartburn     Hiatal hernia with GERD 11/5/2019    Hiatus hernia syndrome     High cholesterol     History of esophageal stricture 11/5/2019    Indigestion     Kidney stone     Osteoporosis     Painful joint     Palpitations     Palpitations     Premature atrial contractions     Rhinitis     Ringing in ears     Stroke (HCC) 04/09/2021 Jan. 2021.  Blood clot in brain RT A-Fib    Tonsillitis     Wears glasses  "       Past Surgical History:   Procedure Laterality Date    COLONOSCOPY WITH POLYP  June 2008    benign polyp  Dr Paul     CHOLECYSTECTOMY  January 2008    laparoscopic    EGD WITH ASP/BX  September 2006    inflammation only ---Dr Paul    CHOLECYSTECTOMY      OTHER CARDIAC SURGERY  Around 2000    Ablation    SINUSOTOMIES         Family History   Problem Relation Age of Onset    Cancer Father 70        cancer of unknown primary    Heart Disease Mother     Heart Disease Son 42        MI and coronary stent placement    Diabetes Son     Hypertension Son     Diabetes Sister        Social History     Socioeconomic History    Marital status:      Spouse name: Not on file    Number of children: Not on file    Years of education: Not on file    Highest education level: Not on file   Occupational History    Occupation: retired   Tobacco Use    Smoking status: Never    Smokeless tobacco: Never   Vaping Use    Vaping Use: Never used   Substance and Sexual Activity    Alcohol use: No     Alcohol/week: 0.0 oz    Drug use: No    Sexual activity: Yes     Partners: Male     Birth control/protection: Post-Menopausal   Other Topics Concern    Not on file   Social History Narrative    Not on file     Social Determinants of Health     Financial Resource Strain: Not on file   Food Insecurity: Not on file   Transportation Needs: Not on file   Physical Activity: Not on file   Stress: Not on file   Social Connections: Not on file   Intimate Partner Violence: Not on file   Housing Stability: Not on file       Current Outpatient Medications   Medication    gabapentin (NEURONTIN) 100 MG Cap    dronedarone (MULTAQ) 400 MG Tab    pantoprazole (PROTONIX) 40 MG Tablet Delayed Response    Magnesium 250 MG Tab    Cholecalciferol (VITAMIN D-3 PO)    propranolol (INDERAL) 20 MG Tab    meclizine (ANTIVERT) 12.5 MG Tab    rivaroxaban (XARELTO) 20 MG Tab tablet    scopolamine (TRANSDERM-SCOP, 1.5 MG,) 1 mg/72hr PATCH 72 HR    zoledronic Acid  (RECLAST) 5 MG/100ML Solution IVPB premix    esomeprazole (NEXIUM) 40 MG delayed-release capsule     No current facility-administered medications for this visit.       Allergies   Allergen Reactions    Amitriptyline      Excessive sedation --even at 10 mg dose    Lovastatin      Muscle aches  Muscle aches      Trazodone      Irregular pulse    Bactrim [Sulfamethoxazole W-Trimethoprim]      Shaking    Levaquin      Nausea        DATA / RESULTS      25-Hydroxy   Vitamin D 25   Date Value Ref Range Status   02/15/2023 37 30 - 100 ng/mL Final     Comment:     Adult Ranges:   <20 ng/mL - Deficiency  20-29 ng/mL - Insufficiency   ng/mL - Sufficiency  Electrochemiluminescence binding assay performed using Roche nnamdi e  immunoassay analyzer.  The Ele[employer]ys Vitamin D total II assay is intended for  the quantitative determination of total 25 hydroxyvitamin D in human serum  and plasma. This assay is to be used as an aid in the assessment of vitamin  D sufficiency in adults.       TSH   Date Value Ref Range Status   02/15/2023 1.560 0.380 - 5.330 uIU/mL Final     Comment:     The 2011 American Thyroid Association (LIONEL) guidelines  recommended that the interpretation of thyroid function in  pregnancy be based on trimester specific reference ranges.    1st Trimester  0.100-2.500 mIU/L  2nd Trimester  0.200-3.000 mIU/L  3rd Trimester  0.300-3.500 mIU/L    These established reference ranges have not been validated  at [facility].       Glycohemoglobin   Date Value Ref Range Status   02/15/2023 5.3 4.0 - 5.6 % Final     Comment:     Increased risk for diabetes:  5.7 -6.4%  Diabetes:  >6.4%  Glycemic control for adults with diabetes:  <7.0%    The above interpretations are per ADA guidelines.  Diagnosis  of diabetes mellitus on the basis of elevated Hemoglobin A1c  should be confirmed by repeating the Hb A1c test.       LDL   Date Value Ref Range Status   02/26/2021 92 <100 mg/dL Final          OBJECTIVE     "  Vitals:    03/12/24 0803   BP: 110/64   BP Location: Left arm   Patient Position: Sitting   BP Cuff Size: Adult   Pulse: 63   Resp: 16   Temp: 36.9 °C (98.4 °F)   TempSrc: Temporal   SpO2: 95%   Weight: 65.2 kg (143 lb 11.8 oz)   Height: 1.702 m (5' 7\")     Physical Exam     Last dose of propranolol taken 2 days ago    General: NAD, appears stated age.      Mental status: Speech clear and fluent without tremor. Fund of knowledge is good.      Cranial Nerves:  CN2: PERRL. Visual fields are full to finger confrontation.   CN3/4/6: EOMI. There is no nystagmus. Saccades are normal.   CN5: V1-V3 intact to light touch    CN7: Symmetric face.   CN8: Hearing grossly intact.   CN9/10/12: Soft palate and uvula rise symmetrically. Tongue midline.   CN11: Shoulder shrug intact bilaterally.     Strength  Right Left   Shoulder Abduction  5 5   Elbow Flexion 5 5   Elbow Extension  5 5   Wrist Extension  5 5   Finger Extension  5 5   Hip Flexion  5 5   Knee Extension 5 5   Ankle Dorsiflexion  5 5     Deep Tendon Reflexes: 2+ and symmetric in biceps, brachioradialis, patella and ankles.     Abnormal movements:    UPDRS Right Left   Finger tapping 0 0   Hand Movement 0 0   Toe Tapping 0 0   Leg Agility 0 0   Rigidity 0 0   Rest Tremor 0 0   Postural Tremor 1-3cm 1-3cm, sometimes 3-5cm   Kinetic Tremor 1-3cm 1-3cm, sometimes 3-5cm      + <1cm chin tremor, + tongue when protruding    No dystonia, dyskinesias, tics, stereotypies, athetosis, akathisia, or chorea noted.         Cerebellar: No dysmetria with FTN or heel-to-shin.    Gait:   Posture - normal   Base - narrow   Stride length - normal   Arm swing - normal   Speed - normal  Shuffling/freezing - none  U-Turn - normal   Heel, toe, and tandem - normal but some mild difficulty with tandem      PROCEDURE     N/A     "

## 2024-03-12 NOTE — PATIENT INSTRUCTIONS
"START gabapentin 100mg capsule  Week 1: 1 capsule at night  Week 2: 1 capsule twice a day  Thereafter: can increase up to 3 capsules twice a day    [employer] (Nimo kIQ: wrist nerve stimulator)  [employer] (vibrating wrist device)  Try wrist weights, weighted cups, pens, and utensils.     Essential Tremor    Tremor is an oscillatory movement produced by rhythmic contractions of muscles. Essential tremor (ET) is the most common form of tremor, affecting about 5% of people over the age 60 years. Although ET is more common in the elderly and often wrongly labeled as \"senile tremor\", it may start at any age. In contrast to rest tremor as in Parkinson's disease, ET is typically an action tremor (reaching for objects) and postural tremor (holding papers or utensils). Arm involvement is most common, followed by head, voice, then legs. ET in neck muscles can cause either \"yes-yes\" or \"no-no\" head movements. When the vocal cords are affected, the patient experiences tremulous voice while singing or talking. Despite the frequently used prefix \"benign\", ET can produce significant physical and psychosocial disability. In addition to social embarrassment, ET can interfere with writing, eating, speaking, singing, and various activities of daily living. Tremor amplitude usually increases with age and become more troublesome. Essential tremor is called “essential” because it was thought to be the single symptom of the disorder. However, patients with ET have higher rates of hearing loss, restless legs syndrome, and balance difficulties.    In some cases, tremors occur only during specific tasks, such as writing, or while holding limbs in certain position such as holding a glass to drink. These task- and position-specific tremors may be variants of ET, but can also have unique features that overlap with another movement disorder called dystonia. Dystonia is manifested an involuntary sustained contraction of " "muscles producing abnormal movements or postures. Examples of dystonia include writer's cramp, torticollis (cervical dystonia), and involuntary eye closure (blepharospasm). In some people, dystonia produces movements resembling ET. But in contrast to ET, dystonic tremor is more irregular and is more influenced by the body part's position.    What causes essential tremor?    Frequently misdiagnosed as a stress-related condition or a natural consequence of aging, ET is a manifestation of a genetic neurological disorder. Family history of tremor is present in majority of patients. Despite the overwhelming evidence that ET is a genetic disorder, the gene or genes for ET have not yet been fully identified. Although at least six gene loci for familial ET have been identified, there is currently no genetic test for ET. Imaging studies are usually normal. Recent evidence have shown loss and swelling of Purkinje cells in the cerebellum (brain area that deals with coordination), and elevated LINGO1 protein levels and HERRERA neurotransmitter dysfunction.     How is essential tremor treated?    The treatment of tremor depends largely on its severity; many patients require nothing more than simple reassurance. Physical or emotional stress can exacerbate ET, as can lack of sleep, low blood sugar, overactive thyroid, and certain drugs. Improvement can therefore be seen with medication adjustments or reduction in stimulants such as caffeine. Weighted utensils, cups, or wrist weights can also help. Since alcohol reduces the amplitude of ET in about 2/3 of patients, some patients use it regularly for its \"calming\" effect such as before an important engagement. For more bothersome tremors, the Nimo kIQ™ is a wristwatch-like device that stimulates the nerves to improve tremors on that arm for a few hours after treatment.    Propranolol (Inderal), a beta-blocker commonly used in cardiology, remains the most common drug for the treatment " of ET and enhanced physiologic tremors. Other beta blockers, especially nadolol, may also help postural tremor. The side effects of propranolol and other beta blockers include fatigue, depression, and erectile dysfunction. Primidone (Mysoline) and topiramate (Topamax) are also considered first-line treatment for ET. They are part of another class of medications originally developed for seizures and shown to be effective for ET in clinical trials. Side effects for primidone are usually felt with the initial doses and include nausea, vomiting, sedation, confusion, and worsening balance that usually resolve after a few days. Side effects for topiramate are usually experienced at higher doses, which can include brain fog, tingling around lips and fingers, or change in sense of taste. Combination of two drugs may be more efficacious than one drug alone.    Other medications such as gabapentin (Neurontin), pregabalin (Lyrica), zonisamide (Zonegran), levetiracetam (Keppra), lorazepam (Ativan), and clonazepam (Klonopin) may also improve ET and its variants. Gabapentin, topiramate, levodopa, primidone, clonazepam, and phenobarbital may be useful in patients with orthostatic tremor, a possible variant of ET.     Task specific tremors usually do not respond as well to medications. Patients with head tremor often do not respond to medications but can be effectively treated with botulinum toxin (BoNT) injections into the neck muscles. BoNT can be effective in the treatment of certain hand tremors. Usually well tolerated, BoNT can produce transient weakness of the injected muscles.    Neurosurgical treatments, such as deep brain stimulation (DBS) or focused ultrasound (magnetic resonance-guide focused ultrasound: MRgFUS), are generally reserved for patients whose tremors continue to interfere with work or activities of daily living despite optimal medical therapy. Large amplitude and slow frequency postural tremors usually do not  "respond well to pharmacologic therapy. Focused ultrasound uses MRI imaging to precisely guide the ultrasound transducer waves to heat and ablate specific parts of the brain responsible for generating tremors on one side of the body. No incision is required for this procedure. The other side can be done in another session but at the risk of more side effects such as balance difficulties or changes to voice and swallowing. Deep brain stimulation involves an incision in the scalp, drilling a hole in the skull, and advancing a thin wire into the portion of the brain that is thought to be functioning abnormally. The DBS lead contains multiple electrodes which is then fixed to the skull with a ring and cap. An extension wire passes from the scalp, under the skin at the neck, and connected to an implantable pulse generator (IPG) in the chest near the collar bone. This pacemaker-like device delivers pulses with a variety of parameters into the target brain area. The patient can activate or deactivate the DBS system with a handheld . The major advantage of DBS over ablative procedures such as focused ultrasound is that the stimulating electrodes and parameters (frequency, pulse width, and voltage) can be adjusted and \"customized\" to the needs of the individual patients. Potential surgical risks such as stroke, bleeds, or infection, are rare but should be considered before proceeding. Side effects, if they occur, are usually reversible, but may include weakness, speech and swallowing difficulties, and abnormal sensations.    Selected References  Vivek RUBIO, Maciej LOCKE, Damon GOMEZ. Focused ultrasound and other lesioning in the treatment of tremor. Journal of the Neurological Sciences. 2022;435:770546. doi:10.1016/j.jns.2022.270975  Lawson ESPARZA, Colletta K, Mg S, et al. Real-World Evidence of Transcutaneous Afferent Patterned Stimulation for Essential Tremor. Tremor and Other Hyperkinetic Movements. 2022;12(1):27. " doi:10.5334/[facility].715  Jaymie Bowman, Natalee LEBLANC. Genetics of essential tremor. Brain 2007;130:1456-64.   Rony JJ, Stephen TA, Willis KE, et al. MDS evidence?based review of treatments for essential tremor. Mov Disord. 2019;34(7):950-958. doi:10.1002/mds.22738  Kayla Flores. Essential Tremor. Damian CG, ed. N Engl J Med. 2018;378(19):4277-1861. doi:10.1056/WAHSri0578628  Natalee Murillo. Botulinum toxin in the treatment of tremors. In: Sierra KRAMER, Kayla FRANKLIN, Natalee LEBLANC. Scientific and Therapeutic Aspects of Botulinum Toxin. [employer], [place], [place], 2002:323-336.   Natalee LEBLANC, Jayla LEBLANC, Lewis MCCLAIN. Essential tremor among children. Pediatrics 2004;114:8679-3529.   Nimisha BARRETO, Keegan CAICEDO, Zohaib PURVIS, Pramod FORREST, Natalee LEBLANC. Hearing impairment in essential tremor. Neurology 2003;61:1093-7.   Nimisha BARRETO, Monica FRANKLIN, Long H.  Computerized Posturography Balance Assessments of Patients with Bilateral VIM Deep Brain Stimulation. Mov Disord 2006;21:4231-8520.  Nimisha GARCIA. Task specific writing tremor: clinical phenotypes, progression, and treatment outcomes. Int J Neurosci 2012 (in press).    Appendix  International Essential Tremor Foundation  www.essentialtremor.org    International Parkinson and Movement Disorder Society  www.movementdisorders.org    National Organization for Rare Disorders (EMA)  www.rarediseases.org

## 2024-03-31 ENCOUNTER — NON-PROVIDER VISIT (OUTPATIENT)
Dept: CARDIOLOGY | Facility: MEDICAL CENTER | Age: 69
End: 2024-03-31
Payer: MEDICARE

## 2024-03-31 PROCEDURE — 93298 REM INTERROG DEV EVAL SCRMS: CPT | Mod: 26 | Performed by: INTERNAL MEDICINE

## 2024-04-01 NOTE — CARDIAC REMOTE MONITOR - SCAN
Device transmission reviewed. Device demonstrated appropriate function.       Electronically Signed by: Roberto Anglin M.D.    4/2/2024  5:03 PM

## 2024-05-01 ENCOUNTER — NON-PROVIDER VISIT (OUTPATIENT)
Dept: CARDIOLOGY | Facility: MEDICAL CENTER | Age: 69
End: 2024-05-01
Payer: MEDICARE

## 2024-05-01 PROCEDURE — 93298 REM INTERROG DEV EVAL SCRMS: CPT | Mod: 26 | Performed by: INTERNAL MEDICINE

## 2024-05-01 NOTE — CARDIAC REMOTE MONITOR - SCAN
Device transmission reviewed. Device demonstrated appropriate function.       Electronically Signed by: Lazaro Hirsch M.D.    5/1/2024  9:37 AM

## 2024-05-02 ENCOUNTER — APPOINTMENT (RX ONLY)
Dept: URBAN - METROPOLITAN AREA CLINIC 6 | Facility: CLINIC | Age: 69
Setting detail: DERMATOLOGY
End: 2024-05-02

## 2024-05-02 DIAGNOSIS — L81.1 CHLOASMA: ICD-10-CM

## 2024-05-02 DIAGNOSIS — L82.1 OTHER SEBORRHEIC KERATOSIS: ICD-10-CM

## 2024-05-02 DIAGNOSIS — L81.4 OTHER MELANIN HYPERPIGMENTATION: ICD-10-CM

## 2024-05-02 DIAGNOSIS — D18.0 HEMANGIOMA: ICD-10-CM

## 2024-05-02 DIAGNOSIS — Z71.89 OTHER SPECIFIED COUNSELING: ICD-10-CM

## 2024-05-02 DIAGNOSIS — D22 MELANOCYTIC NEVI: ICD-10-CM

## 2024-05-02 PROBLEM — D22.72 MELANOCYTIC NEVI OF LEFT LOWER LIMB, INCLUDING HIP: Status: ACTIVE | Noted: 2024-05-02

## 2024-05-02 PROBLEM — D18.01 HEMANGIOMA OF SKIN AND SUBCUTANEOUS TISSUE: Status: ACTIVE | Noted: 2024-05-02

## 2024-05-02 PROBLEM — D22.71 MELANOCYTIC NEVI OF RIGHT LOWER LIMB, INCLUDING HIP: Status: ACTIVE | Noted: 2024-05-02

## 2024-05-02 PROBLEM — D22.62 MELANOCYTIC NEVI OF LEFT UPPER LIMB, INCLUDING SHOULDER: Status: ACTIVE | Noted: 2024-05-02

## 2024-05-02 PROBLEM — D22.61 MELANOCYTIC NEVI OF RIGHT UPPER LIMB, INCLUDING SHOULDER: Status: ACTIVE | Noted: 2024-05-02

## 2024-05-02 PROBLEM — D22.39 MELANOCYTIC NEVI OF OTHER PARTS OF FACE: Status: ACTIVE | Noted: 2024-05-02

## 2024-05-02 PROBLEM — D22.5 MELANOCYTIC NEVI OF TRUNK: Status: ACTIVE | Noted: 2024-05-02

## 2024-05-02 PROCEDURE — ? PRESCRIPTION

## 2024-05-02 PROCEDURE — 99203 OFFICE O/P NEW LOW 30 MIN: CPT

## 2024-05-02 PROCEDURE — ? COUNSELING

## 2024-05-02 PROCEDURE — ? ADDITIONAL NOTES

## 2024-05-02 RX ORDER — FLUOCINOLONE ACETONIDE, HYDROQUINONE, AND TRETINOIN .1; 40; .5 MG/G; MG/G; MG/G
CREAM TOPICAL
Qty: 30 | Refills: 1 | Status: ERX | COMMUNITY
Start: 2024-05-02

## 2024-05-02 RX ADMIN — FLUOCINOLONE ACETONIDE, HYDROQUINONE, AND TRETINOIN: .1; 40; .5 CREAM TOPICAL at 00:00

## 2024-05-02 ASSESSMENT — LOCATION DETAILED DESCRIPTION DERM
LOCATION DETAILED: LEFT INFERIOR CENTRAL MALAR CHEEK
LOCATION DETAILED: LEFT DISTAL POSTERIOR THIGH
LOCATION DETAILED: LEFT PROXIMAL POSTERIOR THIGH
LOCATION DETAILED: LEFT PROXIMAL DORSAL FOREARM
LOCATION DETAILED: RIGHT UPPER CUTANEOUS LIP
LOCATION DETAILED: LOWER STERNUM
LOCATION DETAILED: LEFT CENTRAL MALAR CHEEK
LOCATION DETAILED: LEFT SUPERIOR CENTRAL BUCCAL CHEEK
LOCATION DETAILED: LEFT ANTERIOR DISTAL UPPER ARM
LOCATION DETAILED: RIGHT DISTAL DORSAL FOREARM
LOCATION DETAILED: RIGHT DISTAL POSTERIOR THIGH
LOCATION DETAILED: LEFT SUPERIOR ANTERIOR NECK
LOCATION DETAILED: LEFT SUPERIOR MEDIAL UPPER BACK
LOCATION DETAILED: RIGHT ANTERIOR DISTAL UPPER ARM
LOCATION DETAILED: LEFT UPPER CUTANEOUS LIP
LOCATION DETAILED: LEFT MEDIAL UPPER BACK
LOCATION DETAILED: INFERIOR THORACIC SPINE
LOCATION DETAILED: EPIGASTRIC SKIN
LOCATION DETAILED: LEFT DISTAL DORSAL FOREARM
LOCATION DETAILED: RIGHT PROXIMAL POSTERIOR THIGH
LOCATION DETAILED: MIDDLE STERNUM
LOCATION DETAILED: RIGHT PROXIMAL DORSAL FOREARM
LOCATION DETAILED: LEFT INFERIOR MEDIAL FOREHEAD
LOCATION DETAILED: SUPERIOR THORACIC SPINE

## 2024-05-02 ASSESSMENT — LOCATION SIMPLE DESCRIPTION DERM
LOCATION SIMPLE: UPPER BACK
LOCATION SIMPLE: LEFT UPPER ARM
LOCATION SIMPLE: RIGHT POSTERIOR THIGH
LOCATION SIMPLE: LEFT ANTERIOR NECK
LOCATION SIMPLE: LEFT UPPER BACK
LOCATION SIMPLE: CHEST
LOCATION SIMPLE: LEFT CHEEK
LOCATION SIMPLE: LEFT FOREARM
LOCATION SIMPLE: LEFT POSTERIOR THIGH
LOCATION SIMPLE: LEFT FOREHEAD
LOCATION SIMPLE: ABDOMEN
LOCATION SIMPLE: RIGHT LIP
LOCATION SIMPLE: RIGHT UPPER ARM
LOCATION SIMPLE: LEFT LIP
LOCATION SIMPLE: RIGHT FOREARM

## 2024-05-02 ASSESSMENT — LOCATION ZONE DERM
LOCATION ZONE: TRUNK
LOCATION ZONE: FACE
LOCATION ZONE: LEG
LOCATION ZONE: LIP
LOCATION ZONE: ARM
LOCATION ZONE: NECK

## 2024-05-02 NOTE — PROCEDURE: ADDITIONAL NOTES
Additional Notes: Gave samples for [employer] Roche dark spot corrector and facial sunscreens
Render Risk Assessment In Note?: no
Detail Level: Simple

## 2024-05-10 ENCOUNTER — TELEPHONE (OUTPATIENT)
Dept: NEUROLOGY | Facility: MEDICAL CENTER | Age: 69
End: 2024-05-10
Payer: MEDICARE

## 2024-05-10 DIAGNOSIS — Z79.01 ENCOUNTER FOR MONITORING DIRECT ORAL ANTICOAGULANT THERAPY: ICD-10-CM

## 2024-05-10 DIAGNOSIS — I48.0 PAROXYSMAL ATRIAL FIBRILLATION (HCC): ICD-10-CM

## 2024-05-10 DIAGNOSIS — Z51.81 ENCOUNTER FOR MONITORING DIRECT ORAL ANTICOAGULANT THERAPY: ICD-10-CM

## 2024-05-14 ENCOUNTER — PATIENT MESSAGE (OUTPATIENT)
Dept: CARDIOLOGY | Facility: MEDICAL CENTER | Age: 69
End: 2024-05-14
Payer: MEDICARE

## 2024-05-14 ENCOUNTER — HOSPITAL ENCOUNTER (OUTPATIENT)
Dept: LAB | Facility: MEDICAL CENTER | Age: 69
End: 2024-05-14
Attending: INTERNAL MEDICINE
Payer: MEDICARE

## 2024-05-14 DIAGNOSIS — Z79.01 ENCOUNTER FOR MONITORING DIRECT ORAL ANTICOAGULANT THERAPY: ICD-10-CM

## 2024-05-14 DIAGNOSIS — I48.0 PAROXYSMAL ATRIAL FIBRILLATION (HCC): ICD-10-CM

## 2024-05-14 DIAGNOSIS — Z51.81 ENCOUNTER FOR MONITORING DIRECT ORAL ANTICOAGULANT THERAPY: ICD-10-CM

## 2024-05-14 LAB
ALBUMIN SERPL BCP-MCNC: 4 G/DL (ref 3.2–4.9)
ALBUMIN/GLOB SERPL: 1.9 G/DL
ALP SERPL-CCNC: 78 U/L (ref 30–99)
ALT SERPL-CCNC: 14 U/L (ref 2–50)
ANION GAP SERPL CALC-SCNC: 9 MMOL/L (ref 7–16)
AST SERPL-CCNC: 24 U/L (ref 12–45)
BILIRUB SERPL-MCNC: 0.4 MG/DL (ref 0.1–1.5)
BUN SERPL-MCNC: 18 MG/DL (ref 8–22)
CALCIUM ALBUM COR SERPL-MCNC: 9 MG/DL (ref 8.5–10.5)
CALCIUM SERPL-MCNC: 9 MG/DL (ref 8.5–10.5)
CHLORIDE SERPL-SCNC: 109 MMOL/L (ref 96–112)
CO2 SERPL-SCNC: 24 MMOL/L (ref 20–33)
CREAT SERPL-MCNC: 0.72 MG/DL (ref 0.5–1.4)
ERYTHROCYTE [DISTWIDTH] IN BLOOD BY AUTOMATED COUNT: 46.7 FL (ref 35.9–50)
FASTING STATUS PATIENT QL REPORTED: NORMAL
GFR SERPLBLD CREATININE-BSD FMLA CKD-EPI: 89 ML/MIN/1.73 M 2
GLOBULIN SER CALC-MCNC: 2.1 G/DL (ref 1.9–3.5)
GLUCOSE SERPL-MCNC: 85 MG/DL (ref 65–99)
HCT VFR BLD AUTO: 40.2 % (ref 37–47)
HGB BLD-MCNC: 13.3 G/DL (ref 12–16)
MCH RBC QN AUTO: 30 PG (ref 27–33)
MCHC RBC AUTO-ENTMCNC: 33.1 G/DL (ref 32.2–35.5)
MCV RBC AUTO: 90.7 FL (ref 81.4–97.8)
PLATELET # BLD AUTO: 233 K/UL (ref 164–446)
PMV BLD AUTO: 10.8 FL (ref 9–12.9)
POTASSIUM SERPL-SCNC: 4.2 MMOL/L (ref 3.6–5.5)
PROT SERPL-MCNC: 6.1 G/DL (ref 6–8.2)
RBC # BLD AUTO: 4.43 M/UL (ref 4.2–5.4)
SODIUM SERPL-SCNC: 142 MMOL/L (ref 135–145)
WBC # BLD AUTO: 4.1 K/UL (ref 4.8–10.8)

## 2024-05-15 RX ORDER — DRONEDARONE 400 MG/1
400 TABLET, FILM COATED ORAL 2 TIMES DAILY WITH MEALS
Qty: 180 TABLET | Refills: 1 | Status: SHIPPED | OUTPATIENT
Start: 2024-05-15

## 2024-05-15 NOTE — RESULT ENCOUNTER NOTE
Signs at this time.  White blood cell count being slightly low could be due to anything from lack of sleep, increased stress, recent cold.  Will follow-up with patient on 5/20/2024 and discuss potential symptoms.

## 2024-05-16 ENCOUNTER — TELEPHONE (OUTPATIENT)
Dept: CARDIOLOGY | Facility: MEDICAL CENTER | Age: 69
End: 2024-05-16

## 2024-05-16 ENCOUNTER — PATIENT MESSAGE (OUTPATIENT)
Dept: CARDIOLOGY | Facility: MEDICAL CENTER | Age: 69
End: 2024-05-16

## 2024-05-16 ENCOUNTER — OFFICE VISIT (OUTPATIENT)
Dept: NEUROLOGY | Facility: MEDICAL CENTER | Age: 69
End: 2024-05-16
Attending: INTERNAL MEDICINE
Payer: MEDICARE

## 2024-05-16 VITALS
SYSTOLIC BLOOD PRESSURE: 108 MMHG | BODY MASS INDEX: 22.42 KG/M2 | HEIGHT: 67 IN | TEMPERATURE: 98.5 F | WEIGHT: 142.86 LBS | OXYGEN SATURATION: 95 % | HEART RATE: 62 BPM | DIASTOLIC BLOOD PRESSURE: 62 MMHG

## 2024-05-16 DIAGNOSIS — G25.0 ESSENTIAL TREMOR: ICD-10-CM

## 2024-05-16 PROCEDURE — 3078F DIAST BP <80 MM HG: CPT | Performed by: INTERNAL MEDICINE

## 2024-05-16 PROCEDURE — 3074F SYST BP LT 130 MM HG: CPT | Performed by: INTERNAL MEDICINE

## 2024-05-16 PROCEDURE — 99214 OFFICE O/P EST MOD 30 MIN: CPT | Performed by: INTERNAL MEDICINE

## 2024-05-16 RX ORDER — LEVETIRACETAM 500 MG/1
500 TABLET ORAL 2 TIMES DAILY
Qty: 180 TABLET | Refills: 3 | Status: SHIPPED | OUTPATIENT
Start: 2024-05-16 | End: 2025-05-16

## 2024-05-16 ASSESSMENT — FIBROSIS 4 INDEX: FIB4 SCORE: 1.95

## 2024-05-16 NOTE — TELEPHONE ENCOUNTER
FYI - remote transmission received via home monitor, patient sent symptom episode 5/15/2024, appears consistent w/ AF, ongoing at time of transmission, full report scanned into Media.

## 2024-05-16 NOTE — PROGRESS NOTES
[facility]   [address], [address]. [place] [address]  Phone: [phone] Patient Name: Susy Szymanski  : 1955  MRN: [id number]     ASSESSMENT / PLAN       Susy Szymanski is a 69 y.o. RHD female presenting for tremors    Essential tremor  Onset since her teenage years, but worsened in the past 5 years.  Exam notable for bilateral intention and postural hand tremors, with mild generalized tremors as well.  No bradykinesia or rigidity noted.  This is consistent with essential tremor. Donedranone doesn't appear to worsen tremors as much as amiodarone per literature review.     Xarelto does not allow for primidone use. History of renal stone makes topiramate and zonisamide not a good fit and her preference as well. Propranolol at higher dose may cause low HR and BP, limited by 10-20mg daily. Gabapentin 100-300mg was too sedating.     - continue propranolol 20mg qAM  - start levetiracetam 500mg BID for tremors    - Referral to [place] for MRI guided focused ultrasound  - Discussed Nimo Perdomo  [employer]  [phone]     Orders Placed This Encounter    Referral to Neurosurgery    levETIRAcetam (KEPPRA) 500 MG Tab     - Return in about 3 months (around 2024).    Joao Carroll DO  Neurology, Movement Disorders Specialist    BILLING DOCUMENTATION:   I spent 40 minutes reviewing the medical record, interviewing and examining the patient, discussing diagnosis and treatment, and coordinating care.          HISTORY OF PRESENT ILLNESS      Susy Szymanski is a 69 y.o. RHD female presenting for tremors    PMHx: atrial fibrillation status post ablation on Xarelto, depression, GERD, osteoporosis.     Initial HPI 24: acute left side weakness, received TPA and had thrombectomy for right M2 occlusion.    Dr. Mota 2022:  essential tremor, currently on propranolol 20 mg once daily, with progressively worsening tremor.   She still has problems eating and cannot write well.  "  Her heart rate has been in the 40's on propranolol 20mg twice daily. Propranolol has been helpful but the tremor is still bothersome.    Onset approx since teens. Worsened in the past 5 years. Hand writing, drinking, eating. Socially bothersome. Worse with anxiety.   Family members: mother, mild tremors    - Propranolol: has atrial fibrillation, but has lower HR at times. Takes it in AM, 20mg daily. 10mg BID was helpful too. No longer taking 20mg BID due to low HR.   Efficacy: used to be 70%, now 30%.    - Topiramate: didn't try it, concerned about osteoporosis, kidney stones in past.     Interval history  3/2024: First visit with me. Started gabapentin 100mg up to 300mg nightly but was too sedating and caused weight gain  05/16/24: start keppra 500mg BID. Referral to [facility] for focused ultrasound            1/13/2023     1:00 PM 2/7/2022     5:00 PM 2/17/2021     5:00 PM 4/9/2019     3:00 PM 4/20/2017    10:00 AM   PHQ-9 Screening   Little interest or pleasure in doing things 0 - not at all 0 - not at all 0 - not at all 0 - not at all 0 - not at all   Feeling down, depressed, or hopeless 0 - not at all 0 - not at all 0 - not at all 0 - not at all 0 - not at all   PHQ-2 Total Score 0 0 0 0 0        Past Medical History:   Diagnosis Date    Anxiety     Arrhythmia     afib    ASTHMA 4/9/21:  Patient denies    Atrial fibrillation (HCC)     Bowel habit changes     constipation    Cataract 04/09/2001    \"Small, not affecting eyes\"    Chickenpox     Constipation     Depression 04/09/2021    Patient denies    Frequent urination     GERD (gastroesophageal reflux disease)     Heartburn     Hiatal hernia with GERD 11/5/2019    Hiatus hernia syndrome     High cholesterol     History of esophageal stricture 11/5/2019    Indigestion     Kidney stone     Osteoporosis     Painful joint     Palpitations     Palpitations     Premature atrial contractions     Rhinitis     Ringing in ears     Stroke (HCC) 04/09/2021 Jan. 2021.  " Blood clot in brain RT A-Fib    Tonsillitis     Wears glasses        Past Surgical History:   Procedure Laterality Date    COLONOSCOPY WITH POLYP  June 2008    benign polyp  Dr Paul     CHOLECYSTECTOMY  January 2008    laparoscopic    EGD WITH ASP/BX  September 2006    inflammation only ---Dr Paul    CHOLECYSTECTOMY      OTHER CARDIAC SURGERY  Around 2000    Ablation    SINUSOTOMIES         Family History   Problem Relation Age of Onset    Cancer Father 70        cancer of unknown primary    Heart Disease Mother     Heart Disease Son 42        MI and coronary stent placement    Diabetes Son     Hypertension Son     Diabetes Sister        Social History     Socioeconomic History    Marital status:      Spouse name: Not on file    Number of children: Not on file    Years of education: Not on file    Highest education level: Not on file   Occupational History    Occupation: retired   Tobacco Use    Smoking status: Never    Smokeless tobacco: Never   Vaping Use    Vaping status: Never Used   Substance and Sexual Activity    Alcohol use: No     Alcohol/week: 0.0 oz    Drug use: No    Sexual activity: Yes     Partners: Male     Birth control/protection: Post-Menopausal   Other Topics Concern    Not on file   Social History Narrative    Not on file     Social Determinants of Health     Financial Resource Strain: Not on file   Food Insecurity: Not on file   Transportation Needs: Not on file   Physical Activity: Not on file   Stress: Not on file   Social Connections: Not on file   Intimate Partner Violence: Not on file   Housing Stability: Not on file       Current Outpatient Medications   Medication    levETIRAcetam (KEPPRA) 500 MG Tab    dronedarone (MULTAQ) 400 MG Tab    rivaroxaban (XARELTO) 20 MG Tab tablet    pantoprazole (PROTONIX) 40 MG Tablet Delayed Response    Magnesium 250 MG Tab    Cholecalciferol (VITAMIN D-3 PO)    propranolol (INDERAL) 20 MG Tab    meclizine (ANTIVERT) 12.5 MG Tab    scopolamine  (TRANSDERM-SCOP, 1.5 MG,) 1 mg/72hr PATCH 72 HR    zoledronic Acid (RECLAST) 5 MG/100ML Solution IVPB premix    esomeprazole (NEXIUM) 40 MG delayed-release capsule     No current facility-administered medications for this visit.       Allergies   Allergen Reactions    Amitriptyline      Excessive sedation --even at 10 mg dose    Lovastatin      Muscle aches  Muscle aches      Trazodone      Irregular pulse    Bactrim [Sulfamethoxazole W-Trimethoprim]      Shaking    Levaquin      Nausea        DATA / RESULTS      25-Hydroxy   Vitamin D 25   Date Value Ref Range Status   02/15/2023 37 30 - 100 ng/mL Final     Comment:     Adult Ranges:   <20 ng/mL - Deficiency  20-29 ng/mL - Insufficiency   ng/mL - Sufficiency  Electrochemiluminescence binding assay performed using Roche nnamdi e  immunoassay analyzer.  The Elecsys Vitamin D total II assay is intended for  the quantitative determination of total 25 hydroxyvitamin D in human serum  and plasma. This assay is to be used as an aid in the assessment of vitamin  D sufficiency in adults.       TSH   Date Value Ref Range Status   02/15/2023 1.560 0.380 - 5.330 uIU/mL Final     Comment:     The 2011 American Thyroid Association (LIONEL) guidelines  recommended that the interpretation of thyroid function in  pregnancy be based on trimester specific reference ranges.    1st Trimester  0.100-2.500 mIU/L  2nd Trimester  0.200-3.000 mIU/L  3rd Trimester  0.300-3.500 mIU/L    These established reference ranges have not been validated  at [facility].       Glycohemoglobin   Date Value Ref Range Status   02/15/2023 5.3 4.0 - 5.6 % Final     Comment:     Increased risk for diabetes:  5.7 -6.4%  Diabetes:  >6.4%  Glycemic control for adults with diabetes:  <7.0%    The above interpretations are per ADA guidelines.  Diagnosis  of diabetes mellitus on the basis of elevated Hemoglobin A1c  should be confirmed by repeating the Hb A1c test.       LDL   Date Value Ref Range  "Status   02/26/2021 92 <100 mg/dL Final          OBJECTIVE      Vitals:    05/16/24 1546   BP: 108/62   BP Location: Left arm   Patient Position: Sitting   BP Cuff Size: Adult   Pulse: 62   Temp: 36.9 °C (98.5 °F)   SpO2: 95%   Weight: 64.8 kg (142 lb 13.7 oz)   Height: 1.702 m (5' 7\")       Physical Exam     Last dose of propranolol taken this AM    General: NAD, appears stated age.      Mental status: Speech clear and fluent without tremor. Fund of knowledge is good.      Abnormal movements:    UPDRS Right Left   Finger tapping     Hand Movement     Toe Tapping     Leg Agility     Rigidity     Rest Tremor 0 0   Postural Tremor 1-3cm 1-3cm, sometimes 3-5cm   Kinetic Tremor 1-3cm 1-3cm, sometimes 3-5cm      + <1cm chin tremor, + tongue when protruding    Cerebellar: No dysmetria with FTN      PROCEDURE     N/A     "

## 2024-05-16 NOTE — PATIENT INSTRUCTIONS
Referral to [facility] for MRI guided focused ultrasound    Nimo Perdomo  [facility]  [phone]

## 2024-05-17 ENCOUNTER — TELEPHONE (OUTPATIENT)
Dept: NEUROLOGY | Facility: MEDICAL CENTER | Age: 69
End: 2024-05-17
Payer: MEDICARE

## 2024-05-17 NOTE — TELEPHONE ENCOUNTER
Attempted to contact patient at [phone] to discuss [facility] and services/benefits offered. No answer, left voicemail.    Nahed Read

## 2024-05-17 NOTE — TELEPHONE ENCOUNTER
Received Refill PA request via [facility]  for Levetiracetam (Keppra) 500 MG Tabs. (Quantity:180, Day Supply:90) - Copay $2.30 - NO PA REQUIRED     Insurance: [facility] Med D ([employer])  Member ID:  [id number]  BIN: [id number]  PCN: [id number]  Group: [id number]     Ran Test claim via [place] & medication Pays for a $2.30 copay. Will outreach to patient to offer specialty pharmacy services and or release to preferred pharmacy

## 2024-05-20 ENCOUNTER — OFFICE VISIT (OUTPATIENT)
Dept: MEDICAL GROUP | Facility: LAB | Age: 69
End: 2024-05-20
Payer: MEDICARE

## 2024-05-20 VITALS
OXYGEN SATURATION: 96 % | DIASTOLIC BLOOD PRESSURE: 62 MMHG | TEMPERATURE: 98 F | SYSTOLIC BLOOD PRESSURE: 110 MMHG | HEIGHT: 67 IN | HEART RATE: 60 BPM | BODY MASS INDEX: 22.44 KG/M2 | RESPIRATION RATE: 18 BRPM | WEIGHT: 143 LBS

## 2024-05-20 DIAGNOSIS — R04.0 EPISTAXIS: ICD-10-CM

## 2024-05-20 DIAGNOSIS — D72.819 LEUKOPENIA, UNSPECIFIED TYPE: ICD-10-CM

## 2024-05-20 DIAGNOSIS — Z12.31 ENCOUNTER FOR SCREENING MAMMOGRAM FOR BREAST CANCER: ICD-10-CM

## 2024-05-20 DIAGNOSIS — G25.0 ESSENTIAL TREMOR: ICD-10-CM

## 2024-05-20 PROCEDURE — 3074F SYST BP LT 130 MM HG: CPT | Performed by: FAMILY MEDICINE

## 2024-05-20 PROCEDURE — 99214 OFFICE O/P EST MOD 30 MIN: CPT | Performed by: FAMILY MEDICINE

## 2024-05-20 PROCEDURE — 3078F DIAST BP <80 MM HG: CPT | Performed by: FAMILY MEDICINE

## 2024-05-20 ASSESSMENT — FIBROSIS 4 INDEX: FIB4 SCORE: 1.95

## 2024-05-20 NOTE — PROGRESS NOTES
Verbal consent was acquired by the patient to use [employer] ambient listening note generation during this visit Yes    Subjective:   Susy Szymanski is a 69 y.o. female here today for   Chief Complaint   Patient presents with    Lab Results     History of Present Illness  The patient is a 69-year-old female who is here today for lab review. The patient recently completed CBC and CMP results were unremarkable with the exception of white blood cell count slightly low at 4.1. She is accompanied by her .    The patient's  reports that her cardiologist has conducted a blood test due to her current medication regimen, which includes Multaq and Xarelto. However, the cardiologist expressed concern regarding the low white blood cell count and recommended further evaluation with me. The patient denies any recent infections, coughs, or colds, but does report occasional sinus bleeding, which she attributes to her Xarelto medication. She has researched online about gabapentin, prescribed by her neurologist for tremors and is curious if this could be a contributing factor to her low blood count.    The patient was diagnosed with an essential tremor by her neurologist a few days ago, who prescribed Keppra. She reports experiencing fatigue, dizziness, and occasional feelings of impending fall, which she attributes to the Keppra. She has been self-adjusting the dosage by halving the tablet and taking it at night. The Keppra has been beneficial in managing her tremors, and she is considering trying CalaClick as recommended by her neurologist. Additionally, she is considering the use of electromagnet.    The patient began experiencing severe nasal congestion last night, which she suspects may be a side effect of the Keppra. Due to her atrial fibrillation, she is unable to take decongestants.      Allergies   Allergen Reactions    Amitriptyline      Excessive sedation --even at 10 mg dose    Lovastatin      Muscle  aches  Muscle aches      Trazodone      Irregular pulse    Bactrim [Sulfamethoxazole W-Trimethoprim]      Shaking    Levaquin      Nausea          Current medicines (including changes today)  Current Outpatient Medications   Medication Sig Dispense Refill    levETIRAcetam (KEPPRA) 500 MG Tab Take 1 Tablet by mouth 2 times a day. For tremors 180 Tablet 3    dronedarone (MULTAQ) 400 MG Tab Take 1 Tablet by mouth 2 times a day with meals. Please complete ordered lab work prior to further refills. Thank you! 180 Tablet 1    rivaroxaban (XARELTO) 20 MG Tab tablet Take 1 Tablet by mouth with dinner. TO ENSURE FURTHER REFILLS, PLEASE COMPLETE REQUIRED LAB WORK. 60 Tablet 0    pantoprazole (PROTONIX) 40 MG Tablet Delayed Response Take 40 mg by mouth every day.      Magnesium 250 MG Tab Take 250 mg by mouth every day at 6 PM.      Cholecalciferol (VITAMIN D-3 PO) Take  by mouth.      propranolol (INDERAL) 20 MG Tab Take 1 Tablet by mouth every day at 6 PM. 90 Tablet 3    meclizine (ANTIVERT) 12.5 MG Tab Take 1 Tablet by mouth 3 times a day as needed (for vertigo). 30 Tablet 0    zoledronic Acid (RECLAST) 5 MG/100ML Solution IVPB premix Infuse 100 mL into a venous catheter Once Every 12 Months for 6 doses. (Patient not taking: Reported on 1/2/2024) 100 mL 6    scopolamine (TRANSDERM-SCOP, 1.5 MG,) 1 mg/72hr PATCH 72 HR Place 1 Patch on the skin every 72 hours. 4 Patch 3    esomeprazole (NEXIUM) 40 MG delayed-release capsule Take 1 Capsule by mouth every morning before breakfast. (Patient not taking: Reported on 1/2/2024) 90 Capsule 0     No current facility-administered medications for this visit.     She  has a past medical history of Anxiety, Arrhythmia, ASTHMA (4/9/21:  Patient denies), Atrial fibrillation (HCC), Bowel habit changes, Cataract (04/09/2001), Chickenpox, Constipation, Depression (04/09/2021), Frequent urination, GERD (gastroesophageal reflux disease), Heartburn, Hiatal hernia with GERD (11/5/2019), Hiatus  "hernia syndrome, High cholesterol, History of esophageal stricture (11/5/2019), Indigestion, Kidney stone, Osteoporosis, Painful joint, Palpitations, Palpitations, Premature atrial contractions, Rhinitis, Ringing in ears, Stroke (HCC) (04/09/2021), Tonsillitis, and Wears glasses.    ROS   ROS  -See HPI     Objective:     Physical Exam:  /62   Pulse 60   Temp 36.7 °C (98 °F) (Temporal)   Resp 18   Ht 1.702 m (5' 7.01\")   Wt 64.9 kg (143 lb)   SpO2 96%  Body mass index is 22.39 kg/m².   Constitutional: Alert, no distress, well-groomed.  Skin: No rashes in visible areas.  Eye: Round. Conjunctiva clear, lids normal. No icterus.   ENMT: Lips pink without lesions, good dentition, moist mucous membranes. Phonation normal.  Neck: No masses, no thyromegaly. Moves freely without pain.  Respiratory: Unlabored respiratory effort, no cough or audible wheeze  Psych: Alert and oriented x3, normal affect and mood.   Results  Laboratory Studies  White blood cell count slightly low at 4.1.    Assessment and Plan:     Assessment & Plan  1. Leukopenia.  The patient's white blood cell count is slightly low at 4.1, however, it does not appear to be indicative of immunosuppression. A follow-up blood work will be conducted in 3 months with a differential to ascertain if there are certain white blood cells that are lower than the others.    2. Epistaxis.  The patient's Xarelto and allergy seasons may contribute to the bleeding. The patient will commence Flonase, administering 1 spray in each nostril daily for a few weeks, after which the dosage will be increased to 2 sprays in each nostril daily.    3. Essential tremor.  The patient has been advised to allow more time for the medication to take effect.  She will continue to experience some side effects due to the Keppra.  I discussed appropriate follow-up with neurologist and appropriate titration if needed.    4.  Health maintenance  Patient due for mammogram, will order at " this time.    Orders:  There are no diagnoses linked to this encounter.      Followup: No follow-ups on file.         PLEASE NOTE: This dictation was created using voice recognition and [employer] ambient listening software. I have made every reasonable attempt to correct obvious errors, but I expect that there are errors of grammar and possibly content that I did not discover before finalizing the note.

## 2024-05-26 DIAGNOSIS — T75.3XXA MOTION SICKNESS, INITIAL ENCOUNTER: ICD-10-CM

## 2024-05-28 ENCOUNTER — OFFICE VISIT (OUTPATIENT)
Dept: CARDIOLOGY | Facility: MEDICAL CENTER | Age: 69
End: 2024-05-28
Attending: NURSE PRACTITIONER
Payer: MEDICARE

## 2024-05-28 VITALS
HEIGHT: 67 IN | SYSTOLIC BLOOD PRESSURE: 114 MMHG | DIASTOLIC BLOOD PRESSURE: 74 MMHG | HEART RATE: 61 BPM | BODY MASS INDEX: 22.29 KG/M2 | WEIGHT: 142 LBS | RESPIRATION RATE: 12 BRPM | OXYGEN SATURATION: 94 %

## 2024-05-28 DIAGNOSIS — D68.318 CIRCULATING ANTICOAGULANTS (HCC): ICD-10-CM

## 2024-05-28 DIAGNOSIS — G25.0 ESSENTIAL TREMOR: ICD-10-CM

## 2024-05-28 DIAGNOSIS — Z98.890 H/O CARDIAC RADIOFREQUENCY ABLATION: ICD-10-CM

## 2024-05-28 DIAGNOSIS — I63.411 CEREBROVASCULAR ACCIDENT (CVA) DUE TO EMBOLISM OF RIGHT MIDDLE CEREBRAL ARTERY (HCC): ICD-10-CM

## 2024-05-28 DIAGNOSIS — I49.1 ATRIAL PREMATURE COMPLEXES: ICD-10-CM

## 2024-05-28 DIAGNOSIS — I48.91 ATRIAL FIBRILLATION, UNSPECIFIED TYPE (HCC): ICD-10-CM

## 2024-05-28 DIAGNOSIS — Z45.09 ENCOUNTER FOR LOOP RECORDER CHECK: ICD-10-CM

## 2024-05-28 DIAGNOSIS — I49.3 PVC'S (PREMATURE VENTRICULAR CONTRACTIONS): ICD-10-CM

## 2024-05-28 ASSESSMENT — ENCOUNTER SYMPTOMS
FOCAL WEAKNESS: 0
HEADACHES: 0
SPEECH CHANGE: 0
WHEEZING: 0
SPUTUM PRODUCTION: 0
SENSORY CHANGE: 0
CHILLS: 0
HEMOPTYSIS: 0
FEVER: 0
COUGH: 0
NAUSEA: 0
WEIGHT LOSS: 0
VOMITING: 0
TREMORS: 0
LOSS OF CONSCIOUSNESS: 0
DIZZINESS: 0
TINGLING: 0
ORTHOPNEA: 0
SORE THROAT: 0
PND: 0
STRIDOR: 0
HEARTBURN: 0
PALPITATIONS: 1
SHORTNESS OF BREATH: 0

## 2024-05-28 ASSESSMENT — FIBROSIS 4 INDEX: FIB4 SCORE: 1.95

## 2024-05-28 NOTE — PROGRESS NOTES
"Chief Complaint   Patient presents with    Atrial Fibrillation       Subjective     Ariane Szymanski is a 69 y.o. female who presents today for follow-up paroxysmal atrial fibrillation, status post ablation x 2, high risk medication use in the form of Multaq, frequent PACs and PVCs, prior CVA after weaning from anticoagulation after her first ablation.  She has ILR for close A-fib monitoring.    She is followed by Dr. Moody for EP.    Today in follow-up reports overall doing well.  She reports some potential A-fib episodes, states 1 approximately 11 hours in length.  Continues to report high amount of PACs and PVCs for which she is fairly symptomatic to.  She does report improvement in these on Multaq.    She needs to have an EGD completed, however she did cancel this secondary to possible episode of A-fib, she has yet to reschedule.    Today she would like to discuss potential EMILY closure with watchman implant.  She is curious if she is a candidate for this.  Reports increased epistaxis from sinus bleeding and potential interactions with some options for neurologic medications.  Past Medical History:   Diagnosis Date    Anxiety     Arrhythmia     afib    ASTHMA 4/9/21:  Patient denies    Atrial fibrillation (HCC)     Bowel habit changes     constipation    Cataract 04/09/2001    \"Small, not affecting eyes\"    Chickenpox     Constipation     Depression 04/09/2021    Patient denies    Frequent urination     GERD (gastroesophageal reflux disease)     Heartburn     Hiatal hernia with GERD 11/5/2019    Hiatus hernia syndrome     High cholesterol     History of esophageal stricture 11/5/2019    Indigestion     Kidney stone     Osteoporosis     Painful joint     Palpitations     Palpitations     Premature atrial contractions     Rhinitis     Ringing in ears     Stroke (HCC) 04/09/2021 Jan. 2021.  Blood clot in brain RT A-Fib    Tonsillitis     Wears glasses      Past Surgical History:   Procedure Laterality Date    " COLONOSCOPY WITH POLYP  June 2008    benign polyp  Dr Paul     CHOLECYSTECTOMY  January 2008    laparoscopic    EGD WITH ASP/BX  September 2006    inflammation only ---Dr Paul    CHOLECYSTECTOMY      OTHER CARDIAC SURGERY  Around 2000    Ablation    SINUSOTOMIES       Family History   Problem Relation Age of Onset    Cancer Father 70        cancer of unknown primary    Heart Disease Mother     Heart Disease Son 42        MI and coronary stent placement    Diabetes Son     Hypertension Son     Diabetes Sister      Social History     Socioeconomic History    Marital status:      Spouse name: Not on file    Number of children: Not on file    Years of education: Not on file    Highest education level: Not on file   Occupational History    Occupation: retired   Tobacco Use    Smoking status: Never    Smokeless tobacco: Never   Vaping Use    Vaping status: Never Used   Substance and Sexual Activity    Alcohol use: No     Alcohol/week: 0.0 oz    Drug use: No    Sexual activity: Yes     Partners: Male     Birth control/protection: Post-Menopausal   Other Topics Concern    Not on file   Social History Narrative    Not on file     Social Determinants of Health     Financial Resource Strain: Not on file   Food Insecurity: Not on file   Transportation Needs: Not on file   Physical Activity: Not on file   Stress: Not on file   Social Connections: Not on file   Intimate Partner Violence: Not on file   Housing Stability: Not on file     Allergies   Allergen Reactions    Amitriptyline      Excessive sedation --even at 10 mg dose    Lovastatin      Muscle aches  Muscle aches      Trazodone      Irregular pulse    Bactrim [Sulfamethoxazole W-Trimethoprim]      Shaking    Levaquin      Nausea      Outpatient Encounter Medications as of 5/28/2024   Medication Sig Dispense Refill    levETIRAcetam (KEPPRA) 500 MG Tab Take 1 Tablet by mouth 2 times a day. For tremors 180 Tablet 3    dronedarone (MULTAQ) 400 MG Tab Take 1 Tablet  by mouth 2 times a day with meals. Please complete ordered lab work prior to further refills. Thank you! 180 Tablet 1    rivaroxaban (XARELTO) 20 MG Tab tablet Take 1 Tablet by mouth with dinner. TO ENSURE FURTHER REFILLS, PLEASE COMPLETE REQUIRED LAB WORK. 60 Tablet 0    pantoprazole (PROTONIX) 40 MG Tablet Delayed Response Take 40 mg by mouth every day.      Magnesium 250 MG Tab Take 250 mg by mouth every day at 6 PM.      Cholecalciferol (VITAMIN D-3 PO) Take  by mouth.      propranolol (INDERAL) 20 MG Tab Take 1 Tablet by mouth every day at 6 PM. 90 Tablet 3    meclizine (ANTIVERT) 12.5 MG Tab Take 1 Tablet by mouth 3 times a day as needed (for vertigo). 30 Tablet 0    zoledronic Acid (RECLAST) 5 MG/100ML Solution IVPB premix Infuse 100 mL into a venous catheter Once Every 12 Months for 6 doses. (Patient not taking: Reported on 1/2/2024) 100 mL 6    scopolamine (TRANSDERM-SCOP, 1.5 MG,) 1 mg/72hr PATCH 72 HR Place 1 Patch on the skin every 72 hours. 4 Patch 3    esomeprazole (NEXIUM) 40 MG delayed-release capsule Take 1 Capsule by mouth every morning before breakfast. (Patient not taking: Reported on 1/2/2024) 90 Capsule 0     No facility-administered encounter medications on file as of 5/28/2024.     Review of Systems   Constitutional:  Negative for chills, fever, malaise/fatigue and weight loss.   HENT:  Positive for nosebleeds. Negative for congestion, sore throat and tinnitus.    Respiratory:  Negative for cough, hemoptysis, sputum production, shortness of breath, wheezing and stridor.    Cardiovascular:  Positive for palpitations. Negative for chest pain, orthopnea, leg swelling and PND.   Gastrointestinal:  Negative for heartburn, nausea and vomiting.   Neurological:  Negative for dizziness, tingling, tremors, sensory change, speech change, focal weakness, loss of consciousness and headaches.   All other systems reviewed and are negative.             Objective     /74 (BP Location: Left arm, Patient  "Position: Sitting, BP Cuff Size: Adult)   Pulse 61   Resp 12   Ht 1.702 m (5' 7\")   Wt 64.4 kg (142 lb)   LMP 01/01/2004   SpO2 94%   BMI 22.24 kg/m²     Physical Exam  Vitals reviewed.   Constitutional:       Appearance: Normal appearance.   HENT:      Head: Normocephalic and atraumatic.      Mouth/Throat:      Mouth: Mucous membranes are moist.      Pharynx: Oropharynx is clear.   Eyes:      Extraocular Movements: Extraocular movements intact.      Conjunctiva/sclera: Conjunctivae normal.      Pupils: Pupils are equal, round, and reactive to light.   Cardiovascular:      Rate and Rhythm: Normal rate and regular rhythm.      Pulses: Normal pulses.   Pulmonary:      Effort: Pulmonary effort is normal.      Breath sounds: Normal breath sounds. No wheezing, rhonchi or rales.   Musculoskeletal:      Cervical back: Normal range of motion and neck supple.      Right lower leg: No edema.      Left lower leg: No edema.   Skin:     General: Skin is warm and dry.      Capillary Refill: Capillary refill takes 2 to 3 seconds.   Neurological:      Mental Status: She is alert and oriented to person, place, and time.   Psychiatric:         Mood and Affect: Mood normal.         Behavior: Behavior normal.         Thought Content: Thought content normal.         Judgment: Judgment normal.                Assessment & Plan     1. Atrial fibrillation, unspecified type (HCC)  EKG      2. H/O cardiac radiofrequency ablation 8/18/21 Dr Moody.  PVI (extension of the RPV), PWI and RVOT (PVCs)        3. PVC's (premature ventricular contractions)        4. Atrial premature complexes        5. Circulating anticoagulants (HCC)        6. Cerebrovascular accident (CVA) due to embolism of right middle cerebral artery (HCC)        7. Essential tremor        8. Encounter for loop recorder check            Medical Decision Making: Today's Assessment/Status/Plan:   1.  Atrial fibrillation  2.  Status post ablation  3.  PACs  4.  PVCs  - Prior " ablation x 2.  ILR implanted post second ablation for A-fib burden monitoring.  -She is in sinus with P ACs today.  -ILR interrogated and shows low burden PAF approximately 0.7%, out of available EGM's on today's check most appear frequent PACs/PVCs, 1 with definite AF.    -She reports that she is doing well on Multaq, we will continue 400 mg twice daily.  Continue propranolol.    5.  Anticoagulation  6.  Prior CVA  - Chronically anticoagulated with Xarelto secondary to history of CVA secondary to recurrent A-fib post ablation (OAC had been stopped at time of CVA)  -We discussed potential watchman implant today.  We reviewed procedural indication, procedural detail and risks of procedures.  Additionally we discussed timeframe and follow-up of procedures.    - Her [id number] score is 3.  She does not have report serious bleeding on Xarelto, however does report increased bleeding from sinus disorder, additionally some of her neurologic medications have been limited secondary to potential medication interactions with Xarelto.   -She would like to talk it over a little more with her .  Discussed I would need to discuss with Dr. Moody as well, Dr. Moody may want to see her in office prior, she is currently scheduled later this summer with him.    Return to clinic as scheduled with Dr. Moody.  She will be out of town for her scheduled appointment so we will reschedule to next available which is in early August.  PLEASE NOTE: This Note was created using voice recognition Software. I have made every reasonable attempt to correct obvious errors, but I expect that there are errors of grammar and possibly content that I did not discover before finalizing the note

## 2024-05-28 NOTE — TELEPHONE ENCOUNTER
Received request via: Pharmacy    Was the patient seen in the last year in this department? Yes  LOV : 5/20/2024   Does the patient have an active prescription (recently filled or refills available) for medication(s) requested? No    Pharmacy Name: [facility]    Does the patient have group home Plus and need 100 day supply (blood pressure, diabetes and cholesterol meds only)? Patient does not have SCP

## 2024-05-29 LAB — EKG IMPRESSION: NORMAL

## 2024-05-30 RX ORDER — MECLIZINE HCL 12.5 MG/1
12.5 TABLET ORAL 3 TIMES DAILY PRN
Qty: 30 TABLET | Refills: 0 | Status: SHIPPED | OUTPATIENT
Start: 2024-05-30

## 2024-06-01 ENCOUNTER — NON-PROVIDER VISIT (OUTPATIENT)
Dept: CARDIOLOGY | Facility: MEDICAL CENTER | Age: 69
End: 2024-06-01
Payer: MEDICARE

## 2024-06-03 ENCOUNTER — TELEPHONE (OUTPATIENT)
Dept: CARDIOLOGY | Facility: MEDICAL CENTER | Age: 69
End: 2024-06-03
Payer: MEDICARE

## 2024-06-03 DIAGNOSIS — I48.91 ATRIAL FIBRILLATION, UNSPECIFIED TYPE (HCC): ICD-10-CM

## 2024-06-03 PROCEDURE — 93298 REM INTERROG DEV EVAL SCRMS: CPT | Mod: 26 | Performed by: INTERNAL MEDICINE

## 2024-06-03 NOTE — TELEPHONE ENCOUNTER
Yeny Cruz R.N.  You4 hours ago (8:07 AM)     JOHNY Messina - Patient would like to move forward with Watchnila, thank you!

## 2024-06-03 NOTE — CARDIAC REMOTE MONITOR - SCAN
Device transmission reviewed. Device demonstrated appropriate function.       Electronically Signed by: Lazaro Hirsch M.D.    6/3/2024  11:33 AM

## 2024-06-03 NOTE — TELEPHONE ENCOUNTER
Jayla,    Can you please enter the orders for this procedure?    Latha w/ELIZABETH and an additional ELIZABETH for the fu post latha    Thank You,  Mayuri

## 2024-06-04 NOTE — TELEPHONE ENCOUNTER
Patient scheduled for post watchman ELIZABETH w/anesthesia on 11-15-24 with Dr. Rooney. Patient has been instructed to check in at 6:00 for 8:00 case time. Message sent to ervin VALLADARES to Dr. Rooney.

## 2024-06-04 NOTE — TELEPHONE ENCOUNTER
LILIANA    Caller: Susy Szymanski    Topic/issue: Patient returned call from Mayuri.     Please advise.     Callback Number: [phone]    Thank you,     Korin PICKENS

## 2024-06-04 NOTE — TELEPHONE ENCOUNTER
Patient scheduled for watchman w/ELIZABETH on 9-10-24 with Dr. Moody. Patient has been instructed to check in at 6:00 for 7:30 case time. Message sent to authorizations. Watchman rep is aware of this date.

## 2024-06-05 ENCOUNTER — PATIENT MESSAGE (OUTPATIENT)
Dept: MEDICAL GROUP | Facility: LAB | Age: 69
End: 2024-06-05
Payer: MEDICARE

## 2024-06-05 DIAGNOSIS — T75.3XXA MOTION SICKNESS, INITIAL ENCOUNTER: ICD-10-CM

## 2024-06-06 RX ORDER — MECLIZINE HCL 12.5 MG/1
12.5 TABLET ORAL 3 TIMES DAILY PRN
Qty: 30 TABLET | Refills: 0 | Status: SHIPPED | OUTPATIENT
Start: 2024-06-06

## 2024-06-15 ENCOUNTER — HOSPITAL ENCOUNTER (OUTPATIENT)
Dept: RADIOLOGY | Facility: MEDICAL CENTER | Age: 69
End: 2024-06-15
Attending: INTERNAL MEDICINE
Payer: MEDICARE

## 2024-06-15 DIAGNOSIS — R91.8 ABNORMAL CT SCAN, LUNG: ICD-10-CM

## 2024-06-15 PROCEDURE — 71250 CT THORAX DX C-: CPT

## 2024-06-18 ENCOUNTER — OFFICE VISIT (OUTPATIENT)
Dept: CARDIOLOGY | Facility: MEDICAL CENTER | Age: 69
End: 2024-06-18
Attending: INTERNAL MEDICINE
Payer: MEDICARE

## 2024-06-18 VITALS
SYSTOLIC BLOOD PRESSURE: 105 MMHG | OXYGEN SATURATION: 99 % | HEIGHT: 67 IN | RESPIRATION RATE: 12 BRPM | DIASTOLIC BLOOD PRESSURE: 65 MMHG | WEIGHT: 142 LBS | HEART RATE: 54 BPM | BODY MASS INDEX: 22.29 KG/M2

## 2024-06-18 DIAGNOSIS — I63.411 CEREBROVASCULAR ACCIDENT (CVA) DUE TO EMBOLISM OF RIGHT MIDDLE CEREBRAL ARTERY (HCC): ICD-10-CM

## 2024-06-18 DIAGNOSIS — Z45.09 ENCOUNTER FOR LOOP RECORDER CHECK: ICD-10-CM

## 2024-06-18 DIAGNOSIS — Z51.81 VISIT FOR MONITORING MULTAQ THERAPY: ICD-10-CM

## 2024-06-18 DIAGNOSIS — I48.0 PAROXYSMAL ATRIAL FIBRILLATION (HCC): ICD-10-CM

## 2024-06-18 DIAGNOSIS — Z79.899 VISIT FOR MONITORING MULTAQ THERAPY: ICD-10-CM

## 2024-06-18 LAB — EKG IMPRESSION: NORMAL

## 2024-06-18 PROCEDURE — 93005 ELECTROCARDIOGRAM TRACING: CPT | Performed by: INTERNAL MEDICINE

## 2024-06-18 PROCEDURE — 93291 INTERROG DEV EVAL SCRMS IP: CPT | Performed by: INTERNAL MEDICINE

## 2024-06-18 PROCEDURE — 99213 OFFICE O/P EST LOW 20 MIN: CPT | Performed by: INTERNAL MEDICINE

## 2024-06-18 ASSESSMENT — FIBROSIS 4 INDEX: FIB4 SCORE: 1.95

## 2024-06-18 NOTE — PROGRESS NOTES
"Arrhythmia Clinic Note (Established patient)    DOS: 6/18/2024    Chief complaint/Reason for consult: F/u AF    Interval History:  Pt is a 70 yo F. She has history of AF s/p prior ablation, came off OAC due to apparent success but unfortunately with recurrence complicated by MCA stroke. She is back on OAC. Repeat ablation including PW completed. Has lower but still present burden of fib. ILR in place, most episodes appear PACs. She is managed on Multaq. Has some drug interactions for xarelto and minor bleeding. Scheduled for WATCHMAN implant in Sept.    ROS (+ highlighted in red):  General--Negative for fatigue, weight loss or weight gain  Cardiovascular--Negative for CP, orthopnea, PND    Past Medical History:   Diagnosis Date    Anxiety     Arrhythmia     afib    ASTHMA 4/9/21:  Patient denies    Atrial fibrillation (HCC)     Bowel habit changes     constipation    Cataract 04/09/2001    \"Small, not affecting eyes\"    Chickenpox     Constipation     Depression 04/09/2021    Patient denies    Frequent urination     GERD (gastroesophageal reflux disease)     Heartburn     Hiatal hernia with GERD 11/5/2019    Hiatus hernia syndrome     High cholesterol     History of esophageal stricture 11/5/2019    Indigestion     Kidney stone     Osteoporosis     Painful joint     Palpitations     Palpitations     Premature atrial contractions     Rhinitis     Ringing in ears     Stroke (HCC) 04/09/2021 Jan. 2021.  Blood clot in brain RT A-Fib    Tonsillitis     Wears glasses        Past Surgical History:   Procedure Laterality Date    COLONOSCOPY WITH POLYP  June 2008    benign polyp  Dr Paul     CHOLECYSTECTOMY  January 2008    laparoscopic    EGD WITH ASP/BX  September 2006    inflammation only ---Dr Paul    CHOLECYSTECTOMY      OTHER CARDIAC SURGERY  Around 2000    Ablation    SINUSOTOMIES         Social History     Socioeconomic History    Marital status:      Spouse name: Not on file    Number of children: Not " on file    Years of education: Not on file    Highest education level: Not on file   Occupational History    Occupation: retired   Tobacco Use    Smoking status: Never    Smokeless tobacco: Never   Vaping Use    Vaping status: Never Used   Substance and Sexual Activity    Alcohol use: No     Alcohol/week: 0.0 oz    Drug use: No    Sexual activity: Yes     Partners: Male     Birth control/protection: Post-Menopausal   Other Topics Concern    Not on file   Social History Narrative    Not on file     Social Determinants of Health     Financial Resource Strain: Not on file   Food Insecurity: Not on file   Transportation Needs: Not on file   Physical Activity: Not on file   Stress: Not on file   Social Connections: Not on file   Intimate Partner Violence: Not on file   Housing Stability: Not on file       Family History   Problem Relation Age of Onset    Cancer Father 70        cancer of unknown primary    Heart Disease Mother     Heart Disease Son 42        MI and coronary stent placement    Diabetes Son     Hypertension Son     Diabetes Sister        Allergies   Allergen Reactions    Amitriptyline      Excessive sedation --even at 10 mg dose    Lovastatin      Muscle aches  Muscle aches      Trazodone      Irregular pulse    Bactrim [Sulfamethoxazole W-Trimethoprim]      Shaking    Levaquin      Nausea        Current Outpatient Medications   Medication Sig Dispense Refill    meclizine (ANTIVERT) 12.5 MG Tab Take 1 Tablet by mouth 3 times a day as needed (for vertigo). 30 Tablet 0    levETIRAcetam (KEPPRA) 500 MG Tab Take 1 Tablet by mouth 2 times a day. For tremors 180 Tablet 3    dronedarone (MULTAQ) 400 MG Tab Take 1 Tablet by mouth 2 times a day with meals. Please complete ordered lab work prior to further refills. Thank you! 180 Tablet 1    rivaroxaban (XARELTO) 20 MG Tab tablet Take 1 Tablet by mouth with dinner. TO ENSURE FURTHER REFILLS, PLEASE COMPLETE REQUIRED LAB WORK. 60 Tablet 0    pantoprazole (PROTONIX)  "40 MG Tablet Delayed Response Take 40 mg by mouth every day.      Magnesium 250 MG Tab Take 250 mg by mouth every day at 6 PM.      Cholecalciferol (VITAMIN D-3 PO) Take  by mouth.      propranolol (INDERAL) 20 MG Tab Take 1 Tablet by mouth every day at 6 PM. 90 Tablet 3    zoledronic Acid (RECLAST) 5 MG/100ML Solution IVPB premix Infuse 100 mL into a venous catheter Once Every 12 Months for 6 doses. 100 mL 6    scopolamine (TRANSDERM-SCOP, 1.5 MG,) 1 mg/72hr PATCH 72 HR Place 1 Patch on the skin every 72 hours. 4 Patch 3    esomeprazole (NEXIUM) 40 MG delayed-release capsule Take 1 Capsule by mouth every morning before breakfast. 90 Capsule 0     No current facility-administered medications for this visit.       Physical Exam:  Vitals:    06/18/24 0959   BP: 105/65   BP Location: Left arm   Patient Position: Sitting   BP Cuff Size: Adult   Pulse: (!) 54   Resp: 12   SpO2: 99%   Weight: 64.4 kg (142 lb)   Height: 1.702 m (5' 7\")     General appearance: NAD, conversant  HEENT: PERRL, neck is supple with FROM  Lungs: Clear to auscultation, normal respiratory effort  CV: RRR, no murmurs/rubs/gallops, no JVD  Abdomen: Soft, non-tender with normal bowel sounds  Extremities: No peripheral edema, no clubbing or cyanosis  Skin: No rash, lesions, or ulcers  Psych: Alert and oriented to person, place and time    Data:  Labs reviewed    Prior echo/stress reviewed:  ELIZABETH showing suitable appendage anatomy    Device check reviewed    Impression/Plan:  1. Paroxysmal atrial fibrillation (HCC)  EKG      2. Cerebrovascular accident (CVA) due to embolism of right middle cerebral artery (HCC)        3. Encounter for loop recorder check        -ILR reviewed, most atrial episodes PACs, low burden of atrial arrhythmia   -Doing well on the Multaq  -Recent LFTs WNL  -WATCHMAN planned for Sept  -No changes today    Josue Moody MD    "

## 2024-07-02 ENCOUNTER — NON-PROVIDER VISIT (OUTPATIENT)
Dept: CARDIOLOGY | Facility: MEDICAL CENTER | Age: 69
End: 2024-07-02
Payer: MEDICARE

## 2024-07-02 PROCEDURE — 93298 REM INTERROG DEV EVAL SCRMS: CPT | Mod: 26 | Performed by: INTERNAL MEDICINE

## 2024-07-29 DIAGNOSIS — I48.0 PAROXYSMAL ATRIAL FIBRILLATION (HCC): ICD-10-CM

## 2024-07-29 RX ORDER — DRONEDARONE 400 MG/1
400 TABLET, FILM COATED ORAL 2 TIMES DAILY WITH MEALS
Qty: 180 TABLET | Refills: 1 | Status: SHIPPED | OUTPATIENT
Start: 2024-07-29

## 2024-07-31 LAB — EKG IMPRESSION: NORMAL

## 2024-08-02 ENCOUNTER — NON-PROVIDER VISIT (OUTPATIENT)
Dept: CARDIOLOGY | Facility: MEDICAL CENTER | Age: 69
End: 2024-08-02
Payer: MEDICARE

## 2024-08-02 PROCEDURE — 93298 REM INTERROG DEV EVAL SCRMS: CPT | Mod: 26 | Performed by: INTERNAL MEDICINE

## 2024-08-02 NOTE — CARDIAC REMOTE MONITOR - SCAN
Device transmission reviewed. Device demonstrated appropriate function.       Electronically Signed by: Josue Moody M.D.    8/29/2024  12:47 PM

## 2024-08-06 ENCOUNTER — NON-PROVIDER VISIT (OUTPATIENT)
Dept: CARDIOLOGY | Facility: MEDICAL CENTER | Age: 69
End: 2024-08-06
Payer: MEDICARE

## 2024-08-12 ENCOUNTER — APPOINTMENT (OUTPATIENT)
Dept: ADMISSIONS | Facility: MEDICAL CENTER | Age: 69
End: 2024-08-12
Attending: INTERNAL MEDICINE
Payer: MEDICARE

## 2024-08-14 ENCOUNTER — PATIENT MESSAGE (OUTPATIENT)
Dept: CARDIOLOGY | Facility: MEDICAL CENTER | Age: 69
End: 2024-08-14
Payer: MEDICARE

## 2024-08-14 NOTE — CARDIAC REMOTE MONITOR - SCAN
Device transmission reviewed. Device demonstrated appropriate function.       Electronically Signed by: Lazaro Hirsch M.D.    8/15/2024  2:17 PM

## 2024-08-14 NOTE — TELEPHONE ENCOUNTER
SS- Please advise, patient is reporting have increased acid reflux since starting multaq. She is wondering if there any any alternative medication that wont cause this side effect.

## 2024-08-19 NOTE — PROGRESS NOTES
"Pulmonary Clinic Note    Date of Visit: 8/21/2024     Chief Complaint:  Chief Complaint   Patient presents with    Follow-Up     Last seen 6/19/23 Dr. Lindsay     HPI:   Susy Szymanski is a very pleasant 69 y.o. year old female never smoker, with a PMHx of pulmonary nodules, paroxysmal A-fib s/p ablation x 2 who presented to the Pulmonary Clinic for a regular follow up. Last seen in the office on 6/19/2023 with Dr. Lindsay.     Patient is followed by pulmonary office for pulmonary nodules and abnormal CT.  CT chest in 2021 showed an incidental nodule seen in the focal area of tree-in-bud nodularity in the left lower lobe.  CT chest 11/2022 demonstrated left lower lobe nodule 6 mm now more solid and a new right upper lobe nodule and a new right upper lobe nodule 4 mm in size.  PFTs in 2021 showed an FEV1 of 1.87 L or 74%, FEV1/FVC 66%, %, DLCO 121%, without positive bronchodilator response.  Repeat CT chest in June 2024 shows stable 6 mm irregular nodule in the LLL and adjacent tiny reticular nodule opacities without any adenopathy.    Interval events:  8/21/2024-  Patient denies any significant shortness of breath, cough, sputum production, wheezing, weight loss, night sweats, or hemoptysis.      Past Medical History:   Diagnosis Date    Anxiety     Arrhythmia     afib    ASTHMA 4/9/21:  Patient denies    Atrial fibrillation (HCC)     Bowel habit changes     constipation    Breath shortness     cardiac related    Cataract 04/09/2001    \"Small, not affecting eyes\"    Chickenpox     Constipation     Dental disorder     Patient states dentist told her she has a tooth infection and needs a tooth pulled but no antibiotics prescribed    Depression 04/09/2021    Patient denies    Frequent urination     GERD (gastroesophageal reflux disease)     Heartburn     Hemorrhagic disorder (HCC)     Takes Xarelto    Hiatal hernia with GERD 11/05/2019    Hiatus hernia syndrome     High cholesterol     History of esophageal " stricture 11/05/2019    Indigestion     Kidney stone     Osteoporosis     PAC (premature atrial contraction)     Painful joint     Palpitations     Palpitations     Premature atrial contractions     PVC's (premature ventricular contractions)     Rhinitis     Ringing in ears     Stroke (HCC) 04/09/2021 Jan. 2021.  Blood clot in brain RT A-Fib    Tonsillitis     Urinary bladder disorder     urinary frequency    Wears glasses      Past Surgical History:   Procedure Laterality Date    COLONOSCOPY WITH POLYP  06/2008    benign polyp  Dr Paul     CHOLECYSTECTOMY  01/2008    laparoscopic    EGD WITH ASP/BX  09/2006    inflammation only ---Dr Paul    CHOLECYSTECTOMY      MAMMOPLASTY AUGMENTATION Bilateral     OTHER      loop recorder    OTHER CARDIAC SURGERY  Around 2000    Ablation    SINUSOTOMIES       Social History     Socioeconomic History    Marital status:      Spouse name: Not on file    Number of children: Not on file    Years of education: Not on file    Highest education level: Not on file   Occupational History    Occupation: retired   Tobacco Use    Smoking status: Never    Smokeless tobacco: Never   Vaping Use    Vaping status: Never Used   Substance and Sexual Activity    Alcohol use: No     Alcohol/week: 0.0 oz    Drug use: No    Sexual activity: Yes     Partners: Male     Birth control/protection: Post-Menopausal   Other Topics Concern    Not on file   Social History Narrative    Not on file     Social Determinants of Health     Financial Resource Strain: Not on file   Food Insecurity: Not on file   Transportation Needs: Not on file   Physical Activity: Not on file   Stress: Not on file   Social Connections: Not on file   Intimate Partner Violence: Not on file   Housing Stability: Not on file        Family History   Problem Relation Age of Onset    Cancer Father 70        cancer of unknown primary    Heart Disease Mother     Heart Disease Son 42        MI and coronary stent placement     Diabetes Son     Hypertension Son     Diabetes Sister      Current Outpatient Medications on File Prior to Visit   Medication Sig Dispense Refill    rivaroxaban (XARELTO) 20 MG Tab tablet Take 1 Tablet by mouth with dinner. TO ENSURE FURTHER REFILLS, PLEASE COMPLETE REQUIRED LAB WORK. (Patient taking differently: Take 20 mg by mouth with dinner. TO ENSURE FURTHER REFILLS, PLEASE COMPLETE REQUIRED LAB WORK.    MEDICATION INSTRUCTIONS FOR SURGERY/PROCEDURE ON 9/10/24:   COORDINATE MEDICATION INSTRUCTIONS FROM PRESCRIBING PHYSICIAN) 90 Tablet 3    dronedarone (MULTAQ) 400 MG Tab Take 1 Tablet by mouth 2 times a day with meals. Please complete ordered lab work prior to further refills. Thank you! (Patient taking differently: Take 400 mg by mouth 2 times a day with meals. Please complete ordered lab work prior to further refills. Thank you!    MEDICATION INSTRUCTIONS FOR SURGERY/PROCEDURE SCHEDULED FOR 09-10-24: OKAY TO CONTINUE THIS MEDICATION PRIOR TO SURGERY/PROCEDURE. OKAY TO TAKE THIS MEDICATION DAY OF SURGERY/PROCEDURE WITH A SMALL SIP OF WATER.) 180 Tablet 1    meclizine (ANTIVERT) 12.5 MG Tab Take 1 Tablet by mouth 3 times a day as needed (for vertigo). (Patient taking differently: Take 12.5 mg by mouth 3 times a day as needed (for vertigo).     MEDICATION INSTRUCTIONS FOR SURGERY/PROCEDURE SCHEDULED FOR 09-10-24: OKAY TO CONTINUE THIS MEDICATION PRIOR TO SURGERY/PROCEDURE IF NEEDED. OKAY TO TAKE THIS MEDICATION DAY OF SURGERY/PROCEDURE WITH A SMALL SIP OF WATER IF NEEDED.) 30 Tablet 0    pantoprazole (PROTONIX) 40 MG Tablet Delayed Response Take 40 mg by mouth every morning.     MEDICATION INSTRUCTIONS FOR SURGERY/PROCEDURE SCHEDULED FOR 09-10-24: OKAY TO CONTINUE THIS MEDICATION PRIOR TO SURGERY/PROCEDURE. OKAY TO TAKE THIS MEDICATION DAY OF SURGERY/PROCEDURE WITH A SMALL SIP OF WATER.      Magnesium 250 MG Tab Take 250 mg by mouth every morning.     MEDICATION INSTRUCTIONS FOR SURGERY/PROCEDURE SCHEDULED FOR  09-10-24   DO NOT TAKE 7 DAYS PRIOR TO SURGERY      Cholecalciferol (VITAMIN D-3 PO) Take 1 Tablet by mouth every morning. Patient unsure of dose.    MEDICATION INSTRUCTIONS FOR SURGERY/PROCEDURE SCHEDULED FOR 09-10-24   DO NOT TAKE 7 DAYS PRIOR TO SURGERY      propranolol (INDERAL) 20 MG Tab Take 1 Tablet by mouth every day at 6 PM. (Patient taking differently: Take 20 mg by mouth as needed.     MEDICATION INSTRUCTIONS FOR SURGERY/PROCEDURE SCHEDULED FOR 09-10-24: OKAY TO CONTINUE THIS MEDICATION PRIOR TO SURGERY/PROCEDURE IF NEEDED. OKAY TO TAKE THIS MEDICATION DAY OF SURGERY/PROCEDURE WITH A SMALL SIP OF WATER IF NEEDED.) 90 Tablet 3    esomeprazole (NEXIUM) 40 MG delayed-release capsule Take 1 Capsule by mouth every morning before breakfast. (Patient taking differently: Take 40 mg by mouth every morning before breakfast.     MEDICATION INSTRUCTIONS FOR SURGERY/PROCEDURE SCHEDULED FOR 09-10-24: OKAY TO CONTINUE THIS MEDICATION PRIOR TO SURGERY/PROCEDURE. OKAY TO TAKE THIS MEDICATION DAY OF SURGERY/PROCEDURE WITH A SMALL SIP OF WATER.) 90 Capsule 0    levETIRAcetam (KEPPRA) 500 MG Tab Take 1 Tablet by mouth 2 times a day. For tremors (Patient not taking: Reported on 8/21/2024) 180 Tablet 3    zoledronic Acid (RECLAST) 5 MG/100ML Solution IVPB premix Infuse 100 mL into a venous catheter Once Every 12 Months for 6 doses. (Patient not taking: Reported on 8/21/2024) 100 mL 6    scopolamine (TRANSDERM-SCOP, 1.5 MG,) 1 mg/72hr PATCH 72 HR Place 1 Patch on the skin every 72 hours. (Patient not taking: Reported on 8/21/2024) 4 Patch 3     No current facility-administered medications on file prior to visit.     Allergies: Amitriptyline, Lovastatin, Trazodone, Bactrim [sulfamethoxazole w-trimethoprim], and Levaquin    ROS:   Review of Systems   Constitutional:  Negative for chills, diaphoresis, fever, malaise/fatigue and weight loss.   HENT:  Negative for congestion and sinus pain.    Respiratory:  Negative for cough,  "hemoptysis, sputum production, shortness of breath and wheezing.    Cardiovascular:  Negative for chest pain, palpitations and leg swelling.   Gastrointestinal:  Negative for diarrhea, heartburn, nausea and vomiting.   Musculoskeletal:  Negative for falls and myalgias.   Neurological:  Negative for dizziness, weakness and headaches.     Vitals:  /66 (BP Location: Right arm, Patient Position: Sitting, BP Cuff Size: Adult)   Pulse 65   Ht 1.702 m (5' 7\")   Wt 65.8 kg (145 lb)   SpO2 94%     Physical Exam  Constitutional:       General: She is not in acute distress.     Appearance: Normal appearance. She is not ill-appearing, toxic-appearing or diaphoretic.   Cardiovascular:      Rate and Rhythm: Normal rate and regular rhythm.      Heart sounds: No murmur heard.     No friction rub. No gallop.   Pulmonary:      Effort: No respiratory distress.      Breath sounds: Normal breath sounds. No stridor. No wheezing, rhonchi or rales.   Musculoskeletal:         General: No swelling.      Right lower leg: No edema.      Left lower leg: No edema.   Skin:     General: Skin is warm.   Neurological:      General: No focal deficit present.      Mental Status: She is alert and oriented to person, place, and time.   Psychiatric:         Mood and Affect: Mood normal.         Behavior: Behavior normal.         Thought Content: Thought content normal.         Judgment: Judgment normal.         Laboratory Data:  PFTs: (Date: 11/15/2021)-      Impression:  Baseline spirometry shows airflow obstruction with FEV1/FVC ratio 66 and an FEV1 of 1.87 L or 74% predicted.  There is trend toward bronchodilator response but does not meet ATS criteria, postbronchodilator FEV1 of 2.04 L or 81% predicted.  Lung volumes are within normal limits.  Diffusion capacity is just above upper limits of normal at 121% predicted.  Pulmonary function testing shows airflow obstruction that is mild and reverses postbronchodilator although does not meet " strict criteria for bronchodilator responsiveness.  These findings may be consistent with reactive airways disease.    CT Chest: (Date: 6/15/2024)-  Impression:  1.  Stable clustered nodules in the left lower lobe, postinflammatory.  2.  No new suspicious pulmonary nodule.  3.  No acute abnormality.      Assessment and Plan:    Problem List Items Addressed This Visit       Pulmonary nodules     CT chest in 2021 showed an incidental nodule seen in the focal area of tree-in-bud nodularity in the left lower lobe.  CT chest 11/2022 demonstrated left lower lobe nodule 6 mm now more solid and a new right upper lobe nodule and a new right upper lobe nodule 4 mm in size.  Repeat CT chest in June 2024 shows stable 6 mm irregular nodule in the LLL and adjacent tiny reticular nodule opacities without any adenopathy.  -- Repeat CT chest in 1 year and if stable no further follow-up will be needed.         Relevant Orders    CT-CHEST (THORAX) W/O     Diagnostic studies have been reviewed with the patient.    Return in about 1 year (around 8/21/2025), or if symptoms worsen or fail to improve, for Nodule w/ repeat CT, with Haris.     This note was generated using voice recognition software which has a chance of producing errors of grammar and possibly content.  I have made every reasonable attempt to find and correct any obvious errors, but it should be expected that some may not be found prior to finalization of this note.    Time spent in record review prior to patient arrival, reviewing results, and in face-to-face encounter totaled 25 min.  __________  SOLO Turner  Pulmonary Medicine  [facility]

## 2024-08-20 NOTE — PATIENT COMMUNICATION
There aren't really more options medication wise that can be started as an outpt, she has previously tried propafenone and flecainide.      The other option would be to discuss and get approval from Dr Moody for Class III antiarrhythmics, Sotalol instead of Multaq, this medication requires hospital admission for monitoring while starting on the medication.

## 2024-08-21 ENCOUNTER — OFFICE VISIT (OUTPATIENT)
Dept: SLEEP MEDICINE | Facility: MEDICAL CENTER | Age: 69
End: 2024-08-21
Payer: MEDICARE

## 2024-08-21 ENCOUNTER — TELEPHONE (OUTPATIENT)
Dept: CARDIOLOGY | Facility: MEDICAL CENTER | Age: 69
End: 2024-08-21

## 2024-08-21 ENCOUNTER — PRE-ADMISSION TESTING (OUTPATIENT)
Dept: ADMISSIONS | Facility: MEDICAL CENTER | Age: 69
DRG: 274 | End: 2024-08-21
Attending: INTERNAL MEDICINE
Payer: MEDICARE

## 2024-08-21 VITALS
HEIGHT: 67 IN | BODY MASS INDEX: 22.76 KG/M2 | OXYGEN SATURATION: 94 % | HEART RATE: 65 BPM | DIASTOLIC BLOOD PRESSURE: 66 MMHG | WEIGHT: 145 LBS | SYSTOLIC BLOOD PRESSURE: 112 MMHG

## 2024-08-21 DIAGNOSIS — R91.8 PULMONARY NODULES: ICD-10-CM

## 2024-08-21 PROCEDURE — 99213 OFFICE O/P EST LOW 20 MIN: CPT

## 2024-08-21 PROCEDURE — 3078F DIAST BP <80 MM HG: CPT

## 2024-08-21 PROCEDURE — 99212 OFFICE O/P EST SF 10 MIN: CPT

## 2024-08-21 PROCEDURE — 3074F SYST BP LT 130 MM HG: CPT

## 2024-08-21 ASSESSMENT — ENCOUNTER SYMPTOMS
FALLS: 0
HEADACHES: 0
DIARRHEA: 0
DIZZINESS: 0
DIAPHORESIS: 0
FEVER: 0
HEMOPTYSIS: 0
HEARTBURN: 0
NAUSEA: 0
SHORTNESS OF BREATH: 0
WEAKNESS: 0
VOMITING: 0
WHEEZING: 0
SPUTUM PRODUCTION: 0
PALPITATIONS: 0
MYALGIAS: 0
SINUS PAIN: 0
CHILLS: 0
COUGH: 0
WEIGHT LOSS: 0

## 2024-08-21 ASSESSMENT — FIBROSIS 4 INDEX: FIB4 SCORE: 1.95

## 2024-08-21 NOTE — TELEPHONE ENCOUNTER
Katty,    This patient reported to an RN in admitting that she currently has a tooth infection that will require her tooth eventually being pulled. Her Dentist did not start her on any antibiotics for this infection. It sounds like they aren't going to do anything right now due to the watchman procedure scheduled for 9-10-24 with Dr. Moody.    Can you please call her to discuss timing for both procedures?    Thank You,  Mayuri  
S/w patient regarding concerns and questions of tooth being infected. Patient reported she does not have any pain in this tooth, but her dentist has recommended this tooth be pulled for awhile now.   Discussed with patient time line after having a watchman procedure that we would not recommend any elective procedures for 3 months post watchman.  Patient verbalized understanding and was to reach out to her dentist to see if her tooth needs to be pulled prior.   
normal...

## 2024-08-21 NOTE — TELEPHONE ENCOUNTER
Josue Moody M.D.  You10 minutes ago (4:07 PM)       Can do 2 night stay for sotalol or switch to amiodarone as outpatient (but more toxic side effects with amio)

## 2024-08-21 NOTE — ASSESSMENT & PLAN NOTE
CT chest in 2021 showed an incidental nodule seen in the focal area of tree-in-bud nodularity in the left lower lobe.  CT chest 11/2022 demonstrated left lower lobe nodule 6 mm now more solid and a new right upper lobe nodule and a new right upper lobe nodule 4 mm in size.  Repeat CT chest in June 2024 shows stable 6 mm irregular nodule in the LLL and adjacent tiny reticular nodule opacities without any adenopathy.  -- Repeat CT chest in 1 year and if stable no further follow-up will be needed.

## 2024-08-21 NOTE — OR NURSING
Pre admit appointment completed with Ariane and  Jesús for surgery/procedure on 9/10/24.    Medication and fasting instructions given per RN pre procedure protocol. Pt instructed to stop taking all vitamins and herbal supplements 7 days prior to surgery. Pt instructed to stop any NSAIDS 5 days prior to surgery, unless otherwise instructed by medical provider. Xarelto instructions per Dr. Moody.     Pt verbalizes understanding of all instructions given. No further questions at this time. Medication instruction sheet stapled to testing passport for patient to receive at testing appointment on 9/6/24.    During telephone pre admit appointment pt reports that she has a current dental infection and needs a tooth pulled. Per pt her dentist didn't feel she needed to be on an antibiotic. Notified Mayuri with Dr. Moody's office regarding dental infection. Per Mayuri Moody's RN to follow up with patient.

## 2024-08-28 ENCOUNTER — OFFICE VISIT (OUTPATIENT)
Dept: NEUROLOGY | Facility: MEDICAL CENTER | Age: 69
End: 2024-08-28
Attending: INTERNAL MEDICINE
Payer: MEDICARE

## 2024-08-28 VITALS
OXYGEN SATURATION: 96 % | DIASTOLIC BLOOD PRESSURE: 62 MMHG | BODY MASS INDEX: 22.63 KG/M2 | SYSTOLIC BLOOD PRESSURE: 110 MMHG | HEIGHT: 67 IN | HEART RATE: 71 BPM | TEMPERATURE: 97.9 F | WEIGHT: 144.18 LBS

## 2024-08-28 DIAGNOSIS — G25.0 ESSENTIAL TREMOR: ICD-10-CM

## 2024-08-28 PROCEDURE — 99212 OFFICE O/P EST SF 10 MIN: CPT | Performed by: INTERNAL MEDICINE

## 2024-08-28 RX ORDER — PROPRANOLOL HYDROCHLORIDE 10 MG/1
10 TABLET ORAL 2 TIMES DAILY
Qty: 180 TABLET | Refills: 3 | Status: SHIPPED | OUTPATIENT
Start: 2024-08-28 | End: 2025-08-23

## 2024-08-28 ASSESSMENT — FIBROSIS 4 INDEX: FIB4 SCORE: 1.95

## 2024-08-28 NOTE — PROGRESS NOTES
[facility]  [address] [place] [address], [address]. [place] [address]  Phone: [phone], Fax: [phone]    Joao Carroll DO  Neurology, Movement Disorders Patient Name: Susy Szymanski  : 1955  MRN: [id number]     ASSESSMENT / PLAN     Susy Szymanski is a 69 y.o. RHD female presenting for tremors     Essential tremor  Onset since her teenage years, but worsened in the past 5 years.  Exam notable for bilateral intention and postural hand tremors, with mild generalized tremors as well.  No bradykinesia or rigidity noted.  This is consistent with essential tremor. Donedranone doesn't appear to worsen tremors as much as amiodarone per literature review.      Xarelto does not allow for primidone use.   History of renal stone makes topiramate and zonisamide not a good fit and her preference as well.   Propranolol at higher dose may cause low HR and BP, limited by 10-20mg daily.   Gabapentin 100-300mg and levetiracetam 500mg BID was too sedating.   Holding off on May referral for MRgFUS    - continue propranolol 20mg qAM   - Start Nimo kIQ    Criteria for Initial Medicare Coverage of TAPS Therapy  [x]The beneficiary has a diagnosis of essential tremor (ET); AND  [x]The beneficiary is 18 years or older; AND  [x]The TAPS Therapy stimulator is being prescribed to treat the beneficiary’s dominant upper limb; AND  [x]The severity of ET symptoms significantly impairs the beneficiary’s ability to perform dominant hand, upper-limb-related Activities of Daily Living (ADLs), as indicated by a score of greater than or equal to 3 on the Myra & Katelynn Tremor ADL Scale (BF-ADL) for at least one (1) assessment item for eating, drinking self-care, OR writing (1: no difficulty doing the activity, 4: cannot do the activity alone due to tremor) (Refer to LCD R22454 Appendix A); AND   [x]The beneficiary has no contraindications to external upper limb tremor stimulator therapy; AND  [x]At least two  pharmacological treatment options for the management of ET symptoms have been either tried and failed at maximal tolerable dosages (i.e., no or limited effect, intolerable side effects) OR considered and ruled out (e.g., not appropriate in context of the beneficiary's medical history); AND  [x]If medically appropriate, tremor exacerbating medications (eg. stimulants, beta agonists) have been reduced or eliminated; AND  [x]TAPS therapy is prescribed as an alternative to invasive and/or permanent surgical treatment options (e.g., deep brain stimulation, magnetic resonance guided focused ultrasound, radiosurgery).    Continued Medicare Coverage Beyond First Three Months  Deriving benefit from TAPS Therapy as indicated by a 1-point improvement in the BF-ADL score in any eating, drinking, self care OR writing task scored as ? 3 prior to the initiation of therapy; AND  Adhering to therapy defined as use of TAPS therapy on 70% of days during a consecutive thirty (30) day period anytime during the first three (3) months of initial use.    Myra & Katelynn Tremor ADL Scale (BF-ADL)  Score Score Description  1 Able to do the activity without difficulty  2 Able to do the activity with a little effort  3 Able to do the activity with a lot of effort  4 Cannot do the activity by yourself    Task Description      Cut food with a knife and fork = 4    Use a spoon to drink soup = 4     Hold a cup of tea = 4     Write a letter = 4      Orders Placed This Encounter    propranolol (INDERAL) 10 MG Tab     Return in about 3 months (around 11/28/2024).    BILLING DOCUMENTATION:   Excluding time spent on procedures during visit, I spent 30 minutes reviewing the medical record, interviewing and examining the patient, discussing diagnosis and treatment, and coordinating care.              HISTORY OF PRESENT ILLNESS     Susy Szymanski is a 69 y.o. RHD female presenting for tremors     PMHx: atrial fibrillation status post ablation on  "Xarelto, depression, GERD, osteoporosis.      Initial HPI 03/12/24  Stroke history  2/2021: acute left side weakness, received TPA and had thrombectomy for right M2 occlusion.    Tremor  Dr. Mota 5/2022:  essential tremor, currently on propranolol 20 mg once daily, with progressively worsening tremor.   She still has problems eating and cannot write well.   Her heart rate has been in the 40's on propranolol 20mg twice daily. Propranolol has been helpful but the tremor is still bothersome.     Onset approx since teens. Worsened in the past 5 years. Hand writing, drinking, eating. Socially bothersome. Worse with anxiety.   Family members: mother, mild tremors     - Propranolol: has atrial fibrillation, but has lower HR at times. Takes it in AM, 20mg daily. 10mg BID was helpful too. No longer taking 20mg BID due to low HR.   Efficacy: used to be 70%, now 30%.    - Topiramate: didn't try it, concerned about osteoporosis, kidney stones in past.      Interval history  3/2024: First visit with me. Started gabapentin 100mg up to 300mg nightly but was too sedating and caused weight gain  05/16/24: start keppra 500mg BID, but too sedating and not helpful for tremors. Referral to [place] for focused ultrasound, but wasn't ready.  08/28/24: start Nimo KiQ approval      Past Medical History:   Diagnosis Date    Anxiety     Arrhythmia     afib    ASTHMA 4/9/21:  Patient denies    Atrial fibrillation (HCC)     Bowel habit changes     constipation    Breath shortness     cardiac related    Cataract 04/09/2001    \"Small, not affecting eyes\"    Chickenpox     Constipation     Dental disorder     Patient states dentist told her she has a tooth infection and needs a tooth pulled but no antibiotics prescribed    Depression 04/09/2021    Patient denies    Frequent urination     GERD (gastroesophageal reflux disease)     Heartburn     Hemorrhagic disorder (HCC)     Takes Xarelto    Hiatal hernia with GERD 11/05/2019    Hiatus hernia " syndrome     High cholesterol     History of esophageal stricture 11/05/2019    Indigestion     Kidney stone     Osteoporosis     PAC (premature atrial contraction)     Painful joint     Palpitations     Palpitations     Premature atrial contractions     PVC's (premature ventricular contractions)     Rhinitis     Ringing in ears     Stroke (HCC) 04/09/2021 Jan. 2021.  Blood clot in brain RT A-Fib    Tonsillitis     Urinary bladder disorder     urinary frequency    Wears glasses      Past Surgical History:   Procedure Laterality Date    COLONOSCOPY WITH POLYP  06/2008    benign polyp  Dr Paul     CHOLECYSTECTOMY  01/2008    laparoscopic    EGD WITH ASP/BX  09/2006    inflammation only ---Dr Paul    CHOLECYSTECTOMY      MAMMOPLASTY AUGMENTATION Bilateral     OTHER      loop recorder    OTHER CARDIAC SURGERY  Around 2000    Ablation    SINUSOTOMIES       Family History   Problem Relation Age of Onset    Cancer Father 70        cancer of unknown primary    Heart Disease Mother     Heart Disease Son 42        MI and coronary stent placement    Diabetes Son     Hypertension Son     Diabetes Sister      Social History     Socioeconomic History    Marital status:      Spouse name: Not on file    Number of children: Not on file    Years of education: Not on file    Highest education level: Not on file   Occupational History    Occupation: retired   Tobacco Use    Smoking status: Never    Smokeless tobacco: Never   Vaping Use    Vaping status: Never Used   Substance and Sexual Activity    Alcohol use: No     Alcohol/week: 0.0 oz    Drug use: No    Sexual activity: Yes     Partners: Male     Birth control/protection: Post-Menopausal   Other Topics Concern    Not on file   Social History Narrative    Not on file     Social Determinants of Health     Financial Resource Strain: Not on file   Food Insecurity: Not on file   Transportation Needs: Not on file   Physical Activity: Not on file   Stress: Not on file    Social Connections: Not on file   Intimate Partner Violence: Not on file   Housing Stability: Not on file     Current Outpatient Medications   Medication    propranolol (INDERAL) 10 MG Tab    rivaroxaban (XARELTO) 20 MG Tab tablet    dronedarone (MULTAQ) 400 MG Tab    meclizine (ANTIVERT) 12.5 MG Tab    pantoprazole (PROTONIX) 40 MG Tablet Delayed Response    Magnesium 250 MG Tab    Cholecalciferol (VITAMIN D-3 PO)    esomeprazole (NEXIUM) 40 MG delayed-release capsule    zoledronic Acid (RECLAST) 5 MG/100ML Solution IVPB premix    scopolamine (TRANSDERM-SCOP, 1.5 MG,) 1 mg/72hr PATCH 72 HR     No current facility-administered medications for this visit.     Allergies   Allergen Reactions    Amitriptyline      Excessive sedation --even at 10 mg dose    Lovastatin      Muscle aches  Muscle aches      Trazodone      Irregular pulse    Bactrim [Sulfamethoxazole W-Trimethoprim]      Shaking    Levaquin      Nausea              DATA / RESULTS     25-Hydroxy   Vitamin D 25   Date Value Ref Range Status   02/15/2023 37 30 - 100 ng/mL Final     Comment:     Adult Ranges:   <20 ng/mL - Deficiency  20-29 ng/mL - Insufficiency   ng/mL - Sufficiency  Electrochemiluminescence binding assay performed using Roche nnamdi e  immunoassay analyzer.  The Elecsys Vitamin D total II assay is intended for  the quantitative determination of total 25 hydroxyvitamin D in human serum  and plasma. This assay is to be used as an aid in the assessment of vitamin  D sufficiency in adults.       TSH   Date Value Ref Range Status   02/15/2023 1.560 0.380 - 5.330 uIU/mL Final     Comment:     The 2011 American Thyroid Association (LIONEL) guidelines  recommended that the interpretation of thyroid function in  pregnancy be based on trimester specific reference ranges.    1st Trimester  0.100-2.500 mIU/L  2nd Trimester  0.200-3.000 mIU/L  3rd Trimester  0.300-3.500 mIU/L    These established reference ranges have not been validated  at [facility]  [facility].       Glycohemoglobin   Date Value Ref Range Status   02/15/2023 5.3 4.0 - 5.6 % Final     Comment:     Increased risk for diabetes:  5.7 -6.4%  Diabetes:  >6.4%  Glycemic control for adults with diabetes:  <7.0%    The above interpretations are per ADA guidelines.  Diagnosis  of diabetes mellitus on the basis of elevated Hemoglobin A1c  should be confirmed by repeating the Hb A1c test.       LDL   Date Value Ref Range Status   02/26/2021 92 <100 mg/dL Final                OBJECTIVE      Vitals:    08/28/24 1605   BP: 110/62   BP Location: Left arm   Patient Position: Sitting   BP Cuff Size: Adult   Pulse: 71   Temp: 36.6 °C (97.9 °F)   TempSrc: Temporal   SpO2: 96%   Weight: 65.4 kg (144 lb 2.9 oz)   Height: 1.702 m (5' 7\")     Physical Exam  Last dose of propranolol taken this AM      UPDRS Right Left   Finger tapping       Hand Movement       Toe Tapping       Leg Agility       Rigidity       Rest Tremor 0 0   Postural Tremor 1-3cm 1-3cm, sometimes 3-5cm   Kinetic Tremor 1-3cm 1-3cm, sometimes 3-5cm      + <1cm chin tremor, + tongue when protruding     Cerebellar: No dysmetria with FTN          PROCEDURE   N/A  "

## 2024-09-02 ENCOUNTER — NON-PROVIDER VISIT (OUTPATIENT)
Dept: CARDIOLOGY | Facility: MEDICAL CENTER | Age: 69
End: 2024-09-02
Payer: MEDICARE

## 2024-09-02 PROCEDURE — 93298 REM INTERROG DEV EVAL SCRMS: CPT | Mod: 26 | Performed by: INTERNAL MEDICINE

## 2024-09-03 ENCOUNTER — HOSPITAL ENCOUNTER (OUTPATIENT)
Dept: LAB | Facility: MEDICAL CENTER | Age: 69
End: 2024-09-03
Attending: INTERNAL MEDICINE
Payer: MEDICARE

## 2024-09-03 DIAGNOSIS — Z01.812 PRE-OPERATIVE LABORATORY EXAMINATION: ICD-10-CM

## 2024-09-03 LAB
ANION GAP SERPL CALC-SCNC: 11 MMOL/L (ref 7–16)
BASOPHILS # BLD AUTO: 0.2 % (ref 0–1.8)
BASOPHILS # BLD: 0.01 K/UL (ref 0–0.12)
BUN SERPL-MCNC: 18 MG/DL (ref 8–22)
CALCIUM SERPL-MCNC: 9.7 MG/DL (ref 8.5–10.5)
CHLORIDE SERPL-SCNC: 106 MMOL/L (ref 96–112)
CO2 SERPL-SCNC: 26 MMOL/L (ref 20–33)
CREAT SERPL-MCNC: 0.72 MG/DL (ref 0.5–1.4)
EOSINOPHIL # BLD AUTO: 0.06 K/UL (ref 0–0.51)
EOSINOPHIL NFR BLD: 1.4 % (ref 0–6.9)
ERYTHROCYTE [DISTWIDTH] IN BLOOD BY AUTOMATED COUNT: 49.1 FL (ref 35.9–50)
GFR SERPLBLD CREATININE-BSD FMLA CKD-EPI: 89 ML/MIN/1.73 M 2
GLUCOSE SERPL-MCNC: 91 MG/DL (ref 65–99)
HCT VFR BLD AUTO: 44.7 % (ref 37–47)
HGB BLD-MCNC: 14.4 G/DL (ref 12–16)
IMM GRANULOCYTES # BLD AUTO: 0 K/UL (ref 0–0.11)
IMM GRANULOCYTES NFR BLD AUTO: 0 % (ref 0–0.9)
LYMPHOCYTES # BLD AUTO: 0.93 K/UL (ref 1–4.8)
LYMPHOCYTES NFR BLD: 22.4 % (ref 22–41)
MCH RBC QN AUTO: 29.9 PG (ref 27–33)
MCHC RBC AUTO-ENTMCNC: 32.2 G/DL (ref 32.2–35.5)
MCV RBC AUTO: 92.9 FL (ref 81.4–97.8)
MONOCYTES # BLD AUTO: 0.35 K/UL (ref 0–0.85)
MONOCYTES NFR BLD AUTO: 8.4 % (ref 0–13.4)
NEUTROPHILS # BLD AUTO: 2.8 K/UL (ref 1.82–7.42)
NEUTROPHILS NFR BLD: 67.6 % (ref 44–72)
NRBC # BLD AUTO: 0 K/UL
NRBC BLD-RTO: 0 /100 WBC (ref 0–0.2)
PLATELET # BLD AUTO: 250 K/UL (ref 164–446)
PMV BLD AUTO: 10.9 FL (ref 9–12.9)
POTASSIUM SERPL-SCNC: 4.4 MMOL/L (ref 3.6–5.5)
RBC # BLD AUTO: 4.81 M/UL (ref 4.2–5.4)
SODIUM SERPL-SCNC: 143 MMOL/L (ref 135–145)
WBC # BLD AUTO: 4.2 K/UL (ref 4.8–10.8)

## 2024-09-03 PROCEDURE — 80048 BASIC METABOLIC PNL TOTAL CA: CPT

## 2024-09-03 PROCEDURE — 85025 COMPLETE CBC W/AUTO DIFF WBC: CPT | Mod: GA

## 2024-09-03 PROCEDURE — 85610 PROTHROMBIN TIME: CPT | Mod: GA

## 2024-09-03 PROCEDURE — 85730 THROMBOPLASTIN TIME PARTIAL: CPT | Mod: GA

## 2024-09-03 PROCEDURE — 36415 COLL VENOUS BLD VENIPUNCTURE: CPT

## 2024-09-03 NOTE — CARDIAC REMOTE MONITOR - SCAN
Device transmission reviewed. Device demonstrated appropriate function.       Electronically Signed by: Josue Moody M.D.    9/5/2024  12:33 PM

## 2024-09-04 ENCOUNTER — TELEPHONE (OUTPATIENT)
Dept: CARDIOLOGY | Facility: MEDICAL CENTER | Age: 69
End: 2024-09-04
Payer: MEDICARE

## 2024-09-04 LAB
APTT PPP: 35.3 SEC (ref 24.7–36)
INR PPP: 1.35 (ref 0.87–1.13)
PROTHROMBIN TIME: 16.9 SEC (ref 12–14.6)

## 2024-09-04 NOTE — TELEPHONE ENCOUNTER
FYI - remote transmission received via home monitor, patient reported symptom episode which correlates with ongoing AF episode, full report scanned into Media.    Symptom episode noted 9:35 AM, full episode details below.     AF Episode  -Episode Onset: 9/3/2024 @ 5:06 AM  -Duration: 6 hours 40 minutes  -Average V Rate: 61 bpm  -Patient on OAC?: Yes

## 2024-09-05 ENCOUNTER — TELEPHONE (OUTPATIENT)
Dept: CARDIOLOGY | Facility: MEDICAL CENTER | Age: 69
End: 2024-09-05
Payer: MEDICARE

## 2024-09-05 NOTE — TELEPHONE ENCOUNTER
FYI - remote transmission received via home monitor, patient sent 2 symptom episodes, appear consistent w/ bigeminy, full report scanned into Media for review.

## 2024-09-06 ENCOUNTER — PRE-ADMISSION TESTING (OUTPATIENT)
Dept: ADMISSIONS | Facility: MEDICAL CENTER | Age: 69
DRG: 274 | End: 2024-09-06
Attending: INTERNAL MEDICINE
Payer: MEDICARE

## 2024-09-06 DIAGNOSIS — Z01.810 PRE-OPERATIVE CARDIOVASCULAR EXAMINATION: ICD-10-CM

## 2024-09-06 DIAGNOSIS — Z01.812 PRE-OPERATIVE LABORATORY EXAMINATION: ICD-10-CM

## 2024-09-06 LAB
ANION GAP SERPL CALC-SCNC: 12 MMOL/L (ref 7–16)
APTT PPP: 29.6 SEC (ref 24.7–36)
BASOPHILS # BLD AUTO: 0.2 % (ref 0–1.8)
BASOPHILS # BLD: 0.01 K/UL (ref 0–0.12)
BUN SERPL-MCNC: 20 MG/DL (ref 8–22)
CALCIUM SERPL-MCNC: 9.8 MG/DL (ref 8.5–10.5)
CHLORIDE SERPL-SCNC: 104 MMOL/L (ref 96–112)
CO2 SERPL-SCNC: 26 MMOL/L (ref 20–33)
CREAT SERPL-MCNC: 0.76 MG/DL (ref 0.5–1.4)
EKG IMPRESSION: NORMAL
EOSINOPHIL # BLD AUTO: 0.04 K/UL (ref 0–0.51)
EOSINOPHIL NFR BLD: 0.8 % (ref 0–6.9)
ERYTHROCYTE [DISTWIDTH] IN BLOOD BY AUTOMATED COUNT: 48.4 FL (ref 35.9–50)
GFR SERPLBLD CREATININE-BSD FMLA CKD-EPI: 83 ML/MIN/1.73 M 2
GLUCOSE SERPL-MCNC: 75 MG/DL (ref 65–99)
HCT VFR BLD AUTO: 39.6 % (ref 37–47)
HGB BLD-MCNC: 13 G/DL (ref 12–16)
IMM GRANULOCYTES # BLD AUTO: 0.01 K/UL (ref 0–0.11)
IMM GRANULOCYTES NFR BLD AUTO: 0.2 % (ref 0–0.9)
INR PPP: 1.25 (ref 0.87–1.13)
LYMPHOCYTES # BLD AUTO: 1.09 K/UL (ref 1–4.8)
LYMPHOCYTES NFR BLD: 20.9 % (ref 22–41)
MCH RBC QN AUTO: 30.3 PG (ref 27–33)
MCHC RBC AUTO-ENTMCNC: 32.8 G/DL (ref 32.2–35.5)
MCV RBC AUTO: 92.3 FL (ref 81.4–97.8)
MONOCYTES # BLD AUTO: 0.5 K/UL (ref 0–0.85)
MONOCYTES NFR BLD AUTO: 9.6 % (ref 0–13.4)
NEUTROPHILS # BLD AUTO: 3.56 K/UL (ref 1.82–7.42)
NEUTROPHILS NFR BLD: 68.3 % (ref 44–72)
NRBC # BLD AUTO: 0 K/UL
NRBC BLD-RTO: 0 /100 WBC (ref 0–0.2)
PLATELET # BLD AUTO: 245 K/UL (ref 164–446)
PMV BLD AUTO: 10.7 FL (ref 9–12.9)
POTASSIUM SERPL-SCNC: 5 MMOL/L (ref 3.6–5.5)
PROTHROMBIN TIME: 15.9 SEC (ref 12–14.6)
RBC # BLD AUTO: 4.29 M/UL (ref 4.2–5.4)
SODIUM SERPL-SCNC: 142 MMOL/L (ref 135–145)
WBC # BLD AUTO: 5.2 K/UL (ref 4.8–10.8)

## 2024-09-06 PROCEDURE — 36415 COLL VENOUS BLD VENIPUNCTURE: CPT

## 2024-09-06 PROCEDURE — 85610 PROTHROMBIN TIME: CPT

## 2024-09-06 PROCEDURE — 93005 ELECTROCARDIOGRAM TRACING: CPT

## 2024-09-06 PROCEDURE — 80048 BASIC METABOLIC PNL TOTAL CA: CPT

## 2024-09-06 PROCEDURE — 93010 ELECTROCARDIOGRAM REPORT: CPT | Performed by: INTERNAL MEDICINE

## 2024-09-06 PROCEDURE — 85730 THROMBOPLASTIN TIME PARTIAL: CPT

## 2024-09-06 PROCEDURE — 85025 COMPLETE CBC W/AUTO DIFF WBC: CPT

## 2024-09-06 NOTE — TELEPHONE ENCOUNTER
Josue Moody M.D.  You; Anabella Chaudhari, Med Ass't27 minutes ago (8:22 AM)       Symtpoms on ILR consistent with PVCs. Can result in falsely low pulse reading.

## 2024-09-09 ENCOUNTER — TELEPHONE (OUTPATIENT)
Dept: NEUROLOGY | Facility: MEDICAL CENTER | Age: 69
End: 2024-09-09
Payer: MEDICARE

## 2024-09-10 ENCOUNTER — APPOINTMENT (OUTPATIENT)
Dept: CARDIOLOGY | Facility: MEDICAL CENTER | Age: 69
DRG: 274 | End: 2024-09-10
Attending: INTERNAL MEDICINE
Payer: MEDICARE

## 2024-09-10 ENCOUNTER — HOSPITAL ENCOUNTER (INPATIENT)
Facility: MEDICAL CENTER | Age: 69
LOS: 1 days | DRG: 274 | End: 2024-09-11
Attending: INTERNAL MEDICINE | Admitting: INTERNAL MEDICINE
Payer: MEDICARE

## 2024-09-10 ENCOUNTER — ANESTHESIA (OUTPATIENT)
Dept: CARDIOLOGY | Facility: MEDICAL CENTER | Age: 69
DRG: 274 | End: 2024-09-10
Payer: MEDICARE

## 2024-09-10 ENCOUNTER — ANESTHESIA EVENT (OUTPATIENT)
Dept: CARDIOLOGY | Facility: MEDICAL CENTER | Age: 69
DRG: 274 | End: 2024-09-10
Payer: MEDICARE

## 2024-09-10 DIAGNOSIS — I48.91 ATRIAL FIBRILLATION, UNSPECIFIED TYPE (HCC): ICD-10-CM

## 2024-09-10 LAB — ACT BLD: 311 SEC (ref 74–137)

## 2024-09-10 PROCEDURE — 02L73DK OCCLUSION OF LEFT ATRIAL APPENDAGE WITH INTRALUMINAL DEVICE, PERCUTANEOUS APPROACH: ICD-10-PCS | Performed by: INTERNAL MEDICINE

## 2024-09-10 PROCEDURE — 93355 ECHO TRANSESOPHAGEAL (TEE): CPT

## 2024-09-10 PROCEDURE — 700111 HCHG RX REV CODE 636 W/ 250 OVERRIDE (IP)

## 2024-09-10 PROCEDURE — 85347 COAGULATION TIME ACTIVATED: CPT

## 2024-09-10 PROCEDURE — 160035 HCHG PACU - 1ST 60 MINS PHASE I

## 2024-09-10 PROCEDURE — 33340 PERQ CLSR TCAT L ATR APNDGE: CPT | Mod: Q0 | Performed by: INTERNAL MEDICINE

## 2024-09-10 PROCEDURE — 700101 HCHG RX REV CODE 250: Performed by: ANESTHESIOLOGY

## 2024-09-10 PROCEDURE — 700105 HCHG RX REV CODE 258: Performed by: ANESTHESIOLOGY

## 2024-09-10 PROCEDURE — 770020 HCHG ROOM/CARE - TELE (206)

## 2024-09-10 PROCEDURE — A9270 NON-COVERED ITEM OR SERVICE: HCPCS | Performed by: INTERNAL MEDICINE

## 2024-09-10 PROCEDURE — 700105 HCHG RX REV CODE 258: Performed by: INTERNAL MEDICINE

## 2024-09-10 PROCEDURE — 700111 HCHG RX REV CODE 636 W/ 250 OVERRIDE (IP): Performed by: ANESTHESIOLOGY

## 2024-09-10 PROCEDURE — 160002 HCHG RECOVERY MINUTES (STAT)

## 2024-09-10 PROCEDURE — C1894 INTRO/SHEATH, NON-LASER: HCPCS

## 2024-09-10 PROCEDURE — 700102 HCHG RX REV CODE 250 W/ 637 OVERRIDE(OP): Performed by: INTERNAL MEDICINE

## 2024-09-10 PROCEDURE — 700117 HCHG RX CONTRAST REV CODE 255: Performed by: INTERNAL MEDICINE

## 2024-09-10 PROCEDURE — 700101 HCHG RX REV CODE 250

## 2024-09-10 PROCEDURE — 700111 HCHG RX REV CODE 636 W/ 250 OVERRIDE (IP): Mod: JZ | Performed by: ANESTHESIOLOGY

## 2024-09-10 RX ORDER — BUPIVACAINE HYDROCHLORIDE 5 MG/ML
INJECTION, SOLUTION EPIDURAL; INTRACAUDAL
Status: COMPLETED
Start: 2024-09-10 | End: 2024-09-10

## 2024-09-10 RX ORDER — OXYCODONE HCL 5 MG/5 ML
5 SOLUTION, ORAL ORAL
Status: DISCONTINUED | OUTPATIENT
Start: 2024-09-10 | End: 2024-09-10 | Stop reason: HOSPADM

## 2024-09-10 RX ORDER — SODIUM CHLORIDE, SODIUM LACTATE, POTASSIUM CHLORIDE, CALCIUM CHLORIDE 600; 310; 30; 20 MG/100ML; MG/100ML; MG/100ML; MG/100ML
INJECTION, SOLUTION INTRAVENOUS CONTINUOUS
Status: ACTIVE | OUTPATIENT
Start: 2024-09-10 | End: 2024-09-10

## 2024-09-10 RX ORDER — DEXAMETHASONE SODIUM PHOSPHATE 4 MG/ML
INJECTION, SOLUTION INTRA-ARTICULAR; INTRALESIONAL; INTRAMUSCULAR; INTRAVENOUS; SOFT TISSUE PRN
Status: DISCONTINUED | OUTPATIENT
Start: 2024-09-10 | End: 2024-09-10 | Stop reason: SURG

## 2024-09-10 RX ORDER — LIDOCAINE HYDROCHLORIDE 20 MG/ML
INJECTION, SOLUTION EPIDURAL; INFILTRATION; INTRACAUDAL; PERINEURAL PRN
Status: DISCONTINUED | OUTPATIENT
Start: 2024-09-10 | End: 2024-09-10 | Stop reason: SURG

## 2024-09-10 RX ORDER — LIDOCAINE HYDROCHLORIDE 20 MG/ML
INJECTION, SOLUTION INFILTRATION; PERINEURAL
Status: COMPLETED
Start: 2024-09-10 | End: 2024-09-10

## 2024-09-10 RX ORDER — SODIUM CHLORIDE 9 MG/ML
INJECTION, SOLUTION INTRAVENOUS CONTINUOUS
Status: DISCONTINUED | OUTPATIENT
Start: 2024-09-10 | End: 2024-09-11 | Stop reason: HOSPADM

## 2024-09-10 RX ORDER — SODIUM CHLORIDE, SODIUM LACTATE, POTASSIUM CHLORIDE, CALCIUM CHLORIDE 600; 310; 30; 20 MG/100ML; MG/100ML; MG/100ML; MG/100ML
INJECTION, SOLUTION INTRAVENOUS CONTINUOUS
Status: DISCONTINUED | OUTPATIENT
Start: 2024-09-10 | End: 2024-09-10

## 2024-09-10 RX ORDER — HEPARIN SODIUM 1000 [USP'U]/ML
INJECTION, SOLUTION INTRAVENOUS; SUBCUTANEOUS
Status: COMPLETED
Start: 2024-09-10 | End: 2024-09-10

## 2024-09-10 RX ORDER — PROTAMINE SULFATE 10 MG/ML
INJECTION, SOLUTION INTRAVENOUS
Status: COMPLETED
Start: 2024-09-10 | End: 2024-09-10

## 2024-09-10 RX ORDER — ONDANSETRON 2 MG/ML
INJECTION INTRAMUSCULAR; INTRAVENOUS PRN
Status: DISCONTINUED | OUTPATIENT
Start: 2024-09-10 | End: 2024-09-10 | Stop reason: SURG

## 2024-09-10 RX ORDER — MEPERIDINE HYDROCHLORIDE 25 MG/ML
12.5 INJECTION INTRAMUSCULAR; INTRAVENOUS; SUBCUTANEOUS
Status: DISCONTINUED | OUTPATIENT
Start: 2024-09-10 | End: 2024-09-10 | Stop reason: HOSPADM

## 2024-09-10 RX ORDER — MIDAZOLAM HYDROCHLORIDE 1 MG/ML
INJECTION INTRAMUSCULAR; INTRAVENOUS PRN
Status: DISCONTINUED | OUTPATIENT
Start: 2024-09-10 | End: 2024-09-10 | Stop reason: SURG

## 2024-09-10 RX ORDER — MULTIVITAMIN WITH IRON
250 TABLET ORAL EVERY MORNING
Status: DISCONTINUED | OUTPATIENT
Start: 2024-09-10 | End: 2024-09-10

## 2024-09-10 RX ORDER — MIDAZOLAM HYDROCHLORIDE 1 MG/ML
INJECTION INTRAMUSCULAR; INTRAVENOUS
Status: COMPLETED
Start: 2024-09-10 | End: 2024-09-10

## 2024-09-10 RX ORDER — HYDROMORPHONE HYDROCHLORIDE 1 MG/ML
0.1 INJECTION, SOLUTION INTRAMUSCULAR; INTRAVENOUS; SUBCUTANEOUS
Status: DISCONTINUED | OUTPATIENT
Start: 2024-09-10 | End: 2024-09-10 | Stop reason: HOSPADM

## 2024-09-10 RX ORDER — LIDOCAINE HYDROCHLORIDE 40 MG/ML
SOLUTION TOPICAL PRN
Status: DISCONTINUED | OUTPATIENT
Start: 2024-09-10 | End: 2024-09-10 | Stop reason: SURG

## 2024-09-10 RX ORDER — HYDROMORPHONE HYDROCHLORIDE 1 MG/ML
0.2 INJECTION, SOLUTION INTRAMUSCULAR; INTRAVENOUS; SUBCUTANEOUS
Status: DISCONTINUED | OUTPATIENT
Start: 2024-09-10 | End: 2024-09-10 | Stop reason: HOSPADM

## 2024-09-10 RX ORDER — HYDROMORPHONE HYDROCHLORIDE 1 MG/ML
0.4 INJECTION, SOLUTION INTRAMUSCULAR; INTRAVENOUS; SUBCUTANEOUS
Status: DISCONTINUED | OUTPATIENT
Start: 2024-09-10 | End: 2024-09-10 | Stop reason: HOSPADM

## 2024-09-10 RX ORDER — PROPRANOLOL HCL 10 MG
10 TABLET ORAL 2 TIMES DAILY
Status: DISCONTINUED | OUTPATIENT
Start: 2024-09-10 | End: 2024-09-11 | Stop reason: HOSPADM

## 2024-09-10 RX ORDER — CEFAZOLIN SODIUM 1 G/3ML
INJECTION, POWDER, FOR SOLUTION INTRAMUSCULAR; INTRAVENOUS PRN
Status: DISCONTINUED | OUTPATIENT
Start: 2024-09-10 | End: 2024-09-10 | Stop reason: SURG

## 2024-09-10 RX ORDER — FAMOTIDINE 20 MG/1
20 TABLET, FILM COATED ORAL EVERY MORNING
COMMUNITY

## 2024-09-10 RX ORDER — ALBUTEROL SULFATE 5 MG/ML
2.5 SOLUTION RESPIRATORY (INHALATION)
Status: DISCONTINUED | OUTPATIENT
Start: 2024-09-10 | End: 2024-09-10 | Stop reason: HOSPADM

## 2024-09-10 RX ORDER — HEPARIN SODIUM 200 [USP'U]/100ML
INJECTION, SOLUTION INTRAVENOUS
Status: COMPLETED
Start: 2024-09-10 | End: 2024-09-10

## 2024-09-10 RX ORDER — LIDOCAINE HYDROCHLORIDE 40 MG/ML
SOLUTION TOPICAL
Status: COMPLETED
Start: 2024-09-10 | End: 2024-09-10

## 2024-09-10 RX ORDER — ESOMEPRAZOLE MAGNESIUM 40 MG/1
40 CAPSULE, DELAYED RELEASE ORAL
Status: DISCONTINUED | OUTPATIENT
Start: 2024-09-10 | End: 2024-09-10

## 2024-09-10 RX ORDER — HALOPERIDOL 5 MG/ML
1 INJECTION INTRAMUSCULAR
Status: DISCONTINUED | OUTPATIENT
Start: 2024-09-10 | End: 2024-09-10 | Stop reason: HOSPADM

## 2024-09-10 RX ORDER — HYDRALAZINE HYDROCHLORIDE 20 MG/ML
5 INJECTION INTRAMUSCULAR; INTRAVENOUS
Status: DISCONTINUED | OUTPATIENT
Start: 2024-09-10 | End: 2024-09-10 | Stop reason: HOSPADM

## 2024-09-10 RX ORDER — SODIUM CHLORIDE, SODIUM LACTATE, POTASSIUM CHLORIDE, CALCIUM CHLORIDE 600; 310; 30; 20 MG/100ML; MG/100ML; MG/100ML; MG/100ML
INJECTION, SOLUTION INTRAVENOUS
Status: DISCONTINUED | OUTPATIENT
Start: 2024-09-10 | End: 2024-09-10 | Stop reason: SURG

## 2024-09-10 RX ORDER — OXYCODONE HCL 5 MG/5 ML
10 SOLUTION, ORAL ORAL
Status: DISCONTINUED | OUTPATIENT
Start: 2024-09-10 | End: 2024-09-10 | Stop reason: HOSPADM

## 2024-09-10 RX ORDER — DIPHENHYDRAMINE HYDROCHLORIDE 50 MG/ML
12.5 INJECTION INTRAMUSCULAR; INTRAVENOUS
Status: DISCONTINUED | OUTPATIENT
Start: 2024-09-10 | End: 2024-09-10 | Stop reason: HOSPADM

## 2024-09-10 RX ADMIN — HEPARIN SODIUM: 1000 INJECTION, SOLUTION INTRAVENOUS; SUBCUTANEOUS at 08:06

## 2024-09-10 RX ADMIN — SODIUM CHLORIDE: 9 INJECTION, SOLUTION INTRAVENOUS at 13:30

## 2024-09-10 RX ADMIN — OMEPRAZOLE 20 MG: 20 CAPSULE, DELAYED RELEASE ORAL at 17:17

## 2024-09-10 RX ADMIN — LIDOCAINE HYDROCHLORIDE 100 MG: 20 INJECTION, SOLUTION EPIDURAL; INFILTRATION; INTRACAUDAL at 08:07

## 2024-09-10 RX ADMIN — BUPIVACAINE HYDROCHLORIDE: 5 INJECTION, SOLUTION EPIDURAL; INTRACAUDAL at 08:06

## 2024-09-10 RX ADMIN — MEPERIDINE HYDROCHLORIDE 12.5 MG: 25 INJECTION INTRAMUSCULAR; INTRAVENOUS; SUBCUTANEOUS at 09:29

## 2024-09-10 RX ADMIN — PROPOFOL 200 MG: 10 INJECTION, EMULSION INTRAVENOUS at 08:07

## 2024-09-10 RX ADMIN — RIVAROXABAN 20 MG: 20 TABLET, FILM COATED ORAL at 17:17

## 2024-09-10 RX ADMIN — SUGAMMADEX 200 MG: 100 INJECTION, SOLUTION INTRAVENOUS at 08:50

## 2024-09-10 RX ADMIN — ROCURONIUM BROMIDE 50 MG: 10 INJECTION, SOLUTION INTRAVENOUS at 08:07

## 2024-09-10 RX ADMIN — SODIUM CHLORIDE, POTASSIUM CHLORIDE, SODIUM LACTATE AND CALCIUM CHLORIDE: 600; 310; 30; 20 INJECTION, SOLUTION INTRAVENOUS at 08:01

## 2024-09-10 RX ADMIN — ONDANSETRON 8 MG: 2 INJECTION INTRAMUSCULAR; INTRAVENOUS at 08:46

## 2024-09-10 RX ADMIN — MEPERIDINE HYDROCHLORIDE 12.5 MG: 25 INJECTION INTRAMUSCULAR; INTRAVENOUS; SUBCUTANEOUS at 10:04

## 2024-09-10 RX ADMIN — PROPRANOLOL HYDROCHLORIDE 10 MG: 10 TABLET ORAL at 17:17

## 2024-09-10 RX ADMIN — IOHEXOL 28 ML: 350 INJECTION, SOLUTION INTRAVENOUS at 08:43

## 2024-09-10 RX ADMIN — HEPARIN SODIUM 2000 UNITS: 200 INJECTION, SOLUTION INTRAVENOUS at 08:05

## 2024-09-10 RX ADMIN — CEFAZOLIN 2 G: 1 INJECTION, POWDER, FOR SOLUTION INTRAMUSCULAR; INTRAVENOUS at 08:07

## 2024-09-10 RX ADMIN — PROTAMINE SULFATE 40 MG: 10 INJECTION, SOLUTION INTRAVENOUS at 08:47

## 2024-09-10 RX ADMIN — LIDOCAINE HYDROCHLORIDE 4 ML: 40 SOLUTION TOPICAL at 08:08

## 2024-09-10 RX ADMIN — FENTANYL CITRATE 50 MCG: 50 INJECTION, SOLUTION INTRAMUSCULAR; INTRAVENOUS at 08:01

## 2024-09-10 RX ADMIN — DRONEDARONE 400 MG: 400 TABLET, FILM COATED ORAL at 17:17

## 2024-09-10 RX ADMIN — DEXAMETHASONE SODIUM PHOSPHATE 8 MG: 4 INJECTION INTRA-ARTICULAR; INTRALESIONAL; INTRAMUSCULAR; INTRAVENOUS; SOFT TISSUE at 08:07

## 2024-09-10 RX ADMIN — LIDOCAINE HYDROCHLORIDE: 20 INJECTION, SOLUTION INFILTRATION; PERINEURAL at 08:06

## 2024-09-10 RX ADMIN — MIDAZOLAM HYDROCHLORIDE 2 MG: 2 INJECTION, SOLUTION INTRAMUSCULAR; INTRAVENOUS at 08:01

## 2024-09-10 ASSESSMENT — FIBROSIS 4 INDEX: FIB4 SCORE: 1.86

## 2024-09-10 ASSESSMENT — PAIN DESCRIPTION - PAIN TYPE: TYPE: ACUTE PAIN

## 2024-09-10 NOTE — ANESTHESIA TIME REPORT
Anesthesia Start and Stop Event Times       Date Time Event    9/10/2024 0700 Ready for Procedure     0801 Anesthesia Start     0858 Anesthesia Stop          Responsible Staff  09/10/24      Name Role Begin End    Declan Anaya M.D. Anesth 0801 0858          Overtime Reason:  no overtime (within assigned shift)    Comments:

## 2024-09-10 NOTE — ANESTHESIA PREPROCEDURE EVALUATION
Anesthesia Start Date/Time: 09/10/24 0801    Scheduled providers: Josue Moody M.D.; Declan Anaya M.D.    Procedure: CL-LEFT ATRIAL APPENDAGE CLOSURE    Diagnosis: Atrial fibrillation, unspecified type (HCC) [I48.91]    Indications: See Associated Dx    Location: [facility] - Cath Lab - [facility]          Atrial Fibrillation  Chronic Anticoagulation    Relevant Problems   NEURO   (positive) Cerebrovascular accident (CVA) due to embolism of right middle cerebral artery (HCC)      CARDIAC   (positive) Paroxysmal atrial fibrillation (HCC)      GI   (positive) Gastroesophageal reflux disease without esophagitis      Other   (positive) H/O cardiac radiofrequency ablation 8/18/21 Dr Moody.  PVI (extension of the RPV), PWI and RVOT (PVCs)       Physical Exam    Airway   Mallampati: II  TM distance: >3 FB  Neck ROM: full       Cardiovascular - normal exam  Rhythm: irregular  Rate: normal  (-) murmur     Dental - normal exam           Pulmonary - normal exam  Breath sounds clear to auscultation     Abdominal    Neurological - normal exam                   Anesthesia Plan    ASA 3   ASA physical status 3 criteria: a thrombophilic disease requiring anticoagulation and CVA or TIA - history (> 3 months)    Plan - general       Airway plan will be ETT  ELIZABETH Planned        Induction: intravenous    Postoperative Plan: Postoperative administration of opioids is intended.    Pertinent diagnostic labs and testing reviewed    Informed Consent:    Anesthetic plan and risks discussed with patient and spouse.

## 2024-09-10 NOTE — OR NURSING
0857 - patient arrived to pacu.  2 identifiers verified, attached to monitors, alarms/parameters verified, and received report.  Right groin site assessed with charge RN.  Site is CDI, soft with gauze and tegaderm dressing in place.  Oriented to unit and plan of care discussed.       0922  updated patient in recovery    0930 patient tolerating water    1210 patient has been resting comfortably, report given to receiving RN.     1310 patient transferred to tele. Groin site CDI, no bleeding soft.

## 2024-09-10 NOTE — ANESTHESIA PROCEDURE NOTES
Airway    Date/Time: 9/10/2024 8:08 AM    Performed by: Declan Anaya M.D.  Authorized by: Declan Anaya M.D.    Location:  OR  Urgency:  Elective  Difficult Airway: No    Indications for Airway Management:  Anesthesia      Spontaneous Ventilation: absent    Sedation Level:  Deep  Preoxygenated: Yes    Patient Position:  Sniffing  Final Airway Type:  Endotracheal airway  Final Endotracheal Airway:  ETT  Cuffed: Yes    Technique Used for Successful ETT Placement:  Direct laryngoscopy  Devices/Methods Used in Placement:  Intubating stylet and cricoid pressure    Insertion Site:  Oral  Blade Type:  Jenae  Laryngoscope Blade/Videolaryngoscope Blade Size:  3  ETT Size (mm):  7.0  Measured from:  Teeth  ETT to Teeth (cm):  21  Placement Verified by: auscultation and capnometry    Cormack-Lehane Classification:  Grade I - full view of glottis  Number of Attempts at Approach:  1

## 2024-09-10 NOTE — ANESTHESIA PROCEDURE NOTES
ELIZABETH    Date/Time: 9/10/2024 8:12 AM    Performed by: Declan Anaya M.D.  Authorized by: Declan Anaya M.D.    Start Time:9/10/2024 8:12 AM  Preanesthetic Checklist: patient identified, IV checked, risks and benefits discussed, surgical consent, monitors and equipment checked, pre-op evaluation and timeout performed    Indication for ELIZABETH: diagnostic   Patient Location: OR  Intubated: Yes  Bite Block: No  Heart Visualized: Yes  Insertion: atraumatic    **See FULL ELIZABETH report in patient's chart via CV Synapse**

## 2024-09-10 NOTE — OP REPORT
"[facility] Electrophysiology/Structural Heart Procedure Note     Procedure(s) Performed:   1) Watchman EMILY closure    Indication(s):  PAF    Physician(s): Josue Moody M.D.     Resident/Assistant(s): None     Anesthesia: General endotracheal anesthetic with Dr. Declan Anaya     Specimen(s) Removed: None     Estimated Blood Loss:  30cc     Complications:  None     Description of Procedure:   After informed written consent, the patient was brought to the cath lab in the fasting, non-sedated state. The patient was prepped and draped in the usual sterile fashion. Femoral venous access was obtained using the modified Seldinger technique. In the right femoral vein, an 8 Fr sheath were inserted over 0.035” guidewire and exchanged for a 16 Fr outer sheath. We pre-deployed Perclose. ELIZABETH was used to identify the atrial septum, and left atrial appendage.  A Versacross system was inserted, and under ultrasound guidance a inferior/posterior trans-septal location was used to cross into the left atrium. Intravenous heparin was given to target an -300. A stiff exchange length 0.035\" wire was inserted into the left atrium/left pulmonary veins, over which we exchanged to the WATCHMAN delivery sheath. The WATCHMAN device was prepped and flushed. A pigtail catheter was advanced into the delivery sheath into the left atrium and then after counter clockwise torque maneuvered into the body of the left atrial appendage. Cine was taken to verify the location of the pigtail in the appendage and to assess for depth. The sheath was then advanced over the pigtail until sufficient depth was reached.  The pigtail was removed, and under wet to wet connection the WATCHMAN device was advanced through the delivery sheath until markers were aligned. The sheath was retracted slowly to deploy the device. Initial deployment targeted a posterior lobe which persistently left the device proximal. We recaptured and redeployed x 4 finally utilizing " the smaller anterior lobe. After the device was deployed final time we assessed again for leak with contrast injected through the sheath as well as a tug test. We verified good position, anchoring, size, and seal with no/minimal leak with ELIZABETH. After all criteria were met we detached the device from the delivery system. At the end of the procedure, heparin was reversed with protamine, the catheter and sheaths were removed, and hemostasis was achieved by Perclose along with a figure of 8 silk suture. Following recovery from anesthesia, the patient was transferred to the PPU in good condition.        Fluoroscopy time: 10.1 minutes     Contrast used: 28 cc     Device implanted:  Size 27mm   LOT # [id number]  Max appendage pre-measurement 20 mm  Max device measurement 22.7     Impressions:    1. Successful EMILY closure      Recommendations:  1. Continue OAC  2. Admit to monitored bedrest.  3. Echo and removal of figure of 8 suture in the AM

## 2024-09-10 NOTE — H&P
"EP Pre-procedure H and P    Date of service: 9/10/2024    Reason for visit/Chief complaint: PAF    HPI:   Pt is a 70 yo F. History of PAF. Residual burden on Multaq post multiple ablations. History of stroke. Intolerance of OAC. Here for WATCHMAN.    Past Medical History:   Diagnosis Date    Anxiety     Arrhythmia     afib    ASTHMA 4/9/21:  Patient denies    Atrial fibrillation (HCC)     Bowel habit changes     constipation    Breath shortness     cardiac related    Cataract 04/09/2001    \"Small, not affecting eyes\"    Chickenpox     Constipation     Dental disorder     Patient states dentist told her she has a tooth infection and needs a tooth pulled but no antibiotics prescribed    Depression 04/09/2021    Patient denies    Frequent urination     GERD (gastroesophageal reflux disease)     Heartburn     Hemorrhagic disorder (HCC)     Takes Xarelto    Hiatal hernia with GERD 11/05/2019    Hiatus hernia syndrome     High cholesterol     History of esophageal stricture 11/05/2019    Indigestion     Kidney stone     Osteoporosis     PAC (premature atrial contraction)     Painful joint     Palpitations     Palpitations     Premature atrial contractions     PVC's (premature ventricular contractions)     Rhinitis     Ringing in ears     Stroke (HCC) 04/09/2021 Jan. 2021.  Blood clot in brain RT A-Fib    Tonsillitis     Urinary bladder disorder     urinary frequency    Wears glasses      Past Surgical History:   Procedure Laterality Date    COLONOSCOPY WITH POLYP  06/2008    benign polyp  Dr Paul     CHOLECYSTECTOMY  01/2008    laparoscopic    EGD WITH ASP/BX  09/2006    inflammation only ---Dr Paul    CHOLECYSTECTOMY      MAMMOPLASTY AUGMENTATION Bilateral     OTHER      loop recorder    OTHER CARDIAC SURGERY  Around 2000    Ablation    SINUSOTOMIES       Family History   Problem Relation Age of Onset    Cancer Father 70        cancer of unknown primary    Heart Disease Mother     Heart Disease Son 42        MI " "and coronary stent placement    Diabetes Son     Hypertension Son     Diabetes Sister        Physical Exam:  Vitals:    09/10/24 0625   BP: (!) 143/76   Pulse: (!) 58   Resp: 16   Temp: 36.3 °C (97.3 °F)   TempSrc: Temporal   SpO2: 95%   Weight: 65.5 kg (144 lb 6.4 oz)   Height: 1.702 m (5' 7\")     Gen: NAD, conversant  HEENT: PERRL, EOMI  LUNGS: CTA B, no w/r/r  CV: RRR, no m/r/g, no JVD  Abd: Soft, NT/ND, +BS  Ext: no edema, warm and well perfused    Labs reviewed    EKG interpreted by me:  Sinus robert    Impression/Recs:  1. Atrial fibrillation, unspecified type (HCC)  EC-ELIZABETH W/O CONT    EC-ELIZABETH W/O CONT    CL-LEFT ATRIAL APPENDAGE CLOSURE    CL-LEFT ATRIAL APPENDAGE CLOSURE      -Risks/benefits discussed  -All questions answered  -Proceed with WATCHJIM Moody MD    "

## 2024-09-10 NOTE — ANESTHESIA POSTPROCEDURE EVALUATION
Patient: Susy Szymanski    Procedure Summary       Date: 09/10/24 Room / Location: [facility] - Cath Lab [facility]    Anesthesia Start: 0801 Anesthesia Stop: 0858    Procedure: CL-LEFT ATRIAL APPENDAGE CLOSURE Diagnosis:       Atrial fibrillation, unspecified type (HCC)      (See Associated Dx)    Scheduled Providers: Josue Moody M.D.; Declan Anyaa M.D. Responsible Provider: Declan Anaya M.D.    Anesthesia Type: general ASA Status: 3            Final Anesthesia Type: general  Last vitals  BP   Blood Pressure : 102/56    Temp   36.4 °C (97.6 °F)    Pulse   (!) 54   Resp   15    SpO2   90 %      Anesthesia Post Evaluation    Patient location during evaluation: PACU  Level of consciousness: awake and alert    Airway patency: patent  Anesthetic complications: no  Cardiovascular status: hemodynamically stable  Respiratory status: acceptable  Hydration status: euvolemic    PONV: none          No notable events documented.     Nurse Pain Score: 0 (NPRS)

## 2024-09-11 ENCOUNTER — APPOINTMENT (OUTPATIENT)
Dept: CARDIOLOGY | Facility: MEDICAL CENTER | Age: 69
DRG: 274 | End: 2024-09-11
Attending: NURSE PRACTITIONER
Payer: MEDICARE

## 2024-09-11 VITALS
HEIGHT: 67 IN | TEMPERATURE: 98.1 F | WEIGHT: 147.71 LBS | SYSTOLIC BLOOD PRESSURE: 113 MMHG | DIASTOLIC BLOOD PRESSURE: 61 MMHG | BODY MASS INDEX: 23.18 KG/M2 | HEART RATE: 58 BPM | OXYGEN SATURATION: 93 % | RESPIRATION RATE: 16 BRPM

## 2024-09-11 PROBLEM — Z95.818 PRESENCE OF WATCHMAN LEFT ATRIAL APPENDAGE CLOSURE DEVICE: Status: ACTIVE | Noted: 2024-09-11

## 2024-09-11 LAB — LV EJECT FRACT  99904: 65

## 2024-09-11 PROCEDURE — 99238 HOSP IP/OBS DSCHRG MGMT 30/<: CPT | Performed by: NURSE PRACTITIONER

## 2024-09-11 PROCEDURE — 93308 TTE F-UP OR LMTD: CPT | Mod: 26 | Performed by: INTERNAL MEDICINE

## 2024-09-11 PROCEDURE — 700102 HCHG RX REV CODE 250 W/ 637 OVERRIDE(OP): Performed by: INTERNAL MEDICINE

## 2024-09-11 PROCEDURE — A9270 NON-COVERED ITEM OR SERVICE: HCPCS | Performed by: INTERNAL MEDICINE

## 2024-09-11 PROCEDURE — 93325 DOPPLER ECHO COLOR FLOW MAPG: CPT

## 2024-09-11 PROCEDURE — 93325 DOPPLER ECHO COLOR FLOW MAPG: CPT | Mod: 26 | Performed by: INTERNAL MEDICINE

## 2024-09-11 RX ADMIN — OMEPRAZOLE 20 MG: 20 CAPSULE, DELAYED RELEASE ORAL at 04:54

## 2024-09-11 RX ADMIN — PROPRANOLOL HYDROCHLORIDE 10 MG: 10 TABLET ORAL at 04:54

## 2024-09-11 RX ADMIN — DRONEDARONE 400 MG: 400 TABLET, FILM COATED ORAL at 07:54

## 2024-09-11 ASSESSMENT — FIBROSIS 4 INDEX: FIB4 SCORE: 1.86

## 2024-09-11 NOTE — PROGRESS NOTES
4 Eyes Skin Assessment Completed by CHALO Gold and CHALO Suarez.    Head WDL  Ears WDL  Nose WDL  Mouth WDL  Neck WDL  Breast/Chest WDL  Shoulder Blades WDL  Spine WDL  (R) Arm/Elbow/Hand WDL  (L) Arm/Elbow/Hand WDL  Abdomen WDL  Groin Incision  Scrotum/Coccyx/Buttocks WDL  (R) Leg WDL  (L) Leg WDL  (R) Heel/Foot/Toe WDL  (L) Heel/Foot/Toe WDL          Devices In Places Tele Box, Blood Pressure Cuff, and Pulse Ox      Interventions In Place Pillows    Possible Skin Injury No    Pictures Uploaded Into Epic N/A  Wound Consult Placed N/A  RN Wound Prevention Protocol Ordered No

## 2024-09-11 NOTE — PROGRESS NOTES
Handoff report received from day shift nurse. Pt care assumed. Pt is currently resting in bed. POC discussed with Pt and Pt verbalizes no questions at this time. Pt is AAOx4, on room air, on Tele monitoring, and VSS. Call light and belongings within reach, bed in lowest and locked position, and Pt educated on use of call light. R femoral groin site soft & non-tender, clean/dry/intact with no signs of bleeding. Pt in 0/10 pain. Hourly rounding in place.

## 2024-09-11 NOTE — DISCHARGE INSTRUCTIONS
[facility] POST WATCHMAN INSTRUCTIONS  No lifting > 10 lbs x 1 week.  No baths or hot tubs x 1 week.      May shower on 9/12/24  and take off groin dressing and leave site uncovered.  Please try to keep Steri-strip in place and allow to come off on its own.  Continue to monitor site daily for warmth, redness, discolored drainage.    3. Please do not miss any doses of your blood thinner.      4. Please keep all follow up appointments scheduled for you.  You are scheduled for a wound check appointment in one week.  You are also scheduled for procedure follow up with EP clinic.     5. A transesophageal echocardiogram (ELIZABETH) will be scheduled for you  to check the healing of the Watchman.  This procedure is completed at [facility] same day procedures.  We use sedation/anesthesia for this procedure so you will need a .     6. Please walk and take deep breaths after discharge.  After discharge, if you experience severe chest pain, shortness of breath, neurological changes, high fever, severe dizziness, trouble with catheter site needs to be seen in the emergency dept.

## 2024-09-11 NOTE — PROGRESS NOTES
.Pt transferred to floor by PPU. Pt A&O4, VSS, on RA. Pt updated on POC, all questions answered. Tele box on and monitor room notified. Pt oriented to room and educated to use call light for assistance. All sites checked clean dry intact and soft.

## 2024-09-11 NOTE — PROGRESS NOTES
Discharge instructions reviewed and signed, all questions answered, PIV removed on unit, no new medications, home with family.

## 2024-09-12 NOTE — DISCHARGE SUMMARY
CHIEF COMPLAINT ON ADMISSION  Elective admission for WATCHMAN procedure    CODE STATUS  Prior    HPI & HOSPITAL COURSE  This is a 69 y.o. year old female here for elective EMILY closure with watchman implant secondary to history of paroxysmal Afib and history of CVA off OAC and neurological medication interactions with her anticoagulation.     On 9/10/24 she underwent successful Watchman implant with Dr. Moody.      The patient has been seen and examined in EP rounds this AM.  Her monitored rhythm is sinus rhythm.  Her vital signs, echo have been reviewed and are stable.  LTD echo did not show evidence of effusion.  The patient denies any chest pain, dyspnea, dizziness, paraesthesias, or other complaints.  Her physical exam is without acute abnormality; specifically his right femoral access site is clean and dry.  The figure eight suture was removed.  There is no evidence of hematoma or bleeding.  She has been out of bed and ambulated without difficulty.     Therefore, she is discharged in fair and stable condition with close outpatient follow-up.    PROCEDURES  ROBERTO procedure, Dr. Moody, 9/10/24.  Please see full procedure note for details.     CONSULTATIONS  None    DISCHARGE PROBLEM LIST  Active Problems:    Presence of Watchman left atrial appendage closure device, implanted 9/10/24 Dr Moody (POA: Unknown)  Resolved Problems:    * No resolved hospital problems. *      MEDICATIONS ON DISCHARGE     Medication List        CHANGE how you take these medications        Instructions   Multaq 400 MG Tabs  What changed: additional instructions  Generic drug: dronedarone   Take 1 Tablet by mouth 2 times a day with meals. Please complete ordered lab work prior to further refills. Thank you!  Dose: 400 mg     rivaroxaban 20 MG Tabs tablet  What changed: additional instructions  Commonly known as: Xarelto   Take 1 Tablet by mouth with dinner. TO ENSURE FURTHER REFILLS, PLEASE COMPLETE REQUIRED LAB WORK.  Dose: 20 mg             CONTINUE taking these medications        Instructions   esomeprazole 40 MG delayed-release capsule  Commonly known as: NexIUM   Take 1 Capsule by mouth every morning before breakfast.  Dose: 40 mg     famotidine 20 MG Tabs  Commonly known as: Pepcid   Take 20 mg by mouth every morning.  Dose: 20 mg     Magnesium 250 MG Tabs   Take 250 mg by mouth every morning.     MEDICATION INSTRUCTIONS FOR SURGERY/PROCEDURE SCHEDULED FOR 09-10-24   DO NOT TAKE 7 DAYS PRIOR TO SURGERY  Dose: 250 mg     meclizine 12.5 MG Tabs  Commonly known as: Antivert   Doctor's comments: [facility] on back order of this mediation FYI  Take 1 Tablet by mouth 3 times a day as needed (for vertigo).  Dose: 12.5 mg     pantoprazole 40 MG Tbec  Commonly known as: Protonix   Take 40 mg by mouth every morning.     MEDICATION INSTRUCTIONS FOR SURGERY/PROCEDURE SCHEDULED FOR 09-10-24: OKAY TO CONTINUE THIS MEDICATION PRIOR TO SURGERY/PROCEDURE. OKAY TO TAKE THIS MEDICATION DAY OF SURGERY/PROCEDURE WITH A SMALL SIP OF WATER.  Dose: 40 mg     propranolol 10 MG Tabs  Commonly known as: Inderal   Take 1 Tablet by mouth 2 times a day for 360 days. For tremors  Dose: 10 mg     VITAMIN D-3 PO   Take 1 Tablet by mouth every morning. Patient unsure of dose.    MEDICATION INSTRUCTIONS FOR SURGERY/PROCEDURE SCHEDULED FOR 09-10-24   DO NOT TAKE 7 DAYS PRIOR TO SURGERY  Dose: 1 Tablet            STOP taking these medications      scopolamine 1 mg/72hr Pt72  Commonly known as: Transderm-Scop (1.5 MG)     zoledronic Acid 5 MG/100ML Soln IVPB premix  Commonly known as: Reclast            DIET  Regular      ACTIVITY/POST WATCHMAN INSTRUCTIONS  No lifting > 10 lbs x 1 week.  No baths or hot tubs x 1 week.  May shower on 9/21/24 and take off groin dressing and leave uncovered.  Continue to monitor site daily for warmth, redness, discolored drainage    Please do not miss any doses of your blood thinner.  Please keep all follow up appointments scheduled for you.       Please  walk and take deep breaths after discharge.  After discharge, if  experiences severe chest pain, shortness of breath, neurological changes, high fever, severe dizziness, trouble with catheter site needs to be seen in the emergency dept.       LABORATORY  Lab Results   Component Value Date/Time    SODIUM 142 09/06/2024 12:40 PM    POTASSIUM 5.0 09/06/2024 12:40 PM    CHLORIDE 104 09/06/2024 12:40 PM    CO2 26 09/06/2024 12:40 PM    GLUCOSE 75 09/06/2024 12:40 PM    BUN 20 09/06/2024 12:40 PM    CREATININE 0.76 09/06/2024 12:40 PM    CREATININE 0.8 10/20/2008 11:59 AM        Lab Results   Component Value Date/Time    WBC 5.2 09/06/2024 12:40 PM    HEMOGLOBIN 13.0 09/06/2024 12:40 PM    HEMATOCRIT 39.6 09/06/2024 12:40 PM    PLATELETCT 245 09/06/2024 12:40 PM        FOLLOW UP  Future Appointments   Date Time Provider Department Center   11/15/2024  8:00 AM [facility] EXAM 3 [facility]   11/19/2024  3:00 PM TOMAS Fuchs None   11/21/2024 12:45 PM DEEPA Duvall [facility] None   12/3/2024  3:00 PM TOMAS Fuchs None       SPECIFIC OUTPATIENT FOLLOW-UP  The patient has a post watchman 45 day follow up echo ordered.  They are scheduled to be seen in EP clinic after that.     The above discharge plan was discussed in detail with the patient and she verbalizes understanding and are in agreement with the discharge plan.     DEEPA Duvall   9/11/2024 5:11 PM

## 2024-09-17 ENCOUNTER — TELEPHONE (OUTPATIENT)
Dept: CARDIOLOGY | Facility: MEDICAL CENTER | Age: 69
End: 2024-09-17
Payer: MEDICARE

## 2024-09-17 LAB — LV EJECT FRACT  99904: 70

## 2024-09-17 NOTE — TELEPHONE ENCOUNTER
1 WEEK POST WATCHMAN FOLLOW UP WOUND CHECK:    Patient is s/p EMILY closure with Watchman device by Dr. Moody on (09/10/24) for PAF with intolerance to anticoagulation.    Wound site discussed via telephone encounter. Right femoral access site appears CDI/CATARINA per patient. No evidence of active bleeding, edema, erythema, or hematoma present per patient report.   Discussed to continue OAC until f/u appt.    Patient to follow up in 3-4 weeks as previously scheduled. Patient instructed to contact the office with any questions or concerns.     Future Appointments   Date Time Provider Department Center   11/15/2024  8:00 AM [facility] EXAM 3 [facility]   2024  3:00 PM TOMAS Fuchs None   2024 12:45 PM DEEPA Duvall None   12/3/2024  3:00 PM TOMAS Fuchs None       CHALO Alaniz  [facility]  Office: [phone]  [phone]  Fax: [phone]

## 2024-10-03 ENCOUNTER — NON-PROVIDER VISIT (OUTPATIENT)
Dept: CARDIOLOGY | Facility: MEDICAL CENTER | Age: 69
End: 2024-10-03
Payer: MEDICARE

## 2024-10-07 PROCEDURE — 93298 REM INTERROG DEV EVAL SCRMS: CPT | Mod: 26 | Performed by: INTERNAL MEDICINE

## 2024-10-15 ENCOUNTER — PATIENT MESSAGE (OUTPATIENT)
Dept: CARDIOLOGY | Facility: MEDICAL CENTER | Age: 69
End: 2024-10-15
Payer: MEDICARE

## 2024-10-16 ENCOUNTER — APPOINTMENT (OUTPATIENT)
Dept: ADMISSIONS | Facility: MEDICAL CENTER | Age: 69
End: 2024-10-16
Attending: INTERNAL MEDICINE
Payer: MEDICARE

## 2024-10-16 ENCOUNTER — APPOINTMENT (OUTPATIENT)
Dept: ADMISSIONS | Facility: MEDICAL CENTER | Age: 69
End: 2024-10-16
Payer: MEDICARE

## 2024-10-21 ENCOUNTER — PRE-ADMISSION TESTING (OUTPATIENT)
Dept: ADMISSIONS | Facility: MEDICAL CENTER | Age: 69
End: 2024-10-21
Attending: INTERNAL MEDICINE
Payer: MEDICARE

## 2024-10-21 RX ORDER — CALCIUM CARBONATE 500 MG/1
500 TABLET, CHEWABLE ORAL PRN
COMMUNITY

## 2024-10-22 ENCOUNTER — OFFICE VISIT (OUTPATIENT)
Dept: CARDIOLOGY | Facility: MEDICAL CENTER | Age: 69
End: 2024-10-22
Attending: NURSE PRACTITIONER
Payer: MEDICARE

## 2024-10-22 VITALS
HEIGHT: 67 IN | RESPIRATION RATE: 16 BRPM | OXYGEN SATURATION: 97 % | DIASTOLIC BLOOD PRESSURE: 72 MMHG | BODY MASS INDEX: 23.07 KG/M2 | HEART RATE: 61 BPM | SYSTOLIC BLOOD PRESSURE: 120 MMHG | WEIGHT: 147 LBS

## 2024-10-22 DIAGNOSIS — I49.3 PVC'S (PREMATURE VENTRICULAR CONTRACTIONS): ICD-10-CM

## 2024-10-22 DIAGNOSIS — Z95.818 PRESENCE OF WATCHMAN LEFT ATRIAL APPENDAGE CLOSURE DEVICE: ICD-10-CM

## 2024-10-22 DIAGNOSIS — I48.91 ATRIAL FIBRILLATION, UNSPECIFIED TYPE (HCC): ICD-10-CM

## 2024-10-22 PROCEDURE — 93291 INTERROG DEV EVAL SCRMS IP: CPT | Performed by: INTERNAL MEDICINE

## 2024-10-22 PROCEDURE — 99212 OFFICE O/P EST SF 10 MIN: CPT | Performed by: INTERNAL MEDICINE

## 2024-10-22 PROCEDURE — 93005 ELECTROCARDIOGRAM TRACING: CPT | Performed by: INTERNAL MEDICINE

## 2024-10-22 PROCEDURE — 99214 OFFICE O/P EST MOD 30 MIN: CPT | Performed by: INTERNAL MEDICINE

## 2024-10-22 PROCEDURE — 93291 INTERROG DEV EVAL SCRMS IP: CPT | Mod: 26 | Performed by: INTERNAL MEDICINE

## 2024-10-22 ASSESSMENT — FIBROSIS 4 INDEX: FIB4 SCORE: 1.86

## 2024-10-24 LAB — EKG IMPRESSION: NORMAL

## 2024-10-24 PROCEDURE — 93010 ELECTROCARDIOGRAM REPORT: CPT | Mod: 59 | Performed by: INTERNAL MEDICINE

## 2024-11-03 ENCOUNTER — NON-PROVIDER VISIT (OUTPATIENT)
Dept: CARDIOLOGY | Facility: MEDICAL CENTER | Age: 69
End: 2024-11-03
Payer: MEDICARE

## 2024-11-04 PROCEDURE — 93298 REM INTERROG DEV EVAL SCRMS: CPT | Mod: 26 | Performed by: STUDENT IN AN ORGANIZED HEALTH CARE EDUCATION/TRAINING PROGRAM

## 2024-11-04 NOTE — CARDIAC REMOTE MONITOR - SCAN
Device transmission reviewed. Device demonstrated appropriate function.       Electronically Signed by: Echo Arauz MD, PhD    11/7/2024  7:51 AM

## 2024-11-06 NOTE — TELEPHONE ENCOUNTER
ILR report does not show episodes of AF.  Shows ongoing frequent ectopy.  Her HR monitor/apple watch do not have good filters for frequent ectopy and unfortunately get lumped into AF often.     How much propranolol is she taking presently?       Sandra     06-Nov-2024 22:27

## 2024-11-11 ENCOUNTER — APPOINTMENT (OUTPATIENT)
Dept: ADMISSIONS | Facility: MEDICAL CENTER | Age: 69
End: 2024-11-11
Attending: INTERNAL MEDICINE
Payer: MEDICARE

## 2024-11-11 DIAGNOSIS — Z01.812 PRE-OPERATIVE LABORATORY EXAMINATION: ICD-10-CM

## 2024-11-11 LAB
ERYTHROCYTE [DISTWIDTH] IN BLOOD BY AUTOMATED COUNT: 47.9 FL (ref 35.9–50)
HCT VFR BLD AUTO: 40.7 % (ref 37–47)
HGB BLD-MCNC: 12.9 G/DL (ref 12–16)
MCH RBC QN AUTO: 29.6 PG (ref 27–33)
MCHC RBC AUTO-ENTMCNC: 31.7 G/DL (ref 32.2–35.5)
MCV RBC AUTO: 93.3 FL (ref 81.4–97.8)
PLATELET # BLD AUTO: 242 K/UL (ref 164–446)
PMV BLD AUTO: 10.7 FL (ref 9–12.9)
RBC # BLD AUTO: 4.36 M/UL (ref 4.2–5.4)
WBC # BLD AUTO: 4.8 K/UL (ref 4.8–10.8)

## 2024-11-11 PROCEDURE — 36415 COLL VENOUS BLD VENIPUNCTURE: CPT

## 2024-11-11 PROCEDURE — 85027 COMPLETE CBC AUTOMATED: CPT

## 2024-11-11 RX ORDER — FLECAINIDE ACETATE 50 MG/1
50 TABLET ORAL
COMMUNITY

## 2024-11-14 ENCOUNTER — ANESTHESIA EVENT (OUTPATIENT)
Dept: CARDIOLOGY | Facility: MEDICAL CENTER | Age: 69
End: 2024-11-14
Payer: MEDICARE

## 2024-11-15 ENCOUNTER — HOSPITAL ENCOUNTER (OUTPATIENT)
Facility: MEDICAL CENTER | Age: 69
End: 2024-11-15
Attending: INTERNAL MEDICINE | Admitting: INTERNAL MEDICINE
Payer: MEDICARE

## 2024-11-15 ENCOUNTER — APPOINTMENT (OUTPATIENT)
Dept: CARDIOLOGY | Facility: MEDICAL CENTER | Age: 69
End: 2024-11-15
Attending: INTERNAL MEDICINE
Payer: MEDICARE

## 2024-11-15 ENCOUNTER — ANESTHESIA (OUTPATIENT)
Dept: CARDIOLOGY | Facility: MEDICAL CENTER | Age: 69
End: 2024-11-15
Payer: MEDICARE

## 2024-11-15 VITALS
RESPIRATION RATE: 18 BRPM | TEMPERATURE: 96.9 F | SYSTOLIC BLOOD PRESSURE: 110 MMHG | DIASTOLIC BLOOD PRESSURE: 74 MMHG | OXYGEN SATURATION: 91 % | WEIGHT: 145.5 LBS | HEART RATE: 115 BPM | HEIGHT: 67 IN | BODY MASS INDEX: 22.84 KG/M2

## 2024-11-15 DIAGNOSIS — I48.91 ATRIAL FIBRILLATION, UNSPECIFIED TYPE (HCC): ICD-10-CM

## 2024-11-15 LAB — EKG IMPRESSION: NORMAL

## 2024-11-15 PROCEDURE — 93325 DOPPLER ECHO COLOR FLOW MAPG: CPT | Mod: 26 | Performed by: INTERNAL MEDICINE

## 2024-11-15 PROCEDURE — 160002 HCHG RECOVERY MINUTES (STAT)

## 2024-11-15 PROCEDURE — 160046 HCHG PACU - 1ST 60 MINS PHASE II

## 2024-11-15 PROCEDURE — 160035 HCHG PACU - 1ST 60 MINS PHASE I

## 2024-11-15 PROCEDURE — 700111 HCHG RX REV CODE 636 W/ 250 OVERRIDE (IP): Performed by: STUDENT IN AN ORGANIZED HEALTH CARE EDUCATION/TRAINING PROGRAM

## 2024-11-15 PROCEDURE — 93321 DOPPLER ECHO F-UP/LMTD STD: CPT | Mod: 26 | Performed by: INTERNAL MEDICINE

## 2024-11-15 PROCEDURE — 93325 DOPPLER ECHO COLOR FLOW MAPG: CPT

## 2024-11-15 PROCEDURE — 93312 ECHO TRANSESOPHAGEAL: CPT | Mod: 26 | Performed by: INTERNAL MEDICINE

## 2024-11-15 PROCEDURE — 93005 ELECTROCARDIOGRAM TRACING: CPT | Performed by: INTERNAL MEDICINE

## 2024-11-15 PROCEDURE — 700105 HCHG RX REV CODE 258: Performed by: STUDENT IN AN ORGANIZED HEALTH CARE EDUCATION/TRAINING PROGRAM

## 2024-11-15 PROCEDURE — 93010 ELECTROCARDIOGRAM REPORT: CPT | Performed by: INTERNAL MEDICINE

## 2024-11-15 RX ORDER — LABETALOL HYDROCHLORIDE 5 MG/ML
5 INJECTION, SOLUTION INTRAVENOUS
Status: DISCONTINUED | OUTPATIENT
Start: 2024-11-15 | End: 2024-11-15 | Stop reason: HOSPADM

## 2024-11-15 RX ORDER — OXYCODONE HCL 5 MG/5 ML
10 SOLUTION, ORAL ORAL
Status: DISCONTINUED | OUTPATIENT
Start: 2024-11-15 | End: 2024-11-15 | Stop reason: HOSPADM

## 2024-11-15 RX ORDER — ONDANSETRON 2 MG/ML
4 INJECTION INTRAMUSCULAR; INTRAVENOUS
Status: DISCONTINUED | OUTPATIENT
Start: 2024-11-15 | End: 2024-11-15 | Stop reason: HOSPADM

## 2024-11-15 RX ORDER — SODIUM CHLORIDE 9 MG/ML
INJECTION, SOLUTION INTRAVENOUS
Status: DISCONTINUED | OUTPATIENT
Start: 2024-11-15 | End: 2024-11-15 | Stop reason: SURG

## 2024-11-15 RX ORDER — HYDROMORPHONE HYDROCHLORIDE 1 MG/ML
0.1 INJECTION, SOLUTION INTRAMUSCULAR; INTRAVENOUS; SUBCUTANEOUS
Status: DISCONTINUED | OUTPATIENT
Start: 2024-11-15 | End: 2024-11-15 | Stop reason: HOSPADM

## 2024-11-15 RX ORDER — HYDROMORPHONE HYDROCHLORIDE 1 MG/ML
0.2 INJECTION, SOLUTION INTRAMUSCULAR; INTRAVENOUS; SUBCUTANEOUS
Status: DISCONTINUED | OUTPATIENT
Start: 2024-11-15 | End: 2024-11-15 | Stop reason: HOSPADM

## 2024-11-15 RX ORDER — ONDANSETRON 2 MG/ML
INJECTION INTRAMUSCULAR; INTRAVENOUS PRN
Status: DISCONTINUED | OUTPATIENT
Start: 2024-11-15 | End: 2024-11-15 | Stop reason: SURG

## 2024-11-15 RX ORDER — HYDROMORPHONE HYDROCHLORIDE 1 MG/ML
0.4 INJECTION, SOLUTION INTRAMUSCULAR; INTRAVENOUS; SUBCUTANEOUS
Status: DISCONTINUED | OUTPATIENT
Start: 2024-11-15 | End: 2024-11-15 | Stop reason: HOSPADM

## 2024-11-15 RX ORDER — ALBUTEROL SULFATE 5 MG/ML
2.5 SOLUTION RESPIRATORY (INHALATION)
Status: DISCONTINUED | OUTPATIENT
Start: 2024-11-15 | End: 2024-11-15 | Stop reason: HOSPADM

## 2024-11-15 RX ORDER — HALOPERIDOL 5 MG/ML
1 INJECTION INTRAMUSCULAR
Status: DISCONTINUED | OUTPATIENT
Start: 2024-11-15 | End: 2024-11-15 | Stop reason: HOSPADM

## 2024-11-15 RX ORDER — EPHEDRINE SULFATE 50 MG/ML
5 INJECTION, SOLUTION INTRAVENOUS
Status: DISCONTINUED | OUTPATIENT
Start: 2024-11-15 | End: 2024-11-15 | Stop reason: HOSPADM

## 2024-11-15 RX ORDER — DIPHENHYDRAMINE HYDROCHLORIDE 50 MG/ML
12.5 INJECTION INTRAMUSCULAR; INTRAVENOUS
Status: DISCONTINUED | OUTPATIENT
Start: 2024-11-15 | End: 2024-11-15 | Stop reason: HOSPADM

## 2024-11-15 RX ORDER — OXYCODONE HCL 5 MG/5 ML
5 SOLUTION, ORAL ORAL
Status: DISCONTINUED | OUTPATIENT
Start: 2024-11-15 | End: 2024-11-15 | Stop reason: HOSPADM

## 2024-11-15 RX ADMIN — ONDANSETRON 4 MG: 2 INJECTION INTRAMUSCULAR; INTRAVENOUS at 08:08

## 2024-11-15 RX ADMIN — PROPOFOL 50 MG: 10 INJECTION, EMULSION INTRAVENOUS at 08:13

## 2024-11-15 RX ADMIN — SODIUM CHLORIDE: 9 INJECTION, SOLUTION INTRAVENOUS at 08:04

## 2024-11-15 ASSESSMENT — FIBROSIS 4 INDEX: FIB4 SCORE: 1.88

## 2024-11-15 NOTE — ANESTHESIA POSTPROCEDURE EVALUATION
Patient: Susy Szymanski    Procedure Summary       Date: 11/15/24 Room / Location: [facility] - Echocardiology [facility]    Anesthesia Start: 0804 Anesthesia Stop: 0830    Procedure: EC-ELIZABETH W/O CONT Diagnosis:       Atrial fibrillation, unspecified type (HCC)      Unspecified atrial fibrillation    Scheduled Providers: Holger Rooney M.D.; Keysha Villanueva D.O. Responsible Provider: Keysha Villanueva D.O.    Anesthesia Type: MAC ASA Status: 3            Final Anesthesia Type: MAC  Last vitals  BP   Blood Pressure : 118/70    Temp   36.5 °C (97.7 °F)    Pulse   (!) 126   Resp   18    SpO2   98 %      Anesthesia Post Evaluation    No notable events documented. 12 lead ordered, heavy pvc burden on monitor in pacu. Pt denies any chest pain, reports feeling better than preoperatively. BP stable.      Nurse Pain Score: 0 (NPRS)

## 2024-11-15 NOTE — OR NURSING
0830 Patient arrived to unit from echo.  Report from anesthesia and RN.  Patient heart rate 120's.  Patient arouses to voice, denies palpitations.  Patient updated on plan of care.  0852  Jesús to bedside.  0900 Patient is in Afib but is asymptomatic while resting in [facility].  Dr. Rooney updated on patient status.  Patient states she has not been taking her flecainide consistently.  Per MD ok to discharge once criteria met.  0920 Patient ambulated around unit, denies discomfort.  All lines and monitors discontinued.  0925 Reviewed discharge paperwork with pt and . Discussed diet, activity, medications, follow up care and worsening symptoms. No questions at this time.   0930 Pt discharged home with  via walking by .

## 2024-11-15 NOTE — DISCHARGE INSTRUCTIONS
What to Expect Post Anesthesia    Rest and take it easy for the first 24 hours.  A responsible adult is recommended to remain with you during that time.  It is normal to feel sleepy.  We encourage you to not do anything that requires balance, judgment or coordination.    FOR 24 HOURS DO NOT:  Drive, operate machinery or run household appliances.  Drink beer or alcoholic beverages.  Make important decisions or sign legal documents.    To avoid nausea, slowly advance diet as tolerated, avoiding spicy or greasy foods for the first day.  Add more substantial food to your diet according to your provider's instructions.  Babies can be fed formula or breast milk as soon as they are hungry.  INCREASE FLUIDS AND FIBER TO AVOID CONSTIPATION.    MILD FLU-LIKE SYMPTOMS ARE NORMAL.  YOU MAY EXPERIENCE GENERALIZED MUSCLE ACHES, THROAT IRRITATION, HEADACHE AND/OR SOME NAUSEA.    ELIZABETH - TRANSESOPHAGEAL ECHOCARDIOGRAM  1. Don't drive or drink alcohol for 24 hours. The medication you received will make you too drowsy.  2. If you begin to vomit bloody material, or develop black or bloody stools, call your doctor as soon as possible.  3. If you have any neck, chest, abdominal pain or temp of 100 degrees, call your doctor.    Get help right away if:  You have discomfort in your chest.  You are dizzy or you feel faint.  You have trouble breathing or you are short of breath.  Your speech is slurred.  You have trouble moving an arm or leg on one side of your body.  Your fingers or toes turn cold or blue.  This information is not intended to replace advice given to you by your health care provider. Make sure you discuss any questions you have with your health care provider.

## 2024-11-15 NOTE — PROGRESS NOTES
Pulmonary ELIZABETH  EMILY Watchman device stable, no BILLIE device leak, no thrombus, no impingement on mitral inflow or pulmonary vein  Final report pending

## 2024-11-15 NOTE — ANESTHESIA PREPROCEDURE EVALUATION
Date/Time: 11/15/24 0800    Scheduled providers: Holger Rooney M.D.; Keysha Villanueva D.O.    Procedure: EC-ELIZABETH W/O CONT    Diagnosis:       Atrial fibrillation, unspecified type (HCC) [I48.91]      Unspecified atrial fibrillation [I48.91]    Location: [facility] - Echocardiology [facility]            Relevant Problems   ANESTHESIA (within normal limits)      PULMONARY   (positive) Asthma      NEURO   (positive) Cerebrovascular accident (CVA) due to embolism of right middle cerebral artery (HCC)      CARDIAC   (positive) Atrial premature complexes   (positive) PVC's (premature ventricular contractions)   (positive) Paroxysmal atrial fibrillation (HCC)      GI   (positive) Gastroesophageal reflux disease without esophagitis   (positive) Hiatal hernia with GERD       (within normal limits)      ENDO (within normal limits)      OB (within normal limits)      Other   (positive) H/O cardiac radiofrequency ablation 8/18/21 Dr Moody.  PVI (extension of the RPV), PWI and RVOT (PVCs)   (positive) Presence of Watchman left atrial appendage closure device, implanted 9/10/24 Dr Moody       Physical Exam    Airway   Mallampati: II  TM distance: >3 FB  Neck ROM: full       Cardiovascular - normal exam  Rhythm: regular  Rate: normal  (-) murmur     Dental - normal exam           Pulmonary - normal exam  Breath sounds clear to auscultation     Abdominal    Neurological - normal exam                   Anesthesia Plan    ASA 3   ASA physical status 3 criteria: CVA or TIA - history (> 3 months) and a thrombophilic disease requiring anticoagulation    Plan - MAC               Induction: intravenous    Postoperative Plan: Postoperative administration of opioids is intended.    Pertinent diagnostic labs and testing reviewed    Informed Consent:    Anesthetic plan and risks discussed with patient.    Use of blood products discussed with: patient whom consented to blood products.

## 2024-11-15 NOTE — ANESTHESIA TIME REPORT
Anesthesia Start and Stop Event Times       Date Time Event    11/15/2024 0802 Ready for Procedure     0804 Anesthesia Start     0830 Anesthesia Stop          Responsible Staff  11/15/24      Name Role Begin End    Keysha Villanueva D.O. Anesth 0804 0830          Overtime Reason:  no overtime (within assigned shift)    Comments:

## 2024-11-19 ENCOUNTER — OFFICE VISIT (OUTPATIENT)
Dept: NEUROLOGY | Facility: MEDICAL CENTER | Age: 69
End: 2024-11-19
Attending: INTERNAL MEDICINE
Payer: MEDICARE

## 2024-11-19 VITALS
OXYGEN SATURATION: 95 % | WEIGHT: 147.49 LBS | TEMPERATURE: 98.9 F | BODY MASS INDEX: 23.15 KG/M2 | HEART RATE: 55 BPM | SYSTOLIC BLOOD PRESSURE: 110 MMHG | HEIGHT: 67 IN | RESPIRATION RATE: 16 BRPM | DIASTOLIC BLOOD PRESSURE: 78 MMHG

## 2024-11-19 DIAGNOSIS — G25.0 ESSENTIAL TREMOR: ICD-10-CM

## 2024-11-19 PROCEDURE — 99213 OFFICE O/P EST LOW 20 MIN: CPT | Performed by: INTERNAL MEDICINE

## 2024-11-19 PROCEDURE — 3078F DIAST BP <80 MM HG: CPT | Performed by: INTERNAL MEDICINE

## 2024-11-19 PROCEDURE — 3074F SYST BP LT 130 MM HG: CPT | Performed by: INTERNAL MEDICINE

## 2024-11-19 PROCEDURE — 99212 OFFICE O/P EST SF 10 MIN: CPT | Performed by: INTERNAL MEDICINE

## 2024-11-19 PROCEDURE — G2211 COMPLEX E/M VISIT ADD ON: HCPCS | Performed by: INTERNAL MEDICINE

## 2024-11-19 ASSESSMENT — PATIENT HEALTH QUESTIONNAIRE - PHQ9: CLINICAL INTERPRETATION OF PHQ2 SCORE: 0

## 2024-11-19 ASSESSMENT — FIBROSIS 4 INDEX: FIB4 SCORE: 1.88

## 2024-11-19 NOTE — PROGRESS NOTES
[facility]  [address] [place] [address], [address]. [place] [address]  Phone: [phone], Fax: [phone]    Joao Carroll DO  Neurology, Movement Disorders Patient Name: Susy Szymanski  : 1955  MRN: [id number]     ASSESSMENT / PLAN     Susy Szymanski is a 69 y.o. RHD female presenting for tremors     Essential tremor  Onset since her teenage years, but worsened in the past 5 years.  Exam notable for bilateral intention and postural hand tremors, with mild generalized tremors as well.  No bradykinesia or rigidity noted.  This is consistent with essential tremor. Donedranone doesn't appear to worsen tremors as much as amiodarone per literature review.      Xarelto does not allow for primidone use.   History of renal stone makes topiramate and zonisamide not a good fit and her preference as well.   Propranolol at higher dose may cause low HR and BP, limited by 10-20mg daily.   Gabapentin 100-300mg and levetiracetam 500mg BID was too sedating.   Holding off on [facility] referral for MRgFUS.   Nimo KiQ has been working very well.      - Propranolol 10mg 2x/day. Dosing per cardiology. Continuing as tremors have been benefiting  - OK to start primidone when no longer taking Xarelto. Message me if you need a prescription.     ADDENDUM 24:  - Started Nimo kIQ 2024. Today's notes faxed to: [phone]  Continued Medicare Coverage Beyond First Three Months  Deriving benefit from TAPS Therapy as indicated by a 1-point improvement in the BF-ADL score in any eating, drinking, self care OR writing task scored as >= 3 prior to the initiation of therapy; AND  YES, she is improving on scoring as noted below.   Adhering to therapy defined as use of TAPS therapy on 70% of days during a consecutive thirty (30) day period anytime during the first three (3) months of initial use.  YES, she is using device on near daily basis    Myra & Katelynn Tremor ADL Scale (BF-ADL)  Score Score Description  1 Able to do  the activity without difficulty  2 Able to do the activity with a little effort  3 Able to do the activity with a lot of effort  4 Cannot do the activity by yourself    Task Description      Cut food with a knife and fork = 4. Now 1 with Nimo KiQ.    Use a spoon to drink soup = 4. Now 2 with Nimo KiQ.    Hold a cup of tea = 4. Now 1 with Nimo KiQ.  Write a letter = 4. Now 2 with Nimo KiQ.      Joao Carroll, DO  Neurology, Movement Disorders      No orders of the defined types were placed in this encounter.    Return in about 6 months (around 5/19/2025).    BILLING DOCUMENTATION:   Excluding time spent on procedures during visit, I spent 20 minutes reviewing the medical record, interviewing and examining the patient, discussing diagnosis and treatment, and coordinating care.              HISTORY OF PRESENT ILLNESS     Susy Szymanski is a 69 y.o. RHD female presenting for tremors     PMHx: atrial fibrillation status post ablation on Xarelto, depression, GERD, osteoporosis.      Initial HPI 03/12/24  Stroke history  2/2021: acute left side weakness, received TPA and had thrombectomy for right M2 occlusion.    Tremor  Dr. Mota 5/2022:  essential tremor, currently on propranolol 20 mg once daily, with progressively worsening tremor.   She still has problems eating and cannot write well.   Her heart rate has been in the 40's on propranolol 20mg twice daily. Propranolol has been helpful but the tremor is still bothersome.     Onset approx since teens. Worsened in the past 5 years. Hand writing, drinking, eating. Socially bothersome. Worse with anxiety.   Family members: mother, mild tremors     - Propranolol: has atrial fibrillation, but has lower HR at times. Takes it in AM, 20mg daily. 10mg BID was helpful too. No longer taking 20mg BID due to low HR.   Efficacy: used to be 70%, now 30%.    - Topiramate: didn't try it, concerned about osteoporosis, kidney stones in past.      Interval history  3/2024: First visit  "with me. Started gabapentin 100mg up to 300mg nightly but was too sedating and caused weight gain  05/16/24: start keppra 500mg BID, but too sedating and not helpful for tremors. Referral to [facility] for focused ultrasound, but wasn't ready.  08/28/24: start Nimo KiQ approval, received 9/2024.  9/2024: had left atrial closure with watchman device  11/19/24: no med changes      Past Medical History:   Diagnosis Date    Anxiety     Arrhythmia     afib    ASTHMA 4/9/21:  Patient denies    Atrial fibrillation (HCC)     Bowel habit changes     constipation    Breath shortness     cardiac related    Cataract 04/09/2001    \"Small, not affecting eyes\"    Chickenpox     Constipation     Dental disorder     Patient states dentist told her she has a tooth infection and needs a tooth pulled but no antibiotics prescribed    Depression 04/09/2021    Patient denies    Frequent urination     GERD (gastroesophageal reflux disease)     Heartburn     Hemorrhagic disorder (HCC)     Takes Xarelto    Hiatal hernia with GERD 11/05/2019    Hiatus hernia syndrome     High cholesterol     History of esophageal stricture 11/05/2019    Indigestion     Kidney stone     Osteoporosis     PAC (premature atrial contraction)     Painful joint     Palpitations     Palpitations     Premature atrial contractions     PVC's (premature ventricular contractions)     Rhinitis     Ringing in ears     Stroke (HCC) 04/09/2021 Jan. 2021.  Blood clot in brain RT A-Fib    Tonsillitis     Urinary bladder disorder     urinary frequency    Wears glasses      Past Surgical History:   Procedure Laterality Date    COLONOSCOPY WITH POLYP  06/2008    benign polyp  Dr Paul     CHOLECYSTECTOMY  01/2008    laparoscopic    EGD WITH ASP/BX  09/2006    inflammation only ---Dr Paul    CHOLECYSTECTOMY      MAMMOPLASTY AUGMENTATION Bilateral     OTHER      loop recorder    OTHER CARDIAC SURGERY  Around 2000    Ablation    SINUSOTOMIES       Family History   Problem " Relation Age of Onset    Cancer Father 70        cancer of unknown primary    Heart Disease Mother     Heart Disease Son 42        MI and coronary stent placement    Diabetes Son     Hypertension Son     Diabetes Sister      Social History     Socioeconomic History    Marital status:      Spouse name: Not on file    Number of children: Not on file    Years of education: Not on file    Highest education level: Not on file   Occupational History    Occupation: retired   Tobacco Use    Smoking status: Never    Smokeless tobacco: Never   Vaping Use    Vaping status: Never Used   Substance and Sexual Activity    Alcohol use: No     Alcohol/week: 0.0 oz    Drug use: No    Sexual activity: Yes     Partners: Male     Birth control/protection: Post-Menopausal   Other Topics Concern    Not on file   Social History Narrative    Not on file     Social Drivers of Health     Financial Resource Strain: Not on file   Food Insecurity: Not on file   Transportation Needs: Not on file   Physical Activity: Not on file   Stress: Not on file   Social Connections: Not on file   Intimate Partner Violence: Not on file   Housing Stability: Not on file     Current Outpatient Medications   Medication    flecainide (TAMBOCOR) 50 MG tablet    famotidine (PEPCID) 20 MG Tab    propranolol (INDERAL) 10 MG Tab    rivaroxaban (XARELTO) 20 MG Tab tablet    meclizine (ANTIVERT) 12.5 MG Tab    pantoprazole (PROTONIX) 40 MG Tablet Delayed Response    calcium carbonate (TUMS) 500 MG Chew Tab    Magnesium 250 MG Tab    Cholecalciferol (VITAMIN D-3 PO)    esomeprazole (NEXIUM) 40 MG delayed-release capsule     No current facility-administered medications for this visit.     Allergies   Allergen Reactions    Amitriptyline      Excessive sedation --even at 10 mg dose    Lovastatin      Muscle aches  Muscle aches      Trazodone      Irregular pulse    Bactrim [Sulfamethoxazole W-Trimethoprim]      Shaking    Levaquin      Nausea              DATA /  "RESULTS     25-Hydroxy   Vitamin D 25   Date Value Ref Range Status   02/15/2023 37 30 - 100 ng/mL Final     Comment:     Adult Ranges:   <20 ng/mL - Deficiency  20-29 ng/mL - Insufficiency   ng/mL - Sufficiency  Electrochemiluminescence binding assay performed using Roche nnamdi e  immunoassay analyzer.  The Elecsys Vitamin D total II assay is intended for  the quantitative determination of total 25 hydroxyvitamin D in human serum  and plasma. This assay is to be used as an aid in the assessment of vitamin  D sufficiency in adults.       TSH   Date Value Ref Range Status   02/15/2023 1.560 0.380 - 5.330 uIU/mL Final     Comment:     The 2011 American Thyroid Association (LIONEL) guidelines  recommended that the interpretation of thyroid function in  pregnancy be based on trimester specific reference ranges.    1st Trimester  0.100-2.500 mIU/L  2nd Trimester  0.200-3.000 mIU/L  3rd Trimester  0.300-3.500 mIU/L    These established reference ranges have not been validated  at [employer].       Glycohemoglobin   Date Value Ref Range Status   02/15/2023 5.3 4.0 - 5.6 % Final     Comment:     Increased risk for diabetes:  5.7 -6.4%  Diabetes:  >6.4%  Glycemic control for adults with diabetes:  <7.0%    The above interpretations are per ADA guidelines.  Diagnosis  of diabetes mellitus on the basis of elevated Hemoglobin A1c  should be confirmed by repeating the Hb A1c test.       LDL   Date Value Ref Range Status   02/26/2021 92 <100 mg/dL Final                OBJECTIVE      Vitals:    11/19/24 1448   BP: 110/78   BP Location: Left arm   Patient Position: Sitting   BP Cuff Size: Adult   Pulse: (!) 55   Resp: 16   Temp: 37.2 °C (98.9 °F)   TempSrc: Temporal   SpO2: 95%   Weight: 66.9 kg (147 lb 7.8 oz)   Height: 1.702 m (5' 7\")       Physical Exam  Last dose of propranolol taken this AM      UPDRS Right Left   Finger tapping       Hand Movement       Toe Tapping       Leg Agility       Rigidity     "   Rest Tremor 0 0   Postural Tremor <1cm better <1cm, better   Kinetic Tremor <1cm, better <1cm, better                  PROCEDURE   N/A

## 2024-11-21 ENCOUNTER — APPOINTMENT (OUTPATIENT)
Dept: CARDIOLOGY | Facility: MEDICAL CENTER | Age: 69
End: 2024-11-21
Attending: NURSE PRACTITIONER
Payer: MEDICARE

## 2024-11-21 ENCOUNTER — PATIENT MESSAGE (OUTPATIENT)
Dept: CARDIOLOGY | Facility: MEDICAL CENTER | Age: 69
End: 2024-11-21

## 2024-11-21 DIAGNOSIS — I48.91 ATRIAL FIBRILLATION, UNSPECIFIED TYPE (HCC): ICD-10-CM

## 2024-11-21 DIAGNOSIS — Z95.818 PRESENCE OF WATCHMAN LEFT ATRIAL APPENDAGE CLOSURE DEVICE: ICD-10-CM

## 2024-11-21 RX ORDER — ASPIRIN 81 MG/1
81 TABLET ORAL DAILY
Qty: 100 TABLET | Refills: 3 | Status: SHIPPED | OUTPATIENT
Start: 2024-11-21

## 2024-11-22 ENCOUNTER — TELEPHONE (OUTPATIENT)
Dept: NEUROLOGY | Facility: MEDICAL CENTER | Age: 69
End: 2024-11-22
Payer: MEDICARE

## 2024-11-22 NOTE — TELEPHONE ENCOUNTER
Michelle from [facility] called requesting to add more info where it says Start RAMÍREZ  kIQ 9/2024 needs to be pt specific, add yes or pt's name after bullet points preferably put Yes since this will help pt, it can not be copy and paste since it needs to show that is for this specific pt. Also note needs signature and date, notes need to be fax to [phone] and if you have any question you can call her at [phone]

## 2024-11-23 NOTE — TELEPHONE ENCOUNTER
Updated visit notes were fax to Michelle at [facility] , fax sent conf sheet was received and scanned into pt's chart

## 2024-11-26 ENCOUNTER — PATIENT MESSAGE (OUTPATIENT)
Dept: NEUROLOGY | Facility: MEDICAL CENTER | Age: 69
End: 2024-11-26
Payer: MEDICARE

## 2024-11-26 DIAGNOSIS — G25.0 ESSENTIAL TREMOR: ICD-10-CM

## 2024-11-27 RX ORDER — PRIMIDONE 50 MG/1
50 TABLET ORAL
Qty: 180 TABLET | Refills: 3 | Status: SHIPPED | OUTPATIENT
Start: 2024-11-27 | End: 2025-11-27

## 2024-12-02 NOTE — PROGRESS NOTES
"Chief Complaint   Patient presents with    Atrial Fibrillation       Subjective     Ariane Szymanski is a 69 y.o. female who presents today for follow up after EMILY Closure with WATCHMAN implant by  Dr. Moody 9/10/24.     She had her post watchman ELIZABETH completed 2 weeks ago and showed well seated device without mathieu device leak.     She is followed by Dr Moody for EP.  Arrhythmia history significant for paroxysmal Afib S/P prior ablation x2.  She was weaned off OAC with ongoing sinus, however ended up with recurrence and complicated by MCA CVA.  She underwent ILR implant for close AF monitoring and was placed back on OAC.  She has a history of frequent atrial and ventricular ectopy.     Medical history significant for essential tremor, Watchman implanted secondary to interactions between medications neurology was suggesting for tremor control and anticoagulation.     Past Medical History:   Diagnosis Date    Anxiety     Arrhythmia     afib    ASTHMA 4/9/21:  Patient denies    Atrial fibrillation (HCC)     Bowel habit changes     constipation    Breath shortness     cardiac related    Cataract 04/09/2001    \"Small, not affecting eyes\"    Chickenpox     Constipation     Dental disorder     Patient states dentist told her she has a tooth infection and needs a tooth pulled but no antibiotics prescribed    Depression 04/09/2021    Patient denies    Frequent urination     GERD (gastroesophageal reflux disease)     Heartburn     Hemorrhagic disorder (HCC)     Takes Xarelto    Hiatal hernia with GERD 11/05/2019    Hiatus hernia syndrome     High cholesterol     History of esophageal stricture 11/05/2019    Indigestion     Kidney stone     Osteoporosis     PAC (premature atrial contraction)     Painful joint     Palpitations     Palpitations     Premature atrial contractions     PVC's (premature ventricular contractions)     Rhinitis     Ringing in ears     Stroke (HCC) 04/09/2021 Jan. 2021.  Blood clot in brain RT A-Fib    " Tonsillitis     Urinary bladder disorder     urinary frequency    Wears glasses      Past Surgical History:   Procedure Laterality Date    COLONOSCOPY WITH POLYP  06/2008    benign polyp  Dr Paul     CHOLECYSTECTOMY  01/2008    laparoscopic    EGD WITH ASP/BX  09/2006    inflammation only ---Dr Paul    CHOLECYSTECTOMY      MAMMOPLASTY AUGMENTATION Bilateral     OTHER      loop recorder    OTHER CARDIAC SURGERY  Around 2000    Ablation    SINUSOTOMIES       Family History   Problem Relation Age of Onset    Cancer Father 70        cancer of unknown primary    Heart Disease Mother     Heart Disease Son 42        MI and coronary stent placement    Diabetes Son     Hypertension Son     Diabetes Sister      Social History     Socioeconomic History    Marital status:      Spouse name: Not on file    Number of children: Not on file    Years of education: Not on file    Highest education level: Not on file   Occupational History    Occupation: retired   Tobacco Use    Smoking status: Never    Smokeless tobacco: Never   Vaping Use    Vaping status: Never Used   Substance and Sexual Activity    Alcohol use: No     Alcohol/week: 0.0 oz    Drug use: No    Sexual activity: Yes     Partners: Male     Birth control/protection: Post-Menopausal   Other Topics Concern    Not on file   Social History Narrative    Not on file     Social Drivers of Health     Financial Resource Strain: Not on file   Food Insecurity: Not on file   Transportation Needs: Not on file   Physical Activity: Not on file   Stress: Not on file   Social Connections: Not on file   Intimate Partner Violence: Not on file   Housing Stability: Not on file     Allergies   Allergen Reactions    Amitriptyline      Excessive sedation --even at 10 mg dose    Lovastatin      Muscle aches  Muscle aches      Trazodone      Irregular pulse    Bactrim [Sulfamethoxazole W-Trimethoprim]      Shaking    Levaquin      Nausea      Outpatient Encounter Medications as of  12/4/2024   Medication Sig Dispense Refill    primidone (MYSOLINE) 50 MG Tab Take 1 Tablet by mouth 2 (two) times a day. Start according to clinic instructions. For tremors 180 Tablet 3    aspirin 81 MG EC tablet Take 1 Tablet by mouth every day. 100 Tablet 3    flecainide (TAMBOCOR) 50 MG tablet Take 50 mg by mouth 2 (two) times a day.      calcium carbonate (TUMS) 500 MG Chew Tab Chew 500 mg as needed.          **MEDICATION INSTRUCTIONS FOR SURGERY/PROCEDURE SCHEDULED FOR 11/15/2024   OK TO CONTINUE TAKING PRIOR TO PROCEDURE. DO NOT TAKE MORNING OF PROCEDURE. (Patient not taking: Reported on 11/19/2024)      famotidine (PEPCID) 20 MG Tab Take 20 mg by mouth every morning.        **MEDICATION INSTRUCTIONS FOR SURGERY/PROCEDURE SCHEDULED FOR 11/15/2024   OK TO CONTINUE TAKING PRIOR TO PROCEDURE AND DAY OF PROCEDURE.      propranolol (INDERAL) 10 MG Tab Take 1 Tablet by mouth 2 times a day for 360 days. For tremors 180 Tablet 3    meclizine (ANTIVERT) 12.5 MG Tab Take 1 Tablet by mouth 3 times a day as needed (for vertigo). 30 Tablet 0    pantoprazole (PROTONIX) 40 MG Tablet Delayed Response Take 40 mg by mouth every morning.      **MEDICATION INSTRUCTIONS FOR SURGERY/PROCEDURE SCHEDULED FOR 11/15/2024   OK TO CONTINUE TAKING PRIOR TO PROCEDURE AND DAY OF PROCEDURE.      Magnesium 250 MG Tab Take 250 mg by mouth every morning.        **MEDICATION INSTRUCTIONS FOR SURGERY/PROCEDURE SCHEDULED FOR 11/15/2024  DO NOT TAKE 7 DAYS PRIOR TO SURGERY      Cholecalciferol (VITAMIN D-3 PO) Take 1 Tablet by mouth every morning. Patient unsure of dose.    MEDICATION INSTRUCTIONS FOR SURGERY/PROCEDURE SCHEDULED FOR 09-10-24   DO NOT TAKE 7 DAYS PRIOR TO SURGERY (Patient not taking: Reported on 11/15/2024)      esomeprazole (NEXIUM) 40 MG delayed-release capsule Take 1 Capsule by mouth every morning before breakfast. (Patient not taking: Reported on 11/15/2024) 90 Capsule 0     No facility-administered encounter medications on file  "as of 12/4/2024.     Review of Systems   Constitutional:  Negative for chills, fever, malaise/fatigue and weight loss.   HENT:  Negative for tinnitus.    Respiratory:  Negative for cough, hemoptysis, sputum production, shortness of breath and wheezing.    Cardiovascular:  Negative for chest pain, palpitations, orthopnea, leg swelling and PND.   Gastrointestinal:  Negative for heartburn, nausea and vomiting.   Skin:  Negative for rash.   Neurological:  Negative for dizziness, tingling, tremors, sensory change, speech change, focal weakness, loss of consciousness and headaches.        Objective     /82 (BP Location: Left arm, Patient Position: Sitting, BP Cuff Size: Adult)   Pulse 64   Resp 16   Ht 1.702 m (5' 7\")   Wt 66.7 kg (147 lb)   LMP 01/01/2004   SpO2 95%   BMI 23.02 kg/m²     Physical Exam  Vitals reviewed.   Constitutional:       Appearance: Normal appearance.   HENT:      Head: Normocephalic and atraumatic.      Mouth/Throat:      Mouth: Mucous membranes are moist.      Pharynx: Oropharynx is clear.   Eyes:      Extraocular Movements: Extraocular movements intact.      Conjunctiva/sclera: Conjunctivae normal.      Pupils: Pupils are equal, round, and reactive to light.   Cardiovascular:      Rate and Rhythm: Normal rate and regular rhythm.      Pulses: Normal pulses.   Pulmonary:      Effort: Pulmonary effort is normal.      Breath sounds: Normal breath sounds. No wheezing, rhonchi or rales.   Musculoskeletal:      Cervical back: Normal range of motion and neck supple.      Right lower leg: No edema.      Left lower leg: No edema.   Skin:     General: Skin is warm and dry.      Capillary Refill: Capillary refill takes 2 to 3 seconds.   Neurological:      Mental Status: She is alert and oriented to person, place, and time.   Psychiatric:         Mood and Affect: Mood normal.         Behavior: Behavior normal.         Thought Content: Thought content normal.         Judgment: Judgment normal. "          Assessment & Plan     1. Paroxysmal atrial fibrillation (HCC)  EKG    flecainide (TAMBOCOR) 50 MG tablet      2. H/O cardiac radiofrequency ablation 8/18/21 Dr Moody.  PVI (extension of the RPV), PWI and RVOT (PVCs)        3. Encounter for monitoring flecainide therapy        4. Presence of Watchman left atrial appendage closure device  clopidogrel (PLAVIX) 75 MG Tab      5. Cerebrovascular accident (CVA) due to embolism of right middle cerebral artery (HCC)        6. PVC's (premature ventricular contractions)          Medical Decision Making: Today's Assessment/Status/Plan:   1.  Paroxysmal atrial fibrillation  - Presently in sinus rhythm today.  Prior episodes of atrial fibrillation with mild RVR.  -She is back on flecainide at this time on a as needed basis due to side effect from Multaq.  States that she is needing approximately 1/2 tablet every couple of days.  Reports this is working well for her.  She continues on propranolol 10 mg once daily.  -History of ablation x 2.    2.  High risk medication (flecainide)  - Taking as needed basis.  Prescription refilled today.  - Twelve-lead ECG is stable with appropriate SD interval, QRS duration.    3.  Left atrial appendage closure with watchman implant  - Procedure performed 9/10/2024.  Follow-up ELIZABETH showed no evidence of peridevice leak with a well-seated device.  She stopped Xarelto and has been taking aspirin 81 mg daily.  Discussed need for addition of clopidogrel 75 mg daily for approximately 4.5 months for stroke risk reduction as Watchman device continues to heal.    4.  CVA  - See above #3.    Return to EP clinic in 4 months for review, sooner if clinical condition changes.  PLEASE NOTE: This Note was created using voice recognition Software. I have made every reasonable attempt to correct obvious errors, but I expect that there are errors of grammar and possibly content that I did not discover before finalizing the note

## 2024-12-03 ENCOUNTER — APPOINTMENT (OUTPATIENT)
Dept: NEUROLOGY | Facility: MEDICAL CENTER | Age: 69
End: 2024-12-03
Attending: INTERNAL MEDICINE
Payer: MEDICARE

## 2024-12-04 ENCOUNTER — OFFICE VISIT (OUTPATIENT)
Dept: CARDIOLOGY | Facility: MEDICAL CENTER | Age: 69
End: 2024-12-04
Attending: NURSE PRACTITIONER
Payer: MEDICARE

## 2024-12-04 ENCOUNTER — NON-PROVIDER VISIT (OUTPATIENT)
Dept: CARDIOLOGY | Facility: MEDICAL CENTER | Age: 69
End: 2024-12-04

## 2024-12-04 VITALS
OXYGEN SATURATION: 95 % | RESPIRATION RATE: 16 BRPM | WEIGHT: 147 LBS | SYSTOLIC BLOOD PRESSURE: 132 MMHG | HEART RATE: 64 BPM | HEIGHT: 67 IN | BODY MASS INDEX: 23.07 KG/M2 | DIASTOLIC BLOOD PRESSURE: 82 MMHG

## 2024-12-04 DIAGNOSIS — I49.3 PVC'S (PREMATURE VENTRICULAR CONTRACTIONS): ICD-10-CM

## 2024-12-04 DIAGNOSIS — Z98.890 H/O CARDIAC RADIOFREQUENCY ABLATION: ICD-10-CM

## 2024-12-04 DIAGNOSIS — Z95.818 PRESENCE OF WATCHMAN LEFT ATRIAL APPENDAGE CLOSURE DEVICE: ICD-10-CM

## 2024-12-04 DIAGNOSIS — I48.0 PAROXYSMAL ATRIAL FIBRILLATION (HCC): ICD-10-CM

## 2024-12-04 DIAGNOSIS — I63.411 CEREBROVASCULAR ACCIDENT (CVA) DUE TO EMBOLISM OF RIGHT MIDDLE CEREBRAL ARTERY (HCC): ICD-10-CM

## 2024-12-04 DIAGNOSIS — Z51.81 ENCOUNTER FOR MONITORING FLECAINIDE THERAPY: ICD-10-CM

## 2024-12-04 DIAGNOSIS — Z79.899 ENCOUNTER FOR MONITORING FLECAINIDE THERAPY: ICD-10-CM

## 2024-12-04 LAB — EKG IMPRESSION: NORMAL

## 2024-12-04 PROCEDURE — 99213 OFFICE O/P EST LOW 20 MIN: CPT | Performed by: NURSE PRACTITIONER

## 2024-12-04 PROCEDURE — 93005 ELECTROCARDIOGRAM TRACING: CPT | Mod: TC | Performed by: NURSE PRACTITIONER

## 2024-12-04 PROCEDURE — 93298 REM INTERROG DEV EVAL SCRMS: CPT | Mod: 26 | Performed by: INTERNAL MEDICINE

## 2024-12-04 PROCEDURE — 3079F DIAST BP 80-89 MM HG: CPT | Performed by: NURSE PRACTITIONER

## 2024-12-04 PROCEDURE — 3075F SYST BP GE 130 - 139MM HG: CPT | Performed by: NURSE PRACTITIONER

## 2024-12-04 PROCEDURE — 99214 OFFICE O/P EST MOD 30 MIN: CPT | Performed by: NURSE PRACTITIONER

## 2024-12-04 RX ORDER — CLOPIDOGREL BISULFATE 75 MG/1
75 TABLET ORAL DAILY
Qty: 90 TABLET | Refills: 1 | Status: SHIPPED | OUTPATIENT
Start: 2024-12-04

## 2024-12-04 RX ORDER — FLECAINIDE ACETATE 50 MG/1
50 TABLET ORAL 2 TIMES DAILY PRN
Qty: 90 TABLET | Refills: 1 | Status: SHIPPED | OUTPATIENT
Start: 2024-12-04 | End: 2024-12-26

## 2024-12-04 ASSESSMENT — ENCOUNTER SYMPTOMS
LOSS OF CONSCIOUSNESS: 0
SPUTUM PRODUCTION: 0
CHILLS: 0
SPEECH CHANGE: 0
TREMORS: 0
VOMITING: 0
PND: 0
HEARTBURN: 0
FOCAL WEAKNESS: 0
TINGLING: 0
NAUSEA: 0
HEADACHES: 0
WEIGHT LOSS: 0
SHORTNESS OF BREATH: 0
COUGH: 0
FEVER: 0
HEMOPTYSIS: 0
PALPITATIONS: 0
SENSORY CHANGE: 0
ORTHOPNEA: 0
WHEEZING: 0
DIZZINESS: 0

## 2024-12-04 ASSESSMENT — FIBROSIS 4 INDEX: FIB4 SCORE: 1.88

## 2024-12-04 NOTE — CARDIAC REMOTE MONITOR - SCAN
Device transmission reviewed. Device demonstrated appropriate function.       Electronically Signed by: Lazaro Hirsch M.D.    12/4/2024  9:42 AM

## 2024-12-08 DIAGNOSIS — T75.3XXA MOTION SICKNESS, INITIAL ENCOUNTER: ICD-10-CM

## 2024-12-08 NOTE — TELEPHONE ENCOUNTER
Received request via: Pharmacy    Was the patient seen in the last year in this department? Yes  LOV : 5/20/2024  Does the patient have an active prescription (recently filled or refills available) for medication(s) requested? No    Pharmacy Name: [facility]    Does the patient have assisted Plus and need 100-day supply? (This applies to ALL medications) Patient does not have SCP

## 2024-12-09 RX ORDER — MECLIZINE HCL 12.5 MG 12.5 MG/1
12.5 TABLET ORAL 3 TIMES DAILY PRN
Qty: 30 TABLET | Refills: 0 | Status: SHIPPED | OUTPATIENT
Start: 2024-12-09

## 2024-12-10 ENCOUNTER — TELEPHONE (OUTPATIENT)
Dept: CARDIOLOGY | Facility: MEDICAL CENTER | Age: 69
End: 2024-12-10
Payer: MEDICARE

## 2024-12-10 NOTE — TELEPHONE ENCOUNTER
LILIANA          Caller: Jesús(spouse)      Topic/issue: Patient's  was calling because she has been experiencing some bleeding since last night. She's has coughing up blood and she has been having blood out of her nose. He was concerned that it could be due to his wifes plavix medication and he was asking for a call back. Please advise        Callback Number: [phone]      Thank you    -Franklyn ESPARZA

## 2024-12-10 NOTE — TELEPHONE ENCOUNTER
Phone Number Called: [phone]    Call outcome: Spoke to patient regarding message below.    Message: Called to discuss patients concerns regarding nose bleeds.   Pt reporting waking up at 3:30a with a nose bleed, big blood clots, spitting up blood at the same time.   Not activity bleeding at this time.   Pt just started taking plavix at last OV with EA.   Taking 81mg of ASA daily.   Watchman placed by SS 9/10/24.     Patient will be leaving for a cruise in 2 days and would like to know if she should be taking this medication or if she can switch to something else.     Discussed with patient I will reach out to EA for further recommendations.   Patient verbalized understanding.

## 2024-12-19 ENCOUNTER — TELEPHONE (OUTPATIENT)
Dept: CARDIOLOGY | Facility: MEDICAL CENTER | Age: 69
End: 2024-12-19
Payer: MEDICARE

## 2024-12-20 NOTE — TELEPHONE ENCOUNTER
"Last OV: 12.04.2024  Proposed Surgery: An EGD with Deep (Propofol) Sedation   Surgery Date: 03.25.2025  Requesting Office Name:  Gastroenterology   Fax Number: [phone]  Preference of Location (default is surgery center unless specified by Cardiologist or [facility])  Prior Clearance Addressed: No    Is this a general clearance? YES   Anticoags/Antiplatelets: Clopidogrel  and Aspirin  Anticoags/Antiplatelet managed by Cardiology? YES    Outstanding Cardiac Imaging : Yes  Other CT-CHEST   Clearance to provider to review  Ablation, Cardioversion, Stent, Cardiac Devices, Catheterization, Watchman: Yes  Within the last 6 months. Forward to provider to review.  TAVR/Valve, Mitral Clip, Watchman (including open heart),: Completed within the last 6 months- Forward to provider to review    Recent Cardiac Hospitalization: Yes  Date:  09.10.2024            When: Hospitalized in the last 3 months. Forward to provider to review.   History (cardiac history):   Past Medical History:   Diagnosis Date    Anxiety     Arrhythmia     afib    ASTHMA 4/9/21:  Patient denies    Atrial fibrillation (HCC)     Bowel habit changes     constipation    Breath shortness     cardiac related    Cataract 04/09/2001    \"Small, not affecting eyes\"    Chickenpox     Constipation     Dental disorder     Patient states dentist told her she has a tooth infection and needs a tooth pulled but no antibiotics prescribed    Depression 04/09/2021    Patient denies    Frequent urination     GERD (gastroesophageal reflux disease)     Heartburn     Hemorrhagic disorder (HCC)     Takes Xarelto    Hiatal hernia with GERD 11/05/2019    Hiatus hernia syndrome     High cholesterol     History of esophageal stricture 11/05/2019    Indigestion     Kidney stone     Osteoporosis     PAC (premature atrial contraction)     Painful joint     Palpitations     Palpitations     Premature atrial contractions     PVC's (premature ventricular contractions)     Rhinitis     Ringing " in ears     Stroke (HCC) 04/09/2021 Jan. 2021.  Blood clot in brain RT A-Fib    Tonsillitis     Urinary bladder disorder     urinary frequency    Wears glasses            Is this a dental clearance? NO  Ablation, Cardioversion, Watchman, Stents, Cath, Devices within the last 3 months? No   If yes- Send dental wait letter, do not forward to provider for review.     TAVR / Valve, Mitral clip within the last 6 months? No  If yes- Send dental wait letter, do not forward to provider for review.     If completing a general clearance, continue per protocol.           Surgical Clearance Letter Sent: No Provider to advise.   **Scan clearance request letter into [facility].**

## 2024-12-24 ENCOUNTER — TELEPHONE (OUTPATIENT)
Dept: CARDIOLOGY | Facility: MEDICAL CENTER | Age: 69
End: 2024-12-24
Payer: MEDICARE

## 2024-12-26 DIAGNOSIS — I48.0 PAROXYSMAL ATRIAL FIBRILLATION (HCC): ICD-10-CM

## 2024-12-26 RX ORDER — FLECAINIDE ACETATE 50 MG/1
TABLET ORAL
Qty: 180 TABLET | Refills: 1 | Status: SHIPPED | OUTPATIENT
Start: 2024-12-26

## 2025-01-04 ENCOUNTER — NON-PROVIDER VISIT (OUTPATIENT)
Dept: CARDIOLOGY | Facility: MEDICAL CENTER | Age: 70
End: 2025-01-04
Payer: MEDICARE

## 2025-01-06 PROCEDURE — 93298 REM INTERROG DEV EVAL SCRMS: CPT | Mod: 26 | Performed by: INTERNAL MEDICINE

## 2025-01-06 NOTE — CARDIAC REMOTE MONITOR - SCAN
Device transmission reviewed. Device demonstrated appropriate function.       Electronically Signed by: Josue Moody M.D.    1/20/2025  3:17 PM

## 2025-01-07 ENCOUNTER — TELEPHONE (OUTPATIENT)
Dept: CARDIOLOGY | Facility: MEDICAL CENTER | Age: 70
End: 2025-01-07
Payer: MEDICARE

## 2025-01-07 NOTE — TELEPHONE ENCOUNTER
LILIANA    PT  CAME IN WITH DENTAL CLEARANCE. SCANNED INTO MEDIA. WOULD LIKE FAXED WHEN COMPLETE AND MYCHART NOTIFICATION

## 2025-01-10 ENCOUNTER — TELEPHONE (OUTPATIENT)
Dept: CARDIOLOGY | Facility: MEDICAL CENTER | Age: 70
End: 2025-01-10
Payer: MEDICARE

## 2025-01-10 NOTE — TELEPHONE ENCOUNTER
"Last OV: 12/04/2024  Proposed Surgery: All dental treatment   Surgery Date: TBD  Requesting Office Name: [facility]   Fax Number: [phone]  Preference of Location (default is surgery center unless specified by Cardiologist or [facility])  Prior Clearance Addressed: No    Is this a general clearance? NO  Anticoags/Antiplatelets: Clopidogrel  and Aspirin  Anticoags/Antiplatelet managed by Cardiology? YES    Outstanding Cardiac Imaging : No  Ablation, Cardioversion, Stent, Cardiac Devices, Catheterization, Watchman: Yes  Within the last 6 months. Forward to provider to review.  TAVR/Valve, Mitral Clip, Watchman (including open heart),: Completed within the last 6 months- Forward to provider to review    Recent Cardiac Hospitalization: No            When: N/A  History (cardiac history):   Past Medical History:   Diagnosis Date    Anxiety     Arrhythmia     afib    ASTHMA 4/9/21:  Patient denies    Atrial fibrillation (HCC)     Bowel habit changes     constipation    Breath shortness     cardiac related    Cataract 04/09/2001    \"Small, not affecting eyes\"    Chickenpox     Constipation     Dental disorder     Patient states dentist told her she has a tooth infection and needs a tooth pulled but no antibiotics prescribed    Depression 04/09/2021    Patient denies    Frequent urination     GERD (gastroesophageal reflux disease)     Heartburn     Hemorrhagic disorder (HCC)     Takes Xarelto    Hiatal hernia with GERD 11/05/2019    Hiatus hernia syndrome     High cholesterol     History of esophageal stricture 11/05/2019    Indigestion     Kidney stone     Osteoporosis     PAC (premature atrial contraction)     Painful joint     Palpitations     Palpitations     Premature atrial contractions     PVC's (premature ventricular contractions)     Rhinitis     Ringing in ears     Stroke (HCC) 04/09/2021 Jan. 2021.  Blood clot in brain RT A-Fib    Tonsillitis     Urinary bladder disorder     urinary frequency "    Wears glasses            Is this a dental clearance? YES   Ablation, Cardioversion, Watchman, Stents, Cath, Devices within the last 3 months? No   If yes- Send dental wait letter, do not forward to provider for review.     TAVR / Valve, Mitral clip within the last 6 months? No  If yes- Send dental wait letter, do not forward to provider for review.     If completing a general clearance, continue per protocol.           Surgical Clearance Letter Sent: No Provider to advise.   **Scan clearance request letter into [facility].**

## 2025-01-19 DIAGNOSIS — T75.3XXA MOTION SICKNESS, INITIAL ENCOUNTER: ICD-10-CM

## 2025-01-20 ENCOUNTER — TELEPHONE (OUTPATIENT)
Dept: CARDIOLOGY | Facility: MEDICAL CENTER | Age: 70
End: 2025-01-20
Payer: MEDICARE

## 2025-01-20 NOTE — TELEPHONE ENCOUNTER
Called [facility], s/w Lidia regarding HyrdoEye. Discussed okay from a cardiac standpoint for patient to use Hydroeye.   No further questions at this time.   
Fax received from [facility] asking for pt to start HydroEye RX for dry eye's.   [facility] asking for approval to start this medication.       EA - Please advise on medication.     Thank you!  
OK to use hydroeye.     Thank you  Sandra   
dyspnea upon exertion/spontaneous

## 2025-01-20 NOTE — TELEPHONE ENCOUNTER
Received request via: Patient    Was the patient seen in the last year in this department? Yes    Does the patient have an active prescription (recently filled or refills available) for medication(s) requested? No    Pharmacy Name: [facility]  Does the patient have [facility] Plus and need 100-day supply? (This applies to ALL medications) Patient does not have SCP

## 2025-01-22 RX ORDER — MECLIZINE HCL 12.5 MG 12.5 MG/1
12.5 TABLET ORAL 3 TIMES DAILY PRN
Qty: 90 TABLET | Refills: 0 | Status: SHIPPED | OUTPATIENT
Start: 2025-01-22

## 2025-02-04 ENCOUNTER — NON-PROVIDER VISIT (OUTPATIENT)
Dept: CARDIOLOGY | Facility: MEDICAL CENTER | Age: 70
End: 2025-02-04
Payer: MEDICARE

## 2025-02-04 PROCEDURE — 93298 REM INTERROG DEV EVAL SCRMS: CPT | Mod: 26 | Performed by: INTERNAL MEDICINE

## 2025-02-04 NOTE — CARDIAC REMOTE MONITOR - SCAN
Device transmission reviewed. Device demonstrated appropriate function.       Electronically Signed by: Lazaro Hirsch M.D.    2/4/2025  5:18 PM

## 2025-03-07 ENCOUNTER — NON-PROVIDER VISIT (OUTPATIENT)
Dept: CARDIOLOGY | Facility: MEDICAL CENTER | Age: 70
End: 2025-03-07
Payer: MEDICARE

## 2025-03-07 PROCEDURE — 93298 REM INTERROG DEV EVAL SCRMS: CPT | Performed by: STUDENT IN AN ORGANIZED HEALTH CARE EDUCATION/TRAINING PROGRAM

## 2025-03-07 NOTE — CARDIAC REMOTE MONITOR - SCAN
Device transmission reviewed. Device demonstrated appropriate function.       Electronically Signed by: Echo Arauz MD, PhD    3/12/2025  12:58 PM

## 2025-03-19 LAB — EKG IMPRESSION: NORMAL

## 2025-04-07 ENCOUNTER — NON-PROVIDER VISIT (OUTPATIENT)
Dept: CARDIOLOGY | Facility: MEDICAL CENTER | Age: 70
End: 2025-04-07
Payer: MEDICARE

## 2025-04-07 PROCEDURE — 93298 REM INTERROG DEV EVAL SCRMS: CPT | Performed by: INTERNAL MEDICINE

## 2025-04-07 NOTE — CARDIAC REMOTE MONITOR - SCAN
Device transmission reviewed. Device demonstrated appropriate function.       Electronically Signed by: Lazaro Hirsch M.D.    4/10/2025  4:40 PM

## 2025-04-24 NOTE — PROCEDURE: COUNSELING
Pt arrives via EMS for lightheadedness since 0700. Hx anxiety, unsure if this is from that. BG 92. Pt states she cannot describe exactly how she feels.   
Detail Level: Simple
Detail Level: Zone
Detail Level: Detailed

## 2025-05-06 ENCOUNTER — OFFICE VISIT (OUTPATIENT)
Dept: CARDIOLOGY | Facility: MEDICAL CENTER | Age: 70
End: 2025-05-06
Attending: NURSE PRACTITIONER
Payer: MEDICARE

## 2025-05-06 VITALS
DIASTOLIC BLOOD PRESSURE: 76 MMHG | BODY MASS INDEX: 23.39 KG/M2 | HEART RATE: 47 BPM | OXYGEN SATURATION: 92 % | WEIGHT: 149 LBS | HEIGHT: 67 IN | RESPIRATION RATE: 14 BRPM | SYSTOLIC BLOOD PRESSURE: 104 MMHG

## 2025-05-06 DIAGNOSIS — Z95.818 PRESENCE OF WATCHMAN LEFT ATRIAL APPENDAGE CLOSURE DEVICE: ICD-10-CM

## 2025-05-06 DIAGNOSIS — I63.411 CEREBROVASCULAR ACCIDENT (CVA) DUE TO EMBOLISM OF RIGHT MIDDLE CEREBRAL ARTERY (HCC): ICD-10-CM

## 2025-05-06 DIAGNOSIS — Z45.09 ENCOUNTER FOR LOOP RECORDER CHECK: ICD-10-CM

## 2025-05-06 DIAGNOSIS — I48.0 PAROXYSMAL ATRIAL FIBRILLATION (HCC): ICD-10-CM

## 2025-05-06 DIAGNOSIS — Z98.890 H/O CARDIAC RADIOFREQUENCY ABLATION: ICD-10-CM

## 2025-05-06 PROBLEM — D68.318 CIRCULATING ANTICOAGULANTS (HCC): Status: RESOLVED | Noted: 2023-09-21 | Resolved: 2025-05-06

## 2025-05-06 LAB — EKG IMPRESSION: NORMAL

## 2025-05-06 PROCEDURE — 3078F DIAST BP <80 MM HG: CPT | Performed by: NURSE PRACTITIONER

## 2025-05-06 PROCEDURE — 99214 OFFICE O/P EST MOD 30 MIN: CPT | Performed by: NURSE PRACTITIONER

## 2025-05-06 PROCEDURE — 93005 ELECTROCARDIOGRAM TRACING: CPT | Mod: TC | Performed by: NURSE PRACTITIONER

## 2025-05-06 PROCEDURE — 99212 OFFICE O/P EST SF 10 MIN: CPT | Performed by: NURSE PRACTITIONER

## 2025-05-06 PROCEDURE — 3074F SYST BP LT 130 MM HG: CPT | Performed by: NURSE PRACTITIONER

## 2025-05-06 RX ORDER — CYCLOSPORINE 0 G/ML
SOLUTION/ DROPS OPHTHALMIC; TOPICAL
COMMUNITY
Start: 2025-04-21

## 2025-05-06 ASSESSMENT — ENCOUNTER SYMPTOMS
WEIGHT LOSS: 0
FEVER: 0
LOSS OF CONSCIOUSNESS: 0
WHEEZING: 0
SENSORY CHANGE: 0
CHILLS: 0
PND: 0
SHORTNESS OF BREATH: 0
SPUTUM PRODUCTION: 0
SPEECH CHANGE: 0
VOMITING: 0
ORTHOPNEA: 0
TINGLING: 0
HEMOPTYSIS: 0
PALPITATIONS: 1
TREMORS: 0
HEARTBURN: 0
COUGH: 0
FOCAL WEAKNESS: 0
DIZZINESS: 0
NAUSEA: 0
HEADACHES: 0

## 2025-05-06 ASSESSMENT — FIBROSIS 4 INDEX: FIB4 SCORE: 1.91

## 2025-05-06 NOTE — PROGRESS NOTES
"Chief Complaint   Patient presents with    Atrial Fibrillation     F/v Dx:Paroxysmal atrial fibrillation (HCC)       Subjective     Ariane Szymanski is a 70 y.o. female who presents today for follow-up.    She is followed by Dr. Moody for EP.  Arrhythmia history significant for paroxysmal atrial fibrillation status post prior ablation x 2.  ILR for close A-fib monitoring.  Prior history of MCA CVA when previously weaned off anticoagulation.  She underwent EMILY closure with Watchman 9/10/2024 as certain medications used to treat her essential tremor interacted with Xarelto.  She has been off of Xarelto since November 2024 she was initially placed on Plavix and aspirin after Xarelto discontinuance, however with significant epistaxis therefore clopidogrel was stopped.    Today Ariane presents with her .  Reports overall doing well and feeling well.  Has had a few definite episodes of atrial fibrillation which have broke with pill in the pocket flecainide.  She reports she did stop her propranolol abruptly and she did have episode of A-fib and elevated heart rate after this.  She is now back on.  She was potentially considering a trial for her essential tremor with use of calcium channel blockers but has decided against this.  We discussed with her flecainide and atrial arrhythmias and ectopy ongoing need for some sort of blocking agent so if she ever decides to participate in a trial of a different medication would like her to check in with us prior to ensure that she is not over/under medicated with some of these calos agents.  Past Medical History:   Diagnosis Date    Anxiety     Arrhythmia     afib    ASTHMA 4/9/21:  Patient denies    Atrial fibrillation (HCC)     Bowel habit changes     constipation    Breath shortness     cardiac related    Cataract 04/09/2001    \"Small, not affecting eyes\"    Chickenpox     Constipation     Dental disorder     Patient states dentist told her she has a tooth infection and needs " a tooth pulled but no antibiotics prescribed    Depression 04/09/2021    Patient denies    Frequent urination     GERD (gastroesophageal reflux disease)     Heartburn     Hemorrhagic disorder (HCC)     Takes Xarelto    Hiatal hernia with GERD 11/05/2019    Hiatus hernia syndrome     High cholesterol     History of esophageal stricture 11/05/2019    Indigestion     Kidney stone     Osteoporosis     PAC (premature atrial contraction)     Painful joint     Palpitations     Palpitations     Premature atrial contractions     PVC's (premature ventricular contractions)     Rhinitis     Ringing in ears     Stroke (HCC) 04/09/2021 Jan. 2021.  Blood clot in brain RT A-Fib    Tonsillitis     Urinary bladder disorder     urinary frequency    Wears glasses      Past Surgical History:   Procedure Laterality Date    COLONOSCOPY WITH POLYP  06/2008    benign polyp  Dr Paul     CHOLECYSTECTOMY  01/2008    laparoscopic    EGD WITH ASP/BX  09/2006    inflammation only ---Dr Paul    CHOLECYSTECTOMY      MAMMOPLASTY AUGMENTATION Bilateral     OTHER      loop recorder    OTHER CARDIAC SURGERY  Around 2000    Ablation    SINUSOTOMIES       Family History   Problem Relation Age of Onset    Cancer Father 70        cancer of unknown primary    Heart Disease Mother     Heart Disease Son 42        MI and coronary stent placement    Diabetes Son     Hypertension Son     Diabetes Sister      Social History     Socioeconomic History    Marital status:      Spouse name: Not on file    Number of children: Not on file    Years of education: Not on file    Highest education level: Not on file   Occupational History    Occupation: retired   Tobacco Use    Smoking status: Never    Smokeless tobacco: Never   Vaping Use    Vaping status: Never Used   Substance and Sexual Activity    Alcohol use: No     Alcohol/week: 0.0 oz    Drug use: No    Sexual activity: Yes     Partners: Male     Birth control/protection: Post-Menopausal   Other Topics  Concern    Not on file   Social History Narrative    Not on file     Social Drivers of Health     Financial Resource Strain: Not on file   Food Insecurity: Not on file   Transportation Needs: Not on file   Physical Activity: Not on file   Stress: Not on file   Social Connections: Not on file   Intimate Partner Violence: Not on file   Housing Stability: Not on file     Allergies   Allergen Reactions    Amitriptyline      Excessive sedation --even at 10 mg dose    Lovastatin      Muscle aches  Muscle aches      Trazodone      Irregular pulse    Bactrim [Sulfamethoxazole W-Trimethoprim]      Shaking    Levaquin      Nausea      Outpatient Encounter Medications as of 5/6/2025   Medication Sig Dispense Refill    meclizine (ANTIVERT) 12.5 MG Tab Take 1 Tablet by mouth 3 times a day as needed (for vertigo). 90 Tablet 0    flecainide (TAMBOCOR) 50 MG tablet TAKE 1 TABLET BY MOUTH 2 TIMES A DAY AS NEEDED FOR AFIB EPISODES 180 Tablet 1    primidone (MYSOLINE) 50 MG Tab Take 1 Tablet by mouth 2 (two) times a day. Start according to clinic instructions. For tremors 180 Tablet 3    aspirin 81 MG EC tablet Take 1 Tablet by mouth every day. 100 Tablet 3    calcium carbonate (TUMS) 500 MG Chew Tab Chew 500 mg as needed.          **MEDICATION INSTRUCTIONS FOR SURGERY/PROCEDURE SCHEDULED FOR 11/15/2024   OK TO CONTINUE TAKING PRIOR TO PROCEDURE. DO NOT TAKE MORNING OF PROCEDURE.      famotidine (PEPCID) 20 MG Tab Take 20 mg by mouth every morning.        **MEDICATION INSTRUCTIONS FOR SURGERY/PROCEDURE SCHEDULED FOR 11/15/2024   OK TO CONTINUE TAKING PRIOR TO PROCEDURE AND DAY OF PROCEDURE.      propranolol (INDERAL) 10 MG Tab Take 1 Tablet by mouth 2 times a day for 360 days. For tremors 180 Tablet 3    pantoprazole (PROTONIX) 40 MG Tablet Delayed Response Take 40 mg by mouth every morning.      **MEDICATION INSTRUCTIONS FOR SURGERY/PROCEDURE SCHEDULED FOR 11/15/2024   OK TO CONTINUE TAKING PRIOR TO PROCEDURE AND DAY OF PROCEDURE.    "   Magnesium 250 MG Tab Take 250 mg by mouth every morning.        **MEDICATION INSTRUCTIONS FOR SURGERY/PROCEDURE SCHEDULED FOR 11/15/2024  DO NOT TAKE 7 DAYS PRIOR TO SURGERY      Cholecalciferol (VITAMIN D-3 PO) Take 1 Tablet by mouth every morning. Patient unsure of dose.    MEDICATION INSTRUCTIONS FOR SURGERY/PROCEDURE SCHEDULED FOR 09-10-24   DO NOT TAKE 7 DAYS PRIOR TO SURGERY      esomeprazole (NEXIUM) 40 MG delayed-release capsule Take 1 Capsule by mouth every morning before breakfast. (Patient not taking: Reported on 11/15/2024) 90 Capsule 0     No facility-administered encounter medications on file as of 5/6/2025.     Review of Systems   Constitutional:  Negative for chills, fever, malaise/fatigue and weight loss.   HENT:  Negative for tinnitus.    Respiratory:  Negative for cough, hemoptysis, sputum production, shortness of breath and wheezing.    Cardiovascular:  Positive for palpitations. Negative for chest pain, orthopnea, leg swelling and PND.   Gastrointestinal:  Negative for heartburn, nausea and vomiting.   Neurological:  Negative for dizziness, tingling, tremors, sensory change, speech change, focal weakness, loss of consciousness and headaches.   All other systems reviewed and are negative.             Objective     /76 (BP Location: Left arm, Patient Position: Sitting, BP Cuff Size: Adult)   Pulse (!) 47   Resp 14   Ht 1.702 m (5' 7\")   Wt 67.6 kg (149 lb)   LMP 01/01/2004   SpO2 92%   BMI 23.34 kg/m²     Physical Exam  Vitals reviewed.   Constitutional:       Appearance: Normal appearance.   HENT:      Head: Normocephalic and atraumatic.      Mouth/Throat:      Mouth: Mucous membranes are moist.      Pharynx: Oropharynx is clear.   Eyes:      Extraocular Movements: Extraocular movements intact.      Conjunctiva/sclera: Conjunctivae normal.      Pupils: Pupils are equal, round, and reactive to light.   Cardiovascular:      Rate and Rhythm: Normal rate and regular rhythm.      " "Pulses: Normal pulses.   Pulmonary:      Effort: Pulmonary effort is normal.      Breath sounds: Normal breath sounds. No wheezing, rhonchi or rales.   Musculoskeletal:      Cervical back: Normal range of motion and neck supple.      Right lower leg: No edema.      Left lower leg: No edema.   Skin:     General: Skin is warm and dry.      Capillary Refill: Capillary refill takes 2 to 3 seconds.   Neurological:      Mental Status: She is alert and oriented to person, place, and time.   Psychiatric:         Mood and Affect: Mood normal.         Behavior: Behavior normal.         Thought Content: Thought content normal.         Judgment: Judgment normal.          Assessment & Plan     1. Paroxysmal atrial fibrillation (HCC)  EKG      2. H/O cardiac radiofrequency ablation 8/18/21 Dr Moody.  PVI (extension of the RPV), PWI and RVOT (PVCs)        3. Presence of Watchman left atrial appendage closure device, implanted 9/10/24 Dr Moody        4. Cerebrovascular accident (CVA) due to embolism of right middle cerebral artery (HCC)        5. Encounter for loop recorder check          Medical Decision Making: Today's Assessment/Status/Plan:   1.  Atrial fibrillation  - In sinus rhythm with PACs today.  Recorder check shows overall low burden of atrial fibrillation, though a few episodes.  She does not has very frequent sinus rhythm with frequent PACs which sometimes is also intact as A-fib and her loop checks.  -She will continue pill in the pocket flecainide 50 mg as needed A-fib episodes.  -She will continue propranolol 10 mg twice daily.  She will check in with us if she ever considers changing off of propranolol for her essential tremor.  -She has history of ablation x 2.    2.  EMILY closure with WATCHMAN  -Implanted September 2024.  She continues on aspirin 81 mg enteric-coated.    3.  ILR check  - See discussion #1.  Implanted 2021, battery status is \"good\"    Return to clinic in 6 months for review, sooner if clinical " condition changes.  PLEASE NOTE: This Note was created using voice recognition Software. I have made every reasonable attempt to correct obvious errors, but I expect that there are errors of grammar and possibly content that I did not discover before finalizing the note

## 2025-05-08 ENCOUNTER — NON-PROVIDER VISIT (OUTPATIENT)
Dept: CARDIOLOGY | Facility: MEDICAL CENTER | Age: 70
End: 2025-05-08
Payer: MEDICARE

## 2025-05-08 PROCEDURE — 93298 REM INTERROG DEV EVAL SCRMS: CPT | Mod: 26 | Performed by: INTERNAL MEDICINE

## 2025-05-08 NOTE — CARDIAC REMOTE MONITOR - SCAN
Device transmission reviewed. Device demonstrated appropriate function.       Electronically Signed by: Lazaro Hirsch M.D.    5/8/2025  3:28 PM

## 2025-06-05 ENCOUNTER — TELEPHONE (OUTPATIENT)
Dept: NEUROLOGY | Facility: MEDICAL CENTER | Age: 70
End: 2025-06-05
Payer: MEDICARE

## 2025-06-06 ENCOUNTER — OFFICE VISIT (OUTPATIENT)
Dept: NEUROLOGY | Facility: MEDICAL CENTER | Age: 70
End: 2025-06-06
Attending: INTERNAL MEDICINE
Payer: MEDICARE

## 2025-06-06 VITALS
WEIGHT: 153.66 LBS | HEART RATE: 54 BPM | BODY MASS INDEX: 24.12 KG/M2 | RESPIRATION RATE: 16 BRPM | HEIGHT: 67 IN | DIASTOLIC BLOOD PRESSURE: 68 MMHG | TEMPERATURE: 98.3 F | OXYGEN SATURATION: 96 % | SYSTOLIC BLOOD PRESSURE: 102 MMHG

## 2025-06-06 DIAGNOSIS — G25.0 ESSENTIAL TREMOR: Primary | ICD-10-CM

## 2025-06-06 PROCEDURE — 99214 OFFICE O/P EST MOD 30 MIN: CPT | Performed by: INTERNAL MEDICINE

## 2025-06-06 PROCEDURE — G2211 COMPLEX E/M VISIT ADD ON: HCPCS | Performed by: INTERNAL MEDICINE

## 2025-06-06 PROCEDURE — 3078F DIAST BP <80 MM HG: CPT | Performed by: INTERNAL MEDICINE

## 2025-06-06 PROCEDURE — 3074F SYST BP LT 130 MM HG: CPT | Performed by: INTERNAL MEDICINE

## 2025-06-06 PROCEDURE — 99213 OFFICE O/P EST LOW 20 MIN: CPT | Performed by: INTERNAL MEDICINE

## 2025-06-06 RX ORDER — CLONAZEPAM 0.25 MG/1
.5-1 TABLET, ORALLY DISINTEGRATING ORAL EVERY MORNING
Qty: 30 TABLET | Refills: 5 | Status: SHIPPED | OUTPATIENT
Start: 2025-06-06 | End: 2025-12-03

## 2025-06-06 ASSESSMENT — PATIENT HEALTH QUESTIONNAIRE - PHQ9
5. POOR APPETITE OR OVEREATING: 0 - NOT AT ALL
SUM OF ALL RESPONSES TO PHQ QUESTIONS 1-9: 5
CLINICAL INTERPRETATION OF PHQ2 SCORE: 3

## 2025-06-06 ASSESSMENT — FIBROSIS 4 INDEX: FIB4 SCORE: 1.91

## 2025-06-06 NOTE — PROGRESS NOTES
[facility]   [address], [address]. [place] [address]  Phone: [phone], Fax: [phone]    Joao Carroll DO  Neurology, Movement Disorders Patient Name: Susy Szymanski  : 1955  MRN: [id number]     ASSESSMENT / PLAN     Susy Szymanski is a 69 y.o. RHD female presenting for tremors     Essential tremor  Onset since her teenage years, but worsened in the past 5 years.  Exam notable for bilateral intention and postural hand tremors, with mild generalized tremors as well.  No bradykinesia or rigidity noted.  This is consistent with essential tremor. Donedranone doesn't appear to worsen tremors as much as amiodarone per literature review.      Propranolol at higher dose caused low HR and BP, limited by 10-20mg daily.   Other medications tried as noted in HPI. Consider carbidopa-levodopa next.   Holding off on [facility] referral for MRgFUS.   Nimo KiQ has been working very well since starting 2024    - Propranolol 10mg 2x/day. Dosing per cardiology. Continuing as tremors have been benefiting  - Nimo KiQ PRN tremors      Orders Placed This Encounter    Clonazepam 0.25 MG TABLET DISPERSIBLE     Return in about 8 months (around 2026).    BILLING DOCUMENTATION:   [facility] Level 4              HISTORY OF PRESENT ILLNESS     Susy Szymanski is a 69 y.o. RHD female presenting for tremors     PMHx: atrial fibrillation status post ablation on Xarelto, depression, GERD, osteoporosis.      Initial HPI 24  Stroke history  2021: acute left side weakness, received TPA and had thrombectomy for right M2 occlusion.    Tremor  Dr. Mota 2022:  essential tremor, currently on propranolol 20 mg once daily, with progressively worsening tremor.   She still has problems eating and cannot write well.   Her heart rate has been in the 40's on propranolol 20mg twice daily. Propranolol has been helpful but the tremor is still bothersome.     Onset approx since teens. Worsened in the past 5  "years. Hand writing, drinking, eating. Socially bothersome. Worse with anxiety.   Family members: mother, mild tremors    - topiramate: didn't try it, concerned about osteoporosis, kidney stones in past.   - primidone 25mg: too sedating  - gabapentin: 100mg-300mg: too sedating  - keppra 500mg: too sedating      Current regimen  Propranolol: has atrial fibrillation, but has lower HR at times. Takes it in AM, 20mg daily. 10mg BID was helpful too. No longer taking 20mg BID due to low HR.   Efficacy: used to be 70%, now 30%.    Nimo KiQ PRN       Interval history  3/2024: First visit with me. Started gabapentin 100mg up to 300mg nightly but was too sedating and caused weight gain  05/16/24: start keppra 500mg BID, but too sedating and not helpful for tremors. Referral to [facility] for focused ultrasound, but wasn't ready.  08/28/24: start Nimo KiQ approval, received 9/2024.  9/2024: had left atrial closure with watchman device  11/19/24: no med changes  11/26/24: restarted Primidone 50mg BID as no longer on Xarelto but too sedating  06/06/25: start clonazepam 0.25mg qAM      Past Medical History:   Diagnosis Date    Anxiety     Arrhythmia     afib    ASTHMA 4/9/21:  Patient denies    Atrial fibrillation (HCC)     Bowel habit changes     constipation    Breath shortness     cardiac related    Cataract 04/09/2001    \"Small, not affecting eyes\"    Chickenpox     Constipation     Dental disorder     Patient states dentist told her she has a tooth infection and needs a tooth pulled but no antibiotics prescribed    Depression 04/09/2021    Patient denies    Frequent urination     GERD (gastroesophageal reflux disease)     Heartburn     Hemorrhagic disorder (HCC)     Takes Xarelto    Hiatal hernia with GERD 11/05/2019    Hiatus hernia syndrome     High cholesterol     History of esophageal stricture 11/05/2019    Indigestion     Kidney stone     Osteoporosis     PAC (premature atrial contraction)     Painful joint     " Palpitations     Palpitations     Premature atrial contractions     PVC's (premature ventricular contractions)     Rhinitis     Ringing in ears     Stroke (HCC) 04/09/2021 Jan. 2021.  Blood clot in brain RT A-Fib    Tonsillitis     Urinary bladder disorder     urinary frequency    Wears glasses      Past Surgical History:   Procedure Laterality Date    COLONOSCOPY WITH POLYP  06/2008    benign polyp  Dr Paul     CHOLECYSTECTOMY  01/2008    laparoscopic    EGD WITH ASP/BX  09/2006    inflammation only ---Dr Paul    CHOLECYSTECTOMY      MAMMOPLASTY AUGMENTATION Bilateral     OTHER      loop recorder    OTHER CARDIAC SURGERY  Around 2000    Ablation    SINUSOTOMIES       Family History   Problem Relation Age of Onset    Cancer Father 70        cancer of unknown primary    Heart Disease Mother     Heart Disease Son 42        MI and coronary stent placement    Diabetes Son     Hypertension Son     Diabetes Sister      Social History     Socioeconomic History    Marital status:      Spouse name: Not on file    Number of children: Not on file    Years of education: Not on file    Highest education level: Not on file   Occupational History    Occupation: retired   Tobacco Use    Smoking status: Never    Smokeless tobacco: Never   Vaping Use    Vaping status: Never Used   Substance and Sexual Activity    Alcohol use: No     Alcohol/week: 0.0 oz    Drug use: No    Sexual activity: Yes     Partners: Male     Birth control/protection: Post-Menopausal   Other Topics Concern    Not on file   Social History Narrative    Not on file     Social Drivers of Health     Financial Resource Strain: Not on file   Food Insecurity: Not on file   Transportation Needs: Not on file   Physical Activity: Not on file   Stress: Not on file   Social Connections: Not on file   Intimate Partner Violence: Not on file   Housing Stability: Not on file     Current Outpatient Medications   Medication    Clonazepam 0.25 MG TABLET DISPERSIBLE     CEQUA 0.09 % Solution    meclizine (ANTIVERT) 12.5 MG Tab    flecainide (TAMBOCOR) 50 MG tablet    aspirin 81 MG EC tablet    calcium carbonate (TUMS) 500 MG Chew Tab    famotidine (PEPCID) 20 MG Tab    propranolol (INDERAL) 10 MG Tab    pantoprazole (PROTONIX) 40 MG Tablet Delayed Response    Magnesium 250 MG Tab    Cholecalciferol (VITAMIN D-3 PO)    esomeprazole (NEXIUM) 40 MG delayed-release capsule     No current facility-administered medications for this visit.     Allergies   Allergen Reactions    Amitriptyline      Excessive sedation --even at 10 mg dose    Lovastatin      Muscle aches  Muscle aches      Trazodone      Irregular pulse    Bactrim [Sulfamethoxazole W-Trimethoprim]      Shaking    Levaquin      Nausea              DATA / RESULTS     25-Hydroxy   Vitamin D 25   Date Value Ref Range Status   02/15/2023 37 30 - 100 ng/mL Final     Comment:     Adult Ranges:   <20 ng/mL - Deficiency  20-29 ng/mL - Insufficiency   ng/mL - Sufficiency  Electrochemiluminescence binding assay performed using Roche nnamdi e  immunoassay analyzer.  The [employer] Vitamin D total II assay is intended for  the quantitative determination of total 25 hydroxyvitamin D in human serum  and plasma. This assay is to be used as an aid in the assessment of vitamin  D sufficiency in adults.       TSH   Date Value Ref Range Status   02/15/2023 1.560 0.380 - 5.330 uIU/mL Final     Comment:     The 2011 American Thyroid Association (LIONEL) guidelines  recommended that the interpretation of thyroid function in  pregnancy be based on trimester specific reference ranges.    1st Trimester  0.100-2.500 mIU/L  2nd Trimester  0.200-3.000 mIU/L  3rd Trimester  0.300-3.500 mIU/L    These established reference ranges have not been validated  at [facility].       Glycohemoglobin   Date Value Ref Range Status   02/15/2023 5.3 4.0 - 5.6 % Final     Comment:     Increased risk for diabetes:  5.7 -6.4%  Diabetes:  >6.4%  Glycemic  "control for adults with diabetes:  <7.0%    The above interpretations are per ADA guidelines.  Diagnosis  of diabetes mellitus on the basis of elevated Hemoglobin A1c  should be confirmed by repeating the Hb A1c test.       LDL   Date Value Ref Range Status   02/26/2021 92 <100 mg/dL Final                OBJECTIVE      Vitals:    06/06/25 1334   BP: 102/68   BP Location: Left arm   Patient Position: Sitting   BP Cuff Size: Adult   Pulse: (!) 54   Resp: 16   Temp: 36.8 °C (98.3 °F)   TempSrc: Temporal   SpO2: 96%   Weight: 69.7 kg (153 lb 10.6 oz)   Height: 1.702 m (5' 7\")       Physical Exam  Last dose of propranolol taken this AM      UPDRS Right Left   Finger tapping 0 0   Hand Movement 0 0   Toe Tapping       Leg Agility       Rigidity       Rest Tremor 0 0   Postural Tremor 1cm worse 1cm worse   Kinetic Tremor <1cm <1cm         PROCEDURE   N/A  "

## 2025-06-08 ENCOUNTER — NON-PROVIDER VISIT (OUTPATIENT)
Dept: CARDIOLOGY | Facility: MEDICAL CENTER | Age: 70
End: 2025-06-08
Payer: MEDICARE

## 2025-06-09 PROCEDURE — 93298 REM INTERROG DEV EVAL SCRMS: CPT | Mod: 26 | Performed by: INTERNAL MEDICINE

## 2025-06-09 NOTE — CARDIAC REMOTE MONITOR - SCAN
Device transmission reviewed. Device demonstrated appropriate function.       Electronically Signed by: Josue Moody M.D.    6/17/2025  12:33 PM

## 2025-07-09 ENCOUNTER — NON-PROVIDER VISIT (OUTPATIENT)
Dept: CARDIOLOGY | Facility: MEDICAL CENTER | Age: 70
End: 2025-07-09
Payer: MEDICARE

## 2025-07-09 PROCEDURE — 93298 REM INTERROG DEV EVAL SCRMS: CPT | Mod: 26 | Performed by: INTERNAL MEDICINE

## 2025-07-09 NOTE — CARDIAC REMOTE MONITOR - SCAN
Device transmission reviewed. Device demonstrated appropriate function.       Electronically Signed by: Lazaro Hirsch M.D.    7/10/2025  3:55 PM

## 2025-08-09 ENCOUNTER — NON-PROVIDER VISIT (OUTPATIENT)
Dept: CARDIOLOGY | Facility: MEDICAL CENTER | Age: 70
End: 2025-08-09
Payer: MEDICARE

## 2025-08-09 PROCEDURE — 93298 REM INTERROG DEV EVAL SCRMS: CPT | Mod: 26 | Performed by: STUDENT IN AN ORGANIZED HEALTH CARE EDUCATION/TRAINING PROGRAM

## 2025-08-20 DIAGNOSIS — G25.0 ESSENTIAL TREMOR: ICD-10-CM

## 2025-08-20 RX ORDER — PROPRANOLOL HYDROCHLORIDE 10 MG/1
TABLET ORAL
Qty: 180 TABLET | Refills: 3 | Status: SHIPPED | OUTPATIENT
Start: 2025-08-20